# Patient Record
Sex: MALE | Race: BLACK OR AFRICAN AMERICAN | NOT HISPANIC OR LATINO
[De-identification: names, ages, dates, MRNs, and addresses within clinical notes are randomized per-mention and may not be internally consistent; named-entity substitution may affect disease eponyms.]

---

## 2019-08-20 ENCOUNTER — APPOINTMENT (OUTPATIENT)
Dept: UROLOGY | Facility: CLINIC | Age: 69
End: 2019-08-20
Payer: MEDICARE

## 2019-08-20 VITALS — BODY MASS INDEX: 26.52 KG/M2 | HEIGHT: 68 IN | WEIGHT: 175 LBS

## 2019-08-20 DIAGNOSIS — Z78.9 OTHER SPECIFIED HEALTH STATUS: ICD-10-CM

## 2019-08-20 DIAGNOSIS — R10.30 LOWER ABDOMINAL PAIN, UNSPECIFIED: ICD-10-CM

## 2019-08-20 PROCEDURE — 99204 OFFICE O/P NEW MOD 45 MIN: CPT

## 2019-08-20 NOTE — LETTER HEADER
[FreeTextEntry3] : Lee Montague MD\par Director of Robotic and Minimally Invasive Urologic Surgery\par \par [facility]\par Division of Urology\par [address]\par [address]\[place] [address]\par Tel [phone]\par Fax [phone]\par \par

## 2019-08-20 NOTE — LETTER BODY
[Dear  ___] : Dear  [unfilled], [Consult Letter:] : I had the pleasure of evaluating your patient, [unfilled]. [Please see my note below.] : Please see my note below. [Consult Closing:] : Thank you very much for allowing me to participate in the care of this patient.  If you have any questions, please do not hesitate to contact me. [Sincerely,] : Sincerely, [FreeTextEntry2] : Dr. Scar Peace\par [address]\[place] [address] [FreeTextEntry3] : Lee Montague MD\par Director of Robotic and Minimally Invasive Urologic Surgery\par [facility]\par

## 2019-08-20 NOTE — PHYSICAL EXAM
[General Appearance - Well Developed] : well developed [General Appearance - Well Nourished] : well nourished [Well Groomed] : well groomed [Normal Appearance] : normal appearance [General Appearance - In No Acute Distress] : no acute distress [Edema] : no peripheral edema [Respiration, Rhythm And Depth] : normal respiratory rhythm and effort [Exaggerated Use Of Accessory Muscles For Inspiration] : no accessory muscle use [Abdomen Soft] : soft [Abdomen Tenderness] : non-tender [Costovertebral Angle Tenderness] : no ~M costovertebral angle tenderness [Urethral Meatus] : meatus normal [Urinary Bladder Findings] : the bladder was normal on palpation [Penis Abnormality] : normal uncircumcised penis [Scrotum] : the scrotum was normal [Testes Tenderness] : no tenderness of the testes [Testes Mass (___cm)] : there were no testicular masses [No Prostate Nodules] : no prostate nodules [Prostate Tenderness] : the prostate was not tender [Prostate Size ___ gm] : prostate size [unfilled] gm [Normal Station and Gait] : the gait and station were normal for the patient's age [] : no rash [Oriented To Time, Place, And Person] : oriented to person, place, and time [No Focal Deficits] : no focal deficits [Affect] : the affect was normal [Mood] : the mood was normal [Not Anxious] : not anxious [FreeTextEntry1] : There is a firm, Indurated Non-mobile 2 cm mass (post to the perineal body) tracking to the rectum.

## 2019-08-20 NOTE — HISTORY OF PRESENT ILLNESS
[Nocturia] : nocturia [Weak Stream] : weak stream [FreeTextEntry1] : Yemi is a 69-year-old male who was sent for consultation regarding a perineal "lump" that he felt approximately 2 months ago. He is unsure if it has changed in size or quality.\par \par He denies any pain, drainage, dysuria, gross hematuria, abdominal/flank pain, suprapubic discomfort, testicular discomfort, drainage from the area, fever, chills, or further constitutional symptoms.\par Denies hx of perianal fistulas.\par \par At baseline he is reporting weeks drain and 2 episodes of Nocturia with occasional feelings of incomplete emptying.\par \par His IPSS is 9, indicating moderate symptoms, and he is mostly unsatisfied with the way he is voiding.\par His IIEF is 7 indicating severe ED. He has difficulty with both Obtaining/maintaining an erection. He has never tried PDE 5 inhibitors before.\par \par Old records reviewed and his PSA is 0.2 from 2018. His prior PSA titers have covered between 0.3-0.2 for the last 7 years.\par BMP from February 2018 shows a creatinine of 1.16 an eGFR of 75\par His hemoglobin A1c is 5.9 and borderline

## 2019-08-20 NOTE — ASSESSMENT
[FreeTextEntry1] : Yemi is a 69-year-old male who presents for evaluation of a perineal solid nodule in close proximity to the anus. Given it is indurated and somewhat fixed I would recommend consultation with colorectal surgery Dr Jaramillo with an MRI prior.\par ?neoplasm vs. fibrosed perianal fistulous tract?\par \par For mild LUTS patient is not bothered at this time and he prefers observation. UA\par \par Re Erectile dysfunction, recommending sildenafil.\par \par Based on the patient's history, he was prescribed a PDE5 inhibior. The patient understands that the risks of this medication include facial redness, flushing, nasal congestion, GERD, back pain, priapism, chest pain/MI, dizziness, drop in BP, changes in vision. ER precautions were given. The patient has been screened for potential medication interactions. He is not on any nitrates, po antifungals or HIV meds. He is not on alpha blockers.\par  I recommended that the patient take the first dose by himself. He knows that the medication requires approximately 45 minutes to have effect and works best on an empty stomach. He is aware sexual stimulation is required.\par \par \par

## 2019-08-21 ENCOUNTER — TRANSCRIPTION ENCOUNTER (OUTPATIENT)
Age: 69
End: 2019-08-21

## 2019-08-27 ENCOUNTER — OUTPATIENT (OUTPATIENT)
Dept: OUTPATIENT SERVICES | Facility: HOSPITAL | Age: 69
LOS: 1 days | Discharge: HOME | End: 2019-08-27
Payer: MEDICARE

## 2019-08-27 DIAGNOSIS — N50.89 OTHER SPECIFIED DISORDERS OF THE MALE GENITAL ORGANS: ICD-10-CM

## 2019-08-27 PROCEDURE — 72197 MRI PELVIS W/O & W/DYE: CPT | Mod: 26

## 2019-09-06 ENCOUNTER — OUTPATIENT (OUTPATIENT)
Dept: OUTPATIENT SERVICES | Facility: HOSPITAL | Age: 69
LOS: 1 days | Discharge: HOME | End: 2019-09-06

## 2019-09-06 ENCOUNTER — LABORATORY RESULT (OUTPATIENT)
Age: 69
End: 2019-09-06

## 2019-09-06 DIAGNOSIS — N50.89 OTHER SPECIFIED DISORDERS OF THE MALE GENITAL ORGANS: ICD-10-CM

## 2019-09-07 LAB
APPEARANCE: CLEAR
BILIRUBIN URINE: NEGATIVE
BLOOD URINE: NEGATIVE
COLOR: NORMAL
GLUCOSE QUALITATIVE U: NEGATIVE
KETONES URINE: NEGATIVE
LEUKOCYTE ESTERASE URINE: ABNORMAL
NITRITE URINE: POSITIVE
PH URINE: 6
PROTEIN URINE: NORMAL
SPECIFIC GRAVITY URINE: 1.01
UROBILINOGEN URINE: NORMAL

## 2019-09-17 ENCOUNTER — APPOINTMENT (OUTPATIENT)
Dept: SURGERY | Facility: CLINIC | Age: 69
End: 2019-09-17
Payer: MEDICARE

## 2019-09-17 VITALS
HEIGHT: 68 IN | DIASTOLIC BLOOD PRESSURE: 78 MMHG | WEIGHT: 182 LBS | BODY MASS INDEX: 27.58 KG/M2 | SYSTOLIC BLOOD PRESSURE: 130 MMHG

## 2019-09-17 DIAGNOSIS — I10 ESSENTIAL (PRIMARY) HYPERTENSION: ICD-10-CM

## 2019-09-17 DIAGNOSIS — E11.9 TYPE 2 DIABETES MELLITUS W/OUT COMPLICATIONS: ICD-10-CM

## 2019-09-17 DIAGNOSIS — E78.00 PURE HYPERCHOLESTEROLEMIA, UNSPECIFIED: ICD-10-CM

## 2019-09-17 PROCEDURE — 99203 OFFICE O/P NEW LOW 30 MIN: CPT | Mod: 25

## 2019-09-17 PROCEDURE — 46600 DIAGNOSTIC ANOSCOPY SPX: CPT

## 2019-09-18 PROBLEM — E11.9 DIABETES: Status: ACTIVE | Noted: 2019-09-17

## 2019-09-18 PROBLEM — E78.00 HIGH CHOLESTEROL: Status: ACTIVE | Noted: 2019-09-17

## 2019-09-18 PROBLEM — I10 HIGH BLOOD PRESSURE: Status: ACTIVE | Noted: 2019-09-17

## 2019-10-01 ENCOUNTER — APPOINTMENT (OUTPATIENT)
Dept: UROLOGY | Facility: CLINIC | Age: 69
End: 2019-10-01
Payer: MEDICARE

## 2019-10-01 ENCOUNTER — OUTPATIENT (OUTPATIENT)
Dept: OUTPATIENT SERVICES | Facility: HOSPITAL | Age: 69
LOS: 1 days | Discharge: HOME | End: 2019-10-01

## 2019-10-01 VITALS
HEIGHT: 68 IN | SYSTOLIC BLOOD PRESSURE: 135 MMHG | HEART RATE: 69 BPM | WEIGHT: 182 LBS | BODY MASS INDEX: 27.58 KG/M2 | DIASTOLIC BLOOD PRESSURE: 71 MMHG

## 2019-10-01 PROCEDURE — 99215 OFFICE O/P EST HI 40 MIN: CPT

## 2019-10-01 NOTE — LETTER BODY
[Dear  ___] : Dear  [unfilled], [Consult Letter:] : I had the pleasure of evaluating your patient, [unfilled]. [Please see my note below.] : Please see my note below. [Consult Closing:] : Thank you very much for allowing me to participate in the care of this patient.  If you have any questions, please do not hesitate to contact me. [Sincerely,] : Sincerely, [FreeTextEntry2] : Dr. Scar Peace\par [address]\[place] [address] [FreeTextEntry3] : Lee Montague MD\par Director of Robotic and Minimally Invasive Urologic Surgery\par [facility]\par

## 2019-10-01 NOTE — HISTORY OF PRESENT ILLNESS
[FreeTextEntry1] : Yemi is a 69-year-old male who presents for evaluation of a perineal solid nodule in close proximity to the anus. Patient saw Dr Jaramillo of colorectal surgery and anoscopy was neg, perianal fistula or colorectal pathology was ruled out.\par \par Patient underwent an MRI which showed --No evidence of a perianal fistulous tract. \par \par Abnormal signal in the corpora spongiosum along with two rounded structures \par coursing inferiorly along a tract of enhancement in the posterior \par peritoneum. These findings may be related to chronic fibrotic/granulomatous \par changes related to prior insult or injury. While enhancement can be seen in \par the setting of fibrotic changes, it can also be seen in the setting of \par neoplasia (though this is considered less likely). \par \par Urinalysis demonstrates microhematura (3 rbcs).\par Patient does report mild decreased force of stream. Denies gross hematuria.\par \par Old records reviewed and his PSA is 0.2 from 2018. His prior PSA titers have covered between 0.3-0.2 for the last 7 years.\par BMP from February 2018 shows a creatinine of 1.16 an eGFR of 75\par His hemoglobin A1c is 5.9 and borderline [Nocturia] : nocturia

## 2019-10-01 NOTE — LETTER HEADER
[FreeTextEntry3] : Lee Montague MD\par Director of Robotic and Minimally Invasive Urologic Surgery\par \par [facility]\par Division of Urology\par [address]\par [address]\[place] [address]\par Tel [phone]\par Fax [phone]\par \par

## 2019-10-01 NOTE — ASSESSMENT
[FreeTextEntry1] : Yemi is a 69-year-old male who presents for evaluation of a perineal solid nodule, stable on exam. No colorectal pathology. MRI found to have \par abnormal signal in the corpora spongiosum along with two rounded structures \par coursing inferiorly along a tract of enhancement in the posterior \par peritoneum. \par \par Patient also has microscopic hematuria and mild LUTS, we will perform standard microheme w/u w CTU, urine studies and cystoscopy.\par I am also recommending cystoscopy to evaluate for a urethral lesion or possible benign entity such as urethral stricture causing spongiofibrosis\par \par Re Erectile dysfunction, recommending sildenafil.\par \par

## 2019-10-01 NOTE — PHYSICAL EXAM
[General Appearance - Well Developed] : well developed [General Appearance - Well Nourished] : well nourished [Normal Appearance] : normal appearance [Well Groomed] : well groomed [General Appearance - In No Acute Distress] : no acute distress [Abdomen Soft] : soft [Abdomen Tenderness] : non-tender [Costovertebral Angle Tenderness] : no ~M costovertebral angle tenderness [Penis Abnormality] : normal uncircumcised penis [Urethral Meatus] : meatus normal [Urinary Bladder Findings] : the bladder was normal on palpation [Scrotum] : the scrotum was normal [Testes Tenderness] : no tenderness of the testes [Testes Mass (___cm)] : there were no testicular masses [Edema] : no peripheral edema [] : no respiratory distress [Exaggerated Use Of Accessory Muscles For Inspiration] : no accessory muscle use [Respiration, Rhythm And Depth] : normal respiratory rhythm and effort [Oriented To Time, Place, And Person] : oriented to person, place, and time [Affect] : the affect was normal [Mood] : the mood was normal [Not Anxious] : not anxious [Normal Station and Gait] : the gait and station were normal for the patient's age [No Focal Deficits] : no focal deficits [FreeTextEntry1] : There is a firm, Indurated Non-mobile 2 cm mass, previous prostate exam neg 45 g

## 2019-10-02 DIAGNOSIS — R31.29 OTHER MICROSCOPIC HEMATURIA: ICD-10-CM

## 2019-10-02 DIAGNOSIS — N50.89 OTHER SPECIFIED DISORDERS OF THE MALE GENITAL ORGANS: ICD-10-CM

## 2019-10-02 LAB
CREAT SERPL-MCNC: 1.1 MG/DL
URINE CYTOLOGY: NORMAL

## 2019-10-03 LAB — BACTERIA UR CULT: NORMAL

## 2019-10-08 ENCOUNTER — FORM ENCOUNTER (OUTPATIENT)
Age: 69
End: 2019-10-08

## 2019-10-09 ENCOUNTER — OUTPATIENT (OUTPATIENT)
Dept: OUTPATIENT SERVICES | Facility: HOSPITAL | Age: 69
LOS: 1 days | Discharge: HOME | End: 2019-10-09
Payer: MEDICARE

## 2019-10-09 DIAGNOSIS — R31.29 OTHER MICROSCOPIC HEMATURIA: ICD-10-CM

## 2019-10-09 PROCEDURE — 74178 CT ABD&PLV WO CNTR FLWD CNTR: CPT | Mod: 26

## 2019-10-11 NOTE — ASSESSMENT
[FreeTextEntry1] : Mr. SINGER clearly had a nodular lesion in the perineum. Currently it it appears to be resolved. Evaluation of his MRI does not identify a fistula. In fact the MRI states that there is currently no evidence of anal fistula. Fortunately for Mr. SINGER it appears that whatever process was going on has resolved. I have advised him that there certainly is a chance that that it can return. I've advised him to followup with Dr. GARCIA, andl I will see him back in one month for reevaluation. Certainly if the lesion returned as before that he should call and see me sooner.

## 2019-10-11 NOTE — HISTORY OF PRESENT ILLNESS
[FreeTextEntry1] : Is a new patient visit for Mr. SINGER who is referred with a possible anal fistula. Mr. LLANOS was seen and evaluated by Dr. GARCIA due to a perineal nodular lesion. On evaluation appeared to be possibly an anal fistula. He is referred now for colorectal evaluation. In the interim Mr. SINGER has undergone an MRI. Overall Mr. SINGER states that he feels well. He's had no further pain or drainage from the area. In fact he states that it feels like it went away.

## 2019-10-11 NOTE — PHYSICAL EXAM
[Multiple Sinus Tracts] : no perianal sinus tracts [Excoriation] : excoriations [Wart] : no warts [Fistula] : no fistulas [Pilonidal Sinus] : no pilonidal sinus [Ulcer ___ cm] : no ulcers [Pilonidal Cyst] : no pilonidal cysts [Pilonidal Sinus Draining] : no pilonidal sinus drainage [Nonprolapsing] : a nonprolapsing (grade I) [Skin Tags] : residual hemorrhoidal skin tags were noted [Normal] : was normal [Alert] : alert [None] : there was no rectal mass  [Oriented to Place] : oriented to place [Oriented to Person] : oriented to person [Calm] : calm [Oriented to Time] : oriented to time [de-identified] : No focal deficits. [de-identified] : Full range of motion [de-identified] : Healthy male, NAD

## 2019-10-11 NOTE — PROCEDURE
[FreeTextEntry1] : Anoscopy performed using a disposable anoscope.  The patient was place in the left lateral decubitus position. After JOSÉ was performed the anoscope was inserted and the distal rectum was evaluated along with the anal canal and anal margin.\par \par Findings:\par Extensive evaluation of the perineum reveals whatever nodule was clear appears to be resolved. Palpation throughout the perineum including the median raphae and scrotum does not reveal a nodule at this time. Mr. SE MENA did point to the area where the nodule was previously seen.\par \par JOSÉ, good tone no palpable mass.\par \par Anoscopy, grade 1 and 2 internal hemorrhoids, otherwise normal mucosa.

## 2019-10-22 ENCOUNTER — EMERGENCY (EMERGENCY)
Facility: HOSPITAL | Age: 69
LOS: 0 days | Discharge: HOME | End: 2019-10-23
Attending: EMERGENCY MEDICINE | Admitting: EMERGENCY MEDICINE
Payer: MEDICARE

## 2019-10-22 VITALS
TEMPERATURE: 96 F | SYSTOLIC BLOOD PRESSURE: 155 MMHG | HEIGHT: 68 IN | DIASTOLIC BLOOD PRESSURE: 72 MMHG | HEART RATE: 81 BPM | WEIGHT: 184.97 LBS | OXYGEN SATURATION: 98 % | RESPIRATION RATE: 18 BRPM

## 2019-10-22 DIAGNOSIS — R31.9 HEMATURIA, UNSPECIFIED: ICD-10-CM

## 2019-10-22 DIAGNOSIS — N39.0 URINARY TRACT INFECTION, SITE NOT SPECIFIED: ICD-10-CM

## 2019-10-22 PROCEDURE — 99284 EMERGENCY DEPT VISIT MOD MDM: CPT

## 2019-10-23 VITALS
HEART RATE: 78 BPM | RESPIRATION RATE: 18 BRPM | SYSTOLIC BLOOD PRESSURE: 145 MMHG | DIASTOLIC BLOOD PRESSURE: 68 MMHG | OXYGEN SATURATION: 99 % | TEMPERATURE: 97 F

## 2019-10-23 LAB
ALBUMIN SERPL ELPH-MCNC: 4.4 G/DL — SIGNIFICANT CHANGE UP (ref 3.5–5.2)
ALP SERPL-CCNC: 54 U/L — SIGNIFICANT CHANGE UP (ref 30–115)
ALT FLD-CCNC: 27 U/L — SIGNIFICANT CHANGE UP (ref 0–41)
ANION GAP SERPL CALC-SCNC: 12 MMOL/L — SIGNIFICANT CHANGE UP (ref 7–14)
APPEARANCE UR: CLEAR — SIGNIFICANT CHANGE UP
AST SERPL-CCNC: 36 U/L — SIGNIFICANT CHANGE UP (ref 0–41)
BACTERIA # UR AUTO: ABNORMAL
BASOPHILS # BLD AUTO: 0.04 K/UL — SIGNIFICANT CHANGE UP (ref 0–0.2)
BASOPHILS NFR BLD AUTO: 0.6 % — SIGNIFICANT CHANGE UP (ref 0–1)
BILIRUB SERPL-MCNC: 0.5 MG/DL — SIGNIFICANT CHANGE UP (ref 0.2–1.2)
BILIRUB UR-MCNC: NEGATIVE — SIGNIFICANT CHANGE UP
BUN SERPL-MCNC: 15 MG/DL — SIGNIFICANT CHANGE UP (ref 10–20)
CALCIUM SERPL-MCNC: 9.3 MG/DL — SIGNIFICANT CHANGE UP (ref 8.5–10.1)
CHLORIDE SERPL-SCNC: 102 MMOL/L — SIGNIFICANT CHANGE UP (ref 98–110)
CO2 SERPL-SCNC: 25 MMOL/L — SIGNIFICANT CHANGE UP (ref 17–32)
COLOR SPEC: YELLOW — SIGNIFICANT CHANGE UP
CREAT SERPL-MCNC: 1.1 MG/DL — SIGNIFICANT CHANGE UP (ref 0.7–1.5)
DIFF PNL FLD: ABNORMAL
EOSINOPHIL # BLD AUTO: 0.28 K/UL — SIGNIFICANT CHANGE UP (ref 0–0.7)
EOSINOPHIL NFR BLD AUTO: 4.1 % — SIGNIFICANT CHANGE UP (ref 0–8)
EPI CELLS # UR: 0 /HPF — SIGNIFICANT CHANGE UP (ref 0–5)
GLUCOSE SERPL-MCNC: 121 MG/DL — HIGH (ref 70–99)
GLUCOSE UR QL: NEGATIVE — SIGNIFICANT CHANGE UP
HCT VFR BLD CALC: 44.3 % — SIGNIFICANT CHANGE UP (ref 42–52)
HGB BLD-MCNC: 15 G/DL — SIGNIFICANT CHANGE UP (ref 14–18)
HYALINE CASTS # UR AUTO: 1 /LPF — SIGNIFICANT CHANGE UP (ref 0–7)
IMM GRANULOCYTES NFR BLD AUTO: 0.6 % — HIGH (ref 0.1–0.3)
KETONES UR-MCNC: NEGATIVE — SIGNIFICANT CHANGE UP
LEUKOCYTE ESTERASE UR-ACNC: ABNORMAL
LYMPHOCYTES # BLD AUTO: 1.23 K/UL — SIGNIFICANT CHANGE UP (ref 1.2–3.4)
LYMPHOCYTES # BLD AUTO: 18 % — LOW (ref 20.5–51.1)
MCHC RBC-ENTMCNC: 29 PG — SIGNIFICANT CHANGE UP (ref 27–31)
MCHC RBC-ENTMCNC: 33.9 G/DL — SIGNIFICANT CHANGE UP (ref 32–37)
MCV RBC AUTO: 85.7 FL — SIGNIFICANT CHANGE UP (ref 80–94)
MONOCYTES # BLD AUTO: 0.11 K/UL — SIGNIFICANT CHANGE UP (ref 0.1–0.6)
MONOCYTES NFR BLD AUTO: 1.6 % — LOW (ref 1.7–9.3)
NEUTROPHILS # BLD AUTO: 5.13 K/UL — SIGNIFICANT CHANGE UP (ref 1.4–6.5)
NEUTROPHILS NFR BLD AUTO: 75.1 % — SIGNIFICANT CHANGE UP (ref 42.2–75.2)
NITRITE UR-MCNC: POSITIVE
NRBC # BLD: 0 /100 WBCS — SIGNIFICANT CHANGE UP (ref 0–0)
PH UR: 5.5 — SIGNIFICANT CHANGE UP (ref 5–8)
PLATELET # BLD AUTO: 217 K/UL — SIGNIFICANT CHANGE UP (ref 130–400)
POTASSIUM SERPL-MCNC: 5.8 MMOL/L — HIGH (ref 3.5–5)
POTASSIUM SERPL-SCNC: 5.8 MMOL/L — HIGH (ref 3.5–5)
PROT SERPL-MCNC: 7.9 G/DL — SIGNIFICANT CHANGE UP (ref 6–8)
PROT UR-MCNC: NEGATIVE — SIGNIFICANT CHANGE UP
RBC # BLD: 5.17 M/UL — SIGNIFICANT CHANGE UP (ref 4.7–6.1)
RBC # FLD: 14.2 % — SIGNIFICANT CHANGE UP (ref 11.5–14.5)
RBC CASTS # UR COMP ASSIST: 171 /HPF — HIGH (ref 0–4)
SODIUM SERPL-SCNC: 139 MMOL/L — SIGNIFICANT CHANGE UP (ref 135–146)
SP GR SPEC: 1.02 — SIGNIFICANT CHANGE UP (ref 1.01–1.02)
UROBILINOGEN FLD QL: SIGNIFICANT CHANGE UP
WBC # BLD: 6.83 K/UL — SIGNIFICANT CHANGE UP (ref 4.8–10.8)
WBC # FLD AUTO: 6.83 K/UL — SIGNIFICANT CHANGE UP (ref 4.8–10.8)
WBC UR QL: 27 /HPF — HIGH (ref 0–5)

## 2019-10-23 PROCEDURE — 76770 US EXAM ABDO BACK WALL COMP: CPT | Mod: 26

## 2019-10-23 RX ORDER — CEFTRIAXONE 500 MG/1
1000 INJECTION, POWDER, FOR SOLUTION INTRAMUSCULAR; INTRAVENOUS ONCE
Refills: 0 | Status: COMPLETED | OUTPATIENT
Start: 2019-10-23 | End: 2019-10-23

## 2019-10-23 RX ORDER — SODIUM CHLORIDE 9 MG/ML
1000 INJECTION INTRAMUSCULAR; INTRAVENOUS; SUBCUTANEOUS ONCE
Refills: 0 | Status: COMPLETED | OUTPATIENT
Start: 2019-10-23 | End: 2019-10-23

## 2019-10-23 RX ORDER — CEFPODOXIME PROXETIL 100 MG
1 TABLET ORAL
Qty: 20 | Refills: 0
Start: 2019-10-23 | End: 2019-11-01

## 2019-10-23 RX ADMIN — SODIUM CHLORIDE 1000 MILLILITER(S): 9 INJECTION INTRAMUSCULAR; INTRAVENOUS; SUBCUTANEOUS at 00:50

## 2019-10-23 RX ADMIN — CEFTRIAXONE 1000 MILLIGRAM(S): 500 INJECTION, POWDER, FOR SOLUTION INTRAMUSCULAR; INTRAVENOUS at 01:58

## 2019-10-23 RX ADMIN — CEFTRIAXONE 100 MILLIGRAM(S): 500 INJECTION, POWDER, FOR SOLUTION INTRAMUSCULAR; INTRAVENOUS at 00:57

## 2019-10-23 RX ADMIN — SODIUM CHLORIDE 1000 MILLILITER(S): 9 INJECTION INTRAMUSCULAR; INTRAVENOUS; SUBCUTANEOUS at 01:58

## 2019-10-23 NOTE — ED PROVIDER NOTE - OBJECTIVE STATEMENT
69 year old male w hx of HTN, DM, DLD presents to the ED with sudden onset moderate hematuria around 9 PM tonight associated with chills and dysuria. Patient states he had minimal hematuria a few weeks ago, was evaluated by urologist Dr. Montague and had CT a/p at that time (negative report here in records). No prior hx of kidney stones, UTIs, or abdominal surgeries. Denies antiplatelet/anticoagulation use. Denies fevers, chest pain, shortness of breath, abd pain, back/flank pain, penile d/c, scrotal pain/swelling. No prior smoking hx or concern for STI.

## 2019-10-23 NOTE — ED PROVIDER NOTE - ATTENDING CONTRIBUTION TO CARE
70 yo male with pmh of high chol, HTN, gout, DM who presents today for hematuria tonight. He was in his USOH, when he felt shaky, rigors and noticed bloody urine and dysuria. Pt has +nausea, no vomiting/diarrhea. Pt has decreased appetite. No back pain, no CP/SOB/cough/abdominal pain. No rash.   On exam ncat, lungs ctab, heart regular s1s2, abdomen soft nd +mild tenderness to suprapubic, +BS, no flank tenderness, Ext no edema no calf tenderness, neuro intact. Check labs, urine, US and reassess.

## 2019-10-23 NOTE — ED PROVIDER NOTE - CLINICAL SUMMARY MEDICAL DECISION MAKING FREE TEXT BOX
70 yo male with pmh of high chol, htn, gout, DM who presents to ER for hematuria/chills tonight. +nausea. No pain to back but slight pain to suprapubic. Pt had labs,  urine, US done. +UTI. Started on abx. Copy of CT scan from Oct 9, 2019 given to patient as requested by pt and wife.

## 2019-10-23 NOTE — ED PROVIDER NOTE - PHYSICAL EXAMINATION
VITALS:  I have reviewed the initial vital signs.  GENERAL: Well-developed, well-nourished, in no acute distress. Nontoxic.  HEENT: Sclera clear. No conjunctival pallor. EOMI, PERRLA. Mucous membranes moist.  CARDIO: RRR, nl S1 and S2. No murmurs, rubs, or gallops.  PULM: Normal effort. CTA b/l without wheezes, rales, or rhonchi.  MSK: No paraspinal muscle ttp. No midline thoracic or lumbar spinous tenderness, step offs, or deformity.  GI: Normal bowel sounds. Abdomen soft and non-distended. Nontender in all four quadrants without rebound or guarding.  : No CVA tenderness b/l.  SKIN: Warm, dry. No pallor. Capillary refill <2 seconds.  NEURO: A&Ox3. Speech clear. No gross motor/sensory deficits.

## 2019-10-23 NOTE — ED PROVIDER NOTE - PROGRESS NOTE DETAILS
patient resting comfortably, no new complaints. discussed results, plan to tx for UTI, f/u with Clari. patient and family agreeable, verbalize understanding of return precautions.

## 2019-10-23 NOTE — ED PROVIDER NOTE - CARE PROVIDER_API CALL
Lee Montague)  [facility]  [address] [place] [address], [address]  [place] [address]  Phone: [phone]  Fax: [phone]  Follow Up Time: 1-3 Days

## 2019-10-23 NOTE — ED PROVIDER NOTE - NS ED ROS FT
CONSTITUTIONAL: (-) fevers, (+) chills, (-) decreased appetite  CARDIO: (-) chest pain, (-) palpitations, (-) edema  PULM: (-) cough, (-) sputum, (-) shortness of breath  GI: (-) nausea, (-) vomiting, (-) diarrhea, (-) constipation, (-) abdominal pain, (-) melena, (-) hematochezia  : see HPI, (+) dysuria, (+) hematuria, (-) frequency, (-) urgency, (-) incontinence, (-) difficulty urinating, (-) urinary retention, (-) flank pain, (-) penile discharge  HEME: (-) easy bruising, (-) easy bleeding  MSK: (-) back pain, (-) myalgias  SKIN: (-) rashes, (-) pallor  NEURO: (-) headache, (-) LOC, (-) dizziness, (-) lightheadedness, (-) syncope, (-) weakness    *all other systems negative except as documented above and in the HPI*

## 2019-10-23 NOTE — ED ADULT NURSE NOTE - NSIMPLEMENTINTERV_GEN_ALL_ED
Implemented All Universal Safety Interventions:  [place] to call system. Call bell, personal items and telephone within reach. Instruct patient to call for assistance. Room bathroom lighting operational. Non-slip footwear when patient is off stretcher. Physically safe environment: no spills, clutter or unnecessary equipment. Stretcher in lowest position, wheels locked, appropriate side rails in place.

## 2019-10-23 NOTE — ED PROVIDER NOTE - PATIENT PORTAL LINK FT
You can access the FollowMyHealth Patient Portal offered by [facility] by registering at the following website: [email or web address]. By joining [employer]’s FollowMyHealth portal, you will also be able to view your health information using other applications (apps) compatible with our system.

## 2019-10-24 LAB
CULTURE RESULTS: SIGNIFICANT CHANGE UP
SPECIMEN SOURCE: SIGNIFICANT CHANGE UP

## 2019-11-12 ENCOUNTER — APPOINTMENT (OUTPATIENT)
Dept: SURGERY | Facility: CLINIC | Age: 69
End: 2019-11-12

## 2019-11-19 ENCOUNTER — APPOINTMENT (OUTPATIENT)
Dept: UROLOGY | Facility: CLINIC | Age: 69
End: 2019-11-19
Payer: MEDICARE

## 2019-11-19 DIAGNOSIS — R35.1 NOCTURIA: ICD-10-CM

## 2019-11-19 PROCEDURE — 52000 CYSTOURETHROSCOPY: CPT

## 2019-11-19 PROCEDURE — 99214 OFFICE O/P EST MOD 30 MIN: CPT | Mod: 25

## 2019-11-19 NOTE — LETTER BODY
[Consult Letter:] : I had the pleasure of evaluating your patient, [unfilled]. [Dear  ___] : Dear  [unfilled], [Please see my note below.] : Please see my note below. [Consult Closing:] : Thank you very much for allowing me to participate in the care of this patient.  If you have any questions, please do not hesitate to contact me. [Sincerely,] : Sincerely, [FreeTextEntry2] : Dr. Scar Peace\par [address]\[place] [address] [FreeTextEntry3] : Lee Montague MD\par Director of Robotic and Minimally Invasive Urologic Surgery\par [facility]\par

## 2019-11-19 NOTE — PHYSICAL EXAM
[General Appearance - Well Developed] : well developed [General Appearance - Well Nourished] : well nourished [Normal Appearance] : normal appearance [Well Groomed] : well groomed [General Appearance - In No Acute Distress] : no acute distress [Abdomen Soft] : soft [Abdomen Tenderness] : non-tender [Costovertebral Angle Tenderness] : no ~M costovertebral angle tenderness [Urethral Meatus] : meatus normal [Penis Abnormality] : normal uncircumcised penis [Urinary Bladder Findings] : the bladder was normal on palpation [Scrotum] : the scrotum was normal [Testes Tenderness] : no tenderness of the testes [Testes Mass (___cm)] : there were no testicular masses [FreeTextEntry1] : There is a firm, Indurated Non-mobile 2 cm mass, previous prostate exam neg 45 g [Edema] : no peripheral edema [] : no respiratory distress [Respiration, Rhythm And Depth] : normal respiratory rhythm and effort [Exaggerated Use Of Accessory Muscles For Inspiration] : no accessory muscle use [Oriented To Time, Place, And Person] : oriented to person, place, and time [Affect] : the affect was normal [Mood] : the mood was normal [Not Anxious] : not anxious [Normal Station and Gait] : the gait and station were normal for the patient's age [No Focal Deficits] : no focal deficits

## 2019-11-19 NOTE — LETTER HEADER
[FreeTextEntry3] : Lee Montague MD\par Director of Robotic and Minimally Invasive Urologic Surgery\par \par [facility]\par Division of Urology\par [address]\par [address]\[place] [address]\par Tel [phone]\par Fax [phone]\par \par

## 2019-11-19 NOTE — ASSESSMENT
[FreeTextEntry1] : Yemi is a 69-year-old male who presents for evaluation of a perineal solid nodule, stable on exam. No colorectal pathology. MRI found to have \par abnormal signal in the corpora spongiosum along with two rounded structures \par coursing inferiorly along a tract of enhancement in the posterior \par peritoneum. \par \par Ucytol shows atypical squamous cells, benign urothelial cells,\par Cysto 11/19/19- urethroscopy shows bulbar urethral narrowing starting 3 cm distal to the fixed perineal nodule. not able to advance scope beyond narrowing\par \par Plan for cystoscopy, urethral dilation, urethral biopsy and fulguration. We discussed r/b/a including infection, bleeding ,injury to  tract, need for catheter, recurrence of stricture. Discussed my concern that this is urethral CA vs stricture w spongiofibrosis. \par \par \par \par

## 2019-11-19 NOTE — HISTORY OF PRESENT ILLNESS
[FreeTextEntry1] : Yemi is a 69-year-old male who presents for evaluation of a perineal solid nodule, stable on exam. No colorectal pathology. MRI found to have \par abnormal signal in the corpora spongiosum along with two rounded structures \par coursing inferiorly along a tract of enhancement in the posterior \par peritoneum. \par \par Ucytol shows atypical squamous cells, benign urothelial cells,\par cysto 11/19/19- urethroscopy shows bulbar urethral narrowing starting 3 cm distal to the fixed perineal nodule. not able to advance scope beyond narrowing\par CTU- no  pathology\par Had UTI since last time we saw pt.  cc at hospital bladder sono w debris in bladder. now asymptomatic.\par \par Patient underwent an MRI which showed --No evidence of a perianal fistulous tract. \par Abnormal signal in the corpora spongiosum along with two rounded structures \par coursing inferiorly along a tract of enhancement in the posterior \par peritoneum. These findings may be related to chronic fibrotic/granulomatous \par changes related to prior insult or injury. While enhancement can be seen in \par the setting of fibrotic changes, it can also be seen in the setting of \par neoplasia (though this is considered less likely). \par \par Urinalysis demonstrates microhematura (3 rbcs).\par Patient does report mild decreased force of stream. Denies gross hematuria.\par \par Old records reviewed and his PSA is 0.2 from 2018. His prior PSA titers have covered between 0.3-0.2 for the last 7 years.\par BMP from February 2018 shows a creatinine of 1.16 an eGFR of 75\par His hemoglobin A1c is 5.9 and borderline

## 2019-12-09 ENCOUNTER — OUTPATIENT (OUTPATIENT)
Dept: OUTPATIENT SERVICES | Facility: HOSPITAL | Age: 69
LOS: 1 days | Discharge: HOME | End: 2019-12-09
Payer: MEDICARE

## 2019-12-09 VITALS
SYSTOLIC BLOOD PRESSURE: 126 MMHG | OXYGEN SATURATION: 100 % | DIASTOLIC BLOOD PRESSURE: 75 MMHG | WEIGHT: 176.37 LBS | HEART RATE: 70 BPM | TEMPERATURE: 98 F | HEIGHT: 68 IN | RESPIRATION RATE: 16 BRPM

## 2019-12-09 DIAGNOSIS — Z98.890 OTHER SPECIFIED POSTPROCEDURAL STATES: Chronic | ICD-10-CM

## 2019-12-09 DIAGNOSIS — Z01.818 ENCOUNTER FOR OTHER PREPROCEDURAL EXAMINATION: ICD-10-CM

## 2019-12-09 DIAGNOSIS — N36.9 URETHRAL DISORDER, UNSPECIFIED: ICD-10-CM

## 2019-12-09 LAB
ALBUMIN SERPL ELPH-MCNC: 4.5 G/DL — SIGNIFICANT CHANGE UP (ref 3.5–5.2)
ALP SERPL-CCNC: 52 U/L — SIGNIFICANT CHANGE UP (ref 30–115)
ALT FLD-CCNC: 20 U/L — SIGNIFICANT CHANGE UP (ref 0–41)
ANION GAP SERPL CALC-SCNC: 18 MMOL/L — HIGH (ref 7–14)
APPEARANCE UR: CLEAR — SIGNIFICANT CHANGE UP
APTT BLD: 30.2 SEC — SIGNIFICANT CHANGE UP (ref 27–39.2)
AST SERPL-CCNC: 18 U/L — SIGNIFICANT CHANGE UP (ref 0–41)
BASOPHILS # BLD AUTO: 0.04 K/UL — SIGNIFICANT CHANGE UP (ref 0–0.2)
BASOPHILS NFR BLD AUTO: 0.6 % — SIGNIFICANT CHANGE UP (ref 0–1)
BILIRUB SERPL-MCNC: 0.4 MG/DL — SIGNIFICANT CHANGE UP (ref 0.2–1.2)
BILIRUB UR-MCNC: NEGATIVE — SIGNIFICANT CHANGE UP
BUN SERPL-MCNC: 16 MG/DL — SIGNIFICANT CHANGE UP (ref 10–20)
CALCIUM SERPL-MCNC: 9.5 MG/DL — SIGNIFICANT CHANGE UP (ref 8.5–10.1)
CHLORIDE SERPL-SCNC: 98 MMOL/L — SIGNIFICANT CHANGE UP (ref 98–110)
CO2 SERPL-SCNC: 22 MMOL/L — SIGNIFICANT CHANGE UP (ref 17–32)
COLOR SPEC: SIGNIFICANT CHANGE UP
CREAT SERPL-MCNC: 1.1 MG/DL — SIGNIFICANT CHANGE UP (ref 0.7–1.5)
DIFF PNL FLD: NEGATIVE — SIGNIFICANT CHANGE UP
EOSINOPHIL # BLD AUTO: 0.28 K/UL — SIGNIFICANT CHANGE UP (ref 0–0.7)
EOSINOPHIL NFR BLD AUTO: 4.5 % — SIGNIFICANT CHANGE UP (ref 0–8)
GLUCOSE SERPL-MCNC: 80 MG/DL — SIGNIFICANT CHANGE UP (ref 70–99)
GLUCOSE UR QL: NEGATIVE — SIGNIFICANT CHANGE UP
HCT VFR BLD CALC: 45.4 % — SIGNIFICANT CHANGE UP (ref 42–52)
HGB BLD-MCNC: 15.2 G/DL — SIGNIFICANT CHANGE UP (ref 14–18)
IMM GRANULOCYTES NFR BLD AUTO: 0.2 % — SIGNIFICANT CHANGE UP (ref 0.1–0.3)
INR BLD: 1.02 RATIO — SIGNIFICANT CHANGE UP (ref 0.65–1.3)
KETONES UR-MCNC: NEGATIVE — SIGNIFICANT CHANGE UP
LEUKOCYTE ESTERASE UR-ACNC: ABNORMAL
LYMPHOCYTES # BLD AUTO: 1.82 K/UL — SIGNIFICANT CHANGE UP (ref 1.2–3.4)
LYMPHOCYTES # BLD AUTO: 29.5 % — SIGNIFICANT CHANGE UP (ref 20.5–51.1)
MCHC RBC-ENTMCNC: 28.9 PG — SIGNIFICANT CHANGE UP (ref 27–31)
MCHC RBC-ENTMCNC: 33.5 G/DL — SIGNIFICANT CHANGE UP (ref 32–37)
MCV RBC AUTO: 86.3 FL — SIGNIFICANT CHANGE UP (ref 80–94)
MONOCYTES # BLD AUTO: 0.58 K/UL — SIGNIFICANT CHANGE UP (ref 0.1–0.6)
MONOCYTES NFR BLD AUTO: 9.4 % — HIGH (ref 1.7–9.3)
NEUTROPHILS # BLD AUTO: 3.43 K/UL — SIGNIFICANT CHANGE UP (ref 1.4–6.5)
NEUTROPHILS NFR BLD AUTO: 55.8 % — SIGNIFICANT CHANGE UP (ref 42.2–75.2)
NITRITE UR-MCNC: POSITIVE
NRBC # BLD: 0 /100 WBCS — SIGNIFICANT CHANGE UP (ref 0–0)
PH UR: 6 — SIGNIFICANT CHANGE UP (ref 5–8)
PLATELET # BLD AUTO: 229 K/UL — SIGNIFICANT CHANGE UP (ref 130–400)
POTASSIUM SERPL-MCNC: 4.7 MMOL/L — SIGNIFICANT CHANGE UP (ref 3.5–5)
POTASSIUM SERPL-SCNC: 4.7 MMOL/L — SIGNIFICANT CHANGE UP (ref 3.5–5)
PROT SERPL-MCNC: 7.8 G/DL — SIGNIFICANT CHANGE UP (ref 6–8)
PROT UR-MCNC: NEGATIVE — SIGNIFICANT CHANGE UP
PROTHROM AB SERPL-ACNC: 11.7 SEC — SIGNIFICANT CHANGE UP (ref 9.95–12.87)
RBC # BLD: 5.26 M/UL — SIGNIFICANT CHANGE UP (ref 4.7–6.1)
RBC # FLD: 14.7 % — HIGH (ref 11.5–14.5)
SODIUM SERPL-SCNC: 138 MMOL/L — SIGNIFICANT CHANGE UP (ref 135–146)
SP GR SPEC: 1.02 — SIGNIFICANT CHANGE UP (ref 1.01–1.02)
UROBILINOGEN FLD QL: SIGNIFICANT CHANGE UP
WBC # BLD: 6.16 K/UL — SIGNIFICANT CHANGE UP (ref 4.8–10.8)
WBC # FLD AUTO: 6.16 K/UL — SIGNIFICANT CHANGE UP (ref 4.8–10.8)

## 2019-12-09 PROCEDURE — 71046 X-RAY EXAM CHEST 2 VIEWS: CPT | Mod: 26

## 2019-12-09 PROCEDURE — 93010 ELECTROCARDIOGRAM REPORT: CPT

## 2019-12-09 NOTE — H&P PST ADULT - REASON FOR ADMISSION
70 yo male presents with c/o "difficulty urinating, I have a low stream"; pt is scheduled for urethral dilation  denies chest pain, palpitations, shortness of breath, dyspnea, or dysuria. exercise tolerance: 2 blocks/ flights of stairs w/o sob

## 2019-12-10 LAB
BACTERIA # UR AUTO: ABNORMAL
EPI CELLS # UR: 1 /HPF — SIGNIFICANT CHANGE UP (ref 0–5)
ESTIMATED AVERAGE GLUCOSE: 134 MG/DL — HIGH (ref 68–114)
HBA1C BLD-MCNC: 6.3 % — HIGH (ref 4–5.6)
HYALINE CASTS # UR AUTO: 0 /LPF — SIGNIFICANT CHANGE UP (ref 0–7)
RBC CASTS # UR COMP ASSIST: 2 /HPF — SIGNIFICANT CHANGE UP (ref 0–4)
WBC UR QL: 10 /HPF — HIGH (ref 0–5)

## 2019-12-11 LAB
CULTURE RESULTS: SIGNIFICANT CHANGE UP
SPECIMEN SOURCE: SIGNIFICANT CHANGE UP

## 2019-12-16 PROBLEM — E11.9 TYPE 2 DIABETES MELLITUS WITHOUT COMPLICATIONS: Chronic | Status: ACTIVE | Noted: 2019-12-09

## 2019-12-16 PROBLEM — I10 ESSENTIAL (PRIMARY) HYPERTENSION: Chronic | Status: ACTIVE | Noted: 2019-12-09

## 2019-12-16 PROBLEM — E78.00 PURE HYPERCHOLESTEROLEMIA, UNSPECIFIED: Chronic | Status: ACTIVE | Noted: 2019-12-09

## 2019-12-28 ENCOUNTER — OUTPATIENT (OUTPATIENT)
Dept: OUTPATIENT SERVICES | Facility: HOSPITAL | Age: 69
LOS: 1 days | Discharge: HOME | End: 2019-12-28

## 2019-12-28 DIAGNOSIS — R31.29 OTHER MICROSCOPIC HEMATURIA: ICD-10-CM

## 2019-12-28 DIAGNOSIS — Z98.890 OTHER SPECIFIED POSTPROCEDURAL STATES: Chronic | ICD-10-CM

## 2019-12-28 DIAGNOSIS — N50.89 OTHER SPECIFIED DISORDERS OF THE MALE GENITAL ORGANS: ICD-10-CM

## 2020-01-02 LAB
APPEARANCE: CLEAR
BACTERIA UR CULT: NORMAL
BILIRUBIN URINE: NEGATIVE
BLOOD URINE: NEGATIVE
COLOR: YELLOW
GLUCOSE QUALITATIVE U: NEGATIVE
KETONES URINE: NEGATIVE
LEUKOCYTE ESTERASE URINE: NEGATIVE
NITRITE URINE: NEGATIVE
PH URINE: 6
PROTEIN URINE: NEGATIVE
SPECIFIC GRAVITY URINE: 1.01
UROBILINOGEN URINE: NORMAL

## 2020-01-03 ENCOUNTER — APPOINTMENT (OUTPATIENT)
Dept: UROLOGY | Facility: HOSPITAL | Age: 70
End: 2020-01-03
Payer: MEDICARE

## 2020-01-03 ENCOUNTER — RESULT REVIEW (OUTPATIENT)
Age: 70
End: 2020-01-03

## 2020-01-03 ENCOUNTER — OUTPATIENT (OUTPATIENT)
Dept: OUTPATIENT SERVICES | Facility: HOSPITAL | Age: 70
LOS: 1 days | Discharge: HOME | End: 2020-01-03
Payer: MEDICARE

## 2020-01-03 VITALS — SYSTOLIC BLOOD PRESSURE: 172 MMHG | RESPIRATION RATE: 17 BRPM | DIASTOLIC BLOOD PRESSURE: 88 MMHG | HEART RATE: 64 BPM

## 2020-01-03 VITALS
TEMPERATURE: 96 F | OXYGEN SATURATION: 97 % | SYSTOLIC BLOOD PRESSURE: 141 MMHG | RESPIRATION RATE: 17 BRPM | HEART RATE: 74 BPM | HEIGHT: 68 IN | WEIGHT: 184.97 LBS | DIASTOLIC BLOOD PRESSURE: 67 MMHG

## 2020-01-03 DIAGNOSIS — Z98.890 OTHER SPECIFIED POSTPROCEDURAL STATES: Chronic | ICD-10-CM

## 2020-01-03 LAB — GLUCOSE BLDC GLUCOMTR-MCNC: 120 MG/DL — HIGH (ref 70–99)

## 2020-01-03 PROCEDURE — 88305 TISSUE EXAM BY PATHOLOGIST: CPT | Mod: 26

## 2020-01-03 PROCEDURE — 52224 CYSTOSCOPY AND TREATMENT: CPT

## 2020-01-03 PROCEDURE — 88313 SPECIAL STAINS GROUP 2: CPT | Mod: 26

## 2020-01-03 PROCEDURE — 52276 CYSTOSCOPY AND TREATMENT: CPT

## 2020-01-03 RX ORDER — HYDROMORPHONE HYDROCHLORIDE 2 MG/ML
0.5 INJECTION INTRAMUSCULAR; INTRAVENOUS; SUBCUTANEOUS
Refills: 0 | Status: DISCONTINUED | OUTPATIENT
Start: 2020-01-03 | End: 2020-01-03

## 2020-01-03 RX ORDER — ONDANSETRON 8 MG/1
4 TABLET, FILM COATED ORAL ONCE
Refills: 0 | Status: DISCONTINUED | OUTPATIENT
Start: 2020-01-03 | End: 2020-02-05

## 2020-01-03 RX ORDER — OXYCODONE AND ACETAMINOPHEN 5; 325 MG/1; MG/1
1 TABLET ORAL EVERY 4 HOURS
Refills: 0 | Status: DISCONTINUED | OUTPATIENT
Start: 2020-01-03 | End: 2020-01-03

## 2020-01-03 RX ORDER — ACETAMINOPHEN 500 MG
650 TABLET ORAL ONCE
Refills: 0 | Status: DISCONTINUED | OUTPATIENT
Start: 2020-01-03 | End: 2020-02-05

## 2020-01-03 RX ORDER — SODIUM CHLORIDE 9 MG/ML
1000 INJECTION, SOLUTION INTRAVENOUS
Refills: 0 | Status: DISCONTINUED | OUTPATIENT
Start: 2020-01-03 | End: 2020-02-05

## 2020-01-03 RX ADMIN — SODIUM CHLORIDE 100 MILLILITER(S): 9 INJECTION, SOLUTION INTRAVENOUS at 15:23

## 2020-01-03 NOTE — ASU DISCHARGE PLAN (ADULT/PEDIATRIC) - ASU DC SPECIAL INSTRUCTIONSFT
You have a catheter which will stay in until your next appointment with Dr Montague. Please take antibiotics which have been prescribed to your pharmacy. Save one antibiotic tablet for the morning of catheter removal. You may take tylenol for pain as needed.  Blood In urine or around the tip of the penis is expected. If the catheter stops draining or if you develop fevers or chills >100.4 not improving w tylenol you should visit emergency room and alert Dr Montague.  Dr Montague's office will call you for a follow up appointment.

## 2020-01-03 NOTE — PACU DISCHARGE NOTE - COMMENTS
moderately elevated BP,  Patient declined iv bp meds.  prefer to go home and take his missing medication since this AM.    May discharge patient from PACu

## 2020-01-03 NOTE — ASU PREOP CHECKLIST - WEIGHT IN KG
"Michael Ornelas is a 20 y.o. female.   Pulse 116   Temp 99.3 °F (37.4 °C)   Resp 12   Ht 170.2 cm (67\")   Wt 119 kg (262 lb 12.8 oz)   LMP  (LMP Unknown)   SpO2 96%   BMI 41.16 kg/m²   Past Medical History:   Diagnosis Date   • Allergic    • Anxiety    • Depression      Allergies   Allergen Reactions   • Ibuprofen Hives and Angioedema         Sore Throat    This is a new problem. The current episode started in the past 7 days. The problem has been gradually worsening. Maximum temperature: subjective. Associated symptoms include congestion, coughing and ear pain. Pertinent negatives include no abdominal pain, diarrhea, drooling, ear discharge, headaches, hoarse voice, plugged ear sensation or neck pain.   Cough   Associated symptoms include ear pain, a fever and a sore throat. Pertinent negatives include no headaches.   Earache    Associated symptoms include coughing and a sore throat. Pertinent negatives include no abdominal pain, diarrhea, ear discharge, headaches or neck pain.   Possible Pregnancy   Associated symptoms include congestion, coughing, fatigue, a fever and a sore throat. Pertinent negatives include no abdominal pain, headaches or neck pain.        The following portions of the patient's history were reviewed and updated as appropriate: allergies, current medications, past family history, past medical history, past social history, past surgical history and problem list.    Review of Systems   Constitutional: Positive for fatigue and fever.   HENT: Positive for congestion, ear pain and sore throat. Negative for drooling, ear discharge and hoarse voice.    Respiratory: Positive for cough.    Gastrointestinal: Negative for abdominal pain and diarrhea.   Musculoskeletal: Negative for neck pain.   Neurological: Negative for headaches.       Objective   Physical Exam   Constitutional: She appears well-developed and well-nourished.  Non-toxic appearance. She appears ill.   HENT:   Head: " Normocephalic and atraumatic.   Right Ear: Ear canal normal. Tympanic membrane is erythematous. Tympanic membrane is not perforated and not bulging.   Left Ear: Ear canal normal. Tympanic membrane is erythematous. Tympanic membrane is not perforated and not bulging.   Nose: Right sinus exhibits maxillary sinus tenderness. Right sinus exhibits no frontal sinus tenderness. Left sinus exhibits maxillary sinus tenderness. Left sinus exhibits no frontal sinus tenderness.   Cardiovascular: Regular rhythm and normal heart sounds.   Pulmonary/Chest: Effort normal. She has no wheezes. She has no rhonchi. She has no rales.   Lymphadenopathy:     She has no cervical adenopathy.   Skin: Skin is warm and dry.       Assessment/Plan   Luisana was seen today for sore throat, cough, earache and possible pregnancy.    Diagnoses and all orders for this visit:    Sorethroat  -     POC Rapid Strep A    Amenorrhea  -     POC Pregnancy, Urine    Acute suppurative otitis media of both ears without spontaneous rupture of tympanic membranes, recurrence not specified    Mild intermittent asthma with acute exacerbation    Other orders  -     predniSONE (DELTASONE) 10 MG tablet; 6/5/4/3/2/1 As directed PO  -     albuterol sulfate  (90 Base) MCG/ACT inhaler; Inhale 2 puffs Every 4 (Four) Hours As Needed for Wheezing or Shortness of Air.  -     cefdinir (OMNICEF) 300 MG capsule; Take 1 capsule by mouth 2 (Two) Times a Day for 10 days.          Results for orders placed or performed in visit on 08/21/19   POC Rapid Strep A   Result Value Ref Range    Rapid Strep A Screen Negative Negative, VALID, INVALID, Not Performed    Internal Control Passed Passed    Lot Number [id number]     Expiration Date 10/31/2020    POC Pregnancy, Urine   Result Value Ref Range    HCG, Urine, QL Negative Negative    Lot Number [id number]     Internal Positive Control Positive     Internal Negative Control Negative            83.9

## 2020-01-03 NOTE — CHART NOTE - NSCHARTNOTEFT_GEN_A_CORE
PACU ANESTHESIA ADMISSION NOTE      Procedure: cysto, urethra bx  Post op diagnosis:  urethra lesion    ____  Intubated  TV:______       Rate: ______      FiO2: ______    _x___  Patent Airway    _x___  Full return of protective reflexes    _x___  Full recovery from anesthesia / back to baseline status    Vitals:  see anesthesia record    Mental Status:  _x___ Awake   _____ Alert   _____ Drowsy   _____ Sedated    Nausea/Vomiting:  _x___  NO       ______Yes,   See Post - Op Orders         Pain Scale (0-10):  _____    Treatment: ____ None    __x__ See Post - Op/PCA Orders    Post - Operative Fluids:   ____ Oral   ___x_ See Post - Op Orders    Plan: Discharge:   ____Home       _____Floor     _____Critical Care    _____  Other:_________________    Comments:  No anesthesia issues or complications noted.  Discharge when criteria met.

## 2020-01-03 NOTE — BRIEF OPERATIVE NOTE - OPERATION/FINDINGS
sp urethral biopsy and bladder biopsy, DVIU, majority of bulbar urethra from proximal to distal urethra with shaggy appearing fibrosis ?spongiofibrosis vs tumor. bladder without tumors likely squamous metaplasia

## 2020-01-09 ENCOUNTER — APPOINTMENT (OUTPATIENT)
Dept: UROLOGY | Facility: CLINIC | Age: 70
End: 2020-01-09
Payer: MEDICARE

## 2020-01-09 DIAGNOSIS — R31.29 OTHER MICROSCOPIC HEMATURIA: ICD-10-CM

## 2020-01-09 DIAGNOSIS — N32.9 BLADDER DISORDER, UNSPECIFIED: ICD-10-CM

## 2020-01-09 PROCEDURE — 99214 OFFICE O/P EST MOD 30 MIN: CPT

## 2020-01-09 NOTE — ASSESSMENT
[FreeTextEntry1] : Yemi is a 69-year-old male who presents for evaluation of a perineal solid nodule, stable on exam. No colorectal pathology. MRI found to have \par abnormal signal in the corpora spongiosum along with two rounded structures \par coursing inferiorly along a tract of enhancement in the posterior \par peritoneum. \par \par sp urethral biopsy and bladder biopsy, DVIU, majority of bulbar urethra from proximal to distal urethra with shaggy appearing fibrosis ?spongiofibrosis vs tumor. 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 Recommend fu 6 mo w bladder sono for pvr.\par also discussed urethroplasty now vs CIC to keep stricture open however pt prefers to hold off.

## 2020-01-09 NOTE — LETTER BODY
[Consult Letter:] : I had the pleasure of evaluating your patient, [unfilled]. [Dear  ___] : Dear  [unfilled], [Sincerely,] : Sincerely, [Please see my note below.] : Please see my note below. [Consult Closing:] : Thank you very much for allowing me to participate in the care of this patient.  If you have any questions, please do not hesitate to contact me. [FreeTextEntry3] : Lee Montague MD\par Director of Robotic and Minimally Invasive Urologic Surgery\par [facility]\par  [FreeTextEntry2] : Dr. Scar Peace\par [address]\[place] [address]

## 2020-01-09 NOTE — PHYSICAL EXAM
[General Appearance - Well Developed] : well developed [General Appearance - Well Nourished] : well nourished [Normal Appearance] : normal appearance [General Appearance - In No Acute Distress] : no acute distress [Well Groomed] : well groomed [Abdomen Soft] : soft [Abdomen Tenderness] : non-tender [Costovertebral Angle Tenderness] : no ~M costovertebral angle tenderness [Penis Abnormality] : normal uncircumcised penis [Scrotum] : the scrotum was normal [Urinary Bladder Findings] : the bladder was normal on palpation [FreeTextEntry1] : ruth w clear yelow urine [] : no rash [Edema] : no peripheral edema [Respiration, Rhythm And Depth] : normal respiratory rhythm and effort [Exaggerated Use Of Accessory Muscles For Inspiration] : no accessory muscle use [Oriented To Time, Place, And Person] : oriented to person, place, and time [Affect] : the affect was normal [Mood] : the mood was normal [Not Anxious] : not anxious [Normal Station and Gait] : the gait and station were normal for the patient's age [No Focal Deficits] : no focal deficits

## 2020-01-09 NOTE — LETTER HEADER
[FreeTextEntry3] : Lee Montague MD\par Director of Robotic and Minimally Invasive Urologic Surgery\par \par [facility]\par Division of Urology\par [address]\par [address]\[place] [address]\par Tel [phone]\par Fax [phone]\par \par

## 2020-01-09 NOTE — HISTORY OF PRESENT ILLNESS
[FreeTextEntry1] : Yemi is a 69-year-old male who presents for evaluation of a perineal solid nodule, stable on exam. No colorectal pathology. MRI found to have \par abnormal signal in the corpora spongiosum along with two rounded structures \par coursing inferiorly along a tract of enhancement in the posterior \par peritoneum. \par \par sp urethral biopsy and bladder biopsy, DVIU, majority of bulbar urethra from proximal to distal urethra with shaggy appearing fibrosis ?spongiofibrosis vs tumor. bladder without tumors likely squamous metaplasia\par Pathology prelim = inflammation, bladder w cystitis cystica discussed w dr opitz\par \par Pt doing well w miranda no issues,. nof/c. no hematuria.\par \par Ucytol shows atypical squamous cells, benign urothelial cells,\par cysto 11/19/19- urethroscopy shows bulbar urethral narrowing starting 3 cm distal to the fixed perineal nodule. not able to advance scope beyond narrowing\par CTU- no  pathology\par Had UTI since last time we saw pt.  cc at hospital bladder sono w debris in bladder. now asymptomatic.\par \par Patient underwent an MRI which showed --No evidence of a perianal fistulous tract. \par Abnormal signal in the corpora spongiosum along with two rounded structures \par coursing inferiorly along a tract of enhancement in the posterior \par peritoneum. These findings may be related to chronic fibrotic/granulomatous \par changes related to prior insult or injury. While enhancement can be seen in \par the setting of fibrotic changes, it can also be seen in the setting of \par neoplasia (though this is considered less likely). \par \par Urinalysis demonstrates microhematura (3 rbcs).\par Patient does report mild decreased force of stream. Denies gross hematuria.\par \par Old records reviewed and his PSA is 0.2 from 2018. His prior PSA titers have covered between 0.3-0.2 for the last 7 years.\par BMP from February 2018 shows a creatinine of 1.16 an eGFR of 75\par His hemoglobin A1c is 5.9 and borderline

## 2020-01-10 LAB — SURGICAL PATHOLOGY STUDY: SIGNIFICANT CHANGE UP

## 2020-01-13 DIAGNOSIS — N30.81 OTHER CYSTITIS WITH HEMATURIA: ICD-10-CM

## 2020-01-13 DIAGNOSIS — N35.919 UNSPECIFIED URETHRAL STRICTURE, MALE, UNSPECIFIED SITE: ICD-10-CM

## 2020-01-13 DIAGNOSIS — I10 ESSENTIAL (PRIMARY) HYPERTENSION: ICD-10-CM

## 2020-01-13 DIAGNOSIS — N32.9 BLADDER DISORDER, UNSPECIFIED: ICD-10-CM

## 2020-04-02 PROBLEM — N32.9 LESION OF BLADDER: Status: ACTIVE | Noted: 2020-01-03

## 2020-07-13 ENCOUNTER — APPOINTMENT (OUTPATIENT)
Dept: UROLOGY | Facility: CLINIC | Age: 70
End: 2020-07-13
Payer: MEDICARE

## 2020-07-13 VITALS — TEMPERATURE: 96.7 F | WEIGHT: 182 LBS | BODY MASS INDEX: 27.58 KG/M2 | HEIGHT: 68 IN

## 2020-07-13 PROCEDURE — 99214 OFFICE O/P EST MOD 30 MIN: CPT

## 2020-07-13 NOTE — LETTER BODY
[Dear  ___] : Dear  [unfilled], [Consult Letter:] : I had the pleasure of evaluating your patient, [unfilled]. [Please see my note below.] : Please see my note below. [Sincerely,] : Sincerely, [Consult Closing:] : Thank you very much for allowing me to participate in the care of this patient.  If you have any questions, please do not hesitate to contact me. [FreeTextEntry2] : Dr. Scar Peace\par [address]\[place] [address] [FreeTextEntry3] : Lee Montague MD\par Director of Robotic and Minimally Invasive Urologic Surgery\par [facility]\par

## 2020-07-13 NOTE — PHYSICAL EXAM
[General Appearance - Well Developed] : well developed [General Appearance - Well Nourished] : well nourished [Normal Appearance] : normal appearance [Well Groomed] : well groomed [General Appearance - In No Acute Distress] : no acute distress [Abdomen Soft] : soft [Abdomen Tenderness] : non-tender [Costovertebral Angle Tenderness] : no ~M costovertebral angle tenderness [Penis Abnormality] : normal uncircumcised penis [Urinary Bladder Findings] : the bladder was normal on palpation [Scrotum] : the scrotum was normal [FreeTextEntry1] : no palpable perineal mass [Edema] : no peripheral edema [] : no respiratory distress [Respiration, Rhythm And Depth] : normal respiratory rhythm and effort [Exaggerated Use Of Accessory Muscles For Inspiration] : no accessory muscle use [Oriented To Time, Place, And Person] : oriented to person, place, and time [Affect] : the affect was normal [Mood] : the mood was normal [Not Anxious] : not anxious [Normal Station and Gait] : the gait and station were normal for the patient's age [No Focal Deficits] : no focal deficits

## 2020-07-13 NOTE — HISTORY OF PRESENT ILLNESS
[FreeTextEntry1] : Yemi is a 69-year-old male who presents for evaluation of a perineal solid nodule, stable on exam. No colorectal pathology. MRI found to have \par abnormal signal in the corpora spongiosum along with two rounded structures \par coursing inferiorly along a tract of enhancement in the posterior \par peritoneum. \par sp urethral biopsy and bladder biopsy, DVIU, majority of bulbar urethra from proximal to distal urethra with shaggy appearing fibrosis ?spongiofibrosis vs tumor. 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 States good force of stream, no luts, hematuria, dysuria. noctuira 0x. no daytime sx. c/o ED. no hx CAD, no cp/sob  w 2 flights of stairs. \par \par Ucytol shows atypical squamous cells, benign urothelial cells,\par cysto 11/19/19- urethroscopy shows bulbar urethral narrowing starting 3 cm distal to the fixed perineal nodule. not able to advance scope beyond narrowing\par CTU- no  pathology\par Had UTI since last time we saw pt.  cc at hospital bladder sono w debris in bladder. now asymptomatic.\par \par Patient underwent an MRI which showed --No evidence of a perianal fistulous tract. \par Abnormal signal in the corpora spongiosum along with two rounded structures \par coursing inferiorly along a tract of enhancement in the posterior \par peritoneum. These findings may be related to chronic fibrotic/granulomatous \par changes related to prior insult or injury. While enhancement can be seen in \par the setting of fibrotic changes, it can also be seen in the setting of \par neoplasia (though this is considered less likely). \par \par Urinalysis demonstrates microhematura (3 rbcs).\par Patient does report mild decreased force of stream. Denies gross hematuria.\par \par Old records reviewed and his PSA is 0.2 from 2018. His prior PSA titers have covered between 0.3-0.2 for the last 7 years.\par BMP from February 2018 shows a creatinine of 1.16 an eGFR of 75\par His hemoglobin A1c is 5.9 and borderline

## 2020-07-13 NOTE — LETTER HEADER
[FreeTextEntry3] : Lee Montague MD\par Director of Robotic and Minimally Invasive Urologic Surgery\par \par [facility]\par Division of Urology\par [address]\[place] [address]\par Tel [phone]\par Fax [phone]\par \par

## 2020-07-13 NOTE — ASSESSMENT
[FreeTextEntry1] : Yemi is a 69-year-old male who presents for evaluation of a perineal solid nodule, stable on exam. No colorectal pathology. MRI found to have \par abnormal signal in the corpora spongiosum along with two rounded structures \par coursing inferiorly along a tract of enhancement in the posterior \par peritoneum. \par sp urethral biopsy and bladder biopsy, DVIU, majority of bulbar urethra from proximal to distal urethra with shaggy appearing fibrosis ?spongiofibrosis vs tumor. 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 given the usual findings cystourethroscopy recommending repeat cystoscopy, ensure no recurrence or progression. ua ucx cytol today\par sildenafil\par \par \par Based on the patient's history, he was prescribed a PDE5 inhibior. The patient understands that the risks of this medication include facial redness, flushing, nasal congestion, GERD, back pain, priapism, chest pain/MI, dizziness, drop in BP, changes in vision. ER precautions were given. The patient haseen screened for potential medication interactions. He is not on any nitrates, po antifungals or HIV meds. He is not on alpha blockers.\par  I recommended that the patient take the first dose by himself. He knows that the medication requires approximately 45 minutes to have effect and works best on an empty stomach. He is aware sexual stimulation is required.\par \par \par

## 2020-07-20 LAB
BACTERIA UR CULT: NORMAL
URINE CYTOLOGY: NORMAL

## 2020-08-27 ENCOUNTER — APPOINTMENT (OUTPATIENT)
Dept: UROLOGY | Facility: CLINIC | Age: 70
End: 2020-08-27
Payer: MEDICARE

## 2020-08-27 DIAGNOSIS — R39.12 POOR URINARY STREAM: ICD-10-CM

## 2020-08-27 DIAGNOSIS — N35.819 OTHER URETHRAL STRICTURE, MALE, UNSPECIFIED SITE: ICD-10-CM

## 2020-08-27 DIAGNOSIS — N36.9 URETHRAL DISORDER, UNSPECIFIED: ICD-10-CM

## 2020-08-27 DIAGNOSIS — N52.9 MALE ERECTILE DYSFUNCTION, UNSPECIFIED: ICD-10-CM

## 2020-08-27 DIAGNOSIS — N50.89 OTHER SPECIFIED DISORDERS OF THE MALE GENITAL ORGANS: ICD-10-CM

## 2020-08-27 PROCEDURE — 99214 OFFICE O/P EST MOD 30 MIN: CPT | Mod: 25

## 2020-08-27 PROCEDURE — 52000 CYSTOURETHROSCOPY: CPT

## 2020-08-27 PROCEDURE — 81003 URINALYSIS AUTO W/O SCOPE: CPT | Mod: QW

## 2020-08-27 NOTE — PHYSICAL EXAM
[General Appearance - Well Developed] : well developed [General Appearance - Well Nourished] : well nourished [Normal Appearance] : normal appearance [Well Groomed] : well groomed [General Appearance - In No Acute Distress] : no acute distress [Abdomen Soft] : soft [Abdomen Tenderness] : non-tender [Costovertebral Angle Tenderness] : no ~M costovertebral angle tenderness [Penis Abnormality] : normal uncircumcised penis [Urinary Bladder Findings] : the bladder was normal on palpation [Scrotum] : the scrotum was normal [FreeTextEntry1] : n [Edema] : no peripheral edema [] : no respiratory distress [Respiration, Rhythm And Depth] : normal respiratory rhythm and effort [Exaggerated Use Of Accessory Muscles For Inspiration] : no accessory muscle use [Oriented To Time, Place, And Person] : oriented to person, place, and time [Affect] : the affect was normal [Mood] : the mood was normal [Not Anxious] : not anxious [Normal Station and Gait] : the gait and station were normal for the patient's age [No Focal Deficits] : no focal deficits

## 2020-08-27 NOTE — LETTER HEADER
[FreeTextEntry3] : Lee Montague MD\par Director of Robotic and Minimally Invasive Urologic Surgery\par \par [facility]\par Division of Urology\par [address]\par [address]\[place] [address]\par Tel [phone]\par Fax [phone]\par \par

## 2020-08-27 NOTE — HISTORY OF PRESENT ILLNESS
[FreeTextEntry1] : Yemi is a 69-year-old male who presents for evaluation of a perineal solid nodule, stable on exam. No colorectal pathology. MRI found to have \par abnormal signal in the corpora spongiosum along with two rounded structures \par coursing inferiorly along a tract of enhancement in the posterior \par peritoneum. \par sp urethral biopsy and bladder biopsy, DVIU, majority of bulbar urethra from proximal to distal urethra with shaggy appearing fibrosis ?spongiofibrosis vs tumor. 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 no e/o papillary tumor. perineal spongiofibrosis no change.\par Post void residual July 2020 10 cc\par Pt doing well.  States force of stream slightly slower than before but not bothersome.  No LUTS otherwise \par Ucytol neg 7/2020\par UA neg\par \par cysto 11/19/19- urethroscopy shows bulbar urethral narrowing starting 3 cm distal to the fixed perineal nodule. not able to advance scope beyond narrowing\par CTU- no  pathology\par Had UTI since last time we saw pt.  cc at hospital bladder sono w debris in bladder. now asymptomatic.\par \par Patient underwent an MRI which showed --No evidence of a perianal fistulous tract. \par Abnormal signal in the corpora spongiosum along with two rounded structures \par coursing inferiorly along a tract of enhancement in the posterior \par peritoneum. These findings may be related to chronic fibrotic/granulomatous \par changes related to prior insult or injury. While enhancement can be seen in \par the setting of fibrotic changes, it can also be seen in the setting of \par neoplasia (though this is considered less likely). \par \par Old records reviewed and his PSA is 0.2 from 2018. His prior PSA titers have covered between 0.3-0.2 for the last 7 years.\par BMP from February 2018 shows a creatinine of 1.16 an eGFR of 75\par His hemoglobin A1c is 5.9 and borderline

## 2020-08-27 NOTE — LETTER BODY
[Dear  ___] : Dear  [unfilled], [Consult Letter:] : I had the pleasure of evaluating your patient, [unfilled]. [Please see my note below.] : Please see my note below. [Consult Closing:] : Thank you very much for allowing me to participate in the care of this patient.  If you have any questions, please do not hesitate to contact me. [Sincerely,] : Sincerely, [FreeTextEntry2] : Dr. Scar Peace\par [address]\[place] [address] [FreeTextEntry3] : Lee Montague MD\par Director of Robotic and Minimally Invasive Urologic Surgery\par [facility]\par

## 2020-08-27 NOTE — ASSESSMENT
[FreeTextEntry1] : Yemi is a 69-year-old male who presents for evaluation of a perineal solid nodule, stable on exam. No colorectal pathology. MRI found to have \par abnormal signal in the corpora spongiosum along with two rounded structures \par coursing inferiorly along a tract of enhancement in the posterior \par peritoneum. \par sp urethral biopsy and bladder biopsy, DVIU, majority of bulbar urethra from proximal to distal urethra with shaggy appearing fibrosis ?spongiofibrosis vs tumor. 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  However patient is not retaining and has non-bothersome lower urinary tract symptoms.  He prefers observation at this time to follow-up in 6 months with a uroflow PVR.\par He will consider dilations in future

## 2020-08-28 LAB
BILIRUB UR QL STRIP: NORMAL
COLLECTION METHOD: NORMAL
GLUCOSE UR-MCNC: NORMAL
HCG UR QL: NORMAL EU/DL
HGB UR QL STRIP.AUTO: NORMAL
KETONES UR-MCNC: NORMAL
LEUKOCYTE ESTERASE UR QL STRIP: NORMAL
NITRITE UR QL STRIP: NORMAL
PH UR STRIP: 5
PROT UR STRIP-MCNC: NORMAL
SP GR UR STRIP: 1.01

## 2021-03-01 ENCOUNTER — APPOINTMENT (OUTPATIENT)
Dept: UROLOGY | Facility: CLINIC | Age: 71
End: 2021-03-01

## 2022-07-24 ENCOUNTER — INPATIENT (INPATIENT)
Facility: HOSPITAL | Age: 72
LOS: 3 days | Discharge: HOME | End: 2022-07-28
Attending: HOSPITALIST | Admitting: HOSPITALIST

## 2022-07-24 VITALS
TEMPERATURE: 99 F | WEIGHT: 164.91 LBS | HEIGHT: 68 IN | DIASTOLIC BLOOD PRESSURE: 95 MMHG | RESPIRATION RATE: 18 BRPM | SYSTOLIC BLOOD PRESSURE: 156 MMHG | HEART RATE: 57 BPM | OXYGEN SATURATION: 99 %

## 2022-07-24 DIAGNOSIS — Z80.0 FAMILY HISTORY OF MALIGNANT NEOPLASM OF DIGESTIVE ORGANS: ICD-10-CM

## 2022-07-24 DIAGNOSIS — K59.00 CONSTIPATION, UNSPECIFIED: ICD-10-CM

## 2022-07-24 DIAGNOSIS — C25.2 MALIGNANT NEOPLASM OF TAIL OF PANCREAS: ICD-10-CM

## 2022-07-24 DIAGNOSIS — R63.4 ABNORMAL WEIGHT LOSS: ICD-10-CM

## 2022-07-24 DIAGNOSIS — I10 ESSENTIAL (PRIMARY) HYPERTENSION: ICD-10-CM

## 2022-07-24 DIAGNOSIS — N40.0 BENIGN PROSTATIC HYPERPLASIA WITHOUT LOWER URINARY TRACT SYMPTOMS: ICD-10-CM

## 2022-07-24 DIAGNOSIS — R18.8 OTHER ASCITES: ICD-10-CM

## 2022-07-24 DIAGNOSIS — E78.00 PURE HYPERCHOLESTEROLEMIA, UNSPECIFIED: ICD-10-CM

## 2022-07-24 DIAGNOSIS — Z79.84 LONG TERM (CURRENT) USE OF ORAL HYPOGLYCEMIC DRUGS: ICD-10-CM

## 2022-07-24 DIAGNOSIS — K20.90 ESOPHAGITIS, UNSPECIFIED WITHOUT BLEEDING: ICD-10-CM

## 2022-07-24 DIAGNOSIS — Z91.013 ALLERGY TO SEAFOOD: ICD-10-CM

## 2022-07-24 DIAGNOSIS — R19.06 EPIGASTRIC SWELLING, MASS OR LUMP: ICD-10-CM

## 2022-07-24 DIAGNOSIS — R73.03 PREDIABETES: ICD-10-CM

## 2022-07-24 DIAGNOSIS — N30.90 CYSTITIS, UNSPECIFIED WITHOUT HEMATURIA: ICD-10-CM

## 2022-07-24 DIAGNOSIS — Z98.890 OTHER SPECIFIED POSTPROCEDURAL STATES: Chronic | ICD-10-CM

## 2022-07-24 DIAGNOSIS — C16.9 MALIGNANT NEOPLASM OF STOMACH, UNSPECIFIED: ICD-10-CM

## 2022-07-24 LAB
ALBUMIN SERPL ELPH-MCNC: 4.7 G/DL — SIGNIFICANT CHANGE UP (ref 3.5–5.2)
ALP SERPL-CCNC: 76 U/L — SIGNIFICANT CHANGE UP (ref 30–115)
ALT FLD-CCNC: 13 U/L — SIGNIFICANT CHANGE UP (ref 0–41)
ANION GAP SERPL CALC-SCNC: 17 MMOL/L — HIGH (ref 7–14)
AST SERPL-CCNC: 23 U/L — SIGNIFICANT CHANGE UP (ref 0–41)
BASOPHILS # BLD AUTO: 0.04 K/UL — SIGNIFICANT CHANGE UP (ref 0–0.2)
BASOPHILS NFR BLD AUTO: 0.5 % — SIGNIFICANT CHANGE UP (ref 0–1)
BILIRUB SERPL-MCNC: 0.7 MG/DL — SIGNIFICANT CHANGE UP (ref 0.2–1.2)
BUN SERPL-MCNC: 10 MG/DL — SIGNIFICANT CHANGE UP (ref 10–20)
CALCIUM SERPL-MCNC: 9.7 MG/DL — SIGNIFICANT CHANGE UP (ref 8.5–10.1)
CHLORIDE SERPL-SCNC: 95 MMOL/L — LOW (ref 98–110)
CO2 SERPL-SCNC: 22 MMOL/L — SIGNIFICANT CHANGE UP (ref 17–32)
CREAT SERPL-MCNC: 0.9 MG/DL — SIGNIFICANT CHANGE UP (ref 0.7–1.5)
EGFR: 91 ML/MIN/1.73M2 — SIGNIFICANT CHANGE UP
EOSINOPHIL # BLD AUTO: 0.39 K/UL — SIGNIFICANT CHANGE UP (ref 0–0.7)
EOSINOPHIL NFR BLD AUTO: 5.2 % — SIGNIFICANT CHANGE UP (ref 0–8)
GLUCOSE SERPL-MCNC: 103 MG/DL — HIGH (ref 70–99)
HCT VFR BLD CALC: 43.8 % — SIGNIFICANT CHANGE UP (ref 42–52)
HGB BLD-MCNC: 15.4 G/DL — SIGNIFICANT CHANGE UP (ref 14–18)
IMM GRANULOCYTES NFR BLD AUTO: 0.4 % — HIGH (ref 0.1–0.3)
LIDOCAIN IGE QN: 16 U/L — SIGNIFICANT CHANGE UP (ref 7–60)
LYMPHOCYTES # BLD AUTO: 2.13 K/UL — SIGNIFICANT CHANGE UP (ref 1.2–3.4)
LYMPHOCYTES # BLD AUTO: 28.4 % — SIGNIFICANT CHANGE UP (ref 20.5–51.1)
MCHC RBC-ENTMCNC: 28.8 PG — SIGNIFICANT CHANGE UP (ref 27–31)
MCHC RBC-ENTMCNC: 35.2 G/DL — SIGNIFICANT CHANGE UP (ref 32–37)
MCV RBC AUTO: 82 FL — SIGNIFICANT CHANGE UP (ref 80–94)
MONOCYTES # BLD AUTO: 0.53 K/UL — SIGNIFICANT CHANGE UP (ref 0.1–0.6)
MONOCYTES NFR BLD AUTO: 7.1 % — SIGNIFICANT CHANGE UP (ref 1.7–9.3)
NEUTROPHILS # BLD AUTO: 4.38 K/UL — SIGNIFICANT CHANGE UP (ref 1.4–6.5)
NEUTROPHILS NFR BLD AUTO: 58.4 % — SIGNIFICANT CHANGE UP (ref 42.2–75.2)
NRBC # BLD: 0 /100 WBCS — SIGNIFICANT CHANGE UP (ref 0–0)
PLATELET # BLD AUTO: 266 K/UL — SIGNIFICANT CHANGE UP (ref 130–400)
POTASSIUM SERPL-MCNC: 4.6 MMOL/L — SIGNIFICANT CHANGE UP (ref 3.5–5)
POTASSIUM SERPL-SCNC: 4.6 MMOL/L — SIGNIFICANT CHANGE UP (ref 3.5–5)
PROT SERPL-MCNC: 7.8 G/DL — SIGNIFICANT CHANGE UP (ref 6–8)
RBC # BLD: 5.34 M/UL — SIGNIFICANT CHANGE UP (ref 4.7–6.1)
RBC # FLD: 13.6 % — SIGNIFICANT CHANGE UP (ref 11.5–14.5)
SODIUM SERPL-SCNC: 134 MMOL/L — LOW (ref 135–146)
WBC # BLD: 7.5 K/UL — SIGNIFICANT CHANGE UP (ref 4.8–10.8)
WBC # FLD AUTO: 7.5 K/UL — SIGNIFICANT CHANGE UP (ref 4.8–10.8)

## 2022-07-24 PROCEDURE — 99285 EMERGENCY DEPT VISIT HI MDM: CPT | Mod: FS

## 2022-07-24 PROCEDURE — 74177 CT ABD & PELVIS W/CONTRAST: CPT | Mod: 26,MA

## 2022-07-24 RX ORDER — FAMOTIDINE 10 MG/ML
20 INJECTION INTRAVENOUS ONCE
Refills: 0 | Status: COMPLETED | OUTPATIENT
Start: 2022-07-24 | End: 2022-07-24

## 2022-07-24 RX ORDER — SODIUM CHLORIDE 9 MG/ML
1000 INJECTION INTRAMUSCULAR; INTRAVENOUS; SUBCUTANEOUS ONCE
Refills: 0 | Status: COMPLETED | OUTPATIENT
Start: 2022-07-24 | End: 2022-07-24

## 2022-07-24 RX ADMIN — SODIUM CHLORIDE 1000 MILLILITER(S): 9 INJECTION INTRAMUSCULAR; INTRAVENOUS; SUBCUTANEOUS at 16:40

## 2022-07-24 RX ADMIN — FAMOTIDINE 20 MILLIGRAM(S): 10 INJECTION INTRAVENOUS at 21:28

## 2022-07-24 NOTE — ED PROVIDER NOTE - CLINICAL SUMMARY MEDICAL DECISION MAKING FREE TEXT BOX
72-year-old male past medical history hypertension, hyperlipidemia, diabetes, presents to the ER for gradually worsening epigastric pain for the past month. 72-year-old male past medical history hypertension, hyperlipidemia, diabetes, presents to the ER for gradually worsening epigastric pain for the past month. Labs and imaging reviewed. IVF, pepcid given with significant improvement in sx. Recommend GI f/u

## 2022-07-24 NOTE — ED PROVIDER NOTE - ATTENDING APP SHARED VISIT CONTRIBUTION OF CARE
72-year-old male past medical history hypertension, hyperlipidemia, diabetes, presents to the ER for gradually worsening epigastric pain for the past month.  Associated with 15 pound unintentional weight loss over the past 4 weeks.  Symptoms triggered by food.  Initially he reports vomiting that has resolved completely.  Denies fevers, chills, diarrhea, bloody or melanotic stool, recent travel.  He reports his father had stomach cancer. He has never seen gastroenterology or had a EGD.    Vitals noted, triage note reviewed    Gen - NAD, Head - NCAT, Pharynx - clear, MMM, Heart - RRR, no m/g/r, Lungs - CTAB, no w/c/r, Abdomen - soft, minimally tender epigastric, ND, Skin - No rash, Extremities - FROM, no edema, erythema, ecchymosis, brisk cap refill, Neuro - A&O x3, equal strength and sensation, non-focal exam    a/p:  abd pain, unintentional weight loss  labs, ct a/p  ivf, reassess

## 2022-07-24 NOTE — ED ADULT TRIAGE NOTE - BMI (KG/M2)
Daily Note     Today's date: 2022  Patient name: Arnoldo Mcarthur  : 1952  MRN: [id number]  Referring provider: Gabriella Ng DO  Dx:   Encounter Diagnosis     ICD-10-CM    1  Tear of left rotator cuff, unspecified tear extent, unspecified whether traumatic  M75 [id number]    2  Aftercare following surgery  [id number]        Start Time: 1300  Stop Time: 1345  Total time in clinic (min): 45 minutes    Subjective: Patient reports now new complaints  Patient reports no pain, just sore  Objective: See treatment diary below      Assessment: Tolerated treatment well  Yeimy Tijerina participated in skilled PT session focused on PROM, Cervical ROM, and strengthening  Patient focused on PROM, shoulder retraction and cervical ROM  Patient demonstrates decreased PROM ER  Patient would continue to benefit from skilled PT interventions to address decreased ROM, decreased strength, and decrease pain   Patient demonstrated fatigue post treatment      Plan: Continue per plan of care           Precautions - No AROM for 6 weeks (22)       Manuals 22      PROM left shoulder CD CD      PROM left elbow with stretch into endranges CD CD                      Neuro Re-Ed         Active scap retraction/ elevation 110 X 10                                                      Ther Ex        pendulums  2 x 10 ea      Wrist flex/ext/UD/RD 1/10 2 x 10 ea      gripping  Grn squeeze ball x 30      C/s AROM 110 x10 ea                                      Ther Activity                        Gait Training                        Modalities        CP  X 10 min      IFC PRN
25.1

## 2022-07-24 NOTE — ED PROVIDER NOTE - OBJECTIVE STATEMENT
73 yo male with a pmh of HTN, HLD, and DM presents c/o abdominal pain for over a month. pt notes pain to his epigastric region with no radiation and described as pressure in nature. pt also states to have suffered from constipation with his last BM 1 weeks prior. pt states he was seen by his pcp last week and has a ct scheduled for Tuesday but he "just cannot wait". pt denies any other symptoms including fevers, chill, headache, recent illness/travel, cough, chest pain, or SOB. 73 yo male with a pmh of HTN, HLD, and DM presents c/o abdominal pain for over a month. pt notes pain to his epigastric region with no radiation and described as pressure in nature. pt also states to have suffered from constipation with his last BM 1 weeks prior. pt mentions a 15lb unintentional weight loss. pt states he was seen by his pcp last week and has a ct scheduled for Tuesday but he "just cannot wait". pt states that his father had a hx of stomach CA. pt denies any other symptoms including fevers, chill, headache, recent illness/travel, cough, chest pain, or SOB.

## 2022-07-24 NOTE — ED PROVIDER NOTE - PROGRESS NOTE DETAILS
JR: pt endorsed to Dr. Orosco- plan f/u CT a/p and reassess, f/u with GI JR: delayed entry d/t patient care. On multiple reassessments at the bedside pt reports improvement of sx. abd soft ntnd surgery consulted

## 2022-07-24 NOTE — ED PROVIDER NOTE - NS ED ATTENDING STATEMENT MOD
This was a shared visit with the THEODORA. I reviewed and verified the documentation and independently performed the documented:

## 2022-07-24 NOTE — ED PROVIDER NOTE - PHYSICAL EXAMINATION
Gen: NAD, AOx3  Head: NCAT  HEENT: PERRL, oral mucosa moist, normal conjunctiva, oropharynx clear without exudate or erythema  Lung: CTAB, no respiratory distress, no wheezing, rales, rhonchi  CV: normal s1/s2, rrr, Normal perfusion, pulses 2+ throughout  Abd: soft, epigastric/LUQ tenderness, ND, no CVA tenderness  Genitourinary: no pelvic tenderness  MSK: No edema, no visible deformities, full range of motion in all 4 extremities  Neuro: CN II-XII grossly intact, No focal neurologic deficits  Skin: No rash   Psych: normal affect

## 2022-07-25 LAB
A1C WITH ESTIMATED AVERAGE GLUCOSE RESULT: 6.6 % — HIGH (ref 4–5.6)
ALBUMIN SERPL ELPH-MCNC: 4.1 G/DL — SIGNIFICANT CHANGE UP (ref 3.5–5.2)
ALP SERPL-CCNC: 67 U/L — SIGNIFICANT CHANGE UP (ref 30–115)
ALT FLD-CCNC: 11 U/L — SIGNIFICANT CHANGE UP (ref 0–41)
AMYLASE P1 CFR SERPL: 78 U/L — SIGNIFICANT CHANGE UP (ref 25–115)
ANION GAP SERPL CALC-SCNC: 12 MMOL/L — SIGNIFICANT CHANGE UP (ref 7–14)
APTT BLD: 28.7 SEC — SIGNIFICANT CHANGE UP (ref 27–39.2)
AST SERPL-CCNC: 13 U/L — SIGNIFICANT CHANGE UP (ref 0–41)
BASOPHILS # BLD AUTO: 0.05 K/UL — SIGNIFICANT CHANGE UP (ref 0–0.2)
BASOPHILS NFR BLD AUTO: 0.7 % — SIGNIFICANT CHANGE UP (ref 0–1)
BILIRUB SERPL-MCNC: 0.6 MG/DL — SIGNIFICANT CHANGE UP (ref 0.2–1.2)
BLD GP AB SCN SERPL QL: SIGNIFICANT CHANGE UP
BUN SERPL-MCNC: 9 MG/DL — LOW (ref 10–20)
CALCIUM SERPL-MCNC: 9.3 MG/DL — SIGNIFICANT CHANGE UP (ref 8.5–10.1)
CANCER AG19-9 SERPL-ACNC: 6481 U/ML — HIGH
CEA SERPL-MCNC: 6.8 NG/ML — HIGH (ref 0–3.8)
CHLORIDE SERPL-SCNC: 101 MMOL/L — SIGNIFICANT CHANGE UP (ref 98–110)
CHOLEST SERPL-MCNC: 135 MG/DL — SIGNIFICANT CHANGE UP
CO2 SERPL-SCNC: 27 MMOL/L — SIGNIFICANT CHANGE UP (ref 17–32)
CREAT SERPL-MCNC: 0.8 MG/DL — SIGNIFICANT CHANGE UP (ref 0.7–1.5)
EGFR: 95 ML/MIN/1.73M2 — SIGNIFICANT CHANGE UP
EOSINOPHIL # BLD AUTO: 0.45 K/UL — SIGNIFICANT CHANGE UP (ref 0–0.7)
EOSINOPHIL NFR BLD AUTO: 6.3 % — SIGNIFICANT CHANGE UP (ref 0–8)
ESTIMATED AVERAGE GLUCOSE: 143 MG/DL — HIGH (ref 68–114)
GLUCOSE SERPL-MCNC: 101 MG/DL — HIGH (ref 70–99)
HCT VFR BLD CALC: 41.1 % — LOW (ref 42–52)
HCV AB S/CO SERPL IA: 0.03 COI — SIGNIFICANT CHANGE UP
HCV AB SERPL-IMP: SIGNIFICANT CHANGE UP
HDLC SERPL-MCNC: 46 MG/DL — SIGNIFICANT CHANGE UP
HGB BLD-MCNC: 14.5 G/DL — SIGNIFICANT CHANGE UP (ref 14–18)
IMM GRANULOCYTES NFR BLD AUTO: 0.3 % — SIGNIFICANT CHANGE UP (ref 0.1–0.3)
INR BLD: 1.18 RATIO — SIGNIFICANT CHANGE UP (ref 0.65–1.3)
LIPID PNL WITH DIRECT LDL SERPL: 75 MG/DL — SIGNIFICANT CHANGE UP
LYMPHOCYTES # BLD AUTO: 1.88 K/UL — SIGNIFICANT CHANGE UP (ref 1.2–3.4)
LYMPHOCYTES # BLD AUTO: 26.2 % — SIGNIFICANT CHANGE UP (ref 20.5–51.1)
MAGNESIUM SERPL-MCNC: 2.3 MG/DL — SIGNIFICANT CHANGE UP (ref 1.8–2.4)
MCHC RBC-ENTMCNC: 29 PG — SIGNIFICANT CHANGE UP (ref 27–31)
MCHC RBC-ENTMCNC: 35.3 G/DL — SIGNIFICANT CHANGE UP (ref 32–37)
MCV RBC AUTO: 82.2 FL — SIGNIFICANT CHANGE UP (ref 80–94)
MONOCYTES # BLD AUTO: 0.58 K/UL — SIGNIFICANT CHANGE UP (ref 0.1–0.6)
MONOCYTES NFR BLD AUTO: 8.1 % — SIGNIFICANT CHANGE UP (ref 1.7–9.3)
NEUTROPHILS # BLD AUTO: 4.2 K/UL — SIGNIFICANT CHANGE UP (ref 1.4–6.5)
NEUTROPHILS NFR BLD AUTO: 58.4 % — SIGNIFICANT CHANGE UP (ref 42.2–75.2)
NON HDL CHOLESTEROL: 89 MG/DL — SIGNIFICANT CHANGE UP
NRBC # BLD: 0 /100 WBCS — SIGNIFICANT CHANGE UP (ref 0–0)
PLATELET # BLD AUTO: 241 K/UL — SIGNIFICANT CHANGE UP (ref 130–400)
POTASSIUM SERPL-MCNC: 4.9 MMOL/L — SIGNIFICANT CHANGE UP (ref 3.5–5)
POTASSIUM SERPL-SCNC: 4.9 MMOL/L — SIGNIFICANT CHANGE UP (ref 3.5–5)
PROT SERPL-MCNC: 6.8 G/DL — SIGNIFICANT CHANGE UP (ref 6–8)
PROTHROM AB SERPL-ACNC: 13.5 SEC — HIGH (ref 9.95–12.87)
RBC # BLD: 5 M/UL — SIGNIFICANT CHANGE UP (ref 4.7–6.1)
RBC # FLD: 13.8 % — SIGNIFICANT CHANGE UP (ref 11.5–14.5)
SARS-COV-2 RNA SPEC QL NAA+PROBE: SIGNIFICANT CHANGE UP
SODIUM SERPL-SCNC: 140 MMOL/L — SIGNIFICANT CHANGE UP (ref 135–146)
TRIGL SERPL-MCNC: 73 MG/DL — SIGNIFICANT CHANGE UP
TROPONIN T SERPL-MCNC: <0.01 NG/ML — SIGNIFICANT CHANGE UP
TSH SERPL-MCNC: 2.62 UIU/ML — SIGNIFICANT CHANGE UP (ref 0.27–4.2)
WBC # BLD: 7.18 K/UL — SIGNIFICANT CHANGE UP (ref 4.8–10.8)
WBC # FLD AUTO: 7.18 K/UL — SIGNIFICANT CHANGE UP (ref 4.8–10.8)

## 2022-07-25 PROCEDURE — 71046 X-RAY EXAM CHEST 2 VIEWS: CPT | Mod: 26

## 2022-07-25 PROCEDURE — 74018 RADEX ABDOMEN 1 VIEW: CPT | Mod: 26

## 2022-07-25 PROCEDURE — 71250 CT THORAX DX C-: CPT | Mod: 26

## 2022-07-25 PROCEDURE — 99223 1ST HOSP IP/OBS HIGH 75: CPT

## 2022-07-25 RX ORDER — LISINOPRIL 2.5 MG/1
5 TABLET ORAL DAILY
Refills: 0 | Status: DISCONTINUED | OUTPATIENT
Start: 2022-07-25 | End: 2022-07-28

## 2022-07-25 RX ORDER — ENOXAPARIN SODIUM 100 MG/ML
40 INJECTION SUBCUTANEOUS EVERY 24 HOURS
Refills: 0 | Status: DISCONTINUED | OUTPATIENT
Start: 2022-07-25 | End: 2022-07-25

## 2022-07-25 RX ORDER — PANTOPRAZOLE SODIUM 20 MG/1
40 TABLET, DELAYED RELEASE ORAL
Refills: 0 | Status: DISCONTINUED | OUTPATIENT
Start: 2022-07-25 | End: 2022-07-28

## 2022-07-25 RX ORDER — PANTOPRAZOLE SODIUM 20 MG/1
40 TABLET, DELAYED RELEASE ORAL DAILY
Refills: 0 | Status: DISCONTINUED | OUTPATIENT
Start: 2022-07-25 | End: 2022-07-25

## 2022-07-25 RX ORDER — ATORVASTATIN CALCIUM 80 MG/1
20 TABLET, FILM COATED ORAL AT BEDTIME
Refills: 0 | Status: DISCONTINUED | OUTPATIENT
Start: 2022-07-25 | End: 2022-07-28

## 2022-07-25 RX ORDER — METFORMIN HYDROCHLORIDE 850 MG/1
1 TABLET ORAL
Qty: 0 | Refills: 0 | DISCHARGE

## 2022-07-25 RX ORDER — ENOXAPARIN SODIUM 100 MG/ML
40 INJECTION SUBCUTANEOUS EVERY 24 HOURS
Refills: 0 | Status: DISCONTINUED | OUTPATIENT
Start: 2022-07-26 | End: 2022-07-28

## 2022-07-25 RX ORDER — POLYETHYLENE GLYCOL 3350 17 G/17G
17 POWDER, FOR SOLUTION ORAL
Refills: 0 | Status: DISCONTINUED | OUTPATIENT
Start: 2022-07-25 | End: 2022-07-28

## 2022-07-25 RX ORDER — SENNA PLUS 8.6 MG/1
2 TABLET ORAL AT BEDTIME
Refills: 0 | Status: DISCONTINUED | OUTPATIENT
Start: 2022-07-25 | End: 2022-07-28

## 2022-07-25 RX ORDER — CHLORHEXIDINE GLUCONATE 213 G/1000ML
1 SOLUTION TOPICAL
Refills: 0 | Status: DISCONTINUED | OUTPATIENT
Start: 2022-07-25 | End: 2022-07-28

## 2022-07-25 RX ORDER — SODIUM CHLORIDE 9 MG/ML
1000 INJECTION, SOLUTION INTRAVENOUS
Refills: 0 | Status: DISCONTINUED | OUTPATIENT
Start: 2022-07-25 | End: 2022-07-28

## 2022-07-25 RX ORDER — ALLOPURINOL 300 MG
1 TABLET ORAL
Qty: 0 | Refills: 0 | DISCHARGE

## 2022-07-25 RX ADMIN — SENNA PLUS 2 TABLET(S): 8.6 TABLET ORAL at 22:13

## 2022-07-25 RX ADMIN — PANTOPRAZOLE SODIUM 40 MILLIGRAM(S): 20 TABLET, DELAYED RELEASE ORAL at 17:40

## 2022-07-25 RX ADMIN — SODIUM CHLORIDE 75 MILLILITER(S): 9 INJECTION, SOLUTION INTRAVENOUS at 06:18

## 2022-07-25 RX ADMIN — PANTOPRAZOLE SODIUM 40 MILLIGRAM(S): 20 TABLET, DELAYED RELEASE ORAL at 11:19

## 2022-07-25 RX ADMIN — SODIUM CHLORIDE 75 MILLILITER(S): 9 INJECTION, SOLUTION INTRAVENOUS at 22:13

## 2022-07-25 RX ADMIN — ATORVASTATIN CALCIUM 20 MILLIGRAM(S): 80 TABLET, FILM COATED ORAL at 22:13

## 2022-07-25 RX ADMIN — LISINOPRIL 5 MILLIGRAM(S): 2.5 TABLET ORAL at 06:18

## 2022-07-25 RX ADMIN — POLYETHYLENE GLYCOL 3350 17 GRAM(S): 17 POWDER, FOR SOLUTION ORAL at 17:40

## 2022-07-25 RX ADMIN — POLYETHYLENE GLYCOL 3350 17 GRAM(S): 17 POWDER, FOR SOLUTION ORAL at 06:18

## 2022-07-25 NOTE — ED ADULT NURSE REASSESSMENT NOTE - NS ED NURSE REASSESS COMMENT FT1
Assumed care of pt; A&O4, Neg SOB at this time. Pt resting on stretcher, no s/s of distress. Pt denies any needs at this time. Pt safety and comfort measures in place. Will continue to monitor at this time.

## 2022-07-25 NOTE — H&P ADULT - HISTORY OF PRESENT ILLNESS
This is a 73 yo male with PMH of HTN, HLD, DM, who presents for gradually worsening epigastric pain for the past month. Associated with 15lb weight loss.       In ED, vitals T 98.9, HR 57, /95, 99% RA. CBC wnl, CMP with wnl. Lipase 16. Ct A/P with perigastric infiltrative soft tissue changes which are inseparable from the tail of pancreas and extend around the proximal duodenum. Small volume abdominopelvic ascites. Differential includes underlying pancreatic or gastrointestinal neoplasm, sequelae of partially contained ruptured gastric or duodenal ulcer, and infectious process or perhaps pancreatitis. Clinical correlation and/or further evaluation with EGD should be considered. Findings also compatible with BPH. Superimposed cystitis is difficult to exclude on imaging.     This is a 71 yo male with PMH of HTN, HLD, DM, who presents for gradually worsening epigastric pain for the past month. Pain does not radiate anywhere else, worse after eating. Chronic discomfort, (2/10 on pain scale). Associated with 15lb weight loss in the last month. Also associated with constipation. Last BM 1 week ago, brown, normal. Denies fevers, chills, sob, chest pain, N/V/D, le edema. Denies melena/ BRBPR. Denies jaundice/ pruritis. Denies urinary frequency and urgency. Never smoker. Social alcohol use 1 glass of wine/ beer/ week. Father  of cancer at the age of 60. Thinks it may have been stomach cancer. Has had colonoscopies in the past and reports they were normal. Never had an EGD.     In ED, vitals T 98.9, HR 57, /95, 99% RA. CBC wnl, CMP with wnl. Lipase 16. Ct A/P with perigastric infiltrative soft tissue changes which are inseparable from the tail of pancreas and extend around the proximal duodenum. Small volume abdominopelvic ascites. Differential includes underlying pancreatic or gastrointestinal neoplasm, sequelae of partially contained ruptured gastric or duodenal ulcer, and infectious process or perhaps pancreatitis. Clinical correlation and/or further evaluation with EGD should be considered. Findings also compatible with BPH. Superimposed cystitis is difficult to exclude on imaging.     Per surgery team, no ruptured ulcer. Likely all mass.     Admitted to medicine for GI eval, possible EGD/ biopsy while inpatient.          This is a 71 yo male with PMH of HTN, HLD, DM, who presents for gradually worsening epigastric pain for the past month. Pain does not radiate anywhere else, worse after eating. Chronic discomfort, (2/10 on pain scale). Associated with 15lb weight loss in the last month. Also associated with constipation. Last BM 1 week ago, brown, normal. Denies fevers, chills, sob, chest pain, N/V/D, le edema. Denies melena/ BRBPR. Denies jaundice/ pruritis. Denies urinary frequency and urgency. Never smoker. Social alcohol use 1 glass of wine/ beer/ week. Father  of cancer at the age of 60. Thinks it may have been stomach cancer. Has had colonoscopies in the past and reports they were normal. Never had an EGD.     In ED, vitals T 98.9, HR 57, /95, 99% RA. CBC wnl, CMP with wnl. Lipase 16. Ct A/P with perigastric infiltrative soft tissue changes which are inseparable from the tail of pancreas and extend around the proximal duodenum. Small volume abdominopelvic ascites. Differential includes underlying pancreatic or gastrointestinal neoplasm, sequelae of partially contained ruptured gastric or duodenal ulcer, and infectious process or perhaps pancreatitis. Clinical correlation and/or further evaluation with EGD should be considered. Findings also compatible with BPH. Superimposed cystitis is difficult to exclude on imaging.     Per surgery team, no ruptured ulcer. Likely all soft tissue.     Admitted to medicine for GI eval, possible EGD/ biopsy while inpatient.

## 2022-07-25 NOTE — CONSULT NOTE ADULT - NS PANP COMMENT GEN_ALL_CORE FT
Patient seen and examined with the GI–advanced endoscopy team during rounds, case reviewed, plan discussed with the team, assessment and plan as above

## 2022-07-25 NOTE — CONSULT NOTE ADULT - ASSESSMENT
Patient is a 73 y/o male with PMHx of HTN, HLD, DM, who presented to [facility] for abdominal pain. Patient notes pain located in the Epigastric area,  gradually worsening, for the past month. Pain does not radiate anywhere else, worse after eating. Chronic discomfort like in nature. Pain associated with 15lb weight loss in the last month. Also associated with constipation. Last BM 1 week ago, brown, normal. Denies fevers, chills, sob, chest pain, N/V/D, le edema. Denies melena/ BRBPR. Denies jaundice/ pruritis. Denies urinary frequency and urgency. Patient notes when he eats food feels to become stuck or pass slow ( Points at mid-abdomen). Last Colonoscopy was 4 years prior but never had an EGD. Wife present during interview. CT scan notable for perigastric infiltrative soft tissue changes which are inseparable from the tail of the pancreas that extends around the duodenum patient also has some small volume ascites.  Abdominal exam is reassuring and patient is without pain on exam.  Discussed with patient and family present the need for endoscopic evaluation with utilization of endoscopic ultrasound.  While differential includes ulcer or partial contained rupture clinically it does not appear that way when factoring in weight loss along with family history.  Patient to remain n.p.o. for now.  Advise pantoprazole 40 mg twice daily.    Abnormal CT Imaging / Perigastric soft tissue changes/ 15 lb weight loss over one month  - NPO, Pantoprazole 40mg Q12 hour  - Discussed EGD with EUS with patient for direct evaluation and sampling if need be  - While differential includes ulcer- clinical exam is reassuring  - Timing for EUS to be decided latter on today after rounds  - Will follow

## 2022-07-25 NOTE — CONSULT NOTE ADULT - SUBJECTIVE AND OBJECTIVE BOX
Patient is a 71 y/o male with PMHx of HTN, HLD, DM, who presented to [facility] for abdominal pain. Patient notes pain located in the Epigastric area,  gradually worsening, for the past month. Pain does not radiate anywhere else, worse after eating. Chronic discomfort like in nature. Pain associated with 15lb weight loss in the last month. Also associated with constipation. Last BM 1 week ago, brown, normal. Denies fevers, chills, sob, chest pain, N/V/D, le edema. Denies melena/ BRBPR. Denies jaundice/ pruritis. Denies urinary frequency and urgency. Patient notes when he eats food feels to become stuck or pass slow ( Points at mid-abdomen). Last Colonoscopy was 4 years prior but never had an EGD. Wife present during interview.      PAST MEDICAL & SURGICAL HISTORY:  HTN (hypertension)  Diabetes  Hypercholesteremia      Social History  Denies Current Tobacco use  Denies Current ETOH use  Denies Current Illicit Drug use     FAMILY HISTORY:  Family Hx of Gastric Ca    MEDICATIONS  (STANDING):  atorvastatin 20 milliGRAM(s) Oral at bedtime  chlorhexidine 2% Cloths 1 Application(s) Topical <User Schedule>  enoxaparin Injectable 40 milliGRAM(s) SubCutaneous every 24 hours  lactated ringers. 1000 milliLiter(s) (75 mL/Hr) IV Continuous <Continuous>  lisinopril 5 milliGRAM(s) Oral daily  pantoprazole   Suspension 40 milliGRAM(s) Oral daily  polyethylene glycol 3350 17 Gram(s) Oral two times a day  senna 2 Tablet(s) Oral at bedtime    MEDICATIONS  (PRN):      Allergies  No Known Drug Allergies  shellfish (Unknown)      Review of Systems  General:  See HPI  HEENT: Denies Trouble Swallowing ,Denies  Sore Throat , Denies Change in hearing/vision/speech ,Denies Dizziness    Cardio: Denies  Chest Pain , Palpitations    Respiratory: Denies worsening of SOB, Denies Cough  Abdomen: See detailed HPI  Neuro: Denies Headache Denies Dizziness, Denies Paresthesias  MSK: Denies pain in Bones/Joints/Muscles   Psych: Patient denies depression, denies suicidal or homicidal ideations  Integ: Patient Denies rash, or new skin lesions     Vital Signs Last 24 Hrs  T(C): 36.7 (24 Jul 2022 21:52), Max: 37.2 (24 Jul 2022 14:11)  T(F): 98.1 (24 Jul 2022 21:52), Max: 98.9 (24 Jul 2022 14:11)  HR: 62 (24 Jul 2022 21:52) (57 - 62)  BP: 135/73 (24 Jul 2022 21:52) (135/73 - 156/95)  RR: 20 (24 Jul 2022 21:52) (18 - 20)  SpO2: 98% (24 Jul 2022 21:52) (98% - 99%)    Parameters below as of 24 Jul 2022 21:52  Patient On (Oxygen Delivery Method): room air    Physical Exam  Gen: NAD  HEENT: NC/AT, Mucosal Membranes  Neck: supple  Cardio: S1/S2 No S3/S4, Regular  Resp: CTA B/L  Abdomen: Soft, ND/NT  Neuro: AAOx3, Cranial Nerve II-XII intact   Extremities: FROM x 4  Skin: No jaundice         Labs:                          14.5   7.18  )-----------( 241      ( 25 Jul 2022 06:03 )             41.1       07-25    140  |  101  |  9<L>  ----------------------------<  101<H>  4.9   |  27  |  0.8      Calcium, Total Serum: 9.3 mg/dL (07-25-22 @ 06:03)      LFTs:             6.8  | 0.6  | 13       ------------------[67      ( 25 Jul 2022 06:03 )  4.1  | x    | 11             Amylase:78         Coags:     13.50  ----< 1.18    ( 25 Jul 2022 06:03 )     28.7        CARDIAC MARKERS ( 25 Jul 2022 06:03 )  x     / <0.01 ng/mL / x     / x     / x            RADIOLOGY & ADDITIONAL STUDIES:  CT Abdomen and Pelvis w/ IV Cont (07.24.22 @ 20:07) >  IMPRESSION:    Predominantly perigastric infiltrative soft tissue changes which are   inseparable from the tail of pancreas and extend around the proximal   duodenum. Small volume abdominopelvic ascites. Differential includes   underlying pancreatic or gastrointestinal neoplasm, sequelae of partially   contained ruptured gastric or duodenal  ulcer, and infectious process or   perhaps pancreatitis. Clinical correlation and/or further evaluation with   EGD should be considered.    Findings compatible with BPH. Superimposed cystitis is difficult to   exclude on imaging.

## 2022-07-25 NOTE — H&P ADULT - ASSESSMENT
This is a 73 yo male with PMH of HTN, HLD, DM, who presents for gradually worsening epigastric pain for the past month. Pain does not radiate anywhere else, worse after eating. Chronic discomfort, (2/10 on pain scale). Associated with 15lb weight loss in the last month. Found to have perigastric soft tissue changes, admitted to medicine.     #Epigastric Pain associated with weight loss   #Perigastric infiltrative sift tissue changes likely Neoplasm   - No sepsis on admission, no clinical signs of acute pancreatitis   - Benign abdominal exam   - Lipase 16, f/u amylase   - Ct A/P with perigastric infiltrative soft tissue changes which are inseparable from the tail of pancreas and extend around the proximal duodenum. Small volume abdominopelvic ascites. Differential includes underlying pancreatic or gastrointestinal neoplasm, sequelae of partially contained ruptured gastric or duodenal ulcer, and infectious process or perhaps pancreatitis.   - F/u Surgery consult   - F/u GI consult   -F/U Chest Xray, KUB  - F/U CEA, CA 19-9  - NPO until cleared by surgery and GI    #Possible Cystitis on CT  - Denies urinary symptoms  - F/U UA  - monitor off abx    #HTN  - c/w     #HLD  - c/w atorvastatin     #Diabetes Mellitus  - Home:   - F/U a1c  - Monitor FS. Start insuiln regimen if persistently elevated     #Misc  - GI prophylaxis: PPI  - DVT prophylaxis: lovenox   - Diet: NPO until surgery and GI eval  - Activity as tolerated  - Code Status: Full  - Dispo: admit to medicine          This is a 73 yo male with PMH of HTN, HLD, DM, who presents for gradually worsening epigastric pain for the past month. Pain does not radiate anywhere else, worse after eating. Chronic discomfort, (2/10 on pain scale). Associated with 15lb weight loss in the last month. Found to have perigastric soft tissue changes, admitted to medicine.     #Epigastric Pain associated with weight loss   #Perigastric infiltrative sift tissue changes likely Neoplasm   - No sepsis on admission, no clinical signs of acute pancreatitis   -No free air in CT   - Benign abdominal exam   - Lipase 16, f/u amylase   - Ct A/P with perigastric infiltrative soft tissue changes which are inseparable from the tail of pancreas and extend around the proximal duodenum. Small volume abdominopelvic ascites. Differential includes underlying pancreatic or gastrointestinal neoplasm, sequelae of partially contained ruptured gastric or duodenal ulcer, and infectious process or perhaps pancreatitis.   - F/u Surgery consult   - F/u GI consult   -F/U Chest Xray, KUB  - F/U CEA, CA 19-9  -Protonix qd   - NPO until cleared by surgery and GI    #Possible Cystitis on CT  - Denies urinary symptoms  - F/U UA  - monitor off abx    #HTN  - c/w lisinopril 5mg qd    #HLD  - c/w atorvastatin 20mg qd     #Diabetes Mellitus  - Not on any home meds currently   - F/U a1c  - Monitor FS. Start insulin regimen if persistently elevated     #Misc  - GI prophylaxis: PPI  - DVT prophylaxis: lovenox   - Diet: NPO until surgery and GI eval  - Activity as tolerated  - Code Status: Full  - Dispo: admit to medicine

## 2022-07-25 NOTE — H&P ADULT - NSHPPHYSICALEXAM_GEN_ALL_CORE
GENERAL:   HEENT:  Atraumatic, Normocephalic. EOMI, PERRLA, conjunctiva and sclera clear, No JVD  PULMONARY: Clear to auscultation bilaterally; No wheeze  CARDIOVASCULAR: Regular rate and rhythm; No murmurs, rubs, or gallops  GASTROINTESTINAL: Soft, Nontender, Nondistended; Bowel sounds present  MUSCULOSKELETAL:  2+ Peripheral Pulses, No clubbing, cyanosis, or edema  NEUROLOGY: non-focal  SKIN: No rashes or lesions GENERAL: NAD, well-developed, AAOx3  HEENT:  Atraumatic, Normocephalic. EOMI, PERRLA, conjunctiva and sclera clear, No JVD  PULMONARY: Clear to auscultation bilaterally; No wheeze  CARDIOVASCULAR: Regular rate and rhythm; No murmurs, rubs, or gallops  GASTROINTESTINAL: Soft, Nontender, Nondistended; Bowel sounds present  MUSCULOSKELETAL:  2+ Peripheral Pulses, No clubbing, cyanosis, or edema  NEUROLOGY: non-focal  SKIN: No rashes or lesions

## 2022-07-25 NOTE — CONSULT NOTE ADULT - SUBJECTIVE AND OBJECTIVE BOX
GENERAL SURGERY CONSULT NOTE    Patient: JINA SINGER , 72y ([birth year])Male   MRN: [id number]  Location: [facility] ER Hold 005 A  Visit: 07-24-22 Inpatient  Date: 07-25-22 @ 01:03    HPI:  This is a 73 yo male with PMH of HTN, HLD, DM, who presents for gradually worsening epigastric pain for the past month. Associated with 15lb weight loss.       In ED, vitals T 98.9, HR 57, /95, 99% RA. CBC wnl, CMP with wnl. Lipase 16. Ct A/P with perigastric infiltrative soft tissue changes which are inseparable from the tail of pancreas and extend around the proximal duodenum. Small volume abdominopelvic ascites. Differential includes underlying pancreatic or gastrointestinal neoplasm, sequelae of partially contained ruptured gastric or duodenal ulcer, and infectious process or perhaps pancreatitis. Clinical correlation and/or further evaluation with EGD should be considered. Findings also compatible with BPH. Superimposed cystitis is difficult to exclude on imaging.     (25 Jul 2022 00:11)    History as above. The patient presents to ED for constipation for 1 week, reports a 1 month history of epigastric discomfort after any PO intake. The patient states it feels like the food gets "stuck" at the epigastric level with associated abdominal distension. The patient endorses a 15 pound unintentional weight loss over the last month that he has been experiencing these symptoms. The patient was evaluated by his PCP who ordered a routine CT scan planned for this week. The patient came to the ED because he has not had a bowel movement in 5 days. The patient admits to a family history of cancer (possibly gastric?), last colonoscopy was 4 years ago and was normal, has never had an endoscopy in the past. Surgery consulted to evaluate the patient based on CT scan findings that could not rule out gastric perforation, but could be considered a gastrointestinal neoplasm.       PAST MEDICAL & SURGICAL HISTORY:  HTN (hypertension)  Diabetes  Hypercholesteremia  H/O colonoscopy    Home Medications:  allopurinol 100 mg oral tablet: 1 tab(s) orally once a day (09 Dec 2019 13:09)  atorvastatin 20 mg oral tablet: 1 tab(s) orally once a day (at bedtime) (09 Dec 2019 13:09)  lisinopril 5 mg oral tablet: 1 tab(s) orally once a day (09 Dec 2019 13:09)  metFORMIN 500 mg oral tablet, extended release: 1 tab(s) orally once a day (09 Dec 2019 13:09)      VITALS:  T(F): 98.1 (07-24-22 @ 21:52), Max: 98.9 (07-24-22 @ 14:11)  HR: 62 (07-24-22 @ 21:52) (57 - 62)  BP: 135/73 (07-24-22 @ 21:52) (135/73 - 156/95)  RR: 20 (07-24-22 @ 21:52) (18 - 20)  SpO2: 98% (07-24-22 @ 21:52) (98% - 99%)    PHYSICAL EXAM:  GENERAL: NAD, well-appearing  CHEST/LUNG: Clear to auscultation bilaterally  HEART: Regular rate and rhythm  ABDOMEN: Soft, Nontender, Nondistended;  EXTREMITIES:  No clubbing, cyanosis, or edema      MEDICATIONS  (STANDING):  chlorhexidine 2% Cloths 1 Application(s) Topical <User Schedule>  enoxaparin Injectable 40 milliGRAM(s) SubCutaneous every 24 hours  polyethylene glycol 3350 17 Gram(s) Oral two times a day  senna 2 Tablet(s) Oral at bedtime    MEDICATIONS  (PRN):      LAB/STUDIES:                        15.4   7.50  )-----------( 266      ( 24 Jul 2022 16:46 )             43.8     07-24    134<L>  |  95<L>  |  10  ----------------------------<  103<H>  4.6   |  22  |  0.9    Ca    9.7      24 Jul 2022 16:46    TPro  7.8  /  Alb  4.7  /  TBili  0.7  /  DBili  x   /  AST  23  /  ALT  13  /  AlkPhos  76  07-24      LIVER FUNCTIONS - ( 24 Jul 2022 16:46 )  Alb: 4.7 g/dL / Pro: 7.8 g/dL / ALK PHOS: 76 U/L / ALT: 13 U/L / AST: 23 U/L / GGT: x           IMAGING:  < from: CT Abdomen and Pelvis w/ IV Cont (07.24.22 @ 20:07) >  IMPRESSION:    Predominantly perigastric infiltrative soft tissue changes which are   inseparable from the tail of pancreas and extend around the proximal   duodenum. Small volume abdominopelvic ascites. Differential includes   underlying pancreatic or gastrointestinal neoplasm, sequelae of partially   contained ruptured gastric or duodenal  ulcer, and infectious process or   perhaps pancreatitis. Clinical correlation and/or further evaluation with   EGD should be considered.    Findings compatible with BPH. Superimposed cystitis is difficult to   exclude on imaging.    --- End of Report ---    < end of copied text >      ACCESS DEVICES:  [x ] Peripheral IV               GENERAL SURGERY CONSULT NOTE    Patient: JINA SINGER , 72y ([birth year])Male   MRN: [id number]  Location: [facility] ER Hold 005 A  Visit: 07-24-22 Inpatient  Date: 07-25-22 @ 01:03    HPI:  This is a 71 yo male with PMH of HTN, HLD, DM, who presents for gradually worsening epigastric pain for the past month. Associated with 15lb weight loss.     In ED, vitals T 98.9, HR 57, /95, 99% RA. CBC wnl, CMP with wnl. Lipase 16. Ct A/P with perigastric infiltrative soft tissue changes which are inseparable from the tail of pancreas and extend around the proximal duodenum. Small volume abdominopelvic ascites. Differential includes underlying pancreatic or gastrointestinal neoplasm, sequelae of partially contained ruptured gastric or duodenal ulcer, and infectious process or perhaps pancreatitis. Clinical correlation and/or further evaluation with EGD should be considered. Findings also compatible with BPH. Superimposed cystitis is difficult to exclude on imaging.  (25 Jul 2022 00:11)    History as above. The patient presents to ED for constipation for 1 week, reports a 1 month history of epigastric discomfort after any PO intake. The patient states it feels like the food gets "stuck" at the epigastric level with associated abdominal distension. The patient endorses a 15 pound unintentional weight loss over the last month that he has been experiencing these symptoms. The patient was evaluated by his PCP who ordered a routine CT scan planned for this week. The patient came to the ED because he has not had a bowel movement in 5 days. The patient admits to a family history of cancer (possibly gastric?), last colonoscopy was 4 years ago and was normal, has never had an endoscopy in the past. Surgery consulted to evaluate the patient based on CT scan findings that could not rule out gastric perforation, but could be considered a gastrointestinal neoplasm.     PAST MEDICAL & SURGICAL HISTORY:  HTN (hypertension)  Diabetes  Hypercholesteremia  H/O colonoscopy    Home Medications:  allopurinol 100 mg oral tablet: 1 tab(s) orally once a day (09 Dec 2019 13:09)  atorvastatin 20 mg oral tablet: 1 tab(s) orally once a day (at bedtime) (09 Dec 2019 13:09)  lisinopril 5 mg oral tablet: 1 tab(s) orally once a day (09 Dec 2019 13:09)  metFORMIN 500 mg oral tablet, extended release: 1 tab(s) orally once a day (09 Dec 2019 13:09)      VITALS:  T(F): 98.1 (07-24-22 @ 21:52), Max: 98.9 (07-24-22 @ 14:11)  HR: 62 (07-24-22 @ 21:52) (57 - 62)  BP: 135/73 (07-24-22 @ 21:52) (135/73 - 156/95)  RR: 20 (07-24-22 @ 21:52) (18 - 20)  SpO2: 98% (07-24-22 @ 21:52) (98% - 99%)    PHYSICAL EXAM:  GENERAL: NAD, well-appearing  HEENT: NCAT, EOMI   CHEST/LUNG: normal respiratory effort, bilateral breath sounds   HEART: S1, S2   ABDOMEN: Soft, Nontender, Nondistended, no guarding, no rebound   EXTREMITIES:  No clubbing, cyanosis, or edema  SKIN: No juandice    MEDICATIONS  (STANDING):  chlorhexidine 2% Cloths 1 Application(s) Topical <User Schedule>  enoxaparin Injectable 40 milliGRAM(s) SubCutaneous every 24 hours  polyethylene glycol 3350 17 Gram(s) Oral two times a day  senna 2 Tablet(s) Oral at bedtime    MEDICATIONS  (PRN):      LAB/STUDIES:                        15.4   7.50  )-----------( 266      ( 24 Jul 2022 16:46 )             43.8     07-24    134<L>  |  95<L>  |  10  ----------------------------<  103<H>  4.6   |  22  |  0.9    Ca    9.7      24 Jul 2022 16:46    TPro  7.8  /  Alb  4.7  /  TBili  0.7  /  DBili  x   /  AST  23  /  ALT  13  /  AlkPhos  76  07-24    LIVER FUNCTIONS - ( 24 Jul 2022 16:46 )  Alb: 4.7 g/dL / Pro: 7.8 g/dL / ALK PHOS: 76 U/L / ALT: 13 U/L / AST: 23 U/L / GGT: x           IMAGING:  < from: CT Abdomen and Pelvis w/ IV Cont (07.24.22 @ 20:07) >  IMPRESSION:    Predominantly perigastric infiltrative soft tissue changes which are   inseparable from the tail of pancreas and extend around the proximal   duodenum. Small volume abdominopelvic ascites. Differential includes   underlying pancreatic or gastrointestinal neoplasm, sequelae of partially   contained ruptured gastric or duodenal  ulcer, and infectious process or   perhaps pancreatitis. Clinical correlation and/or further evaluation with   EGD should be considered.    Findings compatible with BPH. Superimposed cystitis is difficult to   exclude on imaging.  --- End of Report ---    ACCESS DEVICES:  [x ] Peripheral IV         GENERAL SURGERY CONSULT NOTE    Patient: JINA SINGER , 72y ([birth year])Male   MRN: [id number]  Location: [facility] ER Hold 005 A  Visit: 07-24-22 Inpatient  Date: 07-25-22 @ 01:03    HPI:  This is a 71 yo male with PMH of HTN, HLD, DM, who presents for gradually worsening epigastric pain for the past month. Associated with 15lb weight loss.     In ED, vitals T 98.9, HR 57, /95, 99% RA. CBC wnl, CMP with wnl. Lipase 16. Ct A/P with perigastric infiltrative soft tissue changes which are inseparable from the tail of pancreas and extend around the proximal duodenum. Small volume abdominopelvic ascites. Differential includes underlying pancreatic or gastrointestinal neoplasm, sequelae of partially contained ruptured gastric or duodenal ulcer, and infectious process or perhaps pancreatitis. Clinical correlation and/or further evaluation with EGD should be considered. Findings also compatible with BPH. Superimposed cystitis is difficult to exclude on imaging.  (25 Jul 2022 00:11)    History as above. The patient presents to ED for constipation for 1 week, reports a 1 month history of epigastric discomfort after any PO intake. The patient states it feels like the food gets "stuck" at the epigastric level with associated abdominal distension. The patient endorses a 15 pound unintentional weight loss over the last month that he has been experiencing these symptoms. The patient was evaluated by his PCP who ordered a routine CT scan planned for this week. The patient came to the ED because he has not had a bowel movement in 5 days. The patient admits to a family history of cancer (possibly gastric?), last colonoscopy was 4 years ago and was normal, has never had an endoscopy in the past. Surgery consulted to evaluate the patient based on CT scan findings that could not rule out gastric perforation, but could be considered a gastrointestinal neoplasm.     PAST MEDICAL & SURGICAL HISTORY:  HTN (hypertension)  Diabetes  Hypercholesteremia  H/O colonoscopy    Home Medications:  allopurinol 100 mg oral tablet: 1 tab(s) orally once a day (09 Dec 2019 13:09)  atorvastatin 20 mg oral tablet: 1 tab(s) orally once a day (at bedtime) (09 Dec 2019 13:09)  lisinopril 5 mg oral tablet: 1 tab(s) orally once a day (09 Dec 2019 13:09)  metFORMIN 500 mg oral tablet, extended release: 1 tab(s) orally once a day (09 Dec 2019 13:09)      VITALS:  T(F): 98.1 (07-24-22 @ 21:52), Max: 98.9 (07-24-22 @ 14:11)  HR: 62 (07-24-22 @ 21:52) (57 - 62)  BP: 135/73 (07-24-22 @ 21:52) (135/73 - 156/95)  RR: 20 (07-24-22 @ 21:52) (18 - 20)  SpO2: 98% (07-24-22 @ 21:52) (98% - 99%)    PHYSICAL EXAM:  GENERAL: NAD, well-appearing  HEENT: NCAT, EOMI   CHEST/LUNG: normal respiratory effort, bilateral breath sounds   HEART: S1, S2   ABDOMEN: Soft, Nontender, Nondistended, no guarding, no rebound   EXTREMITIES:  No clubbing, cyanosis, or edema  SKIN: No juandice    MEDICATIONS  (STANDING):  chlorhexidine 2% Cloths 1 Application(s) Topical <User Schedule>  enoxaparin Injectable 40 milliGRAM(s) SubCutaneous every 24 hours  polyethylene glycol 3350 17 Gram(s) Oral two times a day  senna 2 Tablet(s) Oral at bedtime    MEDICATIONS  (PRN):      LAB/STUDIES:                        15.4   7.50  )-----------( 266      ( 24 Jul 2022 16:46 )             43.8     07-24    134<L>  |  95<L>  |  10  ----------------------------<  103<H>  4.6   |  22  |  0.9    Ca    9.7      24 Jul 2022 16:46    TPro  7.8  /  Alb  4.7  /  TBili  0.7  /  DBili  x   /  AST  23  /  ALT  13  /  AlkPhos  76  07-24    LIVER FUNCTIONS - ( 24 Jul 2022 16:46 )  Alb: 4.7 g/dL / Pro: 7.8 g/dL / ALK PHOS: 76 U/L / ALT: 13 U/L / AST: 23 U/L / GGT: x           IMAGING:  < from: CT Abdomen and Pelvis w/ IV Cont (07.24.22 @ 20:07) >  IMPRESSION:  Predominantly perigastric infiltrative soft tissue changes which are   inseparable from the tail of pancreas and extend around the proximal   duodenum. Small volume abdominopelvic ascites. Differential includes   underlying pancreatic or gastrointestinal neoplasm, sequelae of partially   contained ruptured gastric or duodenal  ulcer, and infectious process or   perhaps pancreatitis. Clinical correlation and/or further evaluation with   EGD should be considered.    Findings compatible with BPH. Superimposed cystitis is difficult to   exclude on imaging.  --- End of Report ---    ACCESS DEVICES:  [x ] Peripheral IV

## 2022-07-25 NOTE — CONSULT NOTE ADULT - ASSESSMENT
ASSESSMENT:  72yM w/ PMHx of  HTN, DM, HLD, who presented with constipation for 5 days, epigastric pain x 1 month and unintentional weight loss of 15pounds (over 1 month), CT scan demonstrates perigastric infiltrative soft tissue changes with differential of possible gastric perforation vs neoplasm. The patient is non-tender on examination, vital signs are stable, WBC 7.50 very low clinical suspicion for gastric perforation, no acute surgical intervention at this time, would consider GI consult and surgical oncology consult.     Physical exam findings, imaging, and labs as documented above.     PLAN:  -GI consult for endoscopy for further evaluation of perigastric infiltrative soft tissue changes  -NPO, IVF (until seen by GI)  -Protonix  -Tumor markers  -Will follow     ASSESSMENT:  72yM w/ PMHx of  HTN, DM, HLD, who presented with constipation for 5 days, epigastric pain x 1 month and unintentional weight loss of 15pounds (over 1 month), CT scan demonstrates perigastric infiltrative soft tissue changes with differential of possible gastric perforation vs neoplasm. The patient is non-tender on examination, vital signs are stable, WBC 7.50 very low clinical suspicion for gastric perforation, no acute surgical intervention at this time, would consider GI consult and surgical oncology consult. Physical exam findings, imaging, and labs as documented above.     PLAN:  -GI consult for EGD/EUS  -NPO, IVF (until seen by GI)  -Protonix  -Tumor markers  -Will follow     ASSESSMENT:  72yM w/ PMHx of  HTN, DM, HLD, who presented with constipation for 5 days, epigastric pain x 1 month with PO intake and unintentional weight loss of 15pounds (over 1 month). History of gastric cancer in father. Reports never having a prior to EGD. Physical exam findings, imaging, and labs as documented above.     PLAN:  -GI consult for EGD/EUS  -Tumor markers  -CT Chest for staging   -GI/DVT ppx   -Surgery to continue to follow     [employer] #[id number]

## 2022-07-25 NOTE — ED ADULT NURSE REASSESSMENT NOTE - NS ED NURSE REASSESS COMMENT FT1
Patient refused bed alarm. educated on risks of not having bed alarm, and importance of having it. Active rounding in place

## 2022-07-25 NOTE — H&P ADULT - ATTENDING COMMENTS
71 yo male with PMH of HTN, HLD, DM, who presents for gradually worsening epigastric pain for the past month. Possibly findings of neoplasm on imaging.    #Abdominal Pain/weight loss  r/o GI malignancy  CTAP w IV (07/24): Predominantly perigastric infiltrative soft tissue changes which are inseparable from the tail of pancreas and extend around the proximal duodenum  - cont NPO  - PPI BID  - GI plans to the EGD + EUS for further eval, will f/u    #HTN/HLD  - c/w lisinopril 5mg qd  - Cont lipitor 20mg qD    #Diabetes Mellitus  - Not on any home meds currently   - F/U a1c  - Monitor FS. Start insulin regimen if persistently elevated     DVT PPX, lovenox    #Progress Note Handoff  Pending (specify): EGD   Family discussion: shira pt regarding further eval with EGD  Disposition: Home

## 2022-07-25 NOTE — CONSULT NOTE ADULT - SUBJECTIVE AND OBJECTIVE BOX
GENERAL SURGERY CONSULT NOTE    Patient: JINA SINGER , 72y ([birth year])Male   MRN: [id number]  Location: [facility] ER Hold 005 A  Visit: 07-24-22 Inpatient  Date: 07-25-22 @ 01:03    HPI:  This is a 73 yo male with PMH of HTN, HLD, DM, who presents for gradually worsening epigastric pain for the past month. Associated with 15lb weight loss.       In ED, vitals T 98.9, HR 57, /95, 99% RA. CBC wnl, CMP with wnl. Lipase 16. Ct A/P with perigastric infiltrative soft tissue changes which are inseparable from the tail of pancreas and extend around the proximal duodenum. Small volume abdominopelvic ascites. Differential includes underlying pancreatic or gastrointestinal neoplasm, sequelae of partially contained ruptured gastric or duodenal ulcer, and infectious process or perhaps pancreatitis. Clinical correlation and/or further evaluation with EGD should be considered. Findings also compatible with BPH. Superimposed cystitis is difficult to exclude on imaging.     (25 Jul 2022 00:11)    History as above. The patient presents to ED for constipation for 1 week, reports a 1 month history of epigastric discomfort after any PO intake. The patient states it feels like the food gets "stuck" at the epigastric level with associated abdominal distension. The patient endorses a 15 pound unintentional weight loss over the last month that he has been experiencing these symptoms. The patient was evaluated by his PCP who ordered a routine CT scan planned for this week. The patient came to the ED because he has not had a bowel movement in 5 days. The patient admits to a family history of cancer (possibly gastric?), last colonoscopy was 4 years ago and was normal, has never had an endoscopy in the past. Surgery consulted to evaluate the patient based on CT scan findings that could not rule out gastric perforation, but could be considered a gastrointestinal neoplasm.       PAST MEDICAL & SURGICAL HISTORY:  HTN (hypertension)  Diabetes  Hypercholesteremia  H/O colonoscopy    Home Medications:  allopurinol 100 mg oral tablet: 1 tab(s) orally once a day (09 Dec 2019 13:09)  atorvastatin 20 mg oral tablet: 1 tab(s) orally once a day (at bedtime) (09 Dec 2019 13:09)  lisinopril 5 mg oral tablet: 1 tab(s) orally once a day (09 Dec 2019 13:09)  metFORMIN 500 mg oral tablet, extended release: 1 tab(s) orally once a day (09 Dec 2019 13:09)      VITALS:  T(F): 98.1 (07-24-22 @ 21:52), Max: 98.9 (07-24-22 @ 14:11)  HR: 62 (07-24-22 @ 21:52) (57 - 62)  BP: 135/73 (07-24-22 @ 21:52) (135/73 - 156/95)  RR: 20 (07-24-22 @ 21:52) (18 - 20)  SpO2: 98% (07-24-22 @ 21:52) (98% - 99%)    PHYSICAL EXAM:  GENERAL: NAD, well-appearing  CHEST/LUNG: Clear to auscultation bilaterally  HEART: Regular rate and rhythm  ABDOMEN: Soft, Nontender, Nondistended;  EXTREMITIES:  No clubbing, cyanosis, or edema      MEDICATIONS  (STANDING):  chlorhexidine 2% Cloths 1 Application(s) Topical <User Schedule>  enoxaparin Injectable 40 milliGRAM(s) SubCutaneous every 24 hours  polyethylene glycol 3350 17 Gram(s) Oral two times a day  senna 2 Tablet(s) Oral at bedtime    MEDICATIONS  (PRN):      LAB/STUDIES:                        15.4   7.50  )-----------( 266      ( 24 Jul 2022 16:46 )             43.8     07-24    134<L>  |  95<L>  |  10  ----------------------------<  103<H>  4.6   |  22  |  0.9    Ca    9.7      24 Jul 2022 16:46    TPro  7.8  /  Alb  4.7  /  TBili  0.7  /  DBili  x   /  AST  23  /  ALT  13  /  AlkPhos  76  07-24      LIVER FUNCTIONS - ( 24 Jul 2022 16:46 )  Alb: 4.7 g/dL / Pro: 7.8 g/dL / ALK PHOS: 76 U/L / ALT: 13 U/L / AST: 23 U/L / GGT: x           IMAGING:  < from: CT Abdomen and Pelvis w/ IV Cont (07.24.22 @ 20:07) >  IMPRESSION:    Predominantly perigastric infiltrative soft tissue changes which are   inseparable from the tail of pancreas and extend around the proximal   duodenum. Small volume abdominopelvic ascites. Differential includes   underlying pancreatic or gastrointestinal neoplasm, sequelae of partially   contained ruptured gastric or duodenal  ulcer, and infectious process or   perhaps pancreatitis. Clinical correlation and/or further evaluation with   EGD should be considered.    Findings compatible with BPH. Superimposed cystitis is difficult to   exclude on imaging.    --- End of Report ---    < end of copied text >      ACCESS DEVICES:  [x ] Peripheral IV           GENERAL SURGERY CONSULT NOTE    Patient: JINA SINGER , 72y ([birth year])Male   MRN: [id number]  Location: [facility] ER Hold 005 A  Visit: 07-24-22 Inpatient  Date: 07-25-22 @ 01:03    HPI:  This is a 71 yo male with PMH of HTN, HLD, DM, who presents for gradually worsening epigastric pain for the past month. Associated with 15lb weight loss.       In ED, vitals T 98.9, HR 57, /95, 99% RA. CBC wnl, CMP with wnl. Lipase 16. Ct A/P with perigastric infiltrative soft tissue changes which are inseparable from the tail of pancreas and extend around the proximal duodenum. Small volume abdominopelvic ascites. Differential includes underlying pancreatic or gastrointestinal neoplasm, sequelae of partially contained ruptured gastric or duodenal ulcer, and infectious process or perhaps pancreatitis. Clinical correlation and/or further evaluation with EGD should be considered. Findings also compatible with BPH. Superimposed cystitis is difficult to exclude on imaging.     (25 Jul 2022 00:11)    History as above. The patient presents to ED for constipation for 1 week, reports a 1 month history of epigastric discomfort after any PO intake. The patient states it feels like the food gets "stuck" at the epigastric level with associated abdominal distension. The patient endorses a 15 pound unintentional weight loss over the last month that he has been experiencing these symptoms. The patient was evaluated by his PCP who ordered a routine CT scan planned for this week. The patient came to the ED because he has not had a bowel movement in 5 days. The patient admits to a family history of cancer (possibly gastric?), last colonoscopy was 4 years ago and was normal, has never had an endoscopy in the past. Surgery consulted to evaluate the patient based on CT scan findings that could not rule out gastric perforation, but could be considered a gastrointestinal neoplasm.       PAST MEDICAL & SURGICAL HISTORY:  HTN (hypertension)  Diabetes  Hypercholesteremia  H/O colonoscopy    Home Medications:  allopurinol 100 mg oral tablet: 1 tab(s) orally once a day (09 Dec 2019 13:09)  atorvastatin 20 mg oral tablet: 1 tab(s) orally once a day (at bedtime) (09 Dec 2019 13:09)  lisinopril 5 mg oral tablet: 1 tab(s) orally once a day (09 Dec 2019 13:09)  metFORMIN 500 mg oral tablet, extended release: 1 tab(s) orally once a day (09 Dec 2019 13:09)      VITALS:  T(F): 98.1 (07-24-22 @ 21:52), Max: 98.9 (07-24-22 @ 14:11)  HR: 62 (07-24-22 @ 21:52) (57 - 62)  BP: 135/73 (07-24-22 @ 21:52) (135/73 - 156/95)  RR: 20 (07-24-22 @ 21:52) (18 - 20)  SpO2: 98% (07-24-22 @ 21:52) (98% - 99%)    PHYSICAL EXAM:  GENERAL: NAD, well-appearing  CHEST/LUNG: Clear to auscultation bilaterally  HEART: Regular rate and rhythm  ABDOMEN: Soft, Nontender, Nondistended;  EXTREMITIES:  No clubbing, cyanosis, or edema      MEDICATIONS  (STANDING):  chlorhexidine 2% Cloths 1 Application(s) Topical <User Schedule>  enoxaparin Injectable 40 milliGRAM(s) SubCutaneous every 24 hours  polyethylene glycol 3350 17 Gram(s) Oral two times a day  senna 2 Tablet(s) Oral at bedtime    MEDICATIONS  (PRN):      LAB/STUDIES:                        15.4   7.50  )-----------( 266      ( 24 Jul 2022 16:46 )             43.8     07-24    134<L>  |  95<L>  |  10  ----------------------------<  103<H>  4.6   |  22  |  0.9    Ca    9.7      24 Jul 2022 16:46    TPro  7.8  /  Alb  4.7  /  TBili  0.7  /  DBili  x   /  AST  23  /  ALT  13  /  AlkPhos  76  07-24      LIVER FUNCTIONS - ( 24 Jul 2022 16:46 )  Alb: 4.7 g/dL / Pro: 7.8 g/dL / ALK PHOS: 76 U/L / ALT: 13 U/L / AST: 23 U/L / GGT: x           IMAGING:  < from: CT Abdomen and Pelvis w/ IV Cont (07.24.22 @ 20:07) >  IMPRESSION:  Predominantly perigastric infiltrative soft tissue changes which are   inseparable from the tail of pancreas and extend around the proximal   duodenum. Small volume abdominopelvic ascites. Differential includes   underlying pancreatic or gastrointestinal neoplasm, sequelae of partially   contained ruptured gastric or duodenal  ulcer, and infectious process or   perhaps pancreatitis. Clinical correlation and/or further evaluation with   EGD should be considered.    Findings compatible with BPH. Superimposed cystitis is difficult to   exclude on imaging.  --- End of Report ---      ACCESS DEVICES:  [x ] Peripheral IV

## 2022-07-25 NOTE — CONSULT NOTE ADULT - ATTENDING COMMENTS
ACS Attending Note Attestation    Patient is examined and evaluated at the bedside with the residents/PAs. Treatment plan discussed with the team, nurses, and consulting physicians and consulting teams. Medications, radiological studies and all other relevant studies reviewed. I reviewed the resident/PA note and agreed with above assessment and plan with following additions and corrections.    JINA SINGER Patient is a 72y old  Male who presents with a chief complaint of Epigastric pain CT abdomen/pelvis reviewed and demonstrated thickened stomach    Allergies  No Known Drug Allergies  shellfish (Unknown)  Intolerances    PAST MEDICAL & SURGICAL HISTORY:  HTN (hypertension)  Diabetes  Hypercholesteremia  H/O colonoscopy    Vital Signs Last 24 Hrs  T(C): 36.7 (24 Jul 2022 21:52), Max: 37.2 (24 Jul 2022 14:11)  T(F): 98.1 (24 Jul 2022 21:52), Max: 98.9 (24 Jul 2022 14:11)  HR: 62 (24 Jul 2022 21:52) (57 - 62)  BP: 135/73 (24 Jul 2022 21:52) (135/73 - 156/95)  BP(mean): --  RR: 20 (24 Jul 2022 21:52) (18 - 20)  SpO2: 98% (24 Jul 2022 21:52) (98% - 99%)    Parameters below as of 24 Jul 2022 21:52  Patient On (Oxygen Delivery Method): room air               15.4   7.50  )-----------( 266      ( 24 Jul 2022 16:46 )             43.8     07-24    134<L>  |  95<L>  |  10  ----------------------------<  103<H>  4.6   |  22  |  0.9    Ca    9.7      24 Jul 2022 16:46    TPro  7.8  /  Alb  4.7  /  TBili  0.7  /  DBili  x   /  AST  23  /  ALT  13  /  AlkPhos  76  07-24      Diagnosis: Abdominal pain    Plan:	  - no surgical intervention  - GI consult   - supportive care  - GI/DVT prophylaxis  - pain management  - follow up consults  - repeat studies as needed    Shyanne Nur MD, FACS  Trauma/ACS/Surcical Critical care Attending
pt examined in the ED  has a stomach mass congruous with pancreatic mass  discussed with patient and family   will need egd/eus  discussed with GI   discussed with family   will need metastatic work up

## 2022-07-25 NOTE — CONSULT NOTE ADULT - ASSESSMENT
ASSESSMENT:  72yM w/ PMHx of  HTN, DM, HLD, who presented with constipation for 5 days, epigastric pain x 1 month and unintentional weight loss of 15pounds (over 1 month), CT scan demonstrates perigastric infiltrative soft tissue changes with differential of possible gastric perforation vs neoplasm. The patient is non-tender on examination, vital signs are stable, WBC 7.50 very low clinical suspicion for gastric perforation, no acute surgical intervention at this time, would consider GI consult and surgical oncology consult.     Physical exam findings, imaging, and labs as documented above.     PLAN:  -GI consult for endoscopy for further evaluation of perigastric infiltrative soft tissue changes  -NPO, IVF (until seen by GI)  -Protonix  -Tumor markers  -Surgical oncology consult    Lines/Tubes: PIV    Above plan discussed with Attending Surgeon Dr. Nur , patient, patient family, and Primary team  07-25-22 @ 01:03   ASSESSMENT:  72yM w/ PMHx of  HTN, DM, HLD, who presented with constipation for 5 days, epigastric pain x 1 month and unintentional weight loss of 15pounds (over 1 month), CT scan demonstrates perigastric infiltrative soft tissue changes with differential of possible gastric perforation vs neoplasm. The patient is non-tender on examination, vital signs are stable, WBC 7.50 very low clinical suspicion for gastric perforation, no acute surgical intervention at this time, would consider GI consult and surgical oncology consult. Physical exam findings, imaging, and labs as documented above.     PLAN:  -GI consult for endoscopy for further evaluation of perigastric infiltrative soft tissue changes  -NPO, IVF (until seen by GI)  -Protonix  -Tumor markers  -Surgical oncology consult    Lines/Tubes: PIV    Above plan discussed with Attending Surgeon Dr. Nur, patient, patient family, and Primary team  07-25-22 @ 01:03

## 2022-07-26 LAB
ALBUMIN SERPL ELPH-MCNC: 3.9 G/DL — SIGNIFICANT CHANGE UP (ref 3.5–5.2)
ALP SERPL-CCNC: 63 U/L — SIGNIFICANT CHANGE UP (ref 30–115)
ALT FLD-CCNC: 10 U/L — SIGNIFICANT CHANGE UP (ref 0–41)
ANION GAP SERPL CALC-SCNC: 10 MMOL/L — SIGNIFICANT CHANGE UP (ref 7–14)
APPEARANCE UR: CLEAR — SIGNIFICANT CHANGE UP
AST SERPL-CCNC: 11 U/L — SIGNIFICANT CHANGE UP (ref 0–41)
BACTERIA # UR AUTO: NEGATIVE — SIGNIFICANT CHANGE UP
BASOPHILS # BLD AUTO: 0.04 K/UL — SIGNIFICANT CHANGE UP (ref 0–0.2)
BASOPHILS NFR BLD AUTO: 0.6 % — SIGNIFICANT CHANGE UP (ref 0–1)
BILIRUB SERPL-MCNC: 0.8 MG/DL — SIGNIFICANT CHANGE UP (ref 0.2–1.2)
BILIRUB UR-MCNC: NEGATIVE — SIGNIFICANT CHANGE UP
BUN SERPL-MCNC: 7 MG/DL — LOW (ref 10–20)
CALCIUM SERPL-MCNC: 9.4 MG/DL — SIGNIFICANT CHANGE UP (ref 8.5–10.1)
CHLORIDE SERPL-SCNC: 101 MMOL/L — SIGNIFICANT CHANGE UP (ref 98–110)
CO2 SERPL-SCNC: 28 MMOL/L — SIGNIFICANT CHANGE UP (ref 17–32)
COLOR SPEC: SIGNIFICANT CHANGE UP
CREAT SERPL-MCNC: 0.9 MG/DL — SIGNIFICANT CHANGE UP (ref 0.7–1.5)
DIFF PNL FLD: NEGATIVE — SIGNIFICANT CHANGE UP
EGFR: 91 ML/MIN/1.73M2 — SIGNIFICANT CHANGE UP
EOSINOPHIL # BLD AUTO: 0.4 K/UL — SIGNIFICANT CHANGE UP (ref 0–0.7)
EOSINOPHIL NFR BLD AUTO: 5.6 % — SIGNIFICANT CHANGE UP (ref 0–8)
EPI CELLS # UR: 1 /HPF — SIGNIFICANT CHANGE UP (ref 0–5)
GLUCOSE BLDC GLUCOMTR-MCNC: 115 MG/DL — HIGH (ref 70–99)
GLUCOSE BLDC GLUCOMTR-MCNC: 117 MG/DL — HIGH (ref 70–99)
GLUCOSE BLDC GLUCOMTR-MCNC: 120 MG/DL — HIGH (ref 70–99)
GLUCOSE BLDC GLUCOMTR-MCNC: 122 MG/DL — HIGH (ref 70–99)
GLUCOSE BLDC GLUCOMTR-MCNC: 123 MG/DL — HIGH (ref 70–99)
GLUCOSE SERPL-MCNC: 106 MG/DL — HIGH (ref 70–99)
GLUCOSE UR QL: NEGATIVE — SIGNIFICANT CHANGE UP
HCT VFR BLD CALC: 38.9 % — LOW (ref 42–52)
HGB BLD-MCNC: 13.7 G/DL — LOW (ref 14–18)
HYALINE CASTS # UR AUTO: 0 /LPF — SIGNIFICANT CHANGE UP (ref 0–7)
IMM GRANULOCYTES NFR BLD AUTO: 0.3 % — SIGNIFICANT CHANGE UP (ref 0.1–0.3)
KETONES UR-MCNC: NEGATIVE — SIGNIFICANT CHANGE UP
LEUKOCYTE ESTERASE UR-ACNC: ABNORMAL
LYMPHOCYTES # BLD AUTO: 1.62 K/UL — SIGNIFICANT CHANGE UP (ref 1.2–3.4)
LYMPHOCYTES # BLD AUTO: 22.8 % — SIGNIFICANT CHANGE UP (ref 20.5–51.1)
MAGNESIUM SERPL-MCNC: 2.3 MG/DL — SIGNIFICANT CHANGE UP (ref 1.8–2.4)
MCHC RBC-ENTMCNC: 29.2 PG — SIGNIFICANT CHANGE UP (ref 27–31)
MCHC RBC-ENTMCNC: 35.2 G/DL — SIGNIFICANT CHANGE UP (ref 32–37)
MCV RBC AUTO: 82.9 FL — SIGNIFICANT CHANGE UP (ref 80–94)
MONOCYTES # BLD AUTO: 0.57 K/UL — SIGNIFICANT CHANGE UP (ref 0.1–0.6)
MONOCYTES NFR BLD AUTO: 8 % — SIGNIFICANT CHANGE UP (ref 1.7–9.3)
NEUTROPHILS # BLD AUTO: 4.47 K/UL — SIGNIFICANT CHANGE UP (ref 1.4–6.5)
NEUTROPHILS NFR BLD AUTO: 62.7 % — SIGNIFICANT CHANGE UP (ref 42.2–75.2)
NITRITE UR-MCNC: NEGATIVE — SIGNIFICANT CHANGE UP
NRBC # BLD: 0 /100 WBCS — SIGNIFICANT CHANGE UP (ref 0–0)
PH UR: 6.5 — SIGNIFICANT CHANGE UP (ref 5–8)
PLATELET # BLD AUTO: 240 K/UL — SIGNIFICANT CHANGE UP (ref 130–400)
POTASSIUM SERPL-MCNC: 4.7 MMOL/L — SIGNIFICANT CHANGE UP (ref 3.5–5)
POTASSIUM SERPL-SCNC: 4.7 MMOL/L — SIGNIFICANT CHANGE UP (ref 3.5–5)
PROT SERPL-MCNC: 6.7 G/DL — SIGNIFICANT CHANGE UP (ref 6–8)
PROT UR-MCNC: NEGATIVE — SIGNIFICANT CHANGE UP
RBC # BLD: 4.69 M/UL — LOW (ref 4.7–6.1)
RBC # FLD: 14 % — SIGNIFICANT CHANGE UP (ref 11.5–14.5)
RBC CASTS # UR COMP ASSIST: 1 /HPF — SIGNIFICANT CHANGE UP (ref 0–4)
SODIUM SERPL-SCNC: 139 MMOL/L — SIGNIFICANT CHANGE UP (ref 135–146)
SP GR SPEC: 1.01 — LOW (ref 1.01–1.03)
UROBILINOGEN FLD QL: SIGNIFICANT CHANGE UP
WBC # BLD: 7.12 K/UL — SIGNIFICANT CHANGE UP (ref 4.8–10.8)
WBC # FLD AUTO: 7.12 K/UL — SIGNIFICANT CHANGE UP (ref 4.8–10.8)
WBC UR QL: 23 /HPF — HIGH (ref 0–5)

## 2022-07-26 PROCEDURE — 99233 SBSQ HOSP IP/OBS HIGH 50: CPT

## 2022-07-26 PROCEDURE — 99232 SBSQ HOSP IP/OBS MODERATE 35: CPT

## 2022-07-26 RX ADMIN — LISINOPRIL 5 MILLIGRAM(S): 2.5 TABLET ORAL at 05:09

## 2022-07-26 RX ADMIN — PANTOPRAZOLE SODIUM 40 MILLIGRAM(S): 20 TABLET, DELAYED RELEASE ORAL at 17:20

## 2022-07-26 RX ADMIN — PANTOPRAZOLE SODIUM 40 MILLIGRAM(S): 20 TABLET, DELAYED RELEASE ORAL at 05:09

## 2022-07-26 RX ADMIN — CHLORHEXIDINE GLUCONATE 1 APPLICATION(S): 213 SOLUTION TOPICAL at 05:10

## 2022-07-26 RX ADMIN — SENNA PLUS 2 TABLET(S): 8.6 TABLET ORAL at 21:18

## 2022-07-26 RX ADMIN — ENOXAPARIN SODIUM 40 MILLIGRAM(S): 100 INJECTION SUBCUTANEOUS at 05:10

## 2022-07-26 RX ADMIN — ATORVASTATIN CALCIUM 20 MILLIGRAM(S): 80 TABLET, FILM COATED ORAL at 21:18

## 2022-07-26 RX ADMIN — SODIUM CHLORIDE 75 MILLILITER(S): 9 INJECTION, SOLUTION INTRAVENOUS at 12:24

## 2022-07-26 RX ADMIN — POLYETHYLENE GLYCOL 3350 17 GRAM(S): 17 POWDER, FOR SOLUTION ORAL at 17:24

## 2022-07-26 NOTE — PROGRESS NOTE ADULT - ASSESSMENT
72yM w/ PMHx of  HTN, DM, HLD, who presented with constipation for 5 days, epigastric pain x 1 month with PO intake and unintentional weight loss of 15pounds (over 1 month). History of gastric cancer in father. Reports never having a prior to EGD. Physical exam findings, imaging, and labs as documented above.     PLAN:  -care by primary team  -no acute surgical at this moment  -GI consult for EGD/EUS, possibly Thurday/Friday  -Heme/ONC after tissue diagnosis  -Tumor markers  -GI/DVT ppx       Blue Team #[id number]

## 2022-07-26 NOTE — PROGRESS NOTE ADULT - SUBJECTIVE AND OBJECTIVE BOX
GENERAL SURGERY PROGRESS NOTE    Patient: JINA SINGER , 72y ([birth year])Male   MRN: [id number]  Location: [facility] [id number] B  Visit: 07-24-22 Inpatient  Date: 07-26-22 @ 01:22      Procedure/Dx/Injuries: suspicious for pancreatic or gastrointestinal neoplasm.    Events of past 24 hours: ct chest done    PAST MEDICAL & SURGICAL HISTORY:  HTN (hypertension)      Diabetes      Hypercholesteremia      H/O colonoscopy          Vitals:   T(F): 97.5 (07-25-22 @ 23:30), Max: 97.7 (07-25-22 @ 16:44)  HR: 67 (07-25-22 @ 23:30)  BP: 118/67 (07-25-22 @ 23:30)  RR: 18 (07-25-22 @ 23:30)  SpO2: 96% (07-25-22 @ 23:30)      Diet, NPO after Midnight:      NPO Start Date: 25-Jul-2022,   NPO Start Time: 23:59  Diet, Full Liquid:   Supplement Feeding Modality:  Oral  Ensure Enlive Cans or Servings Per Day:  1       Frequency:  Two Times a day      Fluids: lactated ringers.: Solution, 1000 milliLiter(s) infuse at 75 mL/Hr      I & O's:      PHYSICAL EXAM:  General Appearance: AAOx3, NAD  Heart: RRR  Lungs: CTAX2  Abdomen:  soft, no tender, non distended  MSK/Extremities:  mobile    MEDICATIONS  (STANDING):  atorvastatin 20 milliGRAM(s) Oral at bedtime  chlorhexidine 2% Cloths 1 Application(s) Topical <User Schedule>  enoxaparin Injectable 40 milliGRAM(s) SubCutaneous every 24 hours  lactated ringers. 1000 milliLiter(s) (75 mL/Hr) IV Continuous <Continuous>  lisinopril 5 milliGRAM(s) Oral daily  pantoprazole  Injectable 40 milliGRAM(s) IV Push two times a day  polyethylene glycol 3350 17 Gram(s) Oral two times a day  senna 2 Tablet(s) Oral at bedtime    MEDICATIONS  (PRN):      DVT PROPHYLAXIS: enoxaparin Injectable 40 milliGRAM(s) SubCutaneous every 24 hours    GI PROPHYLAXIS: pantoprazole  Injectable 40 milliGRAM(s) IV Push two times a day    ANTICOAGULATION:   ANTIBIOTICS:            LAB/STUDIES:  Labs:  CAPILLARY BLOOD GLUCOSE      POCT Blood Glucose.: 122 mg/dL (26 Jul 2022 00:18)                          14.5   7.18  )-----------( 241      ( 25 Jul 2022 06:03 )             41.1       Auto Neutrophil %: 58.4 % (07-25-22 @ 06:03)  Auto Immature Granulocyte %: 0.3 % (07-25-22 @ 06:03)    07-25    140  |  101  |  9<L>  ----------------------------<  101<H>  4.9   |  27  |  0.8      Calcium, Total Serum: 9.3 mg/dL (07-25-22 @ 06:03)      LFTs:             6.8  | 0.6  | 13       ------------------[67      ( 25 Jul 2022 06:03 )  4.1  | x    | 11          Lipase:x      Amylase:78            Coags:     13.50  ----< 1.18    ( 25 Jul 2022 06:03 )     28.7        CARDIAC MARKERS ( 25 Jul 2022 06:03 )  x     / <0.01 ng/mL / x     / x     / x                          IMAGING:  < from: CT Chest No Cont (07.25.22 @ 14:23) >    IMPRESSION:    Perigastric infiltrative process inseparable from the tail the pancreas   extending to the proximal duodenum. Associatedascites. Findings again   suspicious for pancreatic or gastrointestinal neoplasm. Differential   diagnosis includes partially contained ruptured gastric or duodenal ulcer   as well as infectious process/pancreatitis.    Hyperaerated lungs. Scattered granulomata. Vascular calcifications.   Nonspecific areas of subpleural thickening bilaterally.    --- End of Report ---      < end of copied text >      ACCESS/ DEVICES:  [ ] Peripheral IV  [ ] Central Venous Line	[ ] R	[ ] L	[ ] IJ	[ ] Fem	[ ] SC	Placed:   [ ] Arterial Line		[ ] R	[ ] L	[ ] Fem	[ ] Rad	[ ] Ax	Placed:   [ ] PICC:					[ ] Mediport  [ ] Urinary Catheter,  Date Placed:   [ ] Chest tube: [ ] Right, [ ] Left  [ ] MAYCOL/Nicolás Drains

## 2022-07-26 NOTE — PROGRESS NOTE ADULT - ASSESSMENT
Patient is a 71 y/o male with PMHx of HTN, HLD, DM, who presented to [facility] for abdominal pain. Patient notes pain located in the Epigastric area,  gradually worsening, for the past month. Pain does not radiate anywhere else, worse after eating. Chronic discomfort like in nature. Pain associated with 15lb weight loss in the last month. Also associated with constipation. Last BM 1 week ago, brown, normal. Denies fevers, chills, sob, chest pain, N/V/D, le edema. Denies melena/ BRBPR. Denies jaundice/ pruritis. Denies urinary frequency and urgency. Patient notes when he eats food feels to become stuck or pass slow ( Points at mid-abdomen). Last Colonoscopy was 4 years prior but never had an EGD. Wife present during interview. CT scan notable for perigastric infiltrative soft tissue changes which are inseparable from the tail of the pancreas that extends around the duodenum patient also has some small volume ascites.  Abdominal exam is reassuring and patient is without pain on exam.  Discussed with patient and family present the need for endoscopic evaluation with utilization of endoscopic ultrasound.  While differential includes ulcer or partial contained rupture clinically it does not appear that way when factoring in weight loss along with family history. Full liquid diet for now, will attempt Endoscopic evaluation tomorrow if endoscopy can accommodate us.     Abnormal CT Imaging / Perigastric soft tissue changes/ 15 lb weight loss over one month  - Pantoprazole 40mg Q12 hour  - Discussed EGD with EUS with patient for direct evaluation and sampling if need be Wednesday if endoscopy can accommodate   - Full liquids, NPO after midnight , hold AC tomorrow   - Will follow

## 2022-07-26 NOTE — PROGRESS NOTE ADULT - ASSESSMENT
This is a 73 yo male with PMH of HTN, HLD, DM, who presents for gradually worsening epigastric pain for the past month. Pain does not radiate anywhere else, worse after eating. Chronic discomfort, (2/10 on pain scale). Associated with 15lb weight loss in the last month. Found to have perigastric soft tissue changes, admitted to medicine.     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 Small volume abdominopelvic ascites. Differential includes underlying pancreatic or gastrointestinal neoplasm, sequelae of partially contained ruptured gastric or duodenal ulcer, and infectious process or perhaps pancreatitis.   - Surgery consult - no intervention  - CEA 6.8, CA 19-9 [id number]  -Protonix qd   - GI consult - NPO midnight with EGD/EUS tomorrow (7/27)    #Possible Cystitis on CT  - Denies urinary symptoms  - UA positive for leukocyte esterase and WBC 23  - monitor off abx  - KUB - excreted contrast noted within the bladder    #HTN  - c/w lisinopril 5mg qd    #HLD  - c/w atorvastatin 20mg qd     #Diabetes Mellitus  - Not on any home meds currently   - A1c - 6.6  - Monitor FS. Start insulin regimen if persistently elevated     #Misc  - GI prophylaxis: PPI  - DVT prophylaxis: lovenox   - Diet: NPO until surgery and GI eval  - Activity as tolerated  - Code Status: Full  - Dispo: admit to medicine      This is a 71 yo male with PMH of HTN, HLD, DM, who presents for gradually worsening epigastric pain for the past month. Pain does not radiate anywhere else, worse after eating. Chronic discomfort, (2/10 on pain scale). Associated with 15lb weight loss in the last month. Found to have perigastric soft tissue changes, admitted to medicine.     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 Small volume abdominopelvic ascites. Differential includes underlying pancreatic or gastrointestinal neoplasm, sequelae of partially contained ruptured gastric or duodenal ulcer, and infectious process or perhaps pancreatitis.   - Surgery consult - no intervention  - CEA 6.8, CA 19-9 [id number]  -Protonix qd   - GI consult - NPO midnight with EGD/EUS tomorrow (7/27)  -full liquid for now only as per GI    #Possible Cystitis on CT  - Denies urinary symptoms  - UA positive for leukocyte esterase and WBC 23  - monitor off abx  - KUB - excreted contrast noted within the bladder    #HTN  - c/w lisinopril 5mg qd    #HLD  - c/w atorvastatin 20mg qd     #Diabetes Mellitus  - Not on any home meds currently   - A1c - 6.6  - Monitor FS. Start insulin regimen if persistently elevated     #Misc  - GI prophylaxis: PPI  - DVT prophylaxis: lovenox   - Diet: full liquid, npo afer midnnight   - Activity as tolerated  - Code Status: Full  - Dispo: acute

## 2022-07-26 NOTE — PROGRESS NOTE ADULT - SUBJECTIVE AND OBJECTIVE BOX
Patient is a 71 y/o male with PMHx of HTN, HLD, DM, who presented to [facility] for abdominal pain. Patient notes pain located in the Epigastric area,  gradually worsening, for the past month. Patient stable. Seen by surgery team as well. No overnight events.       PAST MEDICAL & SURGICAL HISTORY:  HTN (hypertension)  Diabetes  Hypercholesteremia      MEDICATIONS  (STANDING):  atorvastatin 20 milliGRAM(s) Oral at bedtime  chlorhexidine 2% Cloths 1 Application(s) Topical <User Schedule>  enoxaparin Injectable 40 milliGRAM(s) SubCutaneous every 24 hours  lactated ringers. 1000 milliLiter(s) (75 mL/Hr) IV Continuous <Continuous>  lisinopril 5 milliGRAM(s) Oral daily  pantoprazole   Suspension 40 milliGRAM(s) Oral daily  polyethylene glycol 3350 17 Gram(s) Oral two times a day  senna 2 Tablet(s) Oral at bedtime    MEDICATIONS  (PRN):      Allergies  No Known Drug Allergies  shellfish (Unknown)      Review of Systems  General:  See HPI  HEENT: Denies Trouble Swallowing ,Denies  Sore Throat , Denies Change in hearing/vision/speech ,Denies Dizziness    Cardio: Denies  Chest Pain , Palpitations    Respiratory: Denies worsening of SOB, Denies Cough  Abdomen: See detailed HPI  Neuro: Denies Headache Denies Dizziness, Denies Paresthesias  MSK: Denies pain in Bones/Joints/Muscles   Psych: Patient denies depression, denies suicidal or homicidal ideations  Integ: Patient Denies rash, or new skin lesions     Vital Signs Last 24 Hrs  T(C): 36.8 (26 Jul 2022 05:31), Max: 36.8 (26 Jul 2022 05:31)  T(F): 98.3 (26 Jul 2022 05:31), Max: 98.3 (26 Jul 2022 05:31)  HR: 68 (26 Jul 2022 07:45) (60 - 70)  BP: 105/56 (26 Jul 2022 05:31) (105/56 - 125/74)  BP(mean): 60 (26 Jul 2022 05:31) (60 - 82)  RR: 18 (26 Jul 2022 05:31) (18 - 18)  SpO2: 95% (26 Jul 2022 07:45) (95% - 100%)    Parameters below as of 26 Jul 2022 07:45  Patient On (Oxygen Delivery Method): room air      Physical Exam  Gen: NAD  HEENT: NC/AT, Mucosal Membranes  Neck: supple  Cardio: S1/S2 No S3/S4, Regular  Resp: CTA B/L  Abdomen: Soft, ND/NT  Neuro: AAOx3, Cranial Nerve II-XII intact   Extremities: FROM x 4  Skin: No jaundice         Labs:                        13.7   7.12  )-----------( 240      ( 26 Jul 2022 07:14 )             38.9     07-26    139  |  101  |  7<L>  ----------------------------<  106<H>  4.7   |  28  |  0.9    Ca    9.4      26 Jul 2022 07:14  Mg     2.3     07-26    TPro  6.7  /  Alb  3.9  /  TBili  0.8  /  DBili  x   /  AST  11  /  ALT  10  /  AlkPhos  63  07-26      RADIOLOGY & ADDITIONAL STUDIES:  CT Abdomen and Pelvis w/ IV Cont (07.24.22 @ 20:07) >  IMPRESSION:    Predominantly perigastric infiltrative soft tissue changes which are   inseparable from the tail of pancreas and extend around the proximal   duodenum. Small volume abdominopelvic ascites. Differential includes   underlying pancreatic or gastrointestinal neoplasm, sequelae of partially   contained ruptured gastric or duodenal  ulcer, and infectious process or   perhaps pancreatitis. Clinical correlation and/or further evaluation with   EGD should be considered.    Findings compatible with BPH. Superimposed cystitis is difficult to   exclude on imaging.

## 2022-07-26 NOTE — PROGRESS NOTE ADULT - SUBJECTIVE AND OBJECTIVE BOX
24H events:    Patient is a 72y old Male who presents with a chief complaint of Epigastric pain (2022 01:21)    Primary diagnosis of Abdominal pain       Today is hospital day 2d. This morning patient was seen and examined at bedside, resting comfortably in bed.    No acute or major events overnight.    PAST MEDICAL & SURGICAL HISTORY  HTN (hypertension)    Diabetes    Hypercholesteremia    H/O colonoscopy      SOCIAL HISTORY:  Social History:  Denies smoking  Social alcohol drinker: (2022 00:11)      ALLERGIES:  No Known Drug Allergies  shellfish (Unknown)    MEDICATIONS:  STANDING MEDICATIONS  atorvastatin 20 milliGRAM(s) Oral at bedtime  chlorhexidine 2% Cloths 1 Application(s) Topical <User Schedule>  enoxaparin Injectable 40 milliGRAM(s) SubCutaneous every 24 hours  lactated ringers. 1000 milliLiter(s) IV Continuous <Continuous>  lisinopril 5 milliGRAM(s) Oral daily  pantoprazole  Injectable 40 milliGRAM(s) IV Push two times a day  polyethylene glycol 3350 17 Gram(s) Oral two times a day  senna 2 Tablet(s) Oral at bedtime    PRN MEDICATIONS    VITALS:   T(F): 98.3  HR: 68  BP: 105/56  RR: 18  SpO2: 95%    PHYSICAL EXAM:  GENERAL: No acute distress, well-groomed, well-developed  NERVOUS SYSTEM:  Alert & Oriented X3  CHEST/LUNG: Clear to auscultation bilaterally; No rales, rhonchi, wheezing, or rubs  HEART: Normal S1/S2; Regular rate and rhythm; No murmurs, rubs, or gallops  ABDOMEN: Soft, Nontender, Nondistended; Bowel sounds present  EXTREMITIES:  2+ Peripheral Pulses, No clubbing, cyanosis, or edema    LABS:                        13.7   7.12  )-----------( 240      ( 2022 07:14 )             38.9         139  |  101  |  7<L>  ----------------------------<  106<H>  4.7   |  28  |  0.9    Ca    9.4      2022 07:14  Mg     2.3         TPro  6.7  /  Alb  3.9  /  TBili  0.8  /  DBili  x   /  AST  11  /  ALT  10  /  AlkPhos  63      PT/INR - ( 2022 06:03 )   PT: 13.50 sec;   INR: 1.18 ratio         PTT - ( 2022 06:03 )  PTT:28.7 sec  Urinalysis Basic - ( 2022 05:50 )    Color: Light Yellow / Appearance: Clear / S.007 / pH: x  Gluc: x / Ketone: Negative  / Bili: Negative / Urobili: <2 mg/dL   Blood: x / Protein: Negative / Nitrite: Negative   Leuk Esterase: Large / RBC: 1 /HPF / WBC 23 /HPF   Sq Epi: x / Non Sq Epi: 1 /HPF / Bacteria: Negative            CARDIAC MARKERS ( 2022 06:03 )  x     / <0.01 ng/mL / x     / x     / x          RADIOLOGY:           24H events:    Patient is a 72y old Male who presents with a chief complaint of Epigastric pain (2022 01:21)    Primary diagnosis of Abdominal pain       Today is hospital day 2d. This morning patient was seen and examined at bedside, resting comfortably in bed.    No acute or major events overnight.    PAST MEDICAL & SURGICAL HISTORY  HTN (hypertension)    Diabetes    Hypercholesteremia    H/O colonoscopy      SOCIAL HISTORY:  Social History:  Denies smoking  Social alcohol drinker: (2022 00:11)      ALLERGIES:  No Known Drug Allergies  shellfish (Unknown)    MEDICATIONS:  STANDING MEDICATIONS  atorvastatin 20 milliGRAM(s) Oral at bedtime  chlorhexidine 2% Cloths 1 Application(s) Topical <User Schedule>  enoxaparin Injectable 40 milliGRAM(s) SubCutaneous every 24 hours  lactated ringers. 1000 milliLiter(s) IV Continuous <Continuous>  lisinopril 5 milliGRAM(s) Oral daily  pantoprazole  Injectable 40 milliGRAM(s) IV Push two times a day  polyethylene glycol 3350 17 Gram(s) Oral two times a day  senna 2 Tablet(s) Oral at bedtime    PRN MEDICATIONS    VITALS:   T(F): 98.3  HR: 68  BP: 105/56  RR: 18  SpO2: 95%    PHYSICAL EXAM:  GENERAL: No acute distress, well-groomed, well-developed  NERVOUS SYSTEM:  Alert & Oriented X3  CHEST/LUNG: Clear to auscultation bilaterally; No rales, rhonchi, wheezing, or rubs  HEART: Normal S1/S2; Regular rate and rhythm; No murmurs, rubs, or gallops  ABDOMEN: Soft, Nontender, Nondistended; Bowel sounds present  EXTREMITIES:  2+ Peripheral Pulses, No clubbing, cyanosis, or edema    LABS:                        13.7   7.12  )-----------( 240      ( 2022 07:14 )             38.9         139  |  101  |  7<L>  ----------------------------<  106<H>  4.7   |  28  |  0.9    Ca    9.4      2022 07:14  Mg     2.3         TPro  6.7  /  Alb  3.9  /  TBili  0.8  /  DBili  x   /  AST  11  /  ALT  10  /  AlkPhos  63      PT/INR - ( 2022 06:03 )   PT: 13.50 sec;   INR: 1.18 ratio         PTT - ( 2022 06:03 )  PTT:28.7 sec  Urinalysis Basic - ( 2022 05:50 )    Color: Light Yellow / Appearance: Clear / S.007 / pH: x  Gluc: x / Ketone: Negative  / Bili: Negative / Urobili: <2 mg/dL   Blood: x / Protein: Negative / Nitrite: Negative   Leuk Esterase: Large / RBC: 1 /HPF / WBC 23 /HPF   Sq Epi: x / Non Sq Epi: 1 /HPF / Bacteria: Negative            CARDIAC MARKERS ( 2022 06:03 )  x     / <0.01 ng/mL / x     / x     / x          RADIOLOGY:    < from: CT Abdomen and Pelvis w/ IV Cont (22 @ 20:07) >    IMPRESSION:    Predominantly perigastric infiltrative soft tissue changes which are   inseparable from the tail of pancreas and extend around the proximal   duodenum. Small volume abdominopelvic ascites. Differential includes   underlying pancreatic or gastrointestinal neoplasm, sequelae of partially   contained ruptured gastric or duodenal  ulcer, and infectious process or   perhaps pancreatitis. Clinical correlation and/or further evaluation with   EGD should be considered.    Findings compatible with BPH. Superimposed cystitis is difficult to   exclude on imaging.    < end of copied text >  < from: CT Chest No Cont (22 @ 14:23) >  IMPRESSION:    Perigastric infiltrative process inseparable from the tail the pancreas   extending to the proximal duodenum. Associatedascites. Findings again   suspicious for pancreatic or gastrointestinal neoplasm. Differential   diagnosis includes partially contained ruptured gastric or duodenal ulcer   as well as infectious process/pancreatitis.    Hyperaerated lungs. Scattered granulomata. Vascular calcifications.   Nonspecific areas of subpleural thickening bilaterally.    < end of copied text >

## 2022-07-26 NOTE — PROGRESS NOTE ADULT - ATTENDING COMMENTS
pt examined in the ED  has a stomach mass congruous with pancreatic mass  discussed with patient and family   will need egd/eus  discussed with GI   discussed with family   will need metastatic work up
patient seen and examined at bedside independently of medical resident and medical student; agree with the note unless otherwise stated     -no acute events overnight   -awaiting for EUS - NPO cancelled (procedure most likely tomorrow as per GI)    Vital Signs Last 24 Hrs  T(C): 36.3 (26 Jul 2022 13:56), Max: 36.8 (26 Jul 2022 05:31)  T(F): 97.3 (26 Jul 2022 13:56), Max: 98.3 (26 Jul 2022 05:31)  HR: 98 (26 Jul 2022 13:56) (60 - 98)  BP: 123/67 (26 Jul 2022 13:56) (105/56 - 125/74)  BP(mean): 60 (26 Jul 2022 05:31) (60 - 82)  RR: 18 (26 Jul 2022 13:56) (18 - 18)  SpO2: 95% (26 Jul 2022 07:45) (95% - 100%)    Parameters below as of 26 Jul 2022 07:45  Patient On (Oxygen Delivery Method): room air                          13.7   7.12  )-----------( 240      ( 26 Jul 2022 07:14 )             38.9   07-26    139  |  101  |  7<L>  ----------------------------<  106<H>  4.7   |  28  |  0.9    Ca    9.4      26 Jul 2022 07:14  Mg     2.3     07-26    TPro  6.7  /  Alb  3.9  /  TBili  0.8  /  DBili  x   /  AST  11  /  ALT  10  /  AlkPhos  63  07-26    # Epigastric discomfort   # Recent weight loss  # r/o underlying pancreatic malignancy   # HTN/HL/DM II     -EUS tomorrow with [facility]   -patient is aware of the abnormal  findings   -continue with home maintenance meds     DISPO: from home - not discharge ready   Attending Physician Dr. Esther Mckenna # [id number]

## 2022-07-27 ENCOUNTER — TRANSCRIPTION ENCOUNTER (OUTPATIENT)
Age: 72
End: 2022-07-27

## 2022-07-27 ENCOUNTER — RESULT REVIEW (OUTPATIENT)
Age: 72
End: 2022-07-27

## 2022-07-27 LAB
ALBUMIN SERPL ELPH-MCNC: 3.7 G/DL — SIGNIFICANT CHANGE UP (ref 3.5–5.2)
ALP SERPL-CCNC: 68 U/L — SIGNIFICANT CHANGE UP (ref 30–115)
ALT FLD-CCNC: 10 U/L — SIGNIFICANT CHANGE UP (ref 0–41)
ANION GAP SERPL CALC-SCNC: 11 MMOL/L — SIGNIFICANT CHANGE UP (ref 7–14)
AST SERPL-CCNC: 13 U/L — SIGNIFICANT CHANGE UP (ref 0–41)
BASOPHILS # BLD AUTO: 0.02 K/UL — SIGNIFICANT CHANGE UP (ref 0–0.2)
BASOPHILS NFR BLD AUTO: 0.3 % — SIGNIFICANT CHANGE UP (ref 0–1)
BILIRUB SERPL-MCNC: 0.6 MG/DL — SIGNIFICANT CHANGE UP (ref 0.2–1.2)
BUN SERPL-MCNC: 6 MG/DL — LOW (ref 10–20)
CALCIUM SERPL-MCNC: 9.2 MG/DL — SIGNIFICANT CHANGE UP (ref 8.5–10.1)
CHLORIDE SERPL-SCNC: 100 MMOL/L — SIGNIFICANT CHANGE UP (ref 98–110)
CO2 SERPL-SCNC: 27 MMOL/L — SIGNIFICANT CHANGE UP (ref 17–32)
CREAT SERPL-MCNC: 0.9 MG/DL — SIGNIFICANT CHANGE UP (ref 0.7–1.5)
EGFR: 91 ML/MIN/1.73M2 — SIGNIFICANT CHANGE UP
EOSINOPHIL # BLD AUTO: 0.31 K/UL — SIGNIFICANT CHANGE UP (ref 0–0.7)
EOSINOPHIL NFR BLD AUTO: 4.6 % — SIGNIFICANT CHANGE UP (ref 0–8)
GLUCOSE BLDC GLUCOMTR-MCNC: 107 MG/DL — HIGH (ref 70–99)
GLUCOSE BLDC GLUCOMTR-MCNC: 118 MG/DL — HIGH (ref 70–99)
GLUCOSE BLDC GLUCOMTR-MCNC: 97 MG/DL — SIGNIFICANT CHANGE UP (ref 70–99)
GLUCOSE SERPL-MCNC: 109 MG/DL — HIGH (ref 70–99)
HCT VFR BLD CALC: 38.6 % — LOW (ref 42–52)
HGB BLD-MCNC: 13.5 G/DL — LOW (ref 14–18)
IMM GRANULOCYTES NFR BLD AUTO: 0.3 % — SIGNIFICANT CHANGE UP (ref 0.1–0.3)
LYMPHOCYTES # BLD AUTO: 1.78 K/UL — SIGNIFICANT CHANGE UP (ref 1.2–3.4)
LYMPHOCYTES # BLD AUTO: 26.2 % — SIGNIFICANT CHANGE UP (ref 20.5–51.1)
MAGNESIUM SERPL-MCNC: 2.1 MG/DL — SIGNIFICANT CHANGE UP (ref 1.8–2.4)
MCHC RBC-ENTMCNC: 28.7 PG — SIGNIFICANT CHANGE UP (ref 27–31)
MCHC RBC-ENTMCNC: 35 G/DL — SIGNIFICANT CHANGE UP (ref 32–37)
MCV RBC AUTO: 82.1 FL — SIGNIFICANT CHANGE UP (ref 80–94)
MONOCYTES # BLD AUTO: 0.5 K/UL — SIGNIFICANT CHANGE UP (ref 0.1–0.6)
MONOCYTES NFR BLD AUTO: 7.4 % — SIGNIFICANT CHANGE UP (ref 1.7–9.3)
NEUTROPHILS # BLD AUTO: 4.16 K/UL — SIGNIFICANT CHANGE UP (ref 1.4–6.5)
NEUTROPHILS NFR BLD AUTO: 61.2 % — SIGNIFICANT CHANGE UP (ref 42.2–75.2)
NRBC # BLD: 0 /100 WBCS — SIGNIFICANT CHANGE UP (ref 0–0)
PLATELET # BLD AUTO: 245 K/UL — SIGNIFICANT CHANGE UP (ref 130–400)
POTASSIUM SERPL-MCNC: 4.6 MMOL/L — SIGNIFICANT CHANGE UP (ref 3.5–5)
POTASSIUM SERPL-SCNC: 4.6 MMOL/L — SIGNIFICANT CHANGE UP (ref 3.5–5)
PROT SERPL-MCNC: 6.4 G/DL — SIGNIFICANT CHANGE UP (ref 6–8)
RBC # BLD: 4.7 M/UL — SIGNIFICANT CHANGE UP (ref 4.7–6.1)
RBC # FLD: 13.9 % — SIGNIFICANT CHANGE UP (ref 11.5–14.5)
SODIUM SERPL-SCNC: 138 MMOL/L — SIGNIFICANT CHANGE UP (ref 135–146)
WBC # BLD: 6.79 K/UL — SIGNIFICANT CHANGE UP (ref 4.8–10.8)
WBC # FLD AUTO: 6.79 K/UL — SIGNIFICANT CHANGE UP (ref 4.8–10.8)

## 2022-07-27 PROCEDURE — 88173 CYTOPATH EVAL FNA REPORT: CPT | Mod: 26

## 2022-07-27 PROCEDURE — 88305 TISSUE EXAM BY PATHOLOGIST: CPT | Mod: 26

## 2022-07-27 PROCEDURE — 99232 SBSQ HOSP IP/OBS MODERATE 35: CPT

## 2022-07-27 PROCEDURE — 43239 EGD BIOPSY SINGLE/MULTIPLE: CPT | Mod: XU

## 2022-07-27 RX ADMIN — POLYETHYLENE GLYCOL 3350 17 GRAM(S): 17 POWDER, FOR SOLUTION ORAL at 05:21

## 2022-07-27 RX ADMIN — PANTOPRAZOLE SODIUM 40 MILLIGRAM(S): 20 TABLET, DELAYED RELEASE ORAL at 05:21

## 2022-07-27 RX ADMIN — ENOXAPARIN SODIUM 40 MILLIGRAM(S): 100 INJECTION SUBCUTANEOUS at 05:21

## 2022-07-27 RX ADMIN — SODIUM CHLORIDE 75 MILLILITER(S): 9 INJECTION, SOLUTION INTRAVENOUS at 17:12

## 2022-07-27 RX ADMIN — ATORVASTATIN CALCIUM 20 MILLIGRAM(S): 80 TABLET, FILM COATED ORAL at 21:52

## 2022-07-27 RX ADMIN — LISINOPRIL 5 MILLIGRAM(S): 2.5 TABLET ORAL at 05:21

## 2022-07-27 RX ADMIN — SODIUM CHLORIDE 75 MILLILITER(S): 9 INJECTION, SOLUTION INTRAVENOUS at 01:02

## 2022-07-27 RX ADMIN — PANTOPRAZOLE SODIUM 40 MILLIGRAM(S): 20 TABLET, DELAYED RELEASE ORAL at 17:10

## 2022-07-27 RX ADMIN — CHLORHEXIDINE GLUCONATE 1 APPLICATION(S): 213 SOLUTION TOPICAL at 05:26

## 2022-07-27 NOTE — PROGRESS NOTE ADULT - SUBJECTIVE AND OBJECTIVE BOX
24H events:    Patient is a 72y old Male who presents with a chief complaint of Epigastric pain (2022 12:46)    Primary diagnosis of Abdominal pain       Today is hospital day 3d. This morning patient was seen and examined at bedside, resting comfortably in bed.    No acute or major events overnight.    PAST MEDICAL & SURGICAL HISTORY  HTN (hypertension)    Diabetes    Hypercholesteremia    H/O colonoscopy      SOCIAL HISTORY:  Social History:  Denies smoking  Social alcohol drinker: (2022 00:11)      ALLERGIES:  No Known Drug Allergies  shellfish (Unknown)    MEDICATIONS:  STANDING MEDICATIONS  atorvastatin 20 milliGRAM(s) Oral at bedtime  chlorhexidine 2% Cloths 1 Application(s) Topical <User Schedule>  enoxaparin Injectable 40 milliGRAM(s) SubCutaneous every 24 hours  lactated ringers. 1000 milliLiter(s) IV Continuous <Continuous>  lisinopril 5 milliGRAM(s) Oral daily  pantoprazole  Injectable 40 milliGRAM(s) IV Push two times a day  polyethylene glycol 3350 17 Gram(s) Oral two times a day  senna 2 Tablet(s) Oral at bedtime    PRN MEDICATIONS    VITALS:   T(F): 97.3  HR: 57  BP: 142/74  RR: 18  SpO2: 96%    PHYSICAL EXAM:  GENERAL: No acute distress  NERVOUS SYSTEM:  Alert & Oriented X3  CHEST/LUNG: Clear to auscultation bilaterally; No rales, rhonchi, wheezing, or rubs  HEART: Normal S1/S2; Regular rate and rhythm; No murmurs, rubs, or gallops  ABDOMEN: Soft, Nontender, Nondistended; Bowel sounds present  EXTREMITIES:  2+ Peripheral Pulses, No clubbing, cyanosis, or edema    LABS:                        13.5   6.79  )-----------( 245      ( 2022 09:29 )             38.6         139  |  101  |  7<L>  ----------------------------<  106<H>  4.7   |  28  |  0.9    Ca    9.4      2022 07:14  Mg     2.3         TPro  6.7  /  Alb  3.9  /  TBili  0.8  /  DBili  x   /  AST  11  /  ALT  10  /  AlkPhos  63        Urinalysis Basic - ( 2022 05:50 )    Color: Light Yellow / Appearance: Clear / S.007 / pH: x  Gluc: x / Ketone: Negative  / Bili: Negative / Urobili: <2 mg/dL   Blood: x / Protein: Negative / Nitrite: Negative   Leuk Esterase: Large / RBC: 1 /HPF / WBC 23 /HPF   Sq Epi: x / Non Sq Epi: 1 /HPF / Bacteria: Negative                RADIOLOGY:    IMPRESSION:    Predominantly perigastric infiltrative soft tissue changes which are   inseparable from the tail of pancreas and extend around the proximal   duodenum. Small volume abdominopelvic ascites. Differential includes   underlying pancreatic or gastrointestinal neoplasm, sequelae of partially   contained ruptured gastric or duodenal  ulcer, and infectious process or   perhaps pancreatitis. Clinical correlation and/or further evaluation with   EGD should be considered.    Findings compatible with BPH. Superimposed cystitis is difficult to   exclude on imaging.    < end of copied text >  < from: CT Chest No Cont (22 @ 14:23) >  IMPRESSION:    Perigastric infiltrative process inseparable from the tail the pancreas   extending to the proximal duodenum. Associatedascites. Findings again   suspicious for pancreatic or gastrointestinal neoplasm. Differential   diagnosis includes partially contained ruptured gastric or duodenal ulcer   as well as infectious process/pancreatitis.    Hyperaerated lungs. Scattered granulomata. Vascular calcifications.   Nonspecific areas of subpleural thickening bilaterally.

## 2022-07-27 NOTE — PROGRESS NOTE ADULT - ASSESSMENT
73 yo male with PMH of HTN, HLD, DM, who presents for gradually worsening epigastric pain for the past month. Pain does not radiate anywhere else, worse after eating. Chronic discomfort, (2/10 on pain scale). Associated with 15lb weight loss in the last month. Found to have perigastric soft tissue changes, admitted to medicine.       #Epigastric Pain associated with weight loss   #Perigastric infiltrative sift tissue changes likely Neoplasm   - Ct A/P with perigastric infiltrative soft tissue changes which are inseparable from the tail of pancreas and extend around the proximal duodenum. Small volume abdominopelvic ascites. Differential includes underlying pancreatic or gastrointestinal neoplasm, sequelae of partially contained ruptured gastric or duodenal ulcer, and infectious process or perhaps pancreatitis.   - Surgery consult - no intervention  - CEA 6.8, CA 19-9 [id number]  -Protonix qd   - s/p  EGD/EUS today - pending results from the test  - full liwuid diet; advance as tolerated    # Cystitis on CT- asymptomatic  - Denies urinary symptoms  - UA positive for leukocyte esterase and WBC 23  - monitor off abx    #HTN- c/w lisinopril 5mg qd    #HLD- c/w atorvastatin 20mg qd     #Diabetes Mellitus  - Not on any home meds currently   - A1c - 6.6  - Monitor FS      - GI prophylaxis: PPI  - DVT prophylaxis: lovenox     Pending: EGD and EUS report; tolerance of diet  Plan of care d/w patient and also with wife at bedside

## 2022-07-27 NOTE — PROGRESS NOTE ADULT - SUBJECTIVE AND OBJECTIVE BOX
GENERAL SURGERY PROGRESS NOTE    Patient: JINA SINGER , 72y (52)Male   MRN: [id number]  Location: [address]  Visit: 22 Inpatient  Date: 22 @ 11:42    Events of past 24 hours: No acute issues post-operatively. Pending GI procedure for EGD w/ EUS. Denies nausea, vomiting, fever, or chills. Hemodynamically stable, afebrile, voiding spontaneously (+) flatus, (-) BM. Pain well controlled.     PAST MEDICAL & SURGICAL HISTORY:  HTN (hypertension  Diabetes  Hypercholesteremia  H/O colonoscopy    Vitals:   T(F): 97.3 (22 @ 10:06), Max: 98.4 (22 @ 20:38)  HR: 57 (22 @ 10:59)  BP: 142/74 (22 @ 10:59)  RR: 18 (22 @ 10:59)  SpO2: 96% (22 @ 10:59)      Diet, NPO after Midnight:      NPO Start Date: 2022,   NPO Start Time: 23:59  Diet, Full Liquid  Diet, NPO after Midnight:      NPO Start Date: 2022,   NPO Start Time: 23:59    Fluids:     I & O's:    22 @ 07:01  -  22 @ 07:00  --------------------------------------------------------  IN:    Lactated Ringers: 825 mL  Total IN: 825 mL    OUT:  Total OUT: 0 mL    Total NET: 825 mL    Bowel Movement: : [] YES [x] NO  Flatus: : [x] YES [] NO    PHYSICAL EXAM:  General: NAD, AAOx3, calm and cooperative  HEENT: NCAT  Cardiac: No peripheral cyanosis or pallor, extremities well perfused   Respiratory: Non-labored breathing, equal chest rise bilaterally  Abdomen: Soft, non-distended, non-tender, no rebound, no guarding  Musculoskeletal: Strength 5/5 BL UE/LE, ROM intact, compartments soft  Neuro: Sensation grossly intact and equal throughout, no focal deficits  Vascular: Pulses 2+ throughout, extremities well perfused  Skin: Warm/dry, normal color, no jaundice  Incision/wound: healing well, dressings in place, clean, dry and intact    MEDICATIONS  (STANDING):  atorvastatin 20 milliGRAM(s) Oral at bedtime  chlorhexidine 2% Cloths 1 Application(s) Topical <User Schedule>  enoxaparin Injectable 40 milliGRAM(s) SubCutaneous every 24 hours  lactated ringers. 1000 milliLiter(s) (75 mL/Hr) IV Continuous <Continuous>  lisinopril 5 milliGRAM(s) Oral daily  pantoprazole  Injectable 40 milliGRAM(s) IV Push two times a day  polyethylene glycol 3350 17 Gram(s) Oral two times a day  senna 2 Tablet(s) Oral at bedtime    MEDICATIONS  (PRN):    DVT PROPHYLAXIS: enoxaparin Injectable 40 milliGRAM(s) SubCutaneous every 24 hours  GI PROPHYLAXIS: pantoprazole  Injectable 40 milliGRAM(s) IV Push two times a day  ANTICOAGULATION:   ANTIBIOTICS:      LAB/STUDIES:  Labs:  CAPILLARY BLOOD GLUCOSE      POCT Blood Glucose.: 107 mg/dL (2022 08:02)  POCT Blood Glucose.: 115 mg/dL (2022 21:33)  POCT Blood Glucose.: 120 mg/dL (2022 16:42)  POCT Blood Glucose.: 123 mg/dL (2022 11:47)                          13.5   6.79  )-----------( 245      ( 2022 09:29 )             38.6       Auto Neutrophil %: 61.2 % (22 @ 09:29)  Auto Immature Granulocyte %: 0.3 % (22 @ 09:29)        138  |  100  |  6<L>  ----------------------------<  109<H>  4.6   |  27  |  0.9      Calcium, Total Serum: 9.2 mg/dL (22 @ 09:29)      LFTs:             6.4  | 0.6  | 13       ------------------[68      ( 2022 09:29 )  3.7  | x    | 10          Lipase:x      Amylase:x        Coags:    Urinalysis Basic - ( 2022 05:50 )    Color: Light Yellow / Appearance: Clear / S.007 / pH: x  Gluc: x / Ketone: Negative  / Bili: Negative / Urobili: <2 mg/dL   Blood: x / Protein: Negative / Nitrite: Negative   Leuk Esterase: Large / RBC: 1 /HPF / WBC 23 /HPF   Sq Epi: x / Non Sq Epi: 1 /HPF / Bacteria: Negative

## 2022-07-27 NOTE — PROGRESS NOTE ADULT - ASSESSMENT
72yM w/ PMHx of  HTN, DM, HLD, who presented with constipation for 5 days, epigastric pain x 1 month with PO intake and unintentional weight loss of 15pounds (over 1 month). History of gastric cancer in father. Reports never having a prior to EGD. Physical exam findings, imaging, and labs as documented above. Pending procedure with GI with EGD/EUS.     PLAN:  - Care per primary team   - No acute surgical intervention at this time   - Pending GI evaluation with EGD/EUS  - Heme/onc consulted, recs appreciated   - Following tumor markers: : 6,481, CEA: 6.8   - GI/DVT ppx   - Spectra [id number]

## 2022-07-27 NOTE — CHART NOTE - NSCHARTNOTEFT_GEN_A_CORE
PACU ANESTHESIA ADMISSION NOTE      Procedure: EGD/EUS  Post op diagnosis:  abdominal mass    __x__  Patent Airway    __x__  Full return of protective reflexes    __x__  Full recovery from anesthesia / back to baseline status    Vitals:  T(C): 97.3 F  HR: 98  BP: 129/65  RR: 18  SpO2: 95%    Mental Status:  _x___ Awake   _x____ Alert   _____ Drowsy   _____ Sedated    Nausea/Vomiting:  __x__ NO  ______Yes,   See Post - Op Orders          Pain Scale (0-10):  _____    Treatment: ____ None    __x__ See Post - Op/PCA Orders    Post - Operative Fluids:   ____ Oral   _x___ See Post - Op Orders    Plan: Discharge:   ____Home       __x___Floor     _____Critical Care    _____  Other:_________________    Comments: uneventful anesthesia course no complications. Vitals stable. Pt transferred to PACU. Transfer to floor when criteria is met

## 2022-07-27 NOTE — PROGRESS NOTE ADULT - ASSESSMENT
This is a 71 yo male with PMH of HTN, HLD, DM, who presents for gradually worsening epigastric pain for the past month. Pain does not radiate anywhere else, worse after eating. Chronic discomfort, (2/10 on pain scale). Associated with 15lb weight loss in the last month. Found to have perigastric soft tissue changes, admitted to medicine.     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 Small volume abdominopelvic ascites. Differential includes underlying pancreatic or gastrointestinal neoplasm, sequelae of partially contained ruptured gastric or duodenal ulcer, and infectious process or perhaps pancreatitis.   - Surgery consult - no intervention  - CEA 6.8, CA 19-9 6481  -Protonix qd   - NPO with EGD/EUS today (7/27)  - f/u pathology results       #Possible Cystitis on CT  - Denies urinary symptoms  - UA positive for leukocyte esterase and WBC 23  - monitor off abx  - KUB - excreted contrast noted within the bladder - normal findings as per radiology    #HTN  - c/w lisinopril 5mg qd    #HLD  - c/w atorvastatin 20mg qd     #Diabetes Mellitus  - Not on any home meds currently   - A1c - 6.6  - Monitor FS. Start insulin regimen if persistently elevated     #Misc  - GI prophylaxis: PPI  - DVT prophylaxis: lovenox   - Diet: npo   - Activity as tolerated  - Code Status: Full  - Dispo: acute      This is a 73 yo male with PMH of HTN, HLD, DM, who presents for gradually worsening epigastric pain for the past month. Pain does not radiate anywhere else, worse after eating. Chronic discomfort, (2/10 on pain scale). Associated with 15lb weight loss in the last month. Found to have perigastric soft tissue changes, admitted to medicine.     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 Small volume abdominopelvic ascites. Differential includes underlying pancreatic or gastrointestinal neoplasm, sequelae of partially contained ruptured gastric or duodenal ulcer, and infectious process or perhaps pancreatitis.   - Surgery consult - no intervention  - CEA 6.8, CA 19-9 6481  -Protonix qd   - NPO with EGD/EUS today (7/27)  - f/u pathology results, then onc      #Possible Cystitis on CT  - Denies urinary symptoms  - UA positive for leukocyte esterase and WBC 23  - monitor off abx  - KUB - excreted contrast noted within the bladder - normal findings as per radiology    #HTN  - c/w lisinopril 5mg qd    #HLD  - c/w atorvastatin 20mg qd     #Diabetes Mellitus  - Not on any home meds currently   - A1c - 6.6  - Monitor FS. Start insulin regimen if persistently elevated     #Misc  - GI prophylaxis: PPI  - DVT prophylaxis: lovenox   - Diet: npo   - Activity as tolerated  - Code Status: Full  - Dispo: acute

## 2022-07-27 NOTE — PROGRESS NOTE ADULT - SUBJECTIVE AND OBJECTIVE BOX
HPI  Patient is a 72y old Male who presents with a chief complaint of Epigastric pain (2022 11:41)    Currently admitted to medicine with the primary diagnosis of Abdominal pain       Today is hospital day 3d.     INTERVAL HPI / OVERNIGHT EVENTS:  Patient was seen and examined at bedside  seen after EGD  wife present at bedside  No new complaints  Denies any complains of chest pain or shortness of breath  Denies any abdominal pain/nausea/vomiting        PAST MEDICAL & SURGICAL HISTORY  HTN (hypertension)    Diabetes    Hypercholesteremia    H/O colonoscopy      ALLERGIES  No Known Drug Allergies  shellfish (Unknown)    MEDICATIONS  STANDING MEDICATIONS  atorvastatin 20 milliGRAM(s) Oral at bedtime  chlorhexidine 2% Cloths 1 Application(s) Topical <User Schedule>  enoxaparin Injectable 40 milliGRAM(s) SubCutaneous every 24 hours  lactated ringers. 1000 milliLiter(s) IV Continuous <Continuous>  lisinopril 5 milliGRAM(s) Oral daily  pantoprazole  Injectable 40 milliGRAM(s) IV Push two times a day  polyethylene glycol 3350 17 Gram(s) Oral two times a day  senna 2 Tablet(s) Oral at bedtime    PRN MEDICATIONS    VITALS:  T(F): 97.5  HR: 75  BP: 143/70  RR: 18  SpO2: 95%    PHYSICAL EXAM  GEN: no distress, comfortable  PULM: BS heard b/l equal, No wheezing  CVS: S1S2 present, no rubs or gallops  ABD: Soft, non-distended, no guarding; non-tender  EXT: No lower extremity edema  NEURO: A&Ox3, moving all extremities    LABS                        13.5   6.79  )-----------( 245      ( 2022 09:29 )             38.6     07    138  |  100  |  6<L>  ----------------------------<  109<H>  4.6   |  27  |  0.9    Ca    9.2      2022 09:29  Mg     2.1         TPro  6.4  /  Alb  3.7  /  TBili  0.6  /  DBili  x   /  AST  13  /  ALT  10  /  AlkPhos  68        Urinalysis Basic - ( 2022 05:50 )    Color: Light Yellow / Appearance: Clear / S.007 / pH: x  Gluc: x / Ketone: Negative  / Bili: Negative / Urobili: <2 mg/dL   Blood: x / Protein: Negative / Nitrite: Negative   Leuk Esterase: Large / RBC: 1 /HPF / WBC 23 /HPF   Sq Epi: x / Non Sq Epi: 1 /HPF / Bacteria: Negative                RADIOLOGY

## 2022-07-28 ENCOUNTER — TRANSCRIPTION ENCOUNTER (OUTPATIENT)
Age: 72
End: 2022-07-28

## 2022-07-28 VITALS
RESPIRATION RATE: 18 BRPM | SYSTOLIC BLOOD PRESSURE: 136 MMHG | DIASTOLIC BLOOD PRESSURE: 79 MMHG | HEART RATE: 66 BPM | TEMPERATURE: 98 F

## 2022-07-28 LAB
ALBUMIN SERPL ELPH-MCNC: 3.5 G/DL — SIGNIFICANT CHANGE UP (ref 3.5–5.2)
ALP SERPL-CCNC: 58 U/L — SIGNIFICANT CHANGE UP (ref 30–115)
ALT FLD-CCNC: 9 U/L — SIGNIFICANT CHANGE UP (ref 0–41)
ANION GAP SERPL CALC-SCNC: 10 MMOL/L — SIGNIFICANT CHANGE UP (ref 7–14)
AST SERPL-CCNC: 11 U/L — SIGNIFICANT CHANGE UP (ref 0–41)
BASOPHILS # BLD AUTO: 0.05 K/UL — SIGNIFICANT CHANGE UP (ref 0–0.2)
BASOPHILS NFR BLD AUTO: 0.5 % — SIGNIFICANT CHANGE UP (ref 0–1)
BILIRUB SERPL-MCNC: 0.7 MG/DL — SIGNIFICANT CHANGE UP (ref 0.2–1.2)
BUN SERPL-MCNC: 6 MG/DL — LOW (ref 10–20)
CALCIUM SERPL-MCNC: 8.7 MG/DL — SIGNIFICANT CHANGE UP (ref 8.5–10.1)
CHLORIDE SERPL-SCNC: 100 MMOL/L — SIGNIFICANT CHANGE UP (ref 98–110)
CO2 SERPL-SCNC: 27 MMOL/L — SIGNIFICANT CHANGE UP (ref 17–32)
CREAT SERPL-MCNC: 0.9 MG/DL — SIGNIFICANT CHANGE UP (ref 0.7–1.5)
EGFR: 91 ML/MIN/1.73M2 — SIGNIFICANT CHANGE UP
EOSINOPHIL # BLD AUTO: 0.24 K/UL — SIGNIFICANT CHANGE UP (ref 0–0.7)
EOSINOPHIL NFR BLD AUTO: 2.2 % — SIGNIFICANT CHANGE UP (ref 0–8)
GLUCOSE BLDC GLUCOMTR-MCNC: 110 MG/DL — HIGH (ref 70–99)
GLUCOSE BLDC GLUCOMTR-MCNC: 139 MG/DL — HIGH (ref 70–99)
GLUCOSE SERPL-MCNC: 153 MG/DL — HIGH (ref 70–99)
HCT VFR BLD CALC: 35.6 % — LOW (ref 42–52)
HGB BLD-MCNC: 12.9 G/DL — LOW (ref 14–18)
IMM GRANULOCYTES NFR BLD AUTO: 0.3 % — SIGNIFICANT CHANGE UP (ref 0.1–0.3)
LYMPHOCYTES # BLD AUTO: 1.66 K/UL — SIGNIFICANT CHANGE UP (ref 1.2–3.4)
LYMPHOCYTES # BLD AUTO: 15.3 % — LOW (ref 20.5–51.1)
MAGNESIUM SERPL-MCNC: 1.9 MG/DL — SIGNIFICANT CHANGE UP (ref 1.8–2.4)
MCHC RBC-ENTMCNC: 30.1 PG — SIGNIFICANT CHANGE UP (ref 27–31)
MCHC RBC-ENTMCNC: 36.2 G/DL — SIGNIFICANT CHANGE UP (ref 32–37)
MCV RBC AUTO: 83 FL — SIGNIFICANT CHANGE UP (ref 80–94)
MONOCYTES # BLD AUTO: 0.57 K/UL — SIGNIFICANT CHANGE UP (ref 0.1–0.6)
MONOCYTES NFR BLD AUTO: 5.2 % — SIGNIFICANT CHANGE UP (ref 1.7–9.3)
NEUTROPHILS # BLD AUTO: 8.31 K/UL — HIGH (ref 1.4–6.5)
NEUTROPHILS NFR BLD AUTO: 76.5 % — HIGH (ref 42.2–75.2)
NRBC # BLD: 0 /100 WBCS — SIGNIFICANT CHANGE UP (ref 0–0)
PLATELET # BLD AUTO: 232 K/UL — SIGNIFICANT CHANGE UP (ref 130–400)
POTASSIUM SERPL-MCNC: 3.9 MMOL/L — SIGNIFICANT CHANGE UP (ref 3.5–5)
POTASSIUM SERPL-SCNC: 3.9 MMOL/L — SIGNIFICANT CHANGE UP (ref 3.5–5)
PROT SERPL-MCNC: 5.9 G/DL — LOW (ref 6–8)
RBC # BLD: 4.29 M/UL — LOW (ref 4.7–6.1)
RBC # FLD: 14.1 % — SIGNIFICANT CHANGE UP (ref 11.5–14.5)
SODIUM SERPL-SCNC: 137 MMOL/L — SIGNIFICANT CHANGE UP (ref 135–146)
WBC # BLD: 10.86 K/UL — HIGH (ref 4.8–10.8)
WBC # FLD AUTO: 10.86 K/UL — HIGH (ref 4.8–10.8)

## 2022-07-28 PROCEDURE — 99238 HOSP IP/OBS DSCHRG MGMT 30/<: CPT

## 2022-07-28 PROCEDURE — 99232 SBSQ HOSP IP/OBS MODERATE 35: CPT

## 2022-07-28 RX ORDER — PANTOPRAZOLE SODIUM 20 MG/1
1 TABLET, DELAYED RELEASE ORAL
Qty: 30 | Refills: 0
Start: 2022-07-28 | End: 2022-08-26

## 2022-07-28 RX ADMIN — ENOXAPARIN SODIUM 40 MILLIGRAM(S): 100 INJECTION SUBCUTANEOUS at 05:14

## 2022-07-28 RX ADMIN — CHLORHEXIDINE GLUCONATE 1 APPLICATION(S): 213 SOLUTION TOPICAL at 05:14

## 2022-07-28 RX ADMIN — SODIUM CHLORIDE 75 MILLILITER(S): 9 INJECTION, SOLUTION INTRAVENOUS at 06:16

## 2022-07-28 RX ADMIN — PANTOPRAZOLE SODIUM 40 MILLIGRAM(S): 20 TABLET, DELAYED RELEASE ORAL at 05:14

## 2022-07-28 RX ADMIN — LISINOPRIL 5 MILLIGRAM(S): 2.5 TABLET ORAL at 05:14

## 2022-07-28 NOTE — PROGRESS NOTE ADULT - ASSESSMENT
· Assessment	  72yM w/ PMHx of  HTN, DM, HLD, who presented with constipation for 5 days, epigastric pain x 1 month with PO intake and unintentional weight loss of 15pounds (over 1 month). History of gastric cancer in father. Reports never having a prior to EGD. Physical exam findings, imaging, and labs as documented above. Pending procedure with GI with EGD/EUS.     PLAN:  - Care per primary team   - No acute surgical intervention at this time   - Pending GI evaluation with EGD/EUS  - Heme/onc consulted, recs appreciated   - Following tumor markers: : 6,481, CEA: 6.8   - GI/DVT ppx     - Spectra [id number]

## 2022-07-28 NOTE — PROGRESS NOTE ADULT - SUBJECTIVE AND OBJECTIVE BOX
GENERAL SURGERY PROGRESS NOTE    Patient: JINA SINGER , 72y (52)Male   MRN: [id number]  Location: [facility] T4-3B 016 B  Visit: 22 Inpatient  Date: 22 @ 01:25    Hospital Day #: 3    Procedure/Dx/Injuries: Constipation    Events of past 24 hours: Patient denies pain, nausea, and vomiting. He has been ambulating some, voiding, and having both flatus and bowel movements. No acute events overnight.    PAST MEDICAL & SURGICAL HISTORY:  HTN (hypertension)      Diabetes      Hypercholesteremia      H/O colonoscopy          Vitals:   T(F): 97.5 (22 @ 17:17), Max: 98.1 (22 @ 05:06)  HR: 88 (22 @ 20:39)  BP: 141/78 (22 @ 20:39)  RR: 18 (22 @ 20:39)  SpO2: 95% (22 @ 17:17)      Diet, Full Liquid      Fluids:     I & O's:    22 @ 07:01  -  22 @ 07:00  --------------------------------------------------------  IN:    Lactated Ringers: 825 mL  Total IN: 825 mL    OUT:  Total OUT: 0 mL    Total NET: 825 mL        Bowel Movement: : [x] YES [] NO  Flatus: : [x] YES [] NO    PHYSICAL EXAM:  General: NAD, calm and cooperative  Respiratory: Normal respiratory effort  Abdomen: Soft, non-distended, non-tender, no rebound, no guarding.  Skin: Warm/dry, normal color, no jaundice  Incision/wound: Neck wound healing well    MEDICATIONS  (STANDING):  atorvastatin 20 milliGRAM(s) Oral at bedtime  chlorhexidine 2% Cloths 1 Application(s) Topical <User Schedule>  enoxaparin Injectable 40 milliGRAM(s) SubCutaneous every 24 hours  lactated ringers. 1000 milliLiter(s) (75 mL/Hr) IV Continuous <Continuous>  lisinopril 5 milliGRAM(s) Oral daily  pantoprazole  Injectable 40 milliGRAM(s) IV Push two times a day  polyethylene glycol 3350 17 Gram(s) Oral two times a day  senna 2 Tablet(s) Oral at bedtime    MEDICATIONS  (PRN):      DVT PROPHYLAXIS: enoxaparin Injectable 40 milliGRAM(s) SubCutaneous every 24 hours    GI PROPHYLAXIS: pantoprazole  Injectable 40 milliGRAM(s) IV Push two times a day    ANTICOAGULATION:   ANTIBIOTICS:            LAB/STUDIES:  Labs:  CAPILLARY BLOOD GLUCOSE      POCT Blood Glucose.: 118 mg/dL (2022 21:32)  POCT Blood Glucose.: 97 mg/dL (2022 16:53)  POCT Blood Glucose.: 107 mg/dL (2022 08:02)                          13.5   6.79  )-----------( 245      ( 2022 09:29 )             38.6       Auto Neutrophil %: 61.2 % (22 @ 09:29)  Auto Immature Granulocyte %: 0.3 % (22 @ 09:29)        138  |  100  |  6<L>  ----------------------------<  109<H>  4.6   |  27  |  0.9      Calcium, Total Serum: 9.2 mg/dL (22 @ 09:29)      LFTs:             6.4  | 0.6  | 13       ------------------[68      ( 2022 09:29 )  3.7  | x    | 10          Lipase:x      Amylase:x             Coags:            Urinalysis Basic - ( 2022 05:50 )    Color: Light Yellow / Appearance: Clear / S.007 / pH: x  Gluc: x / Ketone: Negative  / Bili: Negative / Urobili: <2 mg/dL   Blood: x / Protein: Negative / Nitrite: Negative   Leuk Esterase: Large / RBC: 1 /HPF / WBC 23 /HPF   Sq Epi: x / Non Sq Epi: 1 /HPF / Bacteria: Negative        IMAGING:  Nothing past 24 hours.    GENERAL SURGERY PROGRESS NOTE    Patient: JINA SINGER , 72y (52)Male   MRN: [id number]  Location: [facility] T4-3B 016 B  Visit: 22 Inpatient  Date: 22 @ 01:25    Hospital Day #: 3    Procedure/Dx/Injuries: Constipation    Events of past 24 hours: Patient denies pain, nausea, and vomiting. He has been ambulating some, voiding, and having both flatus and bowel movements. No acute events overnight.    PAST MEDICAL & SURGICAL HISTORY:  HTN (hypertension)      Diabetes      Hypercholesteremia      H/O colonoscopy          Vitals:   T(F): 97.5 (22 @ 17:17), Max: 98.1 (22 @ 05:06)  HR: 88 (22 @ 20:39)  BP: 141/78 (22 @ 20:39)  RR: 18 (22 @ 20:39)  SpO2: 95% (22 @ 17:17)      Diet, Full Liquid      Fluids:     I & O's:    22 @ 07:01  -  22 @ 07:00  --------------------------------------------------------  IN:    Lactated Ringers: 825 mL  Total IN: 825 mL    OUT:  Total OUT: 0 mL    Total NET: 825 mL        Bowel Movement: : [x] YES [] NO  Flatus: : [x] YES [] NO    PHYSICAL EXAM:  General: NAD, calm and cooperative  Respiratory: Normal respiratory effort  Abdomen: Soft, non-distended, non-tender, no rebound, no guarding.  Skin: Warm/dry, normal color, no jaundice    MEDICATIONS  (STANDING):  atorvastatin 20 milliGRAM(s) Oral at bedtime  chlorhexidine 2% Cloths 1 Application(s) Topical <User Schedule>  enoxaparin Injectable 40 milliGRAM(s) SubCutaneous every 24 hours  lactated ringers. 1000 milliLiter(s) (75 mL/Hr) IV Continuous <Continuous>  lisinopril 5 milliGRAM(s) Oral daily  pantoprazole  Injectable 40 milliGRAM(s) IV Push two times a day  polyethylene glycol 3350 17 Gram(s) Oral two times a day  senna 2 Tablet(s) Oral at bedtime    MEDICATIONS  (PRN):      DVT PROPHYLAXIS: enoxaparin Injectable 40 milliGRAM(s) SubCutaneous every 24 hours    GI PROPHYLAXIS: pantoprazole  Injectable 40 milliGRAM(s) IV Push two times a day    ANTICOAGULATION:   ANTIBIOTICS:            LAB/STUDIES:  Labs:  CAPILLARY BLOOD GLUCOSE      POCT Blood Glucose.: 118 mg/dL (2022 21:32)  POCT Blood Glucose.: 97 mg/dL (2022 16:53)  POCT Blood Glucose.: 107 mg/dL (2022 08:02)                          13.5   6.79  )-----------( 245      ( 2022 09:29 )             38.6       Auto Neutrophil %: 61.2 % (22 @ 09:29)  Auto Immature Granulocyte %: 0.3 % (22 @ 09:29)        138  |  100  |  6<L>  ----------------------------<  109<H>  4.6   |  27  |  0.9      Calcium, Total Serum: 9.2 mg/dL (22 @ 09:29)      LFTs:             6.4  | 0.6  | 13       ------------------[68      ( 2022 09:29 )  3.7  | x    | 10          Lipase:x      Amylase:x             Coags:            Urinalysis Basic - ( 2022 05:50 )    Color: Light Yellow / Appearance: Clear / S.007 / pH: x  Gluc: x / Ketone: Negative  / Bili: Negative / Urobili: <2 mg/dL   Blood: x / Protein: Negative / Nitrite: Negative   Leuk Esterase: Large / RBC: 1 /HPF / WBC 23 /HPF   Sq Epi: x / Non Sq Epi: 1 /HPF / Bacteria: Negative        IMAGING:  Nothing past 24 hours.

## 2022-07-28 NOTE — DISCHARGE NOTE PROVIDER - PROVIDER TOKENS
PROVIDER:[TOKEN:[[id number]],FOLLOWUP:[1 week]],PROVIDER:[TOKEN:[[id number]],FOLLOWUP:[1 week]] PROVIDER:[TOKEN:[[id number]],FOLLOWUP:[1 week]],PROVIDER:[TOKEN:[[id number]],FOLLOWUP:[1 week]],PROVIDER:[TOKEN:[[id number]],FOLLOWUP:[2 weeks]]

## 2022-07-28 NOTE — PROGRESS NOTE ADULT - PROVIDER SPECIALTY LIST ADULT
Gastroenterology
Internal Medicine
Surgery
Gastroenterology
Internal Medicine
Surgery
Surgery

## 2022-07-28 NOTE — DISCHARGE NOTE PROVIDER - CARE PROVIDER_API CALL
Olga Aguirre)  Gastroenterology; Internal Medicine  [address] [place] [address]  Phone: [phone]  Fax: [phone]  Follow Up Time: 1 week    Joan Morgan)  Surgery  [address] [place] [address], [address] [place] [address]  Phone: [phone]  Fax: [phone]  Follow Up Time: 1 week   Olga Aguirre)  Gastroenterology; Internal Medicine  [address] [place] [address]  Phone: [phone]  Fax: [phone]  Follow Up Time: 1 week    Joan Morgan)  Surgery  [address] [place] [address], [address] [place] [address]  Phone: [phone]  Fax: [phone]  Follow Up Time: 1 week    DONTAE JAIMES  Internal Medicine  [address] [place] [address]  Phone: [phone]  Fax: [phone]  Follow Up Time: 2 weeks

## 2022-07-28 NOTE — PROGRESS NOTE ADULT - SUBJECTIVE AND OBJECTIVE BOX
Patient is a 73 y/o male with PMHx of HTN, HLD, DM, who presented to [facility] for abdominal pain. CT scan notable for perigastric infiltrative soft tissue changes which are inseparable from the tail of the pancreas that extends around the duodenum patient also has some small volume ascites.  Abdominal exam is reassuring and patient is without pain on exam. Patient had EGD with EUS and biopsy 7/27. Feels well, tolerating diet and surgery on board.       PAST MEDICAL & SURGICAL HISTORY:  HTN (hypertension)  Diabetes  Hypercholesteremia      MEDICATIONS  (STANDING):  atorvastatin 20 milliGRAM(s) Oral at bedtime  chlorhexidine 2% Cloths 1 Application(s) Topical <User Schedule>  enoxaparin Injectable 40 milliGRAM(s) SubCutaneous every 24 hours  lactated ringers. 1000 milliLiter(s) (75 mL/Hr) IV Continuous <Continuous>  lisinopril 5 milliGRAM(s) Oral daily  pantoprazole   Suspension 40 milliGRAM(s) Oral daily  polyethylene glycol 3350 17 Gram(s) Oral two times a day  senna 2 Tablet(s) Oral at bedtime    MEDICATIONS  (PRN):      Allergies  No Known Drug Allergies  shellfish (Unknown)      Review of Systems  General:  See HPI  HEENT: Denies Trouble Swallowing ,Denies  Sore Throat , Denies Change in hearing/vision/speech ,Denies Dizziness    Cardio: Denies  Chest Pain , Palpitations    Respiratory: Denies worsening of SOB, Denies Cough  Abdomen: See detailed HPI  Neuro: Denies Headache Denies Dizziness, Denies Paresthesias  MSK: Denies pain in Bones/Joints/Muscles   Psych: Patient denies depression, denies suicidal or homicidal ideations  Integ: Patient Denies rash, or new skin lesions         Vital Signs Last 24 Hrs  T(C): 36.8 (28 Jul 2022 07:56), Max: 37.2 (28 Jul 2022 04:48)  T(F): 98.2 (28 Jul 2022 07:56), Max: 99 (28 Jul 2022 04:48)  HR: 60 (28 Jul 2022 07:56) (60 - 104)  BP: 123/66 (28 Jul 2022 04:48) (123/66 - 166/90)  RR: 18 (28 Jul 2022 07:56) (16 - 20)  SpO2: 94% (28 Jul 2022 07:56) (94% - 98%)    Parameters below as of 28 Jul 2022 07:56  Patient On (Oxygen Delivery Method): room air    Physical Exam  Gen: NAD  HEENT: NC/AT, Mucosal Membranes  Cardio: S1/S2 No S3/S4, Regular  Resp: CTA B/L  Abdomen: Soft, ND/NT  Neuro: AAOx3, Cranial Nerve II-XII intact   Extremities: FROM x 4        Labs:                        12.9   10.86 )-----------( 232      ( 28 Jul 2022 09:58 )             35.6     07-28    137  |  100  |  6<L>  ----------------------------<  153<H>  3.9   |  27  |  0.9    Ca    8.7      28 Jul 2022 09:58  Mg     1.9     07-28    TPro  5.9<L>  /  Alb  3.5  /  TBili  0.7  /  DBili  x   /  AST  11  /  ALT  9   /  AlkPhos  58  07-28        RADIOLOGY & ADDITIONAL STUDIES:  CT Abdomen and Pelvis w/ IV Cont (07.24.22 @ 20:07) >  IMPRESSION:    Predominantly perigastric infiltrative soft tissue changes which are   inseparable from the tail of pancreas and extend around the proximal   duodenum. Small volume abdominopelvic ascites. Differential includes   underlying pancreatic or gastrointestinal neoplasm, sequelae of partially   contained ruptured gastric or duodenal  ulcer, and infectious process or   perhaps pancreatitis. Clinical correlation and/or further evaluation with   EGD should be considered.    Findings compatible with BPH. Superimposed cystitis is difficult to   exclude on imaging.

## 2022-07-28 NOTE — DISCHARGE NOTE NURSING/CASE MANAGEMENT/SOCIAL WORK - NSDCPEFALRISK_GEN_ALL_CORE
For information on Fall & Injury Prevention, visit: [email or web address] OR  [email or web address] OR  https://www.cdc.gov/steadi/patient.html

## 2022-07-28 NOTE — PROGRESS NOTE ADULT - ASSESSMENT
This is a 73 yo male with PMH of HTN, HLD, DM, who presents for gradually worsening epigastric pain for the past month. Pain does not radiate anywhere else, worse after eating. Chronic discomfort, (2/10 on pain scale). Associated with 15lb weight loss in the last month. Found to have perigastric soft tissue changes, admitted to medicine.     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 Small volume abdominopelvic ascites. Differential includes underlying pancreatic or gastrointestinal neoplasm, sequelae of partially contained ruptured gastric or duodenal ulcer, and infectious process or perhaps pancreatitis.   - Surgery consult - no intervention  - CEA 6.8, CA 19-9 6481  -Protonix qd   - EGD/EUS yesterday (7/27)  - f/u pathology results  - advance diet as per GI      #Possible Cystitis on CT  - Denies urinary symptoms  - UA positive for leukocyte esterase and WBC 23  - monitor off abx  - KUB - excreted contrast noted within the bladder - normal findings as per radiology    #HTN  - c/w lisinopril 5mg qd    #HLD  - c/w atorvastatin 20mg qd     #Diabetes Mellitus  - Not on any home meds currently   - A1c - 6.6  - Monitor FS. Start insulin regimen if persistently elevated     #Misc  - GI prophylaxis: PPI  - DVT prophylaxis: lovenox   - Diet: full liquid diet, advance as tolerated  - Activity as tolerated  - Code Status: Full  - Dispo: acute      This is a 71 yo male with PMH of HTN, HLD, DM, who presents for gradually worsening epigastric pain for the past month. Pain does not radiate anywhere else, worse after eating. Chronic discomfort, (2/10 on pain scale). Associated with 15lb weight loss in the last month. Found to have perigastric soft tissue changes, admitted to medicine.     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 Small volume abdominopelvic ascites. Differential includes underlying pancreatic or gastrointestinal neoplasm, sequelae of partially contained ruptured gastric or duodenal ulcer, and infectious process or perhaps pancreatitis.   - Surgery consult - no intervention  - CEA 6.8, CA 19-9 6481  -Protonix qd   - EGD/EUS yesterday (7/27)  - f/u pathology results  - advance diet as per GI      #Possible Cystitis on CT  - Denies urinary symptoms  - UA positive for leukocyte esterase and WBC 23  - monitor off abx  - KUB - excreted contrast noted within the bladder - normal findings as per radiology    #HTN  - c/w lisinopril 5mg qd    #HLD  - c/w atorvastatin 20mg qd     #Diabetes Mellitus  - Not on any home meds currently   - A1c - 6.6  - Monitor FS. Start insulin regimen if persistently elevated     #Misc  - GI prophylaxis: PPI  - DVT prophylaxis: lovenox   - Diet: full liquid diet, advance as tolerated  - Activity as tolerated  - Code Status: Full  - Dispo: acute .

## 2022-07-28 NOTE — PROGRESS NOTE ADULT - SUBJECTIVE AND OBJECTIVE BOX
24H events:    Patient is a 72y old Male who presents with a chief complaint of Epigastric pain (28 Jul 2022 01:24)    Primary diagnosis of Abdominal pain       Today is hospital day 4d. This morning patient was seen and examined at bedside, resting comfortably in bed.    No acute or major events overnight.    PAST MEDICAL & SURGICAL HISTORY  HTN (hypertension)    Diabetes    Hypercholesteremia    H/O colonoscopy      SOCIAL HISTORY:  Social History:  Denies smoking  Social alcohol drinker: (25 Jul 2022 00:11)      ALLERGIES:  No Known Drug Allergies  shellfish (Unknown)    MEDICATIONS:  STANDING MEDICATIONS  atorvastatin 20 milliGRAM(s) Oral at bedtime  chlorhexidine 2% Cloths 1 Application(s) Topical <User Schedule>  enoxaparin Injectable 40 milliGRAM(s) SubCutaneous every 24 hours  lactated ringers. 1000 milliLiter(s) IV Continuous <Continuous>  lisinopril 5 milliGRAM(s) Oral daily  pantoprazole  Injectable 40 milliGRAM(s) IV Push two times a day  polyethylene glycol 3350 17 Gram(s) Oral two times a day  senna 2 Tablet(s) Oral at bedtime    PRN MEDICATIONS    VITALS:   T(F): 98.2  HR: 60  BP: 123/66  RR: 18  SpO2: 94%    PHYSICAL EXAM:  GENERAL: No acute distress  NERVOUS SYSTEM:  Alert & Oriented X3  CHEST/LUNG: Clear to auscultation bilaterally; No rales, rhonchi, wheezing, or rubs  HEART: Normal S1/S2; Regular rate and rhythm; No murmurs, rubs, or gallops  ABDOMEN: Soft, Nontender, Nondistended; Bowel sounds present  EXTREMITIES:  2+ Peripheral Pulses, No clubbing, cyanosis, or edema    LABS:                        13.5   6.79  )-----------( 245      ( 27 Jul 2022 09:29 )             38.6     07-27    138  |  100  |  6<L>  ----------------------------<  109<H>  4.6   |  27  |  0.9    Ca    9.2      27 Jul 2022 09:29  Mg     2.1     07-27    TPro  6.4  /  Alb  3.7  /  TBili  0.6  /  DBili  x   /  AST  13  /  ALT  10  /  AlkPhos  68  07-27    RADIOLOGY:    IMPRESSION:    Predominantly perigastric infiltrative soft tissue changes which are   inseparable from the tail of pancreas and extend around the proximal   duodenum. Small volume abdominopelvic ascites. Differential includes   underlying pancreatic or gastrointestinal neoplasm, sequelae of partially   contained ruptured gastric or duodenal  ulcer, and infectious process or   perhaps pancreatitis. Clinical correlation and/or further evaluation with   EGD should be considered.    Findings compatible with BPH. Superimposed cystitis is difficult to   exclude on imaging.    < end of copied text >  < from: CT Chest No Cont (07.25.22 @ 14:23) >  IMPRESSION:    Perigastric infiltrative process inseparable from the tail the pancreas   extending to the proximal duodenum. Associatedascites. Findings again   suspicious for pancreatic or gastrointestinal neoplasm. Differential   diagnosis includes partially contained ruptured gastric or duodenal ulcer   as well as infectious process/pancreatitis.    Hyperaerated lungs. Scattered granulomata. Vascular calcifications.   Nonspecific areas of subpleural thickening bilaterally.

## 2022-07-28 NOTE — DISCHARGE NOTE PROVIDER - CARE PROVIDERS DIRECT ADDRESSES
,DirectAddress_Unknown,[email or web address] ,DirectAddress_Unknown,[email or web address],DirectAddress_Unknown

## 2022-07-28 NOTE — DISCHARGE NOTE NURSING/CASE MANAGEMENT/SOCIAL WORK - PATIENT PORTAL LINK FT
You can access the FollowMyHealth Patient Portal offered by [facility] by registering at the following website: [email or web address]. By joining [employer]’s FollowMyHealth portal, you will also be able to view your health information using other applications (apps) compatible with our system.

## 2022-07-28 NOTE — DISCHARGE NOTE PROVIDER - HOSPITAL COURSE
This is a 73 yo male with PMH of HTN, HLD, DM, who presents for gradually worsening epigastric pain for the past month. Pain does not radiate anywhere else, worse after eating. Chronic discomfort, (2/10 on pain scale). Associated with 15lb weight loss in the last month. Found to have perigastric soft tissue changes, admitted to medicine.     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 Small volume abdominopelvic ascites. Differential includes underlying pancreatic or gastrointestinal neoplasm, sequelae of partially contained ruptured gastric or duodenal ulcer, and infectious process or perhaps pancreatitis.   - Surgery consult - no intervention  - CEA 6.8, CA 19-9 6481  -Protonix qd   - EGD/EUS yesterday (7/27): 5x5 hypoechoic infiltrative mass (irregular borders) extending between the  muscularis propria (abuting it) and the territory of the pancreatic tail without  involving the superficial (mucosa/submucosa) gastric layers. Findings suggestive  of a possible pancreatic tail mass or gastric mass rising from the deep layers  of the stomach extending to the pancreas. The mass extended between 41-45cm from  theincisors (fundus/cardia/cardia on the left lateral to posterior aspect of  the stomach. The mass was also noted from the antrum - FNB with ESTEFANY. .    Multiple superior pancreatic lymph nodes were noted the largest of which  measuring up to 11mm. .    The PD measured 1.4mm in the head. The common bile duct was also traced from  the hilum to the ampulla, and measured 4.3mm.    - f/u pathology results  - advance diet as per GI      #Possible Cystitis on CT  - Denies urinary symptoms  - UA positive for leukocyte esterase and WBC 23  - monitor off abx  - KUB - excreted contrast noted within the bladder - normal findings as per radiology    #HTN  - c/w lisinopril 5mg qd    #HLD  - c/w atorvastatin 20mg qd     #Diabetes Mellitus  - Not on any home meds currently   - A1c - 6.6  - Monitor FS. Start insulin regimen if persistently elevated    This is a 73 yo male with PMH of HTN, HLD, DM, who presents for gradually worsening epigastric pain for the past month. Pain does not radiate anywhere else, worse after eating. Chronic discomfort, (2/10 on pain scale). Associated with 15lb weight loss in the last month. Found to have perigastric soft tissue changes, admitted to medicine.     #Epigastric Pain associated with weight loss   #Perigastric infiltrative soft tissue changes inseparable from tail of pancreas>> likely locally advanced pancreatic neoplasm  - Ct A/P with perigastric infiltrative soft tissue changes which are inseparable from the tail of pancreas and extend around the proximal duodenum. Small volume abdominopelvic ascites. Differential includes underlying pancreatic or gastrointestinal neoplasm, sequelae of partially contained ruptured gastric or duodenal ulcer, and infectious process or perhaps pancreatitis.     - EGD/EUS yesterday (7/27): 5x5 hypoechoic infiltrative mass (irregular borders) extending between the  muscularis propria (abuting it) and the territory of the pancreatic tail without  involving the superficial (mucosa/submucosa) gastric layers. Findings suggestive  of a possible pancreatic tail mass or gastric mass rising from the deep layers  of the stomach extending to the pancreas. The mass extended between 41-45cm from  theincisors (fundus/cardia/cardia on the left lateral to posterior aspect of  the stomach. The mass was also noted from the antrum - FNB with ESTEFANY. .    Multiple superior pancreatic lymph nodes were noted the largest of which  measuring up to 11mm. .    The PD measured 1.4mm in the head. The common bile duct was also traced from  the hilum to the ampulla, and measured 4.3mm.    - f/u pathology results  - discussed with surgeon on day of discharge. patient to follow up with advanced GI team and surgeon in 1 week for further management plan and to follow up with oncologist based on pathology  result  - CEA 6.8, CA 19-9 6481  -Protonix qd     #Possible Cystitis on CT- asymptomatic  - Denies urinary symptoms  - UA positive for leukocyte esterase and WBC 23  - monitored off antibiotics- stable    #HTN  - c/w lisinopril 5mg qd    #HLD  - c/w atorvastatin 20mg qd     #Diabetes Mellitus  - Not on any home meds currently   - A1c - 6.6  - follow up as OP

## 2022-07-28 NOTE — DISCHARGE NOTE PROVIDER - NSDCMRMEDTOKEN_GEN_ALL_CORE_FT
atorvastatin 20 mg oral tablet: 1 tab(s) orally once a day (at bedtime)  lisinopril 5 mg oral tablet: 1 tab(s) orally once a day   atorvastatin 20 mg oral tablet: 1 tab(s) orally once a day (at bedtime)  lisinopril 5 mg oral tablet: 1 tab(s) orally once a day  pantoprazole 40 mg oral delayed release tablet: 1 tab(s) orally once a day

## 2022-07-28 NOTE — DISCHARGE NOTE PROVIDER - NSDCCPCAREPLAN_GEN_ALL_CORE_FT
PRINCIPAL DISCHARGE DIAGNOSIS  Diagnosis: Gastrointestinal neoplasm  Assessment and Plan of Treatment: You have a mass which involves your stomach and pancreas. We took a biopsy during the endoscopy. It will take a few days for the biopsy to return and give the results of what exactly the mass is. Follow up with both advanced GI and surgery next week.

## 2022-07-28 NOTE — PROGRESS NOTE ADULT - ASSESSMENT
Patient is a 73 y/o male with PMHx of HTN, HLD, DM, who presented to [facility] for abdominal pain. CT scan notable for perigastric infiltrative soft tissue changes which are inseparable from the tail of the pancreas that extends around the duodenum patient also has some small volume ascites.  Abdominal exam is reassuring and patient is without pain on exam. Patient had EGD with EUS and biopsy 7/27. Feels well, tolerating diet and surgery on board.     Abnormal CT Imaging / Perigastric soft tissue changes/ 15 lb weight loss over one month/ S/P EGD EUS FNA of Panc Tail/Gastric Mass  - Clinically well appearing  - Tolerating diet  - Has Surgical Oncology follow up  - Endoscopic procedure and findings discussed with patient- awaiting pathology - can be discharged   - Follow up with Dr Olga Amos advanced GI in 1-2 weeks [phone]

## 2022-07-30 LAB — NON-GYNECOLOGICAL CYTOLOGY STUDY: SIGNIFICANT CHANGE UP

## 2022-08-01 LAB — SURGICAL PATHOLOGY STUDY: SIGNIFICANT CHANGE UP

## 2022-08-02 ENCOUNTER — NON-APPOINTMENT (OUTPATIENT)
Age: 72
End: 2022-08-02

## 2022-08-05 ENCOUNTER — APPOINTMENT (OUTPATIENT)
Dept: SURGERY | Facility: CLINIC | Age: 72
End: 2022-08-05

## 2022-08-05 VITALS
HEART RATE: 88 BPM | WEIGHT: 152 LBS | SYSTOLIC BLOOD PRESSURE: 130 MMHG | OXYGEN SATURATION: 98 % | TEMPERATURE: 97 F | BODY MASS INDEX: 23.04 KG/M2 | HEIGHT: 68 IN | DIASTOLIC BLOOD PRESSURE: 71 MMHG

## 2022-08-05 PROCEDURE — 99214 OFFICE O/P EST MOD 30 MIN: CPT

## 2022-08-05 NOTE — PHYSICAL EXAM
[JVD] : no jugular venous distention  [Purpura] : no purpura  [Alert] : alert [Calm] : calm [de-identified] : Normal [de-identified] : Normal [de-identified] : Normal [de-identified] : Normal

## 2022-08-05 NOTE — HISTORY OF PRESENT ILLNESS
[de-identified] : Mr. JINA SINGER is a 72 year  year old man. He presents to the office on 08/05/2022 , his primary is Unable to Collect PCP .\par He has presented to the ER with abdominal discomfort. \par He had a CT that was shown to have gastric / pancreatic mass.\par EGD showed normal mucosa , EUS showed a retrogastric mass - FNA shows AdenoCA gastric v Pancreas\par Ca199 elevated. \par

## 2022-08-05 NOTE — ASSESSMENT
[FreeTextEntry1] : Mr. JINA SINGER is a 72 year  year old man. He presents to the office on 08/05/2022 , his primary is Unable to Collect PCP .\par He has presented to the ER with abdominal discomfort. \par He had a CT that was shown to have gastric / pancreatic mass.\par EGD showed normal mucosa , EUS showed a retrogastric mass - FNA shows AdenoCA gastric v Pancreas\par Ca199 elevated. \par \par \par impression:  advanced cancer gastric v Adeno\par \par We explained in great detail the pathophysiology of the disease process. We breann diagrams and discussed the workup for diagnosis and management.\par The various options were explained to the patient. The Risk , benefit and alternatives were discussed. We discussed recovery and possible complications.\par The Post operative care was explained to the patient. He was counselled on diet , exercise and wound care.\par We discussed the pathology and surgery with him.\par \par Discussed in tumor board\par \par Will do diagnostic laparoscopy \par peritoneal washings\par EGD/EUS\par and  Mediport. \par \par Discussed in detail with patient and wife.\par Discussed with ONcology cordinator and Oncologist. \par

## 2022-08-09 NOTE — CDI QUERY NOTE - NSCDIOTHERTXTBX_GEN_ALL_CORE_HH
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, in cores of desmoplastic fibrous stroma, Note:  There are no specific immunohistochemical stains to differentiate pancreatic versus gastric adenocarcinoma …    Based on your professional judgement and the clinical indicators, please clarify if the cytopathology report can be further specified as:    •   Invasive adenocarcinoma of the stomach and/or pancreas could not be differentiated      per cytopathology report  •   Other (please specify):  •   Clinically unable to further specify findings on the cytopathology report      Thank you,  Sandy Membreno, RN, BSN, CCDS, [facility]  [phone]

## 2022-08-15 ENCOUNTER — APPOINTMENT (OUTPATIENT)
Dept: GASTROENTEROLOGY | Facility: CLINIC | Age: 72
End: 2022-08-15

## 2022-08-15 ENCOUNTER — NON-APPOINTMENT (OUTPATIENT)
Age: 72
End: 2022-08-15

## 2022-08-15 VITALS — HEIGHT: 68 IN

## 2022-08-15 VITALS — BODY MASS INDEX: 23.57 KG/M2 | WEIGHT: 155 LBS

## 2022-08-15 PROCEDURE — 99214 OFFICE O/P EST MOD 30 MIN: CPT

## 2022-08-15 NOTE — ASSESSMENT
[FreeTextEntry1] : 72-year-old following up after hospitalization for abdominal pain.  EUS and EGD were performed EGD showed erythema in the stomach and irregularity biopsies were suggestive of chronic gastritis.  EUS revealed a mass that arises between the muscularis propria of the stomach to the tail of the pancreas.  Biopsies with FNA revealed adenocarcinoma with signet ring cells.  Case was discussed on tumor board and decision was to perform laparoscopy for further staging and sampling.  Discussed with the patient the next step and suggested performing simultaneous endoscopy for reevaluation of the gastric mucosa.  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  This will be further followed after establishment of the finding gastric/pancreatic etiology and further treatment.  Discussed with medical oncology and will discuss with her surgical oncology to plan for laparoscopy timing (expedited) to be followed by initiation of chemotherapy most likely

## 2022-08-15 NOTE — REVIEW OF SYSTEMS
[Abdominal Pain] : abdominal pain [Negative] : Heme/Lymph [FreeTextEntry2] : 25 lbs weight loss over the last few months [FreeTextEntry7] : pain when eats excessively

## 2022-08-15 NOTE — PHYSICAL EXAM
[General Appearance - Alert] : alert [General Appearance - In No Acute Distress] : in no acute distress [Sclera] : the sclera and conjunctiva were normal [Outer Ear] : the ears and nose were normal in appearance [] : no respiratory distress [Neck Appearance] : the appearance of the neck was normal [Abdomen Soft] : soft [Abdomen Tenderness] : non-tender [No CVA Tenderness] : no ~M costovertebral angle tenderness [Abnormal Walk] : normal gait [Skin Color & Pigmentation] : normal skin color and pigmentation [No Focal Deficits] : no focal deficits [Oriented To Time, Place, And Person] : oriented to person, place, and time

## 2022-08-17 ENCOUNTER — OUTPATIENT (OUTPATIENT)
Dept: OUTPATIENT SERVICES | Facility: HOSPITAL | Age: 72
LOS: 1 days | Discharge: HOME | End: 2022-08-17

## 2022-08-17 VITALS
HEIGHT: 68 IN | WEIGHT: 147.93 LBS | TEMPERATURE: 97 F | HEART RATE: 70 BPM | SYSTOLIC BLOOD PRESSURE: 125 MMHG | OXYGEN SATURATION: 97 % | RESPIRATION RATE: 14 BRPM | DIASTOLIC BLOOD PRESSURE: 68 MMHG

## 2022-08-17 DIAGNOSIS — Z98.890 OTHER SPECIFIED POSTPROCEDURAL STATES: Chronic | ICD-10-CM

## 2022-08-17 DIAGNOSIS — Z01.818 ENCOUNTER FOR OTHER PREPROCEDURAL EXAMINATION: ICD-10-CM

## 2022-08-17 DIAGNOSIS — C16.9 MALIGNANT NEOPLASM OF STOMACH, UNSPECIFIED: ICD-10-CM

## 2022-08-17 LAB
A1C WITH ESTIMATED AVERAGE GLUCOSE RESULT: 6.9 % — HIGH (ref 4–5.6)
ALBUMIN SERPL ELPH-MCNC: 4.5 G/DL — SIGNIFICANT CHANGE UP (ref 3.5–5.2)
ALP SERPL-CCNC: 74 U/L — SIGNIFICANT CHANGE UP (ref 30–115)
ALT FLD-CCNC: 13 U/L — SIGNIFICANT CHANGE UP (ref 0–41)
ANION GAP SERPL CALC-SCNC: 12 MMOL/L — SIGNIFICANT CHANGE UP (ref 7–14)
APPEARANCE UR: ABNORMAL
APTT BLD: 30.2 SEC — SIGNIFICANT CHANGE UP (ref 27–39.2)
AST SERPL-CCNC: 14 U/L — SIGNIFICANT CHANGE UP (ref 0–41)
BASOPHILS # BLD AUTO: 0.07 K/UL — SIGNIFICANT CHANGE UP (ref 0–0.2)
BASOPHILS NFR BLD AUTO: 0.8 % — SIGNIFICANT CHANGE UP (ref 0–1)
BILIRUB SERPL-MCNC: 0.7 MG/DL — SIGNIFICANT CHANGE UP (ref 0.2–1.2)
BILIRUB UR-MCNC: NEGATIVE — SIGNIFICANT CHANGE UP
BLD GP AB SCN SERPL QL: SIGNIFICANT CHANGE UP
BUN SERPL-MCNC: 10 MG/DL — SIGNIFICANT CHANGE UP (ref 10–20)
CALCIUM SERPL-MCNC: 9.8 MG/DL — SIGNIFICANT CHANGE UP (ref 8.5–10.1)
CHLORIDE SERPL-SCNC: 99 MMOL/L — SIGNIFICANT CHANGE UP (ref 98–110)
CO2 SERPL-SCNC: 28 MMOL/L — SIGNIFICANT CHANGE UP (ref 17–32)
COLOR SPEC: YELLOW — SIGNIFICANT CHANGE UP
CREAT SERPL-MCNC: 0.9 MG/DL — SIGNIFICANT CHANGE UP (ref 0.7–1.5)
DIFF PNL FLD: NEGATIVE — SIGNIFICANT CHANGE UP
EGFR: 91 ML/MIN/1.73M2 — SIGNIFICANT CHANGE UP
EOSINOPHIL # BLD AUTO: 0.47 K/UL — SIGNIFICANT CHANGE UP (ref 0–0.7)
EOSINOPHIL NFR BLD AUTO: 5.6 % — SIGNIFICANT CHANGE UP (ref 0–8)
ESTIMATED AVERAGE GLUCOSE: 151 MG/DL — HIGH (ref 68–114)
GLUCOSE SERPL-MCNC: 98 MG/DL — SIGNIFICANT CHANGE UP (ref 70–99)
GLUCOSE UR QL: NEGATIVE — SIGNIFICANT CHANGE UP
HCT VFR BLD CALC: 41.4 % — LOW (ref 42–52)
HGB BLD-MCNC: 14.2 G/DL — SIGNIFICANT CHANGE UP (ref 14–18)
IMM GRANULOCYTES NFR BLD AUTO: 0.4 % — HIGH (ref 0.1–0.3)
INR BLD: 1.15 RATIO — SIGNIFICANT CHANGE UP (ref 0.65–1.3)
KETONES UR-MCNC: SIGNIFICANT CHANGE UP
LEUKOCYTE ESTERASE UR-ACNC: ABNORMAL
LYMPHOCYTES # BLD AUTO: 1.84 K/UL — SIGNIFICANT CHANGE UP (ref 1.2–3.4)
LYMPHOCYTES # BLD AUTO: 22 % — SIGNIFICANT CHANGE UP (ref 20.5–51.1)
MCHC RBC-ENTMCNC: 28.8 PG — SIGNIFICANT CHANGE UP (ref 27–31)
MCHC RBC-ENTMCNC: 34.3 G/DL — SIGNIFICANT CHANGE UP (ref 32–37)
MCV RBC AUTO: 84 FL — SIGNIFICANT CHANGE UP (ref 80–94)
MONOCYTES # BLD AUTO: 0.55 K/UL — SIGNIFICANT CHANGE UP (ref 0.1–0.6)
MONOCYTES NFR BLD AUTO: 6.6 % — SIGNIFICANT CHANGE UP (ref 1.7–9.3)
NEUTROPHILS # BLD AUTO: 5.4 K/UL — SIGNIFICANT CHANGE UP (ref 1.4–6.5)
NEUTROPHILS NFR BLD AUTO: 64.6 % — SIGNIFICANT CHANGE UP (ref 42.2–75.2)
NITRITE UR-MCNC: NEGATIVE — SIGNIFICANT CHANGE UP
NRBC # BLD: 0 /100 WBCS — SIGNIFICANT CHANGE UP (ref 0–0)
PH UR: 6 — SIGNIFICANT CHANGE UP (ref 5–8)
PLATELET # BLD AUTO: 257 K/UL — SIGNIFICANT CHANGE UP (ref 130–400)
POTASSIUM SERPL-MCNC: 4.1 MMOL/L — SIGNIFICANT CHANGE UP (ref 3.5–5)
POTASSIUM SERPL-SCNC: 4.1 MMOL/L — SIGNIFICANT CHANGE UP (ref 3.5–5)
PROT SERPL-MCNC: 7.9 G/DL — SIGNIFICANT CHANGE UP (ref 6–8)
PROT UR-MCNC: SIGNIFICANT CHANGE UP
PROTHROM AB SERPL-ACNC: 13.2 SEC — HIGH (ref 9.95–12.87)
RBC # BLD: 4.93 M/UL — SIGNIFICANT CHANGE UP (ref 4.7–6.1)
RBC # FLD: 13.8 % — SIGNIFICANT CHANGE UP (ref 11.5–14.5)
SODIUM SERPL-SCNC: 139 MMOL/L — SIGNIFICANT CHANGE UP (ref 135–146)
SP GR SPEC: 1.02 — SIGNIFICANT CHANGE UP (ref 1.01–1.03)
UROBILINOGEN FLD QL: ABNORMAL
WBC # BLD: 8.36 K/UL — SIGNIFICANT CHANGE UP (ref 4.8–10.8)
WBC # FLD AUTO: 8.36 K/UL — SIGNIFICANT CHANGE UP (ref 4.8–10.8)

## 2022-08-17 PROCEDURE — 93010 ELECTROCARDIOGRAM REPORT: CPT

## 2022-08-17 NOTE — H&P PST ADULT - HISTORY OF PRESENT ILLNESS
72 male presents for PAST in preparation for  EXPLOATORY LAPAROTOMY; PERITONEAL WASHING; INSERTION OF MEDI PORT ESOPHAGOASTRODUODENOSCOPY  Pt complains of weight loss of 30 pounds in two months. Per pt biopsy of abdomen was done in July (7/27/22). Reports appetite loss, accessional fatigue  Denies N/V/D   CT abdomen results done on 7/24/22  Predominantly perigastric infiltrative soft tissue changes which are  nseparable from the tail of pancreas and extend around the proximal   duodenum. Small volume abdominopelvic ascites. Differential includes underlying pancreatic or gastrointestinal neoplasm, sequelae of partially   contained ruptured gastric or duodenal  ulcer, and infectious process or perhaps pancreatitis. Clinical correlation and/or further evaluation with   EGD should be considered.  Denieschest pain, difficulty breathing, SOB, palpitations, dysuria, URI, or any other infections in the last 2 weeks/1 month. Denies any recent travel, contact, or exposure to any persons with known or suspected COVID-19. Pt also denies COVID testing within the last 2 weeks. Pt advised to self quarantine until day of procedure. Exercise tolerance of 2-3 flights of stairs without dyspnea. TASHI reviewed with patient.  Anesthesia Alert  NO--Difficult Airway  NO--History of neck surgery or radiation  NO--Limited ROM of neck  NO--History of Malignant hyperthermia  NO--Personal or family history of Pseudocholinesterase deficiency.  NO--Prior Anesthesia Complication  NO--Latex Allergy  NO--Loose teeth  NO--History of Rheumatoid Arthritis  NO--TASHI  NO--Bleeding risk  NO--Other_____   written and verbal instructions with teach back on chlorhexidine shampoo provided,  pt verbalized understanding with returned demonstration  Patient verbalized understanding of instructions and was given the opportunity to ask questions and have them answered.

## 2022-08-17 NOTE — H&P PST ADULT - REASON FOR ADMISSION
Case Type: OP Block TimeSuite: OR NorthProceduralist: Joan Morgan  Confirmed Surgery DateTime: 08- - 0:00PAST DateTime: 08- - 17:00Procedure: EXPLOATORY LAPAROTOMY; PERITONEAL WASHING; INSERTION OF MEDI PORT ESOPHAGOASTRODUODENOSCOPY  Laterality: N/ALength of Procedure: 180 Minutes  Anesthesia Type: General

## 2022-08-17 NOTE — H&P PST ADULT - NSANTHOSAYNRD_GEN_A_CORE
No. TASHI screening performed.  STOP BANG Legend: 0-2 = LOW Risk; 3-4 = INTERMEDIATE Risk; 5-8 = HIGH Risk

## 2022-08-18 LAB
BACTERIA # UR AUTO: NEGATIVE — SIGNIFICANT CHANGE UP
EPI CELLS # UR: 3 /HPF — SIGNIFICANT CHANGE UP (ref 0–5)
HYALINE CASTS # UR AUTO: 11 /LPF — HIGH (ref 0–7)
RBC CASTS # UR COMP ASSIST: 5 /HPF — HIGH (ref 0–4)
WBC UR QL: 58 /HPF — HIGH (ref 0–5)

## 2022-08-19 ENCOUNTER — LABORATORY RESULT (OUTPATIENT)
Age: 72
End: 2022-08-19

## 2022-08-19 PROBLEM — M10.9 GOUT, UNSPECIFIED: Chronic | Status: ACTIVE | Noted: 2022-08-17

## 2022-08-19 PROBLEM — Z87.19 PERSONAL HISTORY OF OTHER DISEASES OF THE DIGESTIVE SYSTEM: Chronic | Status: ACTIVE | Noted: 2022-08-17

## 2022-08-19 NOTE — ASU PATIENT PROFILE, ADULT - FALL HARM RISK - UNIVERSAL INTERVENTIONS
Bed in lowest position, wheels locked, appropriate side rails in place/Call bell, personal items and telephone in reach/Instruct patient to call for assistance before getting out of bed or chair/Non-slip footwear when patient is out of bed/[place] to call system/Physically safe environment - no spills, clutter or unnecessary equipment/Purposeful Proactive Rounding/Room/bathroom lighting operational, light cord in reach

## 2022-08-19 NOTE — ASU PATIENT PROFILE, ADULT - NSICDXPASTMEDICALHX_GEN_ALL_CORE_FT
PAST MEDICAL HISTORY:  Cancer of abdominal organ between stomach and opancreas    Diabetes     Gout     H/O constipation     HTN (hypertension)     Hypercholesteremia

## 2022-08-21 ENCOUNTER — RESULT REVIEW (OUTPATIENT)
Age: 72
End: 2022-08-21

## 2022-08-21 VITALS
RESPIRATION RATE: 17 BRPM | WEIGHT: 147.93 LBS | HEIGHT: 68 IN | TEMPERATURE: 98 F | OXYGEN SATURATION: 98 % | SYSTOLIC BLOOD PRESSURE: 134 MMHG | HEART RATE: 82 BPM | DIASTOLIC BLOOD PRESSURE: 73 MMHG

## 2022-08-21 NOTE — PRE-ANESTHESIA EVALUATION ADULT - NSRADCARDRESULTSFT_GEN_ALL_CORE
EKG:    Ventricular Rate 70 BPM    Atrial Rate 70 BPM    P-R Interval 142 ms    QRS Duration 78 ms    Q-T Interval 394 ms    QTC Calculation(Bazett) 425 ms    P Axis 35 degrees    R Axis -50 degrees    T Axis 9 degrees    Diagnosis Line Normal sinus rhythm  Left anterior fascicular block  Moderate voltage criteria for LVH, may be normal variant  Nonspecific T wave abnormality  Abnormal ECG

## 2022-08-21 NOTE — PRE-ANESTHESIA EVALUATION ADULT - NSPROPOSEDPROCEDFT_GEN_ALL_CORE
Please continue to take your vitamin and mineral supplements as instructed. If you received a blood work prescription today for laboratory monitoring due prior to your next routine follow-up visit, please have this blood work obtained 10 to 14 days prior to your next visit. It is important to fast for 12 hours prior to routine weight loss surgery blood work, EXCEPT for drinking water, to ensure accuracy of results. Please report nausea, vomiting, abdominal pain, or any other problems you experience to your surgeon. For problems related to weight loss surgery, it is best to go to [address] [place] [address] Emergency Department and have your surgeon paged. ex-lap, peritoneal washing, insertion of mediport, EGD dx-lap, peritoneal washing, insertion of mediport, EGD

## 2022-08-22 ENCOUNTER — TRANSCRIPTION ENCOUNTER (OUTPATIENT)
Age: 72
End: 2022-08-22

## 2022-08-22 ENCOUNTER — OUTPATIENT (OUTPATIENT)
Dept: OUTPATIENT SERVICES | Facility: HOSPITAL | Age: 72
LOS: 1 days | Discharge: HOME | End: 2022-08-22
Payer: MEDICARE

## 2022-08-22 ENCOUNTER — APPOINTMENT (OUTPATIENT)
Dept: SURGERY | Facility: HOSPITAL | Age: 72
End: 2022-08-22

## 2022-08-22 ENCOUNTER — RESULT REVIEW (OUTPATIENT)
Age: 72
End: 2022-08-22

## 2022-08-22 VITALS
SYSTOLIC BLOOD PRESSURE: 124 MMHG | OXYGEN SATURATION: 100 % | HEART RATE: 70 BPM | DIASTOLIC BLOOD PRESSURE: 64 MMHG | TEMPERATURE: 98 F | RESPIRATION RATE: 16 BRPM

## 2022-08-22 DIAGNOSIS — Z98.890 OTHER SPECIFIED POSTPROCEDURAL STATES: Chronic | ICD-10-CM

## 2022-08-22 LAB — GLUCOSE BLDC GLUCOMTR-MCNC: 96 MG/DL — SIGNIFICANT CHANGE UP (ref 70–99)

## 2022-08-22 PROCEDURE — 88305 TISSUE EXAM BY PATHOLOGIST: CPT | Mod: 26,59

## 2022-08-22 PROCEDURE — 88313 SPECIAL STAINS GROUP 2: CPT | Mod: 26

## 2022-08-22 PROCEDURE — 88112 CYTOPATH CELL ENHANCE TECH: CPT | Mod: 26

## 2022-08-22 PROCEDURE — 88304 TISSUE EXAM BY PATHOLOGIST: CPT | Mod: 26

## 2022-08-22 PROCEDURE — 88305 TISSUE EXAM BY PATHOLOGIST: CPT | Mod: 26

## 2022-08-22 PROCEDURE — 36561 INSERT TUNNELED CV CATH: CPT

## 2022-08-22 PROCEDURE — 49329 UNLSTD LAPS PX ABD PERTM&OMN: CPT

## 2022-08-22 PROCEDURE — 71045 X-RAY EXAM CHEST 1 VIEW: CPT | Mod: 26

## 2022-08-22 PROCEDURE — 88341 IMHCHEM/IMCYTCHM EA ADD ANTB: CPT | Mod: 26

## 2022-08-22 PROCEDURE — 76998 US GUIDE INTRAOP: CPT | Mod: 26,59

## 2022-08-22 PROCEDURE — 49320 DIAG LAPARO SEPARATE PROC: CPT

## 2022-08-22 PROCEDURE — 88342 IMHCHEM/IMCYTCHM 1ST ANTB: CPT | Mod: 26

## 2022-08-22 PROCEDURE — 88307 TISSUE EXAM BY PATHOLOGIST: CPT | Mod: 26

## 2022-08-22 RX ORDER — SODIUM CHLORIDE 9 MG/ML
1000 INJECTION, SOLUTION INTRAVENOUS
Refills: 0 | Status: DISCONTINUED | OUTPATIENT
Start: 2022-08-22 | End: 2022-08-22

## 2022-08-22 RX ORDER — METOCLOPRAMIDE HCL 10 MG
10 TABLET ORAL ONCE
Refills: 0 | Status: DISCONTINUED | OUTPATIENT
Start: 2022-08-22 | End: 2022-08-22

## 2022-08-22 RX ORDER — OXYCODONE HYDROCHLORIDE 5 MG/1
5 TABLET ORAL ONCE
Refills: 0 | Status: DISCONTINUED | OUTPATIENT
Start: 2022-08-22 | End: 2022-08-22

## 2022-08-22 RX ORDER — BUPIVACAINE 13.3 MG/ML
20 INJECTION, SUSPENSION, LIPOSOMAL INFILTRATION ONCE
Refills: 0 | Status: DISCONTINUED | OUTPATIENT
Start: 2022-08-22 | End: 2022-08-22

## 2022-08-22 RX ORDER — HYDROMORPHONE HYDROCHLORIDE 2 MG/ML
0.5 INJECTION INTRAMUSCULAR; INTRAVENOUS; SUBCUTANEOUS
Refills: 0 | Status: DISCONTINUED | OUTPATIENT
Start: 2022-08-22 | End: 2022-08-22

## 2022-08-22 RX ORDER — ONDANSETRON 8 MG/1
4 TABLET, FILM COATED ORAL ONCE
Refills: 0 | Status: DISCONTINUED | OUTPATIENT
Start: 2022-08-22 | End: 2022-08-22

## 2022-08-22 RX ADMIN — SODIUM CHLORIDE 75 MILLILITER(S): 9 INJECTION, SOLUTION INTRAVENOUS at 10:20

## 2022-08-22 NOTE — ASU DISCHARGE PLAN (ADULT/PEDIATRIC) - CARE PROVIDER_API CALL
Joan Morgan)  Surgery  [address] [place] [address], [address] [place] [address]  Phone: [phone]  Fax: [phone]  Follow Up Time: 2 weeks

## 2022-08-22 NOTE — ASU DISCHARGE PLAN (ADULT/PEDIATRIC) - NS MD DC FALL RISK RISK
For information on Fall & Injury Prevention, visit: [email or web address] OR  [email or web address] OR  https://www.cdc.gov/steadi/patient.html

## 2022-08-22 NOTE — BRIEF OPERATIVE NOTE - OPERATION/FINDINGS
Diagnostic laparoscopy with findings of ~1L malignant ascites and peritoneal implants. Peritoneal washings and biopsies taken. Placement of left subclavian chemoport.

## 2022-08-22 NOTE — ASU DISCHARGE PLAN (ADULT/PEDIATRIC) - ASU DC SPECIAL INSTRUCTIONSFT
Diet: Continue your usual diet.  Dressings: Keep dressings dry for 1 days. You may then remove the band aids and shower normally. Leave steri-strips in place, they will fall off on their own. No soaking (tub, pool, ocean, etc.) for 4 weeks.  Pain: Take tylenol 500mg and/or ibuprofen 400mg every 4-6 hours as needed for mild to moderate pain. You may take both at the same time. You have percocet ordered to your pharmacy; only fill it and take it if needed for severe pain. Take percocet every 6-8 hours as needed for breakthrough, severe pain.   Activity: Avoid heavy lifting (anything over 10 pounds) for at least 4 weeks. If you do not urinate within 12 hours of surgery, call Dr. Morgan's office and go to the nearest urgent care or emergency department to get a urinary catheter placed.  Follow up: Call to schedule a follow up appointment in 2 weeks. Diet: Continue your usual diet.  Dressings: Keep dressings dry for 1 days. You may then remove the band aids and shower normally. Leave steri-strips in place, they will fall off on their own. No soaking (tub, pool, ocean, etc.) for 4 weeks.  Pain: Take tylenol 500mg and/or ibuprofen 400mg every 4-6 hours as needed for pain. You may take both at the same time.   Activity: Avoid heavy lifting (anything over 10 pounds) for at least 4 weeks. If you do not urinate within 12 hours of surgery, call Dr. Morgan's office and go to the nearest urgent care or emergency department to get a urinary catheter placed.  Follow up: Call to schedule a follow up appointment in 2 weeks.

## 2022-08-22 NOTE — ASU PREOP CHECKLIST - PATIENT'S PERSONAL PROPERTY GIVEN TO
Suzanne MORILLO wife has wallet/ phone/ keys/security/safe Suzanne MORILLO,  wife has wallet/ phone/ keys/security/safe

## 2022-08-22 NOTE — BRIEF OPERATIVE NOTE - NSICDXBRIEFPROCEDURE_GEN_ALL_CORE_FT
PROCEDURES:  EGD 22-Aug-2022 10:01:37  Mary Odonnell  Diagnostic laparoscopy with peritoneal biopsy 22-Aug-2022 10:01:50  Mary Odonnell  Collection of peritoneal washing using laparoscope 22-Aug-2022 10:01:59  Mary Odonnell  Insertion, central venous catheter, tunneled, with port, age 5 years or older 22-Aug-2022 10:06:21  Mary Odonnell

## 2022-08-24 LAB — SURGICAL PATHOLOGY STUDY: SIGNIFICANT CHANGE UP

## 2022-08-25 PROBLEM — C76.2 MALIGNANT NEOPLASM OF ABDOMEN: Chronic | Status: ACTIVE | Noted: 2022-08-17

## 2022-08-26 ENCOUNTER — APPOINTMENT (OUTPATIENT)
Dept: HEMATOLOGY ONCOLOGY | Facility: CLINIC | Age: 72
End: 2022-08-26

## 2022-08-26 VITALS
WEIGHT: 148 LBS | RESPIRATION RATE: 18 BRPM | HEIGHT: 68 IN | TEMPERATURE: 98.4 F | HEART RATE: 90 BPM | OXYGEN SATURATION: 94 % | SYSTOLIC BLOOD PRESSURE: 123 MMHG | BODY MASS INDEX: 22.43 KG/M2 | DIASTOLIC BLOOD PRESSURE: 69 MMHG

## 2022-08-26 PROCEDURE — 99205 OFFICE O/P NEW HI 60 MIN: CPT

## 2022-08-26 NOTE — H&P PST ADULT - ABILITY TO HEAR (WITH HEARING AID OR HEARING APPLIANCE IF NORMALLY USED):
Attempted to reach Natalie No answer. Unable to leave message. \"The person you are calling is not accepting messages at this time\".    Adequate: hears normal conversation without difficulty

## 2022-08-26 NOTE — REASON FOR VISIT
[Initial Consultation] : an initial consultation [FreeTextEntry2] : Metastatic moderate to poorly differentiated adenocarcinoma

## 2022-08-26 NOTE — CONSULT LETTER
[Dear  ___] : Dear  [unfilled], [Consult Letter:] : I had the pleasure of evaluating your patient, [unfilled]. [Please see my note below.] : Please see my note below. [Consult Closing:] : Thank you very much for allowing me to participate in the care of this patient.  If you have any questions, please do not hesitate to contact me. [Sincerely,] : Sincerely, [FreeTextEntry3] : Faustino Che

## 2022-08-26 NOTE — HISTORY OF PRESENT ILLNESS
[Disease: _____________________] : Disease: [unfilled] [de-identified] : Mr. JINA SINGER is a 72 year old male here today for evaluation and management of Pancreatic Cancer.  \par \par He was referred by SURG, Dr. Morgan.  JINA is a 72 year old M with PMHx including HTN, HLD, T2DM, who presents to clinic to establish care.  Patient presented to ER back in 07/2022 with complaint of gradually worsening abdominal pain for the prior month, worse after eating and associated with 40lb weight loss in the last 2 months.  He underwent CT imaging which shows evidence suggestive of metastatic disease.  He also endorses constipation.  He underwent laparoscopy with findings of ~1L malignant ascites and peritoneal implants on 8.22.2022.  He is presently feeling well with no new complaints.  Patient denies fever, chills, nausea, vomiting, dysphagia, dyspnea, neuropathy or bleeding.  Patient reports family history of malignancy in his father (possibly stomach cancer) and mother (possibly colon).  Never a smoker.  NKDA.  \par \par RADIOLOGIC WORKUP\par CT Chest (7.25.2022) IMPRESSION:Perigastric infiltrative process inseparable from the tail the pancreas extending to the proximal duodenum. Associated ascites. Findings again  suspicious for pancreatic or gastrointestinal neoplasm. Differential diagnosis includes partially contained ruptured gastric or duodenal ulcer as well as infectious process/pancreatitis.Hyperaerated lungs. Scattered granulomata. Vascular calcifications. Nonspecific areas of subpleural thickening bilaterally.\par CT A/P (7.24.2022) IMPRESSION:Predominantly perigastric infiltrative soft tissue changes which are inseparable from the tail of pancreas and extend around the proximal  duodenum. Small volume abdominopelvic ascites. Differential includes underlying pancreatic or gastrointestinal neoplasm, sequelae of partially contained ruptured gastric or duodenal  ulcer, and infectious process or perhaps pancreatitis. Clinical correlation and/or further evaluation with EGD should be considered.Findings compatible with BPH. Superimposed cystitis is difficult to exclude on imaging.\par \par LAB WORKUP\par (8.17.2022) WBC 8.36, Hgb 14.2, MCV 84, , normal diff, Cr 0.9, eGFR 91, normal LFTs\par (7.25.2022) Ca19-9 is 6481, CEA 6.8, TSH 2.62\par (10.23.2019) WBC 6.83, Hgb 15, MCV 85.7, \par \par PATHOLOGY\par (see results section)\par \par HCM\par Colonoscopy done about 5 years , polyps removed \par Upper Endoscopy done 07/2022 showed 5x5 hypoechoic infiltrative mass (irregular borders) extending between the  muscularis propria (abuting it) and the territory of the pancreatic tail \par COVID Vaccinated?

## 2022-08-26 NOTE — PHYSICAL EXAM
[Restricted in physically strenuous activity but ambulatory and able to carry out work of a light or sedentary nature] : Status 1- Restricted in physically strenuous activity but ambulatory and able to carry out work of a light or sedentary nature, e.g., light house work, office work [Normal] : normal appearance, no rash, nodules, vesicles, ulcers, erythema [de-identified] : wearing glasses  [de-identified] : L chest port present

## 2022-08-26 NOTE — ASSESSMENT
[Palliative] : Goals of care discussed with patient: Palliative [FreeTextEntry1] : # Metastatic moderate to poorly differentiated adenocarcinoma , dx 07/2022 \par - s/p laparoscopy on 8.22.2022 with findings of ~1L malignant ascites and peritoneal implants \par - Ca19-9 is 6481, CEA 6.8 from 07/2022 \par - reviewed radiology, pathology and lab workup and had a discussion regarding implications of diagnosis, prognosis and options for management including but not limited to chemotherapy for palliative intent\par - s/p L Chest port placement with SURG, Dr. Morgan \par - Discussed risks of chemotherapy using FOLFIRINOX including but not limited to fatigue, nausea, vomiting, diarrhea, elevated liver enzymes, loss of appetite, mucositis, hair loss, neuropathy, allergic reactions, cytopenias, and infection to name a few.  Written information provided.  Anti-emetics prescribed to pharmacy on file.  Consent obtained. \par \par # H. Pylori infection , dx 07/2022 \par - s/p upper EGD in 07/2022 \par - followup with GI, Dr. Maximilian Larson, as scheduled for management \par \par RTC in 2-3 weeks (C#2) with CBC, BMP, LFTs \par \par SEEn/examined w/ NP C.Smart; note reviewed ;case discussed\par 73 yo with ECOG 1 has pancreas adenocarcinoma, metastatic to omentum, stage IV. Newly diagnosed. \par explained that the goal of therapy is palliation and not cure\par explained that the available modality is systemic therapy. we would need to confirm NGS/MMR/MSI/ BRCA testing\par in view of a good ECOG, recommend to proceed with the standard of care chemo; modified FOLFIRINOX\par explained side effects and what to expect\par agreed to start therapy after the LABOR's Day\par will get restaging CT CAP afer 2-3 months of chemo\par

## 2022-08-29 LAB — NON-GYNECOLOGICAL CYTOLOGY STUDY: SIGNIFICANT CHANGE UP

## 2022-08-31 ENCOUNTER — NON-APPOINTMENT (OUTPATIENT)
Age: 72
End: 2022-08-31

## 2022-08-31 DIAGNOSIS — E78.00 PURE HYPERCHOLESTEROLEMIA, UNSPECIFIED: ICD-10-CM

## 2022-08-31 DIAGNOSIS — C16.9 MALIGNANT NEOPLASM OF STOMACH, UNSPECIFIED: ICD-10-CM

## 2022-08-31 DIAGNOSIS — C79.89 SECONDARY MALIGNANT NEOPLASM OF OTHER SPECIFIED SITES: ICD-10-CM

## 2022-08-31 DIAGNOSIS — C78.6 SECONDARY MALIGNANT NEOPLASM OF RETROPERITONEUM AND PERITONEUM: ICD-10-CM

## 2022-08-31 DIAGNOSIS — Z80.0 FAMILY HISTORY OF MALIGNANT NEOPLASM OF DIGESTIVE ORGANS: ICD-10-CM

## 2022-08-31 DIAGNOSIS — R18.8 OTHER ASCITES: ICD-10-CM

## 2022-08-31 DIAGNOSIS — C80.1 MALIGNANT (PRIMARY) NEOPLASM, UNSPECIFIED: ICD-10-CM

## 2022-08-31 DIAGNOSIS — I10 ESSENTIAL (PRIMARY) HYPERTENSION: ICD-10-CM

## 2022-08-31 DIAGNOSIS — C77.9 SECONDARY AND UNSPECIFIED MALIGNANT NEOPLASM OF LYMPH NODE, UNSPECIFIED: ICD-10-CM

## 2022-08-31 DIAGNOSIS — E11.9 TYPE 2 DIABETES MELLITUS WITHOUT COMPLICATIONS: ICD-10-CM

## 2022-09-06 ENCOUNTER — LABORATORY RESULT (OUTPATIENT)
Age: 72
End: 2022-09-06

## 2022-09-06 ENCOUNTER — APPOINTMENT (OUTPATIENT)
Dept: INFUSION THERAPY | Facility: CLINIC | Age: 72
End: 2022-09-06

## 2022-09-06 VITALS
HEART RATE: 84 BPM | BODY MASS INDEX: 22.23 KG/M2 | DIASTOLIC BLOOD PRESSURE: 70 MMHG | TEMPERATURE: 97.6 F | SYSTOLIC BLOOD PRESSURE: 126 MMHG | HEIGHT: 66.5 IN | WEIGHT: 140 LBS

## 2022-09-06 RX ORDER — IRINOTECAN HYDROCHLORIDE 100 MG/5ML
270 INJECTION, SOLUTION INTRAVENOUS ONCE
Refills: 0 | Status: COMPLETED | OUTPATIENT
Start: 2022-09-06 | End: 2022-09-06

## 2022-09-06 RX ORDER — LEUCOVORIN CALCIUM 5 MG
720 TABLET ORAL ONCE
Refills: 0 | Status: COMPLETED | OUTPATIENT
Start: 2022-09-06 | End: 2022-09-06

## 2022-09-06 RX ORDER — FAMOTIDINE 10 MG/ML
20 INJECTION INTRAVENOUS ONCE
Refills: 0 | Status: COMPLETED | OUTPATIENT
Start: 2022-09-06 | End: 2022-09-06

## 2022-09-06 RX ORDER — ATROPINE SULFATE 0.1 MG/ML
0.6 SYRINGE (ML) INJECTION ONCE
Refills: 0 | Status: COMPLETED | OUTPATIENT
Start: 2022-09-06 | End: 2022-09-06

## 2022-09-06 RX ORDER — DIPHENHYDRAMINE HCL 50 MG
50 CAPSULE ORAL ONCE
Refills: 0 | Status: COMPLETED | OUTPATIENT
Start: 2022-09-06 | End: 2022-09-06

## 2022-09-06 RX ORDER — FLUOROURACIL 50 MG/ML
4150 INJECTION, SOLUTION INTRAVENOUS ONCE
Refills: 0 | Status: COMPLETED | OUTPATIENT
Start: 2022-09-06 | End: 2022-09-06

## 2022-09-06 RX ORDER — OXALIPLATIN 5 MG/ML
150 INJECTION, SOLUTION INTRAVENOUS ONCE
Refills: 0 | Status: COMPLETED | OUTPATIENT
Start: 2022-09-06 | End: 2022-09-06

## 2022-09-06 RX ORDER — DEXAMETHASONE 0.5 MG/5ML
12 ELIXIR ORAL ONCE
Refills: 0 | Status: COMPLETED | OUTPATIENT
Start: 2022-09-06 | End: 2022-09-06

## 2022-09-06 RX ORDER — FOSAPREPITANT DIMEGLUMINE 150 MG/5ML
150 INJECTION, POWDER, LYOPHILIZED, FOR SOLUTION INTRAVENOUS ONCE
Refills: 0 | Status: COMPLETED | OUTPATIENT
Start: 2022-09-06 | End: 2022-09-06

## 2022-09-06 RX ADMIN — Medication 720 MILLIGRAM(S): at 10:55

## 2022-09-06 RX ADMIN — Medication 102 MILLIGRAM(S): at 09:58

## 2022-09-06 RX ADMIN — OXALIPLATIN 150 MILLIGRAM(S): 5 INJECTION, SOLUTION INTRAVENOUS at 10:56

## 2022-09-06 RX ADMIN — FAMOTIDINE 104 MILLIGRAM(S): 10 INJECTION INTRAVENOUS at 09:58

## 2022-09-06 RX ADMIN — Medication 0.6 MILLIGRAM(S): at 09:59

## 2022-09-06 RX ADMIN — FLUOROURACIL 4150 MILLIGRAM(S): 50 INJECTION, SOLUTION INTRAVENOUS at 15:25

## 2022-09-06 RX ADMIN — Medication 122 MILLIGRAM(S): at 09:58

## 2022-09-06 RX ADMIN — IRINOTECAN HYDROCHLORIDE 270 MILLIGRAM(S): 100 INJECTION, SOLUTION INTRAVENOUS at 10:55

## 2022-09-06 RX ADMIN — FOSAPREPITANT DIMEGLUMINE 300 MILLIGRAM(S): 150 INJECTION, POWDER, LYOPHILIZED, FOR SOLUTION INTRAVENOUS at 09:58

## 2022-09-08 ENCOUNTER — NON-APPOINTMENT (OUTPATIENT)
Age: 72
End: 2022-09-08

## 2022-09-08 ENCOUNTER — APPOINTMENT (OUTPATIENT)
Dept: INFUSION THERAPY | Facility: CLINIC | Age: 72
End: 2022-09-08

## 2022-09-08 RX ORDER — PEGFILGRASTIM-CBQV 6 MG/.6ML
6 INJECTION, SOLUTION SUBCUTANEOUS ONCE
Refills: 0 | Status: COMPLETED | OUTPATIENT
Start: 2022-09-08 | End: 2022-09-08

## 2022-09-08 RX ADMIN — PEGFILGRASTIM-CBQV 6 MILLIGRAM(S): 6 INJECTION, SOLUTION SUBCUTANEOUS at 13:45

## 2022-09-12 NOTE — CHART NOTE - NSCHARTNOTEFT_GEN_A_CORE
CDI query     cytopathology report can be further specified as:  •   Invasive adenocarcinoma of the stomach and/or pancreas could not be differentiated      per cytopathology report

## 2022-09-16 ENCOUNTER — APPOINTMENT (OUTPATIENT)
Dept: HEMATOLOGY ONCOLOGY | Facility: CLINIC | Age: 72
End: 2022-09-16

## 2022-09-16 VITALS
SYSTOLIC BLOOD PRESSURE: 138 MMHG | HEART RATE: 88 BPM | TEMPERATURE: 98.2 F | RESPIRATION RATE: 18 BRPM | DIASTOLIC BLOOD PRESSURE: 65 MMHG | HEIGHT: 66 IN | BODY MASS INDEX: 22.5 KG/M2 | WEIGHT: 140 LBS

## 2022-09-16 DIAGNOSIS — Z00.00 ENCOUNTER FOR GENERAL ADULT MEDICAL EXAMINATION W/OUT ABNORMAL FINDINGS: ICD-10-CM

## 2022-09-16 LAB
ALBUMIN SERPL ELPH-MCNC: 4.3 G/DL
ALP BLD-CCNC: 168 U/L
ALT SERPL-CCNC: 15 U/L
ANION GAP SERPL CALC-SCNC: 13 MMOL/L
AST SERPL-CCNC: 21 U/L
BASOPHILS # BLD AUTO: 0.03 K/UL
BASOPHILS NFR BLD AUTO: 0.1 %
BILIRUB DIRECT SERPL-MCNC: <0.2 MG/DL
BILIRUB INDIRECT SERPL-MCNC: >0.1 MG/DL
BILIRUB SERPL-MCNC: 0.3 MG/DL
BUN SERPL-MCNC: 10 MG/DL
CALCIUM SERPL-MCNC: 9.2 MG/DL
CHLORIDE SERPL-SCNC: 96 MMOL/L
CO2 SERPL-SCNC: 25 MMOL/L
CREAT SERPL-MCNC: 0.8 MG/DL
EGFR: 95 ML/MIN/1.73M2
EOSINOPHIL # BLD AUTO: 0.19 K/UL
EOSINOPHIL NFR BLD AUTO: 0.6 %
GLUCOSE SERPL-MCNC: 116 MG/DL
HCT VFR BLD CALC: 36 %
HGB BLD-MCNC: 12.6 G/DL
IMM GRANULOCYTES NFR BLD AUTO: 6.6 %
LYMPHOCYTES # BLD AUTO: 2.24 K/UL
LYMPHOCYTES NFR BLD AUTO: 7 %
MAN DIFF?: NORMAL
MCHC RBC-ENTMCNC: 28.6 PG
MCHC RBC-ENTMCNC: 35 G/DL
MCV RBC AUTO: 81.8 FL
MONOCYTES # BLD AUTO: 1.57 K/UL
MONOCYTES NFR BLD AUTO: 4.9 %
NEUTROPHILS # BLD AUTO: 26.03 K/UL
NEUTROPHILS NFR BLD AUTO: 80.8 %
PLATELET # BLD AUTO: 203 K/UL
POTASSIUM SERPL-SCNC: 3.7 MMOL/L
PROT SERPL-MCNC: 7.2 G/DL
RBC # BLD: 4.4 M/UL
RBC # FLD: 14.6 %
SODIUM SERPL-SCNC: 134 MMOL/L
WBC # FLD AUTO: 32.18 K/UL

## 2022-09-16 PROCEDURE — 99214 OFFICE O/P EST MOD 30 MIN: CPT

## 2022-09-16 NOTE — REASON FOR VISIT
[Follow-Up Visit] : a follow-up [FreeTextEntry2] : Metastatic moderate to poorly differentiated adenocarcinoma

## 2022-09-16 NOTE — REVIEW OF SYSTEMS
[Negative] : Allergic/Immunologic [Fever] : no fever [Night Sweats] : no night sweats [Dysphagia] : no dysphagia [Chest Pain] : no chest pain [Shortness Of Breath] : no shortness of breath [Abdominal Pain] : no abdominal pain [Vomiting] : no vomiting [Constipation] : no constipation [Easy Bleeding] : no tendency for easy bleeding [FreeTextEntry2] : appetite slightly improved but still poor  [FreeTextEntry7] : reports mid abdominal discomfort  [de-identified] : L chest port

## 2022-09-16 NOTE — PHYSICAL EXAM
[Restricted in physically strenuous activity but ambulatory and able to carry out work of a light or sedentary nature] : Status 1- Restricted in physically strenuous activity but ambulatory and able to carry out work of a light or sedentary nature, e.g., light house work, office work [Normal] : affect appropriate [de-identified] : wearing glasses  [de-identified] : L chest port present

## 2022-09-16 NOTE — HISTORY OF PRESENT ILLNESS
[Disease: _____________________] : Disease: [unfilled] [Therapy: ___] : Therapy: [unfilled] [Cycle: ___] : Cycle: [unfilled] [de-identified] : Mr. JINA SINGER is a 72 year old male here today for evaluation and management of Pancreatic Cancer.  \par \par He was referred by SURG, Dr. Morgan.  JINA is a 72 year old M with PMHx including HTN, HLD, T2DM, who presents to clinic to establish care.  Patient presented to ER back in 07/2022 with complaint of gradually worsening abdominal pain for the prior month, worse after eating and associated with 40lb weight loss in the last 2 months.  He underwent CT imaging which shows evidence suggestive of metastatic disease.  He also endorses constipation.  He underwent laparoscopy with findings of ~1L malignant ascites and peritoneal implants on 8.22.2022.  He is presently feeling well with no new complaints.  Patient denies fever, chills, nausea, vomiting, dysphagia, dyspnea, neuropathy or bleeding.  Patient reports family history of malignancy in his father (possibly stomach cancer) and mother (possibly colon).  Never a smoker.  NKDA.  \par \par RADIOLOGIC WORKUP\par CT Chest (7.25.2022) IMPRESSION:Perigastric infiltrative process inseparable from the tail the pancreas extending to the proximal duodenum. Associated ascites. Findings again  suspicious for pancreatic or gastrointestinal neoplasm. Differential diagnosis includes partially contained ruptured gastric or duodenal ulcer as well as infectious process/pancreatitis.Hyperaerated lungs. Scattered granulomata. Vascular calcifications. Nonspecific areas of subpleural thickening bilaterally.\par CT A/P (7.24.2022) IMPRESSION:Predominantly perigastric infiltrative soft tissue changes which are inseparable from the tail of pancreas and extend around the proximal  duodenum. Small volume abdominopelvic ascites. Differential includes underlying pancreatic or gastrointestinal neoplasm, sequelae of partially contained ruptured gastric or duodenal  ulcer, and infectious process or perhaps pancreatitis. Clinical correlation and/or further evaluation with EGD should be considered.Findings compatible with BPH. Superimposed cystitis is difficult to exclude on imaging.\par \par LAB WORKUP\par (8.17.2022) WBC 8.36, Hgb 14.2, MCV 84, , normal diff, Cr 0.9, eGFR 91, normal LFTs\par (7.25.2022) Ca19-9 is 6481, CEA 6.8, TSH 2.62\par (10.23.2019) WBC 6.83, Hgb 15, MCV 85.7, \par \par PATHOLOGY\par (see results section)\par \par HCM\par Colonoscopy done about 5 years , polyps removed \par Upper Endoscopy done 07/2022 showed 5x5 hypoechoic infiltrative mass (irregular borders) extending between the  muscularis propria (abuting it) and the territory of the pancreatic tail \par COVID Vaccinated? [FreeTextEntry1] : Started mFOLFIRINOX on 9.6.2022 [de-identified] : 9/14/22\par Patient is here for a follow-up visit for pancreatic cancer.  He is s/p initial cycle of mFOLFIRINOX on 9.6.2022.  He is feeling well with no new complaints.  Reviewed most recent CBC, which is stable.  Patient denies fever, chills, nausea, vomiting, dyspnea, neuropathy, persistent diarrhea or bleeding.  He states his appetite is still poor but slightly improving.  He is due for cycle 2 of chemotherapy next week.

## 2022-09-16 NOTE — ASSESSMENT
[Palliative] : Goals of care discussed with patient: Palliative [FreeTextEntry1] : # Metastatic moderate to poorly differentiated adenocarcinoma , dx 07/2022 \par - s/p laparoscopy on 8.22.2022 with findings of ~1L malignant ascites and peritoneal implants \par - Ca19-9 is 6481, CEA 6.8 from 07/2022 \par - reviewed radiology, pathology and lab workup and had a discussion regarding implications of diagnosis, prognosis and options for management including but not limited to chemotherapy for palliative intent\par - s/p L Chest port placement with SURG, Dr. Morgan \par - c/w cycle 2 of FOLFIRINOX next week , initiated on 9.6.2022\par - plan to get restaging CT CAP after 2-3 months of chemo (~11/2022)\par - Labwork today: CBC, BMP, LFTs, Ca19-9\par \par # H. Pylori infection , dx 07/2022 \par - s/p upper EGD in 07/2022 \par - followup with GI, Dr. Maximilian Larson, as scheduled for management \par \par RTC in 2 weeks with Dr. Che with CBC, BMP, LFTs Ca19-9 prior \par \par 73 yo with ECOG 1 has pancreas adenocarcinoma, metastatic to omentum, stage IV. Newly diagnosed. \par explained that the goal of therapy is palliation and not cure\par explained that the available modality is systemic therapy. we need to confirm NGS/MMR/MSI/ BRCA testing\par in view of a good ECOG, recommend to proceed with the standard of care chemo; modified FOLFIRINOX\par explained side effects and what to expect

## 2022-09-19 ENCOUNTER — APPOINTMENT (OUTPATIENT)
Dept: HEMATOLOGY ONCOLOGY | Facility: CLINIC | Age: 72
End: 2022-09-19

## 2022-09-20 ENCOUNTER — APPOINTMENT (OUTPATIENT)
Dept: INFUSION THERAPY | Facility: CLINIC | Age: 72
End: 2022-09-20

## 2022-09-20 LAB
ALBUMIN SERPL ELPH-MCNC: 4.3 G/DL
ALP BLD-CCNC: 81 U/L
ALT SERPL-CCNC: 8 U/L
ANION GAP SERPL CALC-SCNC: 13 MMOL/L
AST SERPL-CCNC: 14 U/L
BASOPHILS # BLD AUTO: 0.11 K/UL
BASOPHILS NFR BLD AUTO: 0.5 %
BILIRUB DIRECT SERPL-MCNC: <0.2 MG/DL
BILIRUB INDIRECT SERPL-MCNC: NORMAL MG/DL
BILIRUB SERPL-MCNC: 0.6 MG/DL
BUN SERPL-MCNC: 9 MG/DL
CALCIUM SERPL-MCNC: 9.5 MG/DL
CANCER AG19-9 SERPL-ACNC: ABNORMAL U/ML
CANCER AG19-9 SERPL-ACNC: ABNORMAL U/ML
CHLORIDE SERPL-SCNC: 101 MMOL/L
CO2 SERPL-SCNC: 25 MMOL/L
CREAT SERPL-MCNC: 0.9 MG/DL
EGFR: 91 ML/MIN/1.73M2
EOSINOPHIL # BLD AUTO: 0.19 K/UL
EOSINOPHIL NFR BLD AUTO: 0.8 %
GLUCOSE SERPL-MCNC: 122 MG/DL
HCT VFR BLD CALC: 36 %
HCT VFR BLD CALC: 38.6 %
HGB BLD-MCNC: 12.7 G/DL
HGB BLD-MCNC: 13.4 G/DL
IMM GRANULOCYTES NFR BLD AUTO: 3.8 %
LYMPHOCYTES # BLD AUTO: 2.62 K/UL
LYMPHOCYTES NFR BLD AUTO: 10.8 %
MAN DIFF?: NORMAL
MCHC RBC-ENTMCNC: 28.7 PG
MCHC RBC-ENTMCNC: 29.1 PG
MCHC RBC-ENTMCNC: 34.7 G/DL
MCHC RBC-ENTMCNC: 35.3 G/DL
MCV RBC AUTO: 82.6 FL
MCV RBC AUTO: 82.7 FL
MONOCYTES # BLD AUTO: 0.98 K/UL
MONOCYTES NFR BLD AUTO: 4.1 %
NEUTROPHILS # BLD AUTO: 19.35 K/UL
NEUTROPHILS NFR BLD AUTO: 80 %
PLATELET # BLD AUTO: 189 K/UL
PLATELET # BLD AUTO: 252 K/UL
PMV BLD: 9.1 FL
POTASSIUM SERPL-SCNC: 4.9 MMOL/L
PROT SERPL-MCNC: 7.3 G/DL
RBC # BLD: 4.36 M/UL
RBC # BLD: 4.67 M/UL
RBC # FLD: 14.3 %
RBC # FLD: 15 %
SODIUM SERPL-SCNC: 139 MMOL/L
WBC # FLD AUTO: 24.17 K/UL
WBC # FLD AUTO: 9.1 K/UL

## 2022-09-20 RX ORDER — DEXAMETHASONE 0.5 MG/5ML
12 ELIXIR ORAL ONCE
Refills: 0 | Status: COMPLETED | OUTPATIENT
Start: 2022-09-20 | End: 2022-09-20

## 2022-09-20 RX ORDER — IRINOTECAN HYDROCHLORIDE 100 MG/5ML
270 INJECTION, SOLUTION INTRAVENOUS ONCE
Refills: 0 | Status: COMPLETED | OUTPATIENT
Start: 2022-09-20 | End: 2022-09-20

## 2022-09-20 RX ORDER — FLUOROURACIL 50 MG/ML
4150 INJECTION, SOLUTION INTRAVENOUS ONCE
Refills: 0 | Status: COMPLETED | OUTPATIENT
Start: 2022-09-20 | End: 2022-09-20

## 2022-09-20 RX ORDER — FAMOTIDINE 10 MG/ML
20 INJECTION INTRAVENOUS ONCE
Refills: 0 | Status: COMPLETED | OUTPATIENT
Start: 2022-09-20 | End: 2022-09-20

## 2022-09-20 RX ORDER — DIPHENHYDRAMINE HCL 50 MG
50 CAPSULE ORAL ONCE
Refills: 0 | Status: COMPLETED | OUTPATIENT
Start: 2022-09-20 | End: 2022-09-20

## 2022-09-20 RX ORDER — LEUCOVORIN CALCIUM 5 MG
720 TABLET ORAL ONCE
Refills: 0 | Status: COMPLETED | OUTPATIENT
Start: 2022-09-20 | End: 2022-09-20

## 2022-09-20 RX ORDER — FOSAPREPITANT DIMEGLUMINE 150 MG/5ML
150 INJECTION, POWDER, LYOPHILIZED, FOR SOLUTION INTRAVENOUS ONCE
Refills: 0 | Status: COMPLETED | OUTPATIENT
Start: 2022-09-20 | End: 2022-09-20

## 2022-09-20 RX ORDER — OXALIPLATIN 5 MG/ML
150 INJECTION, SOLUTION INTRAVENOUS ONCE
Refills: 0 | Status: COMPLETED | OUTPATIENT
Start: 2022-09-20 | End: 2022-09-20

## 2022-09-20 RX ORDER — ATROPINE SULFATE 0.1 MG/ML
0.6 SYRINGE (ML) INJECTION ONCE
Refills: 0 | Status: COMPLETED | OUTPATIENT
Start: 2022-09-20 | End: 2022-09-20

## 2022-09-20 RX ADMIN — FOSAPREPITANT DIMEGLUMINE 300 MILLIGRAM(S): 150 INJECTION, POWDER, LYOPHILIZED, FOR SOLUTION INTRAVENOUS at 09:34

## 2022-09-20 RX ADMIN — IRINOTECAN HYDROCHLORIDE 270 MILLIGRAM(S): 100 INJECTION, SOLUTION INTRAVENOUS at 10:30

## 2022-09-20 RX ADMIN — OXALIPLATIN 150 MILLIGRAM(S): 5 INJECTION, SOLUTION INTRAVENOUS at 10:29

## 2022-09-20 RX ADMIN — Medication 122 MILLIGRAM(S): at 09:36

## 2022-09-20 RX ADMIN — Medication 720 MILLIGRAM(S): at 10:29

## 2022-09-20 RX ADMIN — Medication 102 MILLIGRAM(S): at 09:36

## 2022-09-20 RX ADMIN — FLUOROURACIL 4150 MILLIGRAM(S): 50 INJECTION, SOLUTION INTRAVENOUS at 15:58

## 2022-09-20 RX ADMIN — FAMOTIDINE 104 MILLIGRAM(S): 10 INJECTION INTRAVENOUS at 09:36

## 2022-09-20 RX ADMIN — Medication 0.6 MILLIGRAM(S): at 09:35

## 2022-09-22 ENCOUNTER — APPOINTMENT (OUTPATIENT)
Dept: INFUSION THERAPY | Facility: CLINIC | Age: 72
End: 2022-09-22

## 2022-09-22 RX ORDER — PEGFILGRASTIM-CBQV 6 MG/.6ML
6 INJECTION, SOLUTION SUBCUTANEOUS ONCE
Refills: 0 | Status: COMPLETED | OUTPATIENT
Start: 2022-09-22 | End: 2022-09-22

## 2022-09-22 RX ADMIN — PEGFILGRASTIM-CBQV 6 MILLIGRAM(S): 6 INJECTION, SOLUTION SUBCUTANEOUS at 13:28

## 2022-10-03 ENCOUNTER — APPOINTMENT (OUTPATIENT)
Dept: HEMATOLOGY ONCOLOGY | Facility: CLINIC | Age: 72
End: 2022-10-03

## 2022-10-04 ENCOUNTER — APPOINTMENT (OUTPATIENT)
Dept: INFUSION THERAPY | Facility: CLINIC | Age: 72
End: 2022-10-04
Payer: MEDICARE

## 2022-10-04 ENCOUNTER — APPOINTMENT (OUTPATIENT)
Dept: HEMATOLOGY ONCOLOGY | Facility: CLINIC | Age: 72
End: 2022-10-04
Payer: MEDICARE

## 2022-10-04 VITALS
HEART RATE: 86 BPM | DIASTOLIC BLOOD PRESSURE: 77 MMHG | SYSTOLIC BLOOD PRESSURE: 139 MMHG | TEMPERATURE: 98.2 F | HEIGHT: 66 IN | BODY MASS INDEX: 22.34 KG/M2 | WEIGHT: 139 LBS

## 2022-10-04 LAB
ALBUMIN SERPL ELPH-MCNC: 4.2 G/DL
ALP BLD-CCNC: 192 U/L
ALT SERPL-CCNC: 15 U/L
ANION GAP SERPL CALC-SCNC: 14 MMOL/L
AST SERPL-CCNC: 21 U/L
BASOPHILS # BLD AUTO: 0.15 K/UL
BASOPHILS NFR BLD AUTO: 0.4 %
BILIRUB DIRECT SERPL-MCNC: <0.2 MG/DL
BILIRUB INDIRECT SERPL-MCNC: NORMAL MG/DL
BILIRUB SERPL-MCNC: <0.2 MG/DL
BUN SERPL-MCNC: 10 MG/DL
CALCIUM SERPL-MCNC: 9.1 MG/DL
CANCER AG19-9 SERPL-ACNC: ABNORMAL U/ML
CHLORIDE SERPL-SCNC: 100 MMOL/L
CO2 SERPL-SCNC: 28 MMOL/L
CREAT SERPL-MCNC: 0.7 MG/DL
EGFR: 99 ML/MIN/1.73M2
EOSINOPHIL # BLD AUTO: 0.14 K/UL
EOSINOPHIL NFR BLD AUTO: 0.4 %
GLUCOSE SERPL-MCNC: 91 MG/DL
HCT VFR BLD CALC: 33.3 %
HGB BLD-MCNC: 11.6 G/DL
IMM GRANULOCYTES NFR BLD AUTO: 4.3 %
LYMPHOCYTES # BLD AUTO: 3.08 K/UL
LYMPHOCYTES NFR BLD AUTO: 8.7 %
MAN DIFF?: NORMAL
MCHC RBC-ENTMCNC: 28.9 PG
MCHC RBC-ENTMCNC: 34.8 G/DL
MCV RBC AUTO: 82.8 FL
MONOCYTES # BLD AUTO: 1.13 K/UL
MONOCYTES NFR BLD AUTO: 3.2 %
NEUTROPHILS # BLD AUTO: 29.31 K/UL
NEUTROPHILS NFR BLD AUTO: 83 %
PLATELET # BLD AUTO: 232 K/UL
POTASSIUM SERPL-SCNC: 3.9 MMOL/L
PROT SERPL-MCNC: 6.8 G/DL
RBC # BLD: 4.02 M/UL
RBC # FLD: 15.5 %
SODIUM SERPL-SCNC: 142 MMOL/L
WBC # FLD AUTO: 35.32 K/UL

## 2022-10-04 PROCEDURE — 99214 OFFICE O/P EST MOD 30 MIN: CPT

## 2022-10-04 RX ORDER — ATROPINE SULFATE 0.1 MG/ML
0.6 SYRINGE (ML) INJECTION ONCE
Refills: 0 | Status: COMPLETED | OUTPATIENT
Start: 2022-10-04 | End: 2022-10-04

## 2022-10-04 RX ORDER — IRINOTECAN HYDROCHLORIDE 100 MG/5ML
270 INJECTION, SOLUTION INTRAVENOUS ONCE
Refills: 0 | Status: COMPLETED | OUTPATIENT
Start: 2022-10-04 | End: 2022-10-04

## 2022-10-04 RX ORDER — DIPHENHYDRAMINE HCL 50 MG
50 CAPSULE ORAL ONCE
Refills: 0 | Status: COMPLETED | OUTPATIENT
Start: 2022-10-04 | End: 2022-10-04

## 2022-10-04 RX ORDER — FAMOTIDINE 10 MG/ML
20 INJECTION INTRAVENOUS ONCE
Refills: 0 | Status: COMPLETED | OUTPATIENT
Start: 2022-10-04 | End: 2022-10-04

## 2022-10-04 RX ORDER — LEUCOVORIN CALCIUM 5 MG
720 TABLET ORAL ONCE
Refills: 0 | Status: COMPLETED | OUTPATIENT
Start: 2022-10-04 | End: 2022-10-04

## 2022-10-04 RX ORDER — FOSAPREPITANT DIMEGLUMINE 150 MG/5ML
150 INJECTION, POWDER, LYOPHILIZED, FOR SOLUTION INTRAVENOUS ONCE
Refills: 0 | Status: COMPLETED | OUTPATIENT
Start: 2022-10-04 | End: 2022-10-04

## 2022-10-04 RX ORDER — FLUOROURACIL 50 MG/ML
4150 INJECTION, SOLUTION INTRAVENOUS ONCE
Refills: 0 | Status: COMPLETED | OUTPATIENT
Start: 2022-10-04 | End: 2022-10-04

## 2022-10-04 RX ORDER — DEXAMETHASONE 0.5 MG/5ML
12 ELIXIR ORAL ONCE
Refills: 0 | Status: COMPLETED | OUTPATIENT
Start: 2022-10-04 | End: 2022-10-04

## 2022-10-04 RX ORDER — OXALIPLATIN 5 MG/ML
150 INJECTION, SOLUTION INTRAVENOUS ONCE
Refills: 0 | Status: COMPLETED | OUTPATIENT
Start: 2022-10-04 | End: 2022-10-04

## 2022-10-04 RX ADMIN — Medication 720 MILLIGRAM(S): at 14:40

## 2022-10-04 RX ADMIN — FOSAPREPITANT DIMEGLUMINE 500 MILLIGRAM(S): 150 INJECTION, POWDER, LYOPHILIZED, FOR SOLUTION INTRAVENOUS at 14:01

## 2022-10-04 RX ADMIN — OXALIPLATIN 150 MILLIGRAM(S): 5 INJECTION, SOLUTION INTRAVENOUS at 14:39

## 2022-10-04 RX ADMIN — Medication 102 MILLIGRAM(S): at 14:00

## 2022-10-04 RX ADMIN — FAMOTIDINE 104 MILLIGRAM(S): 10 INJECTION INTRAVENOUS at 14:01

## 2022-10-04 RX ADMIN — IRINOTECAN HYDROCHLORIDE 270 MILLIGRAM(S): 100 INJECTION, SOLUTION INTRAVENOUS at 14:39

## 2022-10-04 RX ADMIN — Medication 0.6 MILLIGRAM(S): at 14:01

## 2022-10-04 RX ADMIN — FLUOROURACIL 4150 MILLIGRAM(S): 50 INJECTION, SOLUTION INTRAVENOUS at 17:52

## 2022-10-04 RX ADMIN — Medication 122 MILLIGRAM(S): at 14:01

## 2022-10-04 NOTE — HISTORY OF PRESENT ILLNESS
[Disease: _____________________] : Disease: [unfilled] [Therapy: ___] : Therapy: [unfilled] [Cycle: ___] : Cycle: [unfilled] [de-identified] : Mr. JINA SINGER is a 72 year old male here today for evaluation and management of Pancreatic Cancer.  \par \par He was referred by SURG, Dr. Morgan.  JINA is a 72 year old M with PMHx including HTN, HLD, T2DM, who presents to clinic to establish care.  Patient presented to ER back in 07/2022 with complaint of gradually worsening abdominal pain for the prior month, worse after eating and associated with 40lb weight loss in the last 2 months.  He underwent CT imaging which shows evidence suggestive of metastatic disease.  He also endorses constipation.  He underwent laparoscopy with findings of ~1L malignant ascites and peritoneal implants on 8.22.2022.  He is presently feeling well with no new complaints.  Patient denies fever, chills, nausea, vomiting, dysphagia, dyspnea, neuropathy or bleeding.  Patient reports family history of malignancy in his father (possibly stomach cancer) and mother (possibly colon).  Never a smoker.  NKDA.  \par \par RADIOLOGIC WORKUP\par CT Chest (7.25.2022) IMPRESSION:Perigastric infiltrative process inseparable from the tail the pancreas extending to the proximal duodenum. Associated ascites. Findings again  suspicious for pancreatic or gastrointestinal neoplasm. Differential diagnosis includes partially contained ruptured gastric or duodenal ulcer as well as infectious process/pancreatitis.Hyperaerated lungs. Scattered granulomata. Vascular calcifications. Nonspecific areas of subpleural thickening bilaterally.\par CT A/P (7.24.2022) IMPRESSION:Predominantly perigastric infiltrative soft tissue changes which are inseparable from the tail of pancreas and extend around the proximal  duodenum. Small volume abdominopelvic ascites. Differential includes underlying pancreatic or gastrointestinal neoplasm, sequelae of partially contained ruptured gastric or duodenal  ulcer, and infectious process or perhaps pancreatitis. Clinical correlation and/or further evaluation with EGD should be considered.Findings compatible with BPH. Superimposed cystitis is difficult to exclude on imaging.\par \par LAB WORKUP\par (8.17.2022) WBC 8.36, Hgb 14.2, MCV 84, , normal diff, Cr 0.9, eGFR 91, normal LFTs\par (7.25.2022) Ca19-9 is 6481, CEA 6.8, TSH 2.62\par (10.23.2019) WBC 6.83, Hgb 15, MCV 85.7, \par \par PATHOLOGY\par (see results section)\par \par HCM\par Colonoscopy done about 5 years , polyps removed \par Upper Endoscopy done 07/2022 showed 5x5 hypoechoic infiltrative mass (irregular borders) extending between the  muscularis propria (abuting it) and the territory of the pancreatic tail \par COVID Vaccinated? [FreeTextEntry1] : Started mFOLFIRINOX on 9.6.2022 [de-identified] : 9/14/22\par Patient is here for a follow-up visit for pancreatic cancer.  He is s/p initial cycle of mFOLFIRINOX on 9.6.2022.  He is feeling well with no new complaints.  Reviewed most recent CBC, which is stable.  Patient denies fever, chills, nausea, vomiting, dyspnea, neuropathy, persistent diarrhea or bleeding.  He states his appetite is still poor but slightly improving.  He is due for cycle 2 of chemotherapy next week.  \par \par 10/4/22\par Patient is here for a follow-up visit for pancreatic cancer.  He is due for cycle 3 of mFOLFIRINOX today, initiated on 9.6.2022.  He is feeling well with no new complaints.  Reviewed most recent CBC, which shows mild anemia, hgb 11.6g/dL and persistent leukocytosis, likely due to GCSF administration with chemotherapy.  ALK Phos is slightly higher at 198 but Ca19-9 is decreasing down to 32294X/mL.  Patient denies fever, chills, vomiting, dyspnea, neuropathy, CP, palpitations, abdominal pain, persistent diarrhea or bleeding.  He is endorses transient nausea which was not bothersome and he took anti-emetics.

## 2022-10-04 NOTE — ASSESSMENT
[Palliative] : Goals of care discussed with patient: Palliative [FreeTextEntry1] : # Metastatic moderate to poorly differentiated adenocarcinoma , dx 07/2022 \par - s/p laparoscopy on 8.22.2022 with findings of ~1L malignant ascites and peritoneal implants \par - Ca19-9 is 6481, CEA 6.8 from 07/2022 \par - reviewed radiology, pathology and lab workup and had a discussion regarding implications of diagnosis, prognosis and options for management including but not limited to chemotherapy for palliative intent\par - s/p L Chest port placement with SURG, Dr. Morgan \par - c/w cycle 3 of FOLFIRINOX on 10/4 , initiated on 9.6.2022\par - plan to get restaging CT CAP after 2-3 months of chemo (~11/2022) ; will order at next visit \par - Labwork today: CBC, BMP, LFTs, Ca19-9\par \par # H. Pylori infection , dx 07/2022 \par - s/p upper EGD in 07/2022 \par - followup with GI, Dr. Maximilian Larson, as scheduled for management \par \par RTC in 2 weeks with Dr. Che with CBC, BMP, LFTs Ca19-9 prior \par \par 73 yo with ECOG 1 has pancreas adenocarcinoma, metastatic to omentum, stage IV. Newly diagnosed. \par explained that the goal of therapy is palliation and not cure\par explained that the available modality is systemic therapy. we need to confirm NGS/MMR/MSI/ BRCA testing\par in view of a good ECOG, recommend to proceed with the standard of care chemo; modified FOLFIRINOX\par explained side effects and what to expect

## 2022-10-04 NOTE — REVIEW OF SYSTEMS
[Negative] : Allergic/Immunologic [Fever] : no fever [Night Sweats] : no night sweats [Dysphagia] : no dysphagia [Chest Pain] : no chest pain [Shortness Of Breath] : no shortness of breath [Abdominal Pain] : no abdominal pain [Vomiting] : no vomiting [Constipation] : no constipation [Easy Bleeding] : no tendency for easy bleeding [FreeTextEntry2] : appetite slightly improved but still poor  [FreeTextEntry7] : reports transient nausea  [de-identified] : L chest port present

## 2022-10-04 NOTE — PHYSICAL EXAM
[Restricted in physically strenuous activity but ambulatory and able to carry out work of a light or sedentary nature] : Status 1- Restricted in physically strenuous activity but ambulatory and able to carry out work of a light or sedentary nature, e.g., light house work, office work [Normal] : affect appropriate [de-identified] : wearing glasses  [de-identified] : L chest port present

## 2022-10-05 ENCOUNTER — NON-APPOINTMENT (OUTPATIENT)
Age: 72
End: 2022-10-05

## 2022-10-06 ENCOUNTER — NON-APPOINTMENT (OUTPATIENT)
Age: 72
End: 2022-10-06

## 2022-10-06 ENCOUNTER — APPOINTMENT (OUTPATIENT)
Dept: INFUSION THERAPY | Facility: CLINIC | Age: 72
End: 2022-10-06

## 2022-10-06 RX ORDER — PEGFILGRASTIM-CBQV 6 MG/.6ML
6 INJECTION, SOLUTION SUBCUTANEOUS ONCE
Refills: 0 | Status: COMPLETED | OUTPATIENT
Start: 2022-10-06 | End: 2022-10-06

## 2022-10-06 RX ADMIN — PEGFILGRASTIM-CBQV 6 MILLIGRAM(S): 6 INJECTION, SOLUTION SUBCUTANEOUS at 14:30

## 2022-10-14 ENCOUNTER — APPOINTMENT (OUTPATIENT)
Dept: HEMATOLOGY ONCOLOGY | Facility: CLINIC | Age: 72
End: 2022-10-14

## 2022-10-17 ENCOUNTER — APPOINTMENT (OUTPATIENT)
Dept: HEMATOLOGY ONCOLOGY | Facility: CLINIC | Age: 72
End: 2022-10-17

## 2022-10-18 ENCOUNTER — APPOINTMENT (OUTPATIENT)
Dept: INFUSION THERAPY | Facility: CLINIC | Age: 72
End: 2022-10-18

## 2022-10-18 ENCOUNTER — APPOINTMENT (OUTPATIENT)
Dept: HEMATOLOGY ONCOLOGY | Facility: CLINIC | Age: 72
End: 2022-10-18

## 2022-10-18 ENCOUNTER — APPOINTMENT (OUTPATIENT)
Dept: HEMATOLOGY ONCOLOGY | Facility: CLINIC | Age: 72
End: 2022-10-18
Payer: MEDICARE

## 2022-10-18 ENCOUNTER — APPOINTMENT (OUTPATIENT)
Dept: INFUSION THERAPY | Facility: CLINIC | Age: 72
End: 2022-10-18
Payer: MEDICARE

## 2022-10-18 LAB
ALBUMIN SERPL ELPH-MCNC: 3.8 G/DL
ALP BLD-CCNC: 148 U/L
ALT SERPL-CCNC: 19 U/L
ANION GAP SERPL CALC-SCNC: 15 MMOL/L
AST SERPL-CCNC: 20 U/L
BASOPHILS # BLD AUTO: 0.16 K/UL
BASOPHILS NFR BLD AUTO: 0.6 %
BILIRUB DIRECT SERPL-MCNC: <0.2 MG/DL
BILIRUB INDIRECT SERPL-MCNC: >0 MG/DL
BILIRUB SERPL-MCNC: 0.2 MG/DL
BUN SERPL-MCNC: 10 MG/DL
CALCIUM SERPL-MCNC: 9.1 MG/DL
CANCER AG19-9 SERPL-ACNC: 7079 U/ML
CHLORIDE SERPL-SCNC: 100 MMOL/L
CO2 SERPL-SCNC: 23 MMOL/L
CREAT SERPL-MCNC: 0.8 MG/DL
EGFR: 95 ML/MIN/1.73M2
EOSINOPHIL # BLD AUTO: 0.09 K/UL
EOSINOPHIL NFR BLD AUTO: 0.3 %
GLUCOSE SERPL-MCNC: 110 MG/DL
HCT VFR BLD CALC: 31.8 %
HGB BLD-MCNC: 11.1 G/DL
IMM GRANULOCYTES NFR BLD AUTO: 7.9 %
LYMPHOCYTES # BLD AUTO: 2.48 K/UL
LYMPHOCYTES NFR BLD AUTO: 9 %
MAN DIFF?: NORMAL
MCHC RBC-ENTMCNC: 29.3 PG
MCHC RBC-ENTMCNC: 34.9 G/DL
MCV RBC AUTO: 83.9 FL
MONOCYTES # BLD AUTO: 1.29 K/UL
MONOCYTES NFR BLD AUTO: 4.7 %
NEUTROPHILS # BLD AUTO: 21.44 K/UL
NEUTROPHILS NFR BLD AUTO: 77.5 %
PLATELET # BLD AUTO: 203 K/UL
POTASSIUM SERPL-SCNC: 4.2 MMOL/L
PROT SERPL-MCNC: 6.6 G/DL
RBC # BLD: 3.79 M/UL
RBC # FLD: 16.6 %
SODIUM SERPL-SCNC: 138 MMOL/L
WBC # FLD AUTO: 27.64 K/UL

## 2022-10-18 PROCEDURE — 99214 OFFICE O/P EST MOD 30 MIN: CPT

## 2022-10-18 RX ORDER — FLUOROURACIL 50 MG/ML
4150 INJECTION, SOLUTION INTRAVENOUS ONCE
Refills: 0 | Status: COMPLETED | OUTPATIENT
Start: 2022-10-18 | End: 2022-10-18

## 2022-10-18 RX ORDER — LEUCOVORIN CALCIUM 5 MG
720 TABLET ORAL ONCE
Refills: 0 | Status: COMPLETED | OUTPATIENT
Start: 2022-10-18 | End: 2022-10-18

## 2022-10-18 RX ORDER — DIPHENHYDRAMINE HCL 50 MG
50 CAPSULE ORAL ONCE
Refills: 0 | Status: COMPLETED | OUTPATIENT
Start: 2022-10-18 | End: 2022-10-18

## 2022-10-18 RX ORDER — FOSAPREPITANT DIMEGLUMINE 150 MG/5ML
150 INJECTION, POWDER, LYOPHILIZED, FOR SOLUTION INTRAVENOUS ONCE
Refills: 0 | Status: COMPLETED | OUTPATIENT
Start: 2022-10-18 | End: 2022-10-18

## 2022-10-18 RX ORDER — ATROPINE SULFATE 0.1 MG/ML
0.6 SYRINGE (ML) INJECTION ONCE
Refills: 0 | Status: COMPLETED | OUTPATIENT
Start: 2022-10-18 | End: 2022-10-18

## 2022-10-18 RX ORDER — OXALIPLATIN 5 MG/ML
150 INJECTION, SOLUTION INTRAVENOUS ONCE
Refills: 0 | Status: COMPLETED | OUTPATIENT
Start: 2022-10-18 | End: 2022-10-18

## 2022-10-18 RX ORDER — DEXAMETHASONE 0.5 MG/5ML
12 ELIXIR ORAL ONCE
Refills: 0 | Status: COMPLETED | OUTPATIENT
Start: 2022-10-18 | End: 2022-10-18

## 2022-10-18 RX ORDER — IRINOTECAN HYDROCHLORIDE 100 MG/5ML
270 INJECTION, SOLUTION INTRAVENOUS ONCE
Refills: 0 | Status: COMPLETED | OUTPATIENT
Start: 2022-10-18 | End: 2022-10-18

## 2022-10-18 RX ORDER — FAMOTIDINE 10 MG/ML
20 INJECTION INTRAVENOUS ONCE
Refills: 0 | Status: COMPLETED | OUTPATIENT
Start: 2022-10-18 | End: 2022-10-18

## 2022-10-18 RX ADMIN — FOSAPREPITANT DIMEGLUMINE 500 MILLIGRAM(S): 150 INJECTION, POWDER, LYOPHILIZED, FOR SOLUTION INTRAVENOUS at 09:55

## 2022-10-18 RX ADMIN — Medication 102 MILLIGRAM(S): at 10:55

## 2022-10-18 RX ADMIN — FLUOROURACIL 4150 MILLIGRAM(S): 50 INJECTION, SOLUTION INTRAVENOUS at 00:00

## 2022-10-18 RX ADMIN — Medication 720 MILLIGRAM(S): at 13:35

## 2022-10-18 RX ADMIN — FLUOROURACIL 4150 MILLIGRAM(S): 50 INJECTION, SOLUTION INTRAVENOUS at 16:00

## 2022-10-18 RX ADMIN — Medication 0.6 MILLIGRAM(S): at 14:10

## 2022-10-18 RX ADMIN — FOSAPREPITANT DIMEGLUMINE 150 MILLIGRAM(S): 150 INJECTION, POWDER, LYOPHILIZED, FOR SOLUTION INTRAVENOUS at 10:25

## 2022-10-18 RX ADMIN — Medication 720 MILLIGRAM(S): at 15:45

## 2022-10-18 RX ADMIN — IRINOTECAN HYDROCHLORIDE 270 MILLIGRAM(S): 100 INJECTION, SOLUTION INTRAVENOUS at 14:10

## 2022-10-18 RX ADMIN — FAMOTIDINE 20 MILLIGRAM(S): 10 INJECTION INTRAVENOUS at 10:55

## 2022-10-18 RX ADMIN — Medication 50 MILLIGRAM(S): at 11:05

## 2022-10-18 RX ADMIN — FAMOTIDINE 104 MILLIGRAM(S): 10 INJECTION INTRAVENOUS at 10:45

## 2022-10-18 RX ADMIN — Medication 122 MILLIGRAM(S): at 10:35

## 2022-10-18 RX ADMIN — Medication 12 MILLIGRAM(S): at 10:45

## 2022-10-18 RX ADMIN — IRINOTECAN HYDROCHLORIDE 270 MILLIGRAM(S): 100 INJECTION, SOLUTION INTRAVENOUS at 15:50

## 2022-10-18 RX ADMIN — OXALIPLATIN 150 MILLIGRAM(S): 5 INJECTION, SOLUTION INTRAVENOUS at 10:26

## 2022-10-20 ENCOUNTER — APPOINTMENT (OUTPATIENT)
Dept: INFUSION THERAPY | Facility: CLINIC | Age: 72
End: 2022-10-20

## 2022-10-20 RX ORDER — PEGFILGRASTIM-CBQV 6 MG/.6ML
6 INJECTION, SOLUTION SUBCUTANEOUS ONCE
Refills: 0 | Status: COMPLETED | OUTPATIENT
Start: 2022-10-20 | End: 2022-10-20

## 2022-10-20 RX ADMIN — PEGFILGRASTIM-CBQV 6 MILLIGRAM(S): 6 INJECTION, SOLUTION SUBCUTANEOUS at 14:46

## 2022-11-01 ENCOUNTER — APPOINTMENT (OUTPATIENT)
Dept: HEMATOLOGY ONCOLOGY | Facility: CLINIC | Age: 72
End: 2022-11-01

## 2022-11-01 LAB
ALBUMIN SERPL ELPH-MCNC: 3.7 G/DL
ALP BLD-CCNC: 138 U/L
ALT SERPL-CCNC: 16 U/L
ANION GAP SERPL CALC-SCNC: 11 MMOL/L
AST SERPL-CCNC: 18 U/L
BASOPHILS # BLD AUTO: 0.12 K/UL
BASOPHILS NFR BLD AUTO: 0.5 %
BILIRUB DIRECT SERPL-MCNC: <0.2 MG/DL
BILIRUB INDIRECT SERPL-MCNC: NORMAL MG/DL
BILIRUB SERPL-MCNC: <0.2 MG/DL
BUN SERPL-MCNC: 13 MG/DL
CALCIUM SERPL-MCNC: 8.9 MG/DL
CHLORIDE SERPL-SCNC: 106 MMOL/L
CO2 SERPL-SCNC: 26 MMOL/L
CREAT SERPL-MCNC: 0.8 MG/DL
EGFR: 95 ML/MIN/1.73M2
EOSINOPHIL # BLD AUTO: 0.19 K/UL
EOSINOPHIL NFR BLD AUTO: 0.8 %
GLUCOSE SERPL-MCNC: 108 MG/DL
HCT VFR BLD CALC: 33.5 %
HGB BLD-MCNC: 11.1 G/DL
IMM GRANULOCYTES NFR BLD AUTO: 4.6 %
LYMPHOCYTES # BLD AUTO: 2.44 K/UL
LYMPHOCYTES NFR BLD AUTO: 9.9 %
MAN DIFF?: NORMAL
MCHC RBC-ENTMCNC: 28.8 PG
MCHC RBC-ENTMCNC: 33.1 G/DL
MCV RBC AUTO: 86.8 FL
MONOCYTES # BLD AUTO: 0.92 K/UL
MONOCYTES NFR BLD AUTO: 3.7 %
NEUTROPHILS # BLD AUTO: 19.78 K/UL
NEUTROPHILS NFR BLD AUTO: 80.5 %
PLATELET # BLD AUTO: 211 K/UL
POTASSIUM SERPL-SCNC: 3.6 MMOL/L
PROT SERPL-MCNC: 6.6 G/DL
RBC # BLD: 3.86 M/UL
RBC # FLD: 18.2 %
SODIUM SERPL-SCNC: 143 MMOL/L
WBC # FLD AUTO: 24.58 K/UL

## 2022-11-01 NOTE — HISTORY OF PRESENT ILLNESS
[Disease: _____________________] : Disease: [unfilled] [Therapy: ___] : Therapy: [unfilled] [Cycle: ___] : Cycle: [unfilled] [de-identified] : Mr. JINA SINGER is a 72 year old male here today for evaluation and management of Pancreatic Cancer.  \par \par He was referred by SURG, Dr. Morgan.  JINA is a 72 year old M with PMHx including HTN, HLD, T2DM, who presents to clinic to establish care.  Patient presented to ER back in 07/2022 with complaint of gradually worsening abdominal pain for the prior month, worse after eating and associated with 40lb weight loss in the last 2 months.  He underwent CT imaging which shows evidence suggestive of metastatic disease.  He also endorses constipation.  He underwent laparoscopy with findings of ~1L malignant ascites and peritoneal implants on 8.22.2022.  He is presently feeling well with no new complaints.  Patient denies fever, chills, nausea, vomiting, dysphagia, dyspnea, neuropathy or bleeding.  Patient reports family history of malignancy in his father (possibly stomach cancer) and mother (possibly colon).  Never a smoker.  NKDA.  \par \par RADIOLOGIC WORKUP\par CT Chest (7.25.2022) IMPRESSION:Perigastric infiltrative process inseparable from the tail the pancreas extending to the proximal duodenum. Associated ascites. Findings again  suspicious for pancreatic or gastrointestinal neoplasm. Differential diagnosis includes partially contained ruptured gastric or duodenal ulcer as well as infectious process/pancreatitis.Hyperaerated lungs. Scattered granulomata. Vascular calcifications. Nonspecific areas of subpleural thickening bilaterally.\par CT A/P (7.24.2022) IMPRESSION:Predominantly perigastric infiltrative soft tissue changes which are inseparable from the tail of pancreas and extend around the proximal  duodenum. Small volume abdominopelvic ascites. Differential includes underlying pancreatic or gastrointestinal neoplasm, sequelae of partially contained ruptured gastric or duodenal  ulcer, and infectious process or perhaps pancreatitis. Clinical correlation and/or further evaluation with EGD should be considered.Findings compatible with BPH. Superimposed cystitis is difficult to exclude on imaging.\par \par LAB WORKUP\par (8.17.2022) WBC 8.36, Hgb 14.2, MCV 84, , normal diff, Cr 0.9, eGFR 91, normal LFTs\par (7.25.2022) Ca19-9 is 6481, CEA 6.8, TSH 2.62\par (10.23.2019) WBC 6.83, Hgb 15, MCV 85.7, \par \par PATHOLOGY\par (see results section)\par \par HCM\par Colonoscopy done about 5 years , polyps removed \par Upper Endoscopy done 07/2022 showed 5x5 hypoechoic infiltrative mass (irregular borders) extending between the  muscularis propria (abuting it) and the territory of the pancreatic tail \par COVID Vaccinated? [FreeTextEntry1] : Started mFOLFIRINOX on 9.6.2022 [de-identified] : 9/14/22\par Patient is here for a follow-up visit for pancreatic cancer.  He is s/p initial cycle of mFOLFIRINOX on 9.6.2022.  He is feeling well with no new complaints.  Reviewed most recent CBC, which is stable.  Patient denies fever, chills, nausea, vomiting, dyspnea, neuropathy, persistent diarrhea or bleeding.  He states his appetite is still poor but slightly improving.  He is due for cycle 2 of chemotherapy next week.  \par \par 10/4/22\par Patient is here for a follow-up visit for pancreatic cancer.  He is due for cycle 3 of mFOLFIRINOX today, initiated on 9.6.2022.  He is feeling well with no new complaints.  Reviewed most recent CBC, which shows mild anemia, hgb 11.6g/dL and persistent leukocytosis, likely due to GCSF administration with chemotherapy.  ALK Phos is slightly higher at 198 but Ca19-9 is decreasing down to 05172K/mL.  Patient denies fever, chills, vomiting, dyspnea, neuropathy, CP, palpitations, abdominal pain, persistent diarrhea or bleeding.  He is endorses transient nausea which was not bothersome and he took anti-emetics.

## 2022-11-01 NOTE — REVIEW OF SYSTEMS
[Negative] : Allergic/Immunologic [Fever] : no fever [Night Sweats] : no night sweats [Dysphagia] : no dysphagia [Chest Pain] : no chest pain [Shortness Of Breath] : no shortness of breath [Abdominal Pain] : no abdominal pain [Vomiting] : no vomiting [Constipation] : no constipation [Easy Bleeding] : no tendency for easy bleeding [FreeTextEntry2] : appetite slightly improved but still poor  [FreeTextEntry7] : reports transient nausea  [de-identified] : L chest port present

## 2022-11-01 NOTE — PHYSICAL EXAM
[Restricted in physically strenuous activity but ambulatory and able to carry out work of a light or sedentary nature] : Status 1- Restricted in physically strenuous activity but ambulatory and able to carry out work of a light or sedentary nature, e.g., light house work, office work [Normal] : affect appropriate [de-identified] : wearing glasses  [de-identified] : L chest port present

## 2022-11-01 NOTE — ASSESSMENT
[Palliative] : Goals of care discussed with patient: Palliative [FreeTextEntry1] : # Metastatic moderate to poorly differentiated adenocarcinoma , dx 07/2022 \par - s/p laparoscopy on 8.22.2022 with findings of ~1L malignant ascites and peritoneal implants \par - Ca19-9 is 6481, CEA 6.8 from 07/2022 \par - reviewed radiology, pathology and lab workup and had a discussion regarding implications of diagnosis, prognosis and options for management including but not limited to chemotherapy for palliative intent\par - s/p L Chest port placement with SURG, Dr. Morgan \par - c/w cycle 3 of FOLFIRINOX on 10/4 , initiated on 9.6.2022\par - plan to get restaging CT CAP after 2-3 months of chemo (~11/2022) ; will order at next visit \par - Labwork today: CBC, BMP, LFTs, Ca19-9\par \par # H. Pylori infection , dx 07/2022 \par - s/p upper EGD in 07/2022 \par - followup with GI, Dr. Maximilian Larson, as scheduled for management \par \par RTC in 2 weeks with Dr. Che with CBC, BMP, LFTs Ca19-9 prior \par \par 71 yo with ECOG 1 has pancreas adenocarcinoma, metastatic to omentum, stage IV. Newly diagnosed. \par explained that the goal of therapy is palliation and not cure\par explained that the available modality is systemic therapy. we need to confirm NGS/MMR/MSI/ BRCA testing\par in view of a good ECOG, recommend to proceed with the standard of care chemo; modified FOLFIRINOX\par explained side effects and what to expect\par c4 10/18/22\par plan CT CAP after c5=6

## 2022-11-02 ENCOUNTER — APPOINTMENT (OUTPATIENT)
Dept: HEMATOLOGY ONCOLOGY | Facility: CLINIC | Age: 72
End: 2022-11-02
Payer: MEDICARE

## 2022-11-02 ENCOUNTER — APPOINTMENT (OUTPATIENT)
Dept: INFUSION THERAPY | Facility: CLINIC | Age: 72
End: 2022-11-02
Payer: MEDICARE

## 2022-11-02 VITALS
HEART RATE: 78 BPM | TEMPERATURE: 98.4 F | OXYGEN SATURATION: 97 % | SYSTOLIC BLOOD PRESSURE: 143 MMHG | WEIGHT: 142 LBS | HEIGHT: 66 IN | DIASTOLIC BLOOD PRESSURE: 80 MMHG | BODY MASS INDEX: 22.82 KG/M2

## 2022-11-02 LAB — CANCER AG19-9 SERPL-ACNC: 4619 U/ML

## 2022-11-02 PROCEDURE — 99214 OFFICE O/P EST MOD 30 MIN: CPT

## 2022-11-02 RX ORDER — FLUOROURACIL 50 MG/ML
4150 INJECTION, SOLUTION INTRAVENOUS ONCE
Refills: 0 | Status: COMPLETED | OUTPATIENT
Start: 2022-11-02 | End: 2022-11-02

## 2022-11-02 RX ORDER — ATROPINE SULFATE 0.1 MG/ML
0.6 SYRINGE (ML) INJECTION ONCE
Refills: 0 | Status: COMPLETED | OUTPATIENT
Start: 2022-11-02 | End: 2022-11-02

## 2022-11-02 RX ORDER — DIPHENHYDRAMINE HCL 50 MG
50 CAPSULE ORAL ONCE
Refills: 0 | Status: COMPLETED | OUTPATIENT
Start: 2022-11-02 | End: 2022-11-02

## 2022-11-02 RX ORDER — IRINOTECAN HYDROCHLORIDE 100 MG/5ML
270 INJECTION, SOLUTION INTRAVENOUS ONCE
Refills: 0 | Status: COMPLETED | OUTPATIENT
Start: 2022-11-02 | End: 2022-11-02

## 2022-11-02 RX ORDER — LEUCOVORIN CALCIUM 5 MG
720 TABLET ORAL ONCE
Refills: 0 | Status: COMPLETED | OUTPATIENT
Start: 2022-11-02 | End: 2022-11-02

## 2022-11-02 RX ORDER — FOSAPREPITANT DIMEGLUMINE 150 MG/5ML
150 INJECTION, POWDER, LYOPHILIZED, FOR SOLUTION INTRAVENOUS ONCE
Refills: 0 | Status: COMPLETED | OUTPATIENT
Start: 2022-11-02 | End: 2022-11-02

## 2022-11-02 RX ORDER — DEXAMETHASONE 0.5 MG/5ML
12 ELIXIR ORAL ONCE
Refills: 0 | Status: COMPLETED | OUTPATIENT
Start: 2022-11-02 | End: 2022-11-02

## 2022-11-02 RX ORDER — OXALIPLATIN 5 MG/ML
150 INJECTION, SOLUTION INTRAVENOUS ONCE
Refills: 0 | Status: COMPLETED | OUTPATIENT
Start: 2022-11-02 | End: 2022-11-02

## 2022-11-02 RX ORDER — FAMOTIDINE 10 MG/ML
20 INJECTION INTRAVENOUS ONCE
Refills: 0 | Status: COMPLETED | OUTPATIENT
Start: 2022-11-02 | End: 2022-11-02

## 2022-11-02 RX ADMIN — Medication 102 MILLIGRAM(S): at 10:50

## 2022-11-02 RX ADMIN — FLUOROURACIL 4150 MILLIGRAM(S): 50 INJECTION, SOLUTION INTRAVENOUS at 15:58

## 2022-11-02 RX ADMIN — Medication 720 MILLIGRAM(S): at 15:55

## 2022-11-02 RX ADMIN — Medication 122 MILLIGRAM(S): at 10:10

## 2022-11-02 RX ADMIN — Medication 720 MILLIGRAM(S): at 13:46

## 2022-11-02 RX ADMIN — OXALIPLATIN 150 MILLIGRAM(S): 5 INJECTION, SOLUTION INTRAVENOUS at 11:43

## 2022-11-02 RX ADMIN — Medication 0.6 MILLIGRAM(S): at 10:15

## 2022-11-02 RX ADMIN — FOSAPREPITANT DIMEGLUMINE 150 MILLIGRAM(S): 150 INJECTION, POWDER, LYOPHILIZED, FOR SOLUTION INTRAVENOUS at 11:40

## 2022-11-02 RX ADMIN — FAMOTIDINE 104 MILLIGRAM(S): 10 INJECTION INTRAVENOUS at 10:30

## 2022-11-02 RX ADMIN — FAMOTIDINE 20 MILLIGRAM(S): 10 INJECTION INTRAVENOUS at 10:50

## 2022-11-02 RX ADMIN — FOSAPREPITANT DIMEGLUMINE 500 MILLIGRAM(S): 150 INJECTION, POWDER, LYOPHILIZED, FOR SOLUTION INTRAVENOUS at 11:10

## 2022-11-02 RX ADMIN — Medication 12 MILLIGRAM(S): at 10:30

## 2022-11-02 RX ADMIN — IRINOTECAN HYDROCHLORIDE 270 MILLIGRAM(S): 100 INJECTION, SOLUTION INTRAVENOUS at 15:55

## 2022-11-02 RX ADMIN — IRINOTECAN HYDROCHLORIDE 270 MILLIGRAM(S): 100 INJECTION, SOLUTION INTRAVENOUS at 14:25

## 2022-11-02 RX ADMIN — Medication 50 MILLIGRAM(S): at 11:10

## 2022-11-02 RX ADMIN — OXALIPLATIN 150 MILLIGRAM(S): 5 INJECTION, SOLUTION INTRAVENOUS at 13:43

## 2022-11-02 NOTE — HISTORY OF PRESENT ILLNESS
[Disease: _____________________] : Disease: [unfilled] [Therapy: ___] : Therapy: [unfilled] [Cycle: ___] : Cycle: [unfilled] [de-identified] : Mr. JINA SINGER is a 72 year old male here today for evaluation and management of Pancreatic Cancer.  \par \par He was referred by SURG, Dr. Morgan.  JINA is a 72 year old M with PMHx including HTN, HLD, T2DM, who presents to clinic to establish care.  Patient presented to ER back in 07/2022 with complaint of gradually worsening abdominal pain for the prior month, worse after eating and associated with 40lb weight loss in the last 2 months.  He underwent CT imaging which shows evidence suggestive of metastatic disease.  He also endorses constipation.  He underwent laparoscopy with findings of ~1L malignant ascites and peritoneal implants on 8.22.2022.  He is presently feeling well with no new complaints.  Patient denies fever, chills, nausea, vomiting, dysphagia, dyspnea, neuropathy or bleeding.  Patient reports family history of malignancy in his father (possibly stomach cancer) and mother (possibly colon).  Never a smoker.  NKDA.  \par \par RADIOLOGIC WORKUP\par CT Chest (7.25.2022) IMPRESSION:Perigastric infiltrative process inseparable from the tail the pancreas extending to the proximal duodenum. Associated ascites. Findings again  suspicious for pancreatic or gastrointestinal neoplasm. Differential diagnosis includes partially contained ruptured gastric or duodenal ulcer as well as infectious process/pancreatitis.Hyperaerated lungs. Scattered granulomata. Vascular calcifications. Nonspecific areas of subpleural thickening bilaterally.\par CT A/P (7.24.2022) IMPRESSION:Predominantly perigastric infiltrative soft tissue changes which are inseparable from the tail of pancreas and extend around the proximal  duodenum. Small volume abdominopelvic ascites. Differential includes underlying pancreatic or gastrointestinal neoplasm, sequelae of partially contained ruptured gastric or duodenal  ulcer, and infectious process or perhaps pancreatitis. Clinical correlation and/or further evaluation with EGD should be considered.Findings compatible with BPH. Superimposed cystitis is difficult to exclude on imaging.\par \par LAB WORKUP\par (8.17.2022) WBC 8.36, Hgb 14.2, MCV 84, , normal diff, Cr 0.9, eGFR 91, normal LFTs\par (7.25.2022) Ca19-9 is 6481, CEA 6.8, TSH 2.62\par (10.23.2019) WBC 6.83, Hgb 15, MCV 85.7, \par \par PATHOLOGY\par (see results section)\par \par HCM\par Colonoscopy done about 5 years , polyps removed \par Upper Endoscopy done 07/2022 showed 5x5 hypoechoic infiltrative mass (irregular borders) extending between the  muscularis propria (abuting it) and the territory of the pancreatic tail \par COVID Vaccinated? [FreeTextEntry1] : Started mFOLFIRINOX on 9.6.2022 [de-identified] : 9/14/22\par Patient is here for a follow-up visit for pancreatic cancer.  He is s/p initial cycle of mFOLFIRINOX on 9.6.2022.  He is feeling well with no new complaints.  Reviewed most recent CBC, which is stable.  Patient denies fever, chills, nausea, vomiting, dyspnea, neuropathy, persistent diarrhea or bleeding.  He states his appetite is still poor but slightly improving.  He is due for cycle 2 of chemotherapy next week.  \par \par 10/4/22\par Patient is here for a follow-up visit for pancreatic cancer.  He is due for cycle 3 of mFOLFIRINOX today, initiated on 9.6.2022.  He is feeling well with no new complaints.  Reviewed most recent CBC, which shows mild anemia, hgb 11.6g/dL and persistent leukocytosis, likely due to GCSF administration with chemotherapy.  ALK Phos is slightly higher at 198 but Ca19-9 is decreasing down to 01127J/mL.  Patient denies fever, chills, vomiting, dyspnea, neuropathy, CP, palpitations, abdominal pain, persistent diarrhea or bleeding.  He is endorses transient nausea which was not bothersome and he took anti-emetics.  \par \par 11/2/22\par Patient is here for a follow-up visit for pancreatic cancer.  He is due for cycle 4 of mFOLFIRINOX today, initiated on 9.6.2022.  He is feeling well with no new complaints.  Reviewed most recent CBC, which shows mild anemia, hgb 11.1g/dL and persistent leukocytosis, likely due to GCSF administration with chemotherapy.  ALK Phos is slightly lower at 138 but Ca19-9 is decreasing down to 4619U/mL.  Patient denies fever, chills, vomiting, dyspnea, CP, palpitations, abdominal pain, persistent diarrhea or bleeding.  He endorses occasional neuropathy with long exposure to cold items.

## 2022-11-02 NOTE — PHYSICAL EXAM
[Restricted in physically strenuous activity but ambulatory and able to carry out work of a light or sedentary nature] : Status 1- Restricted in physically strenuous activity but ambulatory and able to carry out work of a light or sedentary nature, e.g., light house work, office work [Normal] : affect appropriate [de-identified] : wearing glasses  [de-identified] : L chest port present

## 2022-11-02 NOTE — REVIEW OF SYSTEMS
[Negative] : Allergic/Immunologic [Fever] : no fever [Night Sweats] : no night sweats [Dysphagia] : no dysphagia [Chest Pain] : no chest pain [Shortness Of Breath] : no shortness of breath [Abdominal Pain] : no abdominal pain [Vomiting] : no vomiting [Constipation] : no constipation [Easy Bleeding] : no tendency for easy bleeding [FreeTextEntry2] : appetite slightly improved but still poor  [FreeTextEntry7] : reports transient nausea  [de-identified] : L chest port present  [de-identified] : endorses occasional neuropathy with long exposure to cold items

## 2022-11-02 NOTE — ASSESSMENT
[Palliative] : Goals of care discussed with patient: Palliative [FreeTextEntry1] : # Metastatic moderate to poorly differentiated adenocarcinoma , dx 07/2022 \par - BRCA (-) , \par - s/p laparoscopy on 8.22.2022 with findings of ~1L malignant ascites and peritoneal implants \par - Ca19-9 is 6481, CEA 6.8 from 07/2022 \par - reviewed radiology, pathology and lab workup and had a discussion regarding implications of diagnosis, prognosis and options for management including but not limited to chemotherapy for palliative intent\par - s/p L Chest port placement with SURG, Dr. Morgan \par - c/w cycle 5 of FOLFIRINOX on 11/2 , initiated on 9.6.2022\par - CT CAP after 2-3 months of chemo (~11/2022) , Rx given\par \par # H. Pylori infection , dx 07/2022 \par - s/p upper EGD in 07/2022 ; completed antibiotic treatment \par - followup with GI, Dr. Maximilian Larson, as scheduled for management \par \par RTC in 2 weeks with Dr. Che with CBC, BMP, LFTs Ca19-9 prior \par \par 71 yo with ECOG 0 has pancreas adenocarcinoma, metastatic to omentum, stage IV. Newly diagnosed. \par explained that the goal of therapy is palliation and not cure\par c5 mFOLFIRINOX 11.2.22\par  has a dramatic response\par plan CT CAP to restage now

## 2022-11-04 ENCOUNTER — APPOINTMENT (OUTPATIENT)
Dept: INFUSION THERAPY | Facility: CLINIC | Age: 72
End: 2022-11-04

## 2022-11-04 RX ORDER — PEGFILGRASTIM-CBQV 6 MG/.6ML
6 INJECTION, SOLUTION SUBCUTANEOUS ONCE
Refills: 0 | Status: COMPLETED | OUTPATIENT
Start: 2022-11-04 | End: 2022-11-04

## 2022-11-04 RX ADMIN — PEGFILGRASTIM-CBQV 6 MILLIGRAM(S): 6 INJECTION, SOLUTION SUBCUTANEOUS at 13:55

## 2022-11-08 ENCOUNTER — OUTPATIENT (OUTPATIENT)
Dept: OUTPATIENT SERVICES | Facility: HOSPITAL | Age: 72
LOS: 1 days | Discharge: HOME | End: 2022-11-08
Payer: MEDICARE

## 2022-11-08 DIAGNOSIS — R10.2 PELVIC AND PERINEAL PAIN: ICD-10-CM

## 2022-11-08 DIAGNOSIS — Z98.890 OTHER SPECIFIED POSTPROCEDURAL STATES: Chronic | ICD-10-CM

## 2022-11-08 DIAGNOSIS — C25.9 MALIGNANT NEOPLASM OF PANCREAS, UNSPECIFIED: ICD-10-CM

## 2022-11-08 DIAGNOSIS — R10.9 UNSPECIFIED ABDOMINAL PAIN: ICD-10-CM

## 2022-11-08 PROCEDURE — 74177 CT ABD & PELVIS W/CONTRAST: CPT | Mod: 26,MH

## 2022-11-08 PROCEDURE — 71260 CT THORAX DX C+: CPT | Mod: 26,MH

## 2022-11-15 ENCOUNTER — APPOINTMENT (OUTPATIENT)
Dept: HEMATOLOGY ONCOLOGY | Facility: CLINIC | Age: 72
End: 2022-11-15

## 2022-11-15 LAB
ALBUMIN SERPL ELPH-MCNC: 3.9 G/DL
ALP BLD-CCNC: 146 U/L
ALT SERPL-CCNC: 19 U/L
ANION GAP SERPL CALC-SCNC: 11 MMOL/L
AST SERPL-CCNC: 20 U/L
BASOPHILS # BLD AUTO: 0.17 K/UL
BASOPHILS NFR BLD AUTO: 0.7 %
BILIRUB DIRECT SERPL-MCNC: <0.2 MG/DL
BILIRUB INDIRECT SERPL-MCNC: >0 MG/DL
BILIRUB SERPL-MCNC: 0.2 MG/DL
BUN SERPL-MCNC: 10 MG/DL
CALCIUM SERPL-MCNC: 9.1 MG/DL
CHLORIDE SERPL-SCNC: 100 MMOL/L
CO2 SERPL-SCNC: 28 MMOL/L
CREAT SERPL-MCNC: 0.8 MG/DL
EGFR: 95 ML/MIN/1.73M2
EOSINOPHIL # BLD AUTO: 0.24 K/UL
EOSINOPHIL NFR BLD AUTO: 1 %
GLUCOSE SERPL-MCNC: 104 MG/DL
HCT VFR BLD CALC: 32.4 %
HGB BLD-MCNC: 11.1 G/DL
IMM GRANULOCYTES NFR BLD AUTO: 3.7 %
LYMPHOCYTES # BLD AUTO: 2.26 K/UL
LYMPHOCYTES NFR BLD AUTO: 9.3 %
MAN DIFF?: NORMAL
MCHC RBC-ENTMCNC: 29.3 PG
MCHC RBC-ENTMCNC: 34.3 G/DL
MCV RBC AUTO: 85.5 FL
MONOCYTES # BLD AUTO: 1.14 K/UL
MONOCYTES NFR BLD AUTO: 4.7 %
NEUTROPHILS # BLD AUTO: 19.49 K/UL
NEUTROPHILS NFR BLD AUTO: 80.6 %
PLATELET # BLD AUTO: 145 K/UL
POTASSIUM SERPL-SCNC: 3.7 MMOL/L
PROT SERPL-MCNC: 6.8 G/DL
RBC # BLD: 3.79 M/UL
RBC # FLD: 18.8 %
SODIUM SERPL-SCNC: 139 MMOL/L
WBC # FLD AUTO: 24.2 K/UL

## 2022-11-16 ENCOUNTER — APPOINTMENT (OUTPATIENT)
Dept: INFUSION THERAPY | Facility: CLINIC | Age: 72
End: 2022-11-16
Payer: MEDICARE

## 2022-11-16 ENCOUNTER — APPOINTMENT (OUTPATIENT)
Dept: HEMATOLOGY ONCOLOGY | Facility: CLINIC | Age: 72
End: 2022-11-16
Payer: MEDICARE

## 2022-11-16 VITALS
TEMPERATURE: 98.4 F | WEIGHT: 138 LBS | BODY MASS INDEX: 22.18 KG/M2 | HEIGHT: 66 IN | OXYGEN SATURATION: 96 % | HEART RATE: 64 BPM | SYSTOLIC BLOOD PRESSURE: 145 MMHG | DIASTOLIC BLOOD PRESSURE: 72 MMHG

## 2022-11-16 LAB — CANCER AG19-9 SERPL-ACNC: 2708 U/ML

## 2022-11-16 PROCEDURE — 99214 OFFICE O/P EST MOD 30 MIN: CPT

## 2022-11-16 RX ORDER — IRINOTECAN HYDROCHLORIDE 100 MG/5ML
270 INJECTION, SOLUTION INTRAVENOUS ONCE
Refills: 0 | Status: COMPLETED | OUTPATIENT
Start: 2022-11-16 | End: 2022-11-16

## 2022-11-16 RX ORDER — DIPHENHYDRAMINE HCL 50 MG
50 CAPSULE ORAL ONCE
Refills: 0 | Status: COMPLETED | OUTPATIENT
Start: 2022-11-16 | End: 2022-11-16

## 2022-11-16 RX ORDER — ATROPINE SULFATE 0.1 MG/ML
0.6 SYRINGE (ML) INJECTION ONCE
Refills: 0 | Status: COMPLETED | OUTPATIENT
Start: 2022-11-16 | End: 2022-11-16

## 2022-11-16 RX ORDER — LEUCOVORIN CALCIUM 5 MG
720 TABLET ORAL ONCE
Refills: 0 | Status: COMPLETED | OUTPATIENT
Start: 2022-11-16 | End: 2022-11-16

## 2022-11-16 RX ORDER — DEXAMETHASONE 0.5 MG/5ML
12 ELIXIR ORAL ONCE
Refills: 0 | Status: COMPLETED | OUTPATIENT
Start: 2022-11-16 | End: 2022-11-16

## 2022-11-16 RX ORDER — FAMOTIDINE 10 MG/ML
20 INJECTION INTRAVENOUS ONCE
Refills: 0 | Status: COMPLETED | OUTPATIENT
Start: 2022-11-16 | End: 2022-11-16

## 2022-11-16 RX ORDER — FLUOROURACIL 50 MG/ML
4150 INJECTION, SOLUTION INTRAVENOUS ONCE
Refills: 0 | Status: COMPLETED | OUTPATIENT
Start: 2022-11-16 | End: 2022-11-16

## 2022-11-16 RX ORDER — OXALIPLATIN 5 MG/ML
150 INJECTION, SOLUTION INTRAVENOUS ONCE
Refills: 0 | Status: COMPLETED | OUTPATIENT
Start: 2022-11-16 | End: 2022-11-16

## 2022-11-16 RX ORDER — FOSAPREPITANT DIMEGLUMINE 150 MG/5ML
150 INJECTION, POWDER, LYOPHILIZED, FOR SOLUTION INTRAVENOUS ONCE
Refills: 0 | Status: COMPLETED | OUTPATIENT
Start: 2022-11-16 | End: 2022-11-16

## 2022-11-16 RX ADMIN — FLUOROURACIL 4150 MILLIGRAM(S): 50 INJECTION, SOLUTION INTRAVENOUS at 16:41

## 2022-11-16 RX ADMIN — OXALIPLATIN 150 MILLIGRAM(S): 5 INJECTION, SOLUTION INTRAVENOUS at 11:50

## 2022-11-16 RX ADMIN — FAMOTIDINE 104 MILLIGRAM(S): 10 INJECTION INTRAVENOUS at 10:29

## 2022-11-16 RX ADMIN — Medication 720 MILLIGRAM(S): at 11:50

## 2022-11-16 RX ADMIN — FOSAPREPITANT DIMEGLUMINE 500 MILLIGRAM(S): 150 INJECTION, POWDER, LYOPHILIZED, FOR SOLUTION INTRAVENOUS at 10:28

## 2022-11-16 RX ADMIN — Medication 122 MILLIGRAM(S): at 10:29

## 2022-11-16 RX ADMIN — Medication 0.6 MILLIGRAM(S): at 10:29

## 2022-11-16 RX ADMIN — IRINOTECAN HYDROCHLORIDE 270 MILLIGRAM(S): 100 INJECTION, SOLUTION INTRAVENOUS at 11:50

## 2022-11-16 RX ADMIN — Medication 102 MILLIGRAM(S): at 10:28

## 2022-11-16 NOTE — REVIEW OF SYSTEMS
[Negative] : Allergic/Immunologic [Fever] : no fever [Night Sweats] : no night sweats [Dysphagia] : no dysphagia [Chest Pain] : no chest pain [Shortness Of Breath] : no shortness of breath [Abdominal Pain] : no abdominal pain [Vomiting] : no vomiting [Constipation] : no constipation [Easy Bleeding] : no tendency for easy bleeding [FreeTextEntry2] : appetite slightly improved but still poor  [FreeTextEntry7] : reports transient nausea  [de-identified] : L chest port present  [de-identified] : endorses occasional neuropathy with long exposure to cold items

## 2022-11-16 NOTE — HISTORY OF PRESENT ILLNESS
[Disease: _____________________] : Disease: [unfilled] [Therapy: ___] : Therapy: [unfilled] [Cycle: ___] : Cycle: [unfilled] [de-identified] : Mr. JINA SINGER is a 72 year old male here today for evaluation and management of Pancreatic Cancer.  \par \par He was referred by SURG, Dr. Morgan.  JINA is a 72 year old M with PMHx including HTN, HLD, T2DM, who presents to clinic to establish care.  Patient presented to ER back in 07/2022 with complaint of gradually worsening abdominal pain for the prior month, worse after eating and associated with 40lb weight loss in the last 2 months.  He underwent CT imaging which shows evidence suggestive of metastatic disease.  He also endorses constipation.  He underwent laparoscopy with findings of ~1L malignant ascites and peritoneal implants on 8.22.2022.  He is presently feeling well with no new complaints.  Patient denies fever, chills, nausea, vomiting, dysphagia, dyspnea, neuropathy or bleeding.  Patient reports family history of malignancy in his father (possibly stomach cancer) and mother (possibly colon).  Never a smoker.  NKDA.  \par \par RADIOLOGIC WORKUP\par CT Chest (7.25.2022) IMPRESSION:Perigastric infiltrative process inseparable from the tail the pancreas extending to the proximal duodenum. Associated ascites. Findings again  suspicious for pancreatic or gastrointestinal neoplasm. Differential diagnosis includes partially contained ruptured gastric or duodenal ulcer as well as infectious process/pancreatitis.Hyperaerated lungs. Scattered granulomata. Vascular calcifications. Nonspecific areas of subpleural thickening bilaterally.\par CT A/P (7.24.2022) IMPRESSION:Predominantly perigastric infiltrative soft tissue changes which are inseparable from the tail of pancreas and extend around the proximal  duodenum. Small volume abdominopelvic ascites. Differential includes underlying pancreatic or gastrointestinal neoplasm, sequelae of partially contained ruptured gastric or duodenal  ulcer, and infectious process or perhaps pancreatitis. Clinical correlation and/or further evaluation with EGD should be considered.Findings compatible with BPH. Superimposed cystitis is difficult to exclude on imaging.\par \par LAB WORKUP\par (8.17.2022) WBC 8.36, Hgb 14.2, MCV 84, , normal diff, Cr 0.9, eGFR 91, normal LFTs\par (7.25.2022) Ca19-9 is 6481, CEA 6.8, TSH 2.62\par (10.23.2019) WBC 6.83, Hgb 15, MCV 85.7, \par \par PATHOLOGY\par (see results section)\par \par HCM\par Colonoscopy done about 5 years , polyps removed \par Upper Endoscopy done 07/2022 showed 5x5 hypoechoic infiltrative mass (irregular borders) extending between the  muscularis propria (abuting it) and the territory of the pancreatic tail \par COVID Vaccinated? [FreeTextEntry1] : Started mFOLFIRINOX on 9.6.2022 [de-identified] : 9/14/22\[facility] Patient is here for a follow-up visit for pancreatic cancer.  He is s/p initial cycle of mFOLFIRINOX on 9.6.2022.  He is feeling well with no new complaints.  Reviewed most recent CBC, which is stable.  Patient denies fever, chills, nausea, vomiting, dyspnea, neuropathy, persistent diarrhea or bleeding.  He states his appetite is still poor but slightly improving.  He is due for cycle 2 of chemotherapy next week.  \par \par 10/4/22\[facility] Patient is here for a follow-up visit for pancreatic cancer.  He is due for cycle 3 of mFOLFIRINOX today, initiated on 9.6.2022.  He is feeling well with no new complaints.  Reviewed most recent CBC, which shows mild anemia, hgb 11.6g/dL and persistent leukocytosis, likely due to GCSF administration with chemotherapy.  ALK Phos is slightly higher at 198 but Ca19-9 is decreasing down to 58715C/mL.  Patient denies fever, chills, vomiting, dyspnea, neuropathy, CP, palpitations, abdominal pain, persistent diarrhea or bleeding.  He is endorses transient nausea which was not bothersome and he took anti-emetics.  \par \par 11/2/22\[facility] Patient is here for a follow-up visit for pancreatic cancer.  He is due for cycle 4 of mFOLFIRINOX today, initiated on 9.6.2022.  He is feeling well with no new complaints.  Reviewed most recent CBC, which shows mild anemia, hgb 11.1g/dL and persistent leukocytosis, likely due to GCSF administration with chemotherapy.  ALK Phos is slightly lower at 138 but Ca19-9 is decreasing down to 4619U/mL.  Patient denies fever, chills, vomiting, dyspnea, CP, palpitations, abdominal pain, persistent diarrhea or bleeding.  He endorses occasional neuropathy with long exposure to cold items.  \par \par 11/16/22\[facility] Patient is here for a follow-up visit for pancreatic cancer, accompanied by spouse.  He is due for cycle 6 of mFOLFIRINOX today, initiated on 9.6.2022.  He is feeling well with no new complaints.  Reviewed most recent CBC, which shows mild anemia, hgb 11.1g/dL and persistent leukocytosis, likely due to GCSF administration with chemotherapy.  ALK Phos is essentially stable at 146 but Ca19-9 is decreasing down to 2708U/mL.  Patient denies fever, chills, vomiting, dyspnea, CP, palpitations, abdominal pain, persistent diarrhea, mouth sores or bleeding.  He still notes occasional neuropathy with long exposure to cold items.  Reviewed most recent CT imaging which is essentially stable.  \par CT C/A/P (11.8.2022) IMPRESSION:1. No definite evidence of intrathoracic metastasis.Since 7/24/2022, no significant interval change.Near diffuse peritoneal carcinomatosis is not significantly changed.Diffuse tumor infiltration in the pancreatic tail (4/226), unchanged.Completely attenuated splenic vein is unchanged. Prominent venous collaterals in the left upper quadrant is unchanged.

## 2022-11-16 NOTE — PHYSICAL EXAM
[Restricted in physically strenuous activity but ambulatory and able to carry out work of a light or sedentary nature] : Status 1- Restricted in physically strenuous activity but ambulatory and able to carry out work of a light or sedentary nature, e.g., light house work, office work [Normal] : affect appropriate [de-identified] : wearing glasses  [de-identified] : L chest port present

## 2022-11-16 NOTE — REASON FOR VISIT
[Follow-Up Visit] : a follow-up [Spouse] : spouse [FreeTextEntry2] : Metastatic moderate to poorly differentiated adenocarcinoma

## 2022-11-16 NOTE — ASSESSMENT
[Palliative] : Goals of care discussed with patient: Palliative [FreeTextEntry1] : # Metastatic moderate to poorly differentiated adenocarcinoma , dx 07/2022 \par - BRCA (-) \par - s/p laparoscopy on 8.22.2022 with findings of ~1L malignant ascites and peritoneal implants \par - Ca19-9 is 6481, CEA 6.8 from 07/2022 \par - reviewed radiology, pathology and lab workup and had a discussion regarding implications of diagnosis, prognosis and options for management including but not limited to chemotherapy for palliative intent\par - s/p L Chest port placement with SURG, Dr. Morgan \par - CT CAP from 11.8.2022 is essentially stable, tumor marker is decreasing \par - c/w cycle 6 of FOLFIRINOX on 11/16 , initiated on 9.6.2022 \par - plan to reimage after another 2-3 months of treatment (late ~01/2023) \par \par # H. Pylori infection , dx 07/2022 \par - s/p upper EGD in 07/2022 ; completed antibiotic treatment \par - followup with GI, Dr. Maximilian Larson, as scheduled for management \par \par RTC in 2 weeks with CBC, BMP, LFTs Ca19-9 prior \par \par 71 yo with ECOG 0 has pancreas adenocarcinoma, metastatic to omentum, stage IV. Newly diagnosed. \par explained that the goal of therapy is palliation and not cure\par c6 mFOLFIRINOX 11.16.22\par  has a dramatic response\par plan CT CAP to restage after 2-3 months after the last one

## 2022-11-18 ENCOUNTER — APPOINTMENT (OUTPATIENT)
Dept: INFUSION THERAPY | Facility: CLINIC | Age: 72
End: 2022-11-18

## 2022-11-18 ENCOUNTER — OUTPATIENT (OUTPATIENT)
Dept: OUTPATIENT SERVICES | Facility: HOSPITAL | Age: 72
LOS: 1 days | Discharge: HOME | End: 2022-11-18

## 2022-11-18 DIAGNOSIS — Z98.890 OTHER SPECIFIED POSTPROCEDURAL STATES: Chronic | ICD-10-CM

## 2022-11-18 DIAGNOSIS — Z51.11 ENCOUNTER FOR ANTINEOPLASTIC CHEMOTHERAPY: ICD-10-CM

## 2022-11-18 DIAGNOSIS — C25.9 MALIGNANT NEOPLASM OF PANCREAS, UNSPECIFIED: ICD-10-CM

## 2022-11-18 DIAGNOSIS — R97.8 OTHER ABNORMAL TUMOR MARKERS: ICD-10-CM

## 2022-11-18 RX ORDER — PEGFILGRASTIM-CBQV 6 MG/.6ML
6 INJECTION, SOLUTION SUBCUTANEOUS ONCE
Refills: 0 | Status: COMPLETED | OUTPATIENT
Start: 2022-11-18 | End: 2022-11-18

## 2022-11-18 RX ADMIN — PEGFILGRASTIM-CBQV 6 MILLIGRAM(S): 6 INJECTION, SOLUTION SUBCUTANEOUS at 14:59

## 2022-11-29 ENCOUNTER — APPOINTMENT (OUTPATIENT)
Dept: HEMATOLOGY ONCOLOGY | Facility: CLINIC | Age: 72
End: 2022-11-29

## 2022-11-30 ENCOUNTER — APPOINTMENT (OUTPATIENT)
Dept: INFUSION THERAPY | Facility: CLINIC | Age: 72
End: 2022-11-30
Payer: MEDICARE

## 2022-11-30 ENCOUNTER — APPOINTMENT (OUTPATIENT)
Dept: HEMATOLOGY ONCOLOGY | Facility: CLINIC | Age: 72
End: 2022-11-30
Payer: MEDICARE

## 2022-11-30 VITALS
DIASTOLIC BLOOD PRESSURE: 76 MMHG | TEMPERATURE: 97.6 F | BODY MASS INDEX: 22.5 KG/M2 | RESPIRATION RATE: 18 BRPM | HEIGHT: 66 IN | OXYGEN SATURATION: 97 % | HEART RATE: 77 BPM | SYSTOLIC BLOOD PRESSURE: 151 MMHG | WEIGHT: 140 LBS

## 2022-11-30 DIAGNOSIS — E87.6 HYPOKALEMIA: ICD-10-CM

## 2022-11-30 LAB
ALBUMIN SERPL ELPH-MCNC: 3.8 G/DL
ALP BLD-CCNC: 138 U/L
ALT SERPL-CCNC: 14 U/L
ANION GAP SERPL CALC-SCNC: 10 MMOL/L
AST SERPL-CCNC: 17 U/L
BASOPHILS # BLD AUTO: 0.09 K/UL
BASOPHILS NFR BLD AUTO: 0.4 %
BILIRUB DIRECT SERPL-MCNC: <0.2 MG/DL
BILIRUB INDIRECT SERPL-MCNC: >0 MG/DL
BILIRUB SERPL-MCNC: 0.2 MG/DL
BUN SERPL-MCNC: 9 MG/DL
CALCIUM SERPL-MCNC: 8.8 MG/DL
CANCER AG19-9 SERPL-ACNC: 1683 U/ML
CHLORIDE SERPL-SCNC: 100 MMOL/L
CO2 SERPL-SCNC: 31 MMOL/L
CREAT SERPL-MCNC: 0.8 MG/DL
EGFR: 95 ML/MIN/1.73M2
EOSINOPHIL # BLD AUTO: 0.3 K/UL
EOSINOPHIL NFR BLD AUTO: 1.3 %
GLUCOSE SERPL-MCNC: 111 MG/DL
HCT VFR BLD CALC: 31.9 %
HGB BLD-MCNC: 10.9 G/DL
IMM GRANULOCYTES NFR BLD AUTO: 1.9 %
LYMPHOCYTES # BLD AUTO: 1.91 K/UL
LYMPHOCYTES NFR BLD AUTO: 8.5 %
MAGNESIUM SERPL-MCNC: 2 MG/DL
MAN DIFF?: NORMAL
MCHC RBC-ENTMCNC: 29.8 PG
MCHC RBC-ENTMCNC: 34.2 G/DL
MCV RBC AUTO: 87.2 FL
MONOCYTES # BLD AUTO: 1.19 K/UL
MONOCYTES NFR BLD AUTO: 5.3 %
NEUTROPHILS # BLD AUTO: 18.64 K/UL
NEUTROPHILS NFR BLD AUTO: 82.6 %
PLATELET # BLD AUTO: 150 K/UL
POTASSIUM SERPL-SCNC: 3.2 MMOL/L
PROT SERPL-MCNC: 6.7 G/DL
RBC # BLD: 3.66 M/UL
RBC # FLD: 17.5 %
SODIUM SERPL-SCNC: 141 MMOL/L
WBC # FLD AUTO: 22.55 K/UL

## 2022-11-30 PROCEDURE — 99215 OFFICE O/P EST HI 40 MIN: CPT

## 2022-11-30 RX ORDER — POTASSIUM CHLORIDE 750 MG/1
10 TABLET, EXTENDED RELEASE ORAL TWICE DAILY
Qty: 4 | Refills: 0 | Status: COMPLETED | COMMUNITY
Start: 2022-11-30 | End: 2022-12-02

## 2022-11-30 RX ORDER — ATROPINE SULFATE 0.1 MG/ML
0.6 SYRINGE (ML) INJECTION ONCE
Refills: 0 | Status: COMPLETED | OUTPATIENT
Start: 2022-11-30 | End: 2022-11-30

## 2022-11-30 RX ORDER — FAMOTIDINE 10 MG/ML
20 INJECTION INTRAVENOUS ONCE
Refills: 0 | Status: COMPLETED | OUTPATIENT
Start: 2022-11-30 | End: 2022-11-30

## 2022-11-30 RX ORDER — DEXAMETHASONE 0.5 MG/5ML
12 ELIXIR ORAL ONCE
Refills: 0 | Status: COMPLETED | OUTPATIENT
Start: 2022-11-30 | End: 2022-11-30

## 2022-11-30 RX ORDER — IRINOTECAN HYDROCHLORIDE 100 MG/5ML
270 INJECTION, SOLUTION INTRAVENOUS ONCE
Refills: 0 | Status: COMPLETED | OUTPATIENT
Start: 2022-11-30 | End: 2022-11-30

## 2022-11-30 RX ORDER — LEUCOVORIN CALCIUM 5 MG
720 TABLET ORAL ONCE
Refills: 0 | Status: COMPLETED | OUTPATIENT
Start: 2022-11-30 | End: 2022-11-30

## 2022-11-30 RX ORDER — OXALIPLATIN 5 MG/ML
150 INJECTION, SOLUTION INTRAVENOUS ONCE
Refills: 0 | Status: COMPLETED | OUTPATIENT
Start: 2022-11-30 | End: 2022-11-30

## 2022-11-30 RX ORDER — DIPHENHYDRAMINE HCL 50 MG
50 CAPSULE ORAL ONCE
Refills: 0 | Status: COMPLETED | OUTPATIENT
Start: 2022-11-30 | End: 2022-11-30

## 2022-11-30 RX ORDER — FLUOROURACIL 50 MG/ML
4150 INJECTION, SOLUTION INTRAVENOUS ONCE
Refills: 0 | Status: COMPLETED | OUTPATIENT
Start: 2022-11-30 | End: 2022-11-30

## 2022-11-30 RX ORDER — FOSAPREPITANT DIMEGLUMINE 150 MG/5ML
150 INJECTION, POWDER, LYOPHILIZED, FOR SOLUTION INTRAVENOUS ONCE
Refills: 0 | Status: COMPLETED | OUTPATIENT
Start: 2022-11-30 | End: 2022-11-30

## 2022-11-30 RX ADMIN — Medication 122 MILLIGRAM(S): at 11:50

## 2022-11-30 RX ADMIN — FLUOROURACIL 4150 MILLIGRAM(S): 50 INJECTION, SOLUTION INTRAVENOUS at 17:21

## 2022-11-30 RX ADMIN — Medication 0.6 MILLIGRAM(S): at 11:50

## 2022-11-30 RX ADMIN — OXALIPLATIN 150 MILLIGRAM(S): 5 INJECTION, SOLUTION INTRAVENOUS at 12:38

## 2022-11-30 RX ADMIN — FOSAPREPITANT DIMEGLUMINE 500 MILLIGRAM(S): 150 INJECTION, POWDER, LYOPHILIZED, FOR SOLUTION INTRAVENOUS at 12:50

## 2022-11-30 RX ADMIN — FAMOTIDINE 104 MILLIGRAM(S): 10 INJECTION INTRAVENOUS at 12:10

## 2022-11-30 RX ADMIN — FAMOTIDINE 20 MILLIGRAM(S): 10 INJECTION INTRAVENOUS at 12:30

## 2022-11-30 RX ADMIN — Medication 102 MILLIGRAM(S): at 12:30

## 2022-11-30 RX ADMIN — Medication 720 MILLIGRAM(S): at 12:38

## 2022-11-30 RX ADMIN — Medication 12 MILLIGRAM(S): at 12:10

## 2022-11-30 RX ADMIN — IRINOTECAN HYDROCHLORIDE 270 MILLIGRAM(S): 100 INJECTION, SOLUTION INTRAVENOUS at 12:36

## 2022-11-30 RX ADMIN — Medication 50 MILLIGRAM(S): at 12:50

## 2022-11-30 NOTE — PHYSICAL EXAM
[Restricted in physically strenuous activity but ambulatory and able to carry out work of a light or sedentary nature] : Status 1- Restricted in physically strenuous activity but ambulatory and able to carry out work of a light or sedentary nature, e.g., light house work, office work [Normal] : affect appropriate [de-identified] : wearing glasses  [de-identified] : L chest port present

## 2022-11-30 NOTE — HISTORY OF PRESENT ILLNESS
[Disease: _____________________] : Disease: [unfilled] [Therapy: ___] : Therapy: [unfilled] [Cycle: ___] : Cycle: [unfilled] [de-identified] : Mr. JINA SINGER is a 72 year old male here today for evaluation and management of Pancreatic Cancer.  \par \par He was referred by SURG, Dr. Morgan.  JINA is a 72 year old M with PMHx including HTN, HLD, T2DM, who presents to clinic to establish care.  Patient presented to ER back in 07/2022 with complaint of gradually worsening abdominal pain for the prior month, worse after eating and associated with 40lb weight loss in the last 2 months.  He underwent CT imaging which shows evidence suggestive of metastatic disease.  He also endorses constipation.  He underwent laparoscopy with findings of ~1L malignant ascites and peritoneal implants on 8.22.2022.  He is presently feeling well with no new complaints.  Patient denies fever, chills, nausea, vomiting, dysphagia, dyspnea, neuropathy or bleeding.  Patient reports family history of malignancy in his father (possibly stomach cancer) and mother (possibly colon).  Never a smoker.  NKDA.  \par \par RADIOLOGIC WORKUP\par CT Chest (7.25.2022) IMPRESSION:Perigastric infiltrative process inseparable from the tail the pancreas extending to the proximal duodenum. Associated ascites. Findings again  suspicious for pancreatic or gastrointestinal neoplasm. Differential diagnosis includes partially contained ruptured gastric or duodenal ulcer as well as infectious process/pancreatitis.Hyperaerated lungs. Scattered granulomata. Vascular calcifications. Nonspecific areas of subpleural thickening bilaterally.\par CT A/P (7.24.2022) IMPRESSION:Predominantly perigastric infiltrative soft tissue changes which are inseparable from the tail of pancreas and extend around the proximal  duodenum. Small volume abdominopelvic ascites. Differential includes underlying pancreatic or gastrointestinal neoplasm, sequelae of partially contained ruptured gastric or duodenal  ulcer, and infectious process or perhaps pancreatitis. Clinical correlation and/or further evaluation with EGD should be considered.Findings compatible with BPH. Superimposed cystitis is difficult to exclude on imaging.\par \par LAB WORKUP\par (8.17.2022) WBC 8.36, Hgb 14.2, MCV 84, , normal diff, Cr 0.9, eGFR 91, normal LFTs\par (7.25.2022) Ca19-9 is 6481, CEA 6.8, TSH 2.62\par (10.23.2019) WBC 6.83, Hgb 15, MCV 85.7, \par \par PATHOLOGY\par (see results section)\par \par HCM\par Colonoscopy done about 5 years , polyps removed \par Upper Endoscopy done 07/2022 showed 5x5 hypoechoic infiltrative mass (irregular borders) extending between the  muscularis propria (abuting it) and the territory of the pancreatic tail \par COVID Vaccinated? [FreeTextEntry1] : Started mFOLFIRINOX on 9.6.2022 [de-identified] : 9/14/22\[facility] Patient is here for a follow-up visit for pancreatic cancer.  He is s/p initial cycle of mFOLFIRINOX on 9.6.2022.  He is feeling well with no new complaints.  Reviewed most recent CBC, which is stable.  Patient denies fever, chills, nausea, vomiting, dyspnea, neuropathy, persistent diarrhea or bleeding.  He states his appetite is still poor but slightly improving.  He is due for cycle 2 of chemotherapy next week.  \par \par 10/4/22\[facility] Patient is here for a follow-up visit for pancreatic cancer.  He is due for cycle 3 of mFOLFIRINOX today, initiated on 9.6.2022.  He is feeling well with no new complaints.  Reviewed most recent CBC, which shows mild anemia, hgb 11.6g/dL and persistent leukocytosis, likely due to GCSF administration with chemotherapy.  ALK Phos is slightly higher at 198 but Ca19-9 is decreasing down to 64378W/mL.  Patient denies fever, chills, vomiting, dyspnea, neuropathy, CP, palpitations, abdominal pain, persistent diarrhea or bleeding.  He is endorses transient nausea which was not bothersome and he took anti-emetics.  \par \par 11/2/22\[facility] Patient is here for a follow-up visit for pancreatic cancer.  He is due for cycle 4 of mFOLFIRINOX today, initiated on 9.6.2022.  He is feeling well with no new complaints.  Reviewed most recent CBC, which shows mild anemia, hgb 11.1g/dL and persistent leukocytosis, likely due to GCSF administration with chemotherapy.  ALK Phos is slightly lower at 138 but Ca19-9 is decreasing down to 4619U/mL.  Patient denies fever, chills, vomiting, dyspnea, CP, palpitations, abdominal pain, persistent diarrhea or bleeding.  He endorses occasional neuropathy with long exposure to cold items.  \par \par 11/16/22\[facility] Patient is here for a follow-up visit for pancreatic cancer, accompanied by spouse.  He is due for cycle 6 of mFOLFIRINOX today, initiated on 9.6.2022.  He is feeling well with no new complaints.  Reviewed most recent CBC, which shows mild anemia, hgb 11.1g/dL and persistent leukocytosis, likely due to GCSF administration with chemotherapy.  ALK Phos is essentially stable at 146 but Ca19-9 is decreasing down to 2708U/mL.  Patient denies fever, chills, vomiting, dyspnea, CP, palpitations, abdominal pain, persistent diarrhea, mouth sores or bleeding.  He still notes occasional neuropathy with long exposure to cold items.  Reviewed most recent CT imaging which is essentially stable.  \par CT C/A/P (11.8.2022) IMPRESSION:1. No definite evidence of intrathoracic metastasis.Since 7/24/2022, no significant interval change.Near diffuse peritoneal carcinomatosis is not significantly changed.Diffuse tumor infiltration in the pancreatic tail (4/226), unchanged.Completely attenuated splenic vein is unchanged. Prominent venous collaterals in the left upper quadrant is unchanged.\par \par 11/30/22\par Patient is here for a follow-up visit for pancreatic cancer.  He is due for cycle 7 of mFOLFIRINOX today, initiated on 9.6.2022.  Reviewed most recent CBC, which shows mild anemia, hgb 10.9g/dL and persistent leukocytosis, likely due to GCSF administration with chemotherapy.  ALK Phos is essentially stable at 138 and Ca19-9 is decreasing down to 1683U/mL.  Patient denies fever, chills, vomiting, dyspnea, CP, palpitations, abdominal pain, persistent diarrhea, mouth sores or bleeding.  He still notes occasional neuropathy with long exposure to cold items.

## 2022-11-30 NOTE — REVIEW OF SYSTEMS
[Negative] : Allergic/Immunologic [Fever] : no fever [Night Sweats] : no night sweats [Dysphagia] : no dysphagia [Chest Pain] : no chest pain [Shortness Of Breath] : no shortness of breath [Abdominal Pain] : no abdominal pain [Vomiting] : no vomiting [Constipation] : no constipation [Easy Bleeding] : no tendency for easy bleeding [FreeTextEntry2] : appetite slightly improved but still poor  [FreeTextEntry7] : reports transient nausea  [de-identified] : L chest port present  [de-identified] : endorses occasional neuropathy with long exposure to cold items

## 2022-11-30 NOTE — ASSESSMENT
[Palliative] : Goals of care discussed with patient: Palliative [FreeTextEntry1] : # Metastatic moderate to poorly differentiated adenocarcinoma , dx 07/2022 \par - BRCA (-) \par - s/p laparoscopy on 8.22.2022 with findings of ~1L malignant ascites and peritoneal implants \par - Ca19-9 is 6481, CEA 6.8 from 07/2022 \par - reviewed radiology, pathology and lab workup and had a discussion regarding implications of diagnosis, prognosis and options for management including but not limited to chemotherapy for palliative intent ; had a lengthy discussion regarding Stage IV disease and not curative \par - s/p L Chest port placement with SURG, Dr. Morgan \par - CT CAP from 11.8.2022 is essentially stable, tumor marker is decreasing \par - c/w cycle 7 of FOLFIRINOX on 11/30 , initiated on 9.6.2022 \par - plan to reimage PET/CT after 2-3 months of treatment (late ~01/2023) \par \par # H. Pylori infection , dx 07/2022 \par - s/p upper EGD in 07/2022 ; completed antibiotic treatment \par - followup with GI, Dr. Maximilian Larson, as scheduled for management \par \par # Hypokalemia \par - denies vomiting \par - KCl 10mEq PO BiD x 2 days , Rx sent \par - Labwork today: Mg STAT ([facility])\par \par RTC in 2 weeks with CBC, BMP, LFTs Ca19-9 prior \par \par 71 yo with ECOG 0 has pancreas adenocarcinoma, metastatic to omentum, stage IV. Newly diagnosed. \par explained that the goal of therapy is palliation and not cure\par c6 mFOLFIRINOX 11.16.22\par  has a dramatic response\par \par SEEN/examined w/ NP [facility].Smart; note revieewed; case discussed\par again explained the extent of disease\par explaned the goal of treatment again: NOT CURE, but treatment\par will follow with PET scan , not CT scan since CT CAP failed to reveal the extent of disease\par so far his symptoms have been improving and tumor marker has been decreasing\par do not envison to stop chemo at this time

## 2022-12-02 ENCOUNTER — APPOINTMENT (OUTPATIENT)
Dept: INFUSION THERAPY | Facility: CLINIC | Age: 72
End: 2022-12-02

## 2022-12-02 RX ORDER — PEGFILGRASTIM-CBQV 6 MG/.6ML
6 INJECTION, SOLUTION SUBCUTANEOUS ONCE
Refills: 0 | Status: COMPLETED | OUTPATIENT
Start: 2022-12-02 | End: 2022-12-02

## 2022-12-02 RX ADMIN — PEGFILGRASTIM-CBQV 6 MILLIGRAM(S): 6 INJECTION, SOLUTION SUBCUTANEOUS at 16:29

## 2022-12-13 ENCOUNTER — APPOINTMENT (OUTPATIENT)
Dept: HEMATOLOGY ONCOLOGY | Facility: CLINIC | Age: 72
End: 2022-12-13

## 2022-12-13 LAB
BASOPHILS # BLD AUTO: 0.08 K/UL
BASOPHILS NFR BLD AUTO: 0.4 %
EOSINOPHIL # BLD AUTO: 0.15 K/UL
EOSINOPHIL NFR BLD AUTO: 0.8 %
HCT VFR BLD CALC: 31.5 %
HGB BLD-MCNC: 10.5 G/DL
IMM GRANULOCYTES NFR BLD AUTO: 4.9 %
LYMPHOCYTES # BLD AUTO: 1.98 K/UL
LYMPHOCYTES NFR BLD AUTO: 10.3 %
MAN DIFF?: NORMAL
MCHC RBC-ENTMCNC: 29.2 PG
MCHC RBC-ENTMCNC: 33.3 G/DL
MCV RBC AUTO: 87.5 FL
MONOCYTES # BLD AUTO: 1.08 K/UL
MONOCYTES NFR BLD AUTO: 5.6 %
NEUTROPHILS # BLD AUTO: 14.96 K/UL
NEUTROPHILS NFR BLD AUTO: 78 %
PLATELET # BLD AUTO: 166 K/UL
RBC # BLD: 3.6 M/UL
RBC # FLD: 17.1 %
WBC # FLD AUTO: 19.19 K/UL

## 2022-12-14 ENCOUNTER — APPOINTMENT (OUTPATIENT)
Dept: INFUSION THERAPY | Facility: CLINIC | Age: 72
End: 2022-12-14
Payer: MEDICARE

## 2022-12-14 ENCOUNTER — RESULT REVIEW (OUTPATIENT)
Age: 72
End: 2022-12-14

## 2022-12-14 ENCOUNTER — APPOINTMENT (OUTPATIENT)
Dept: HEMATOLOGY ONCOLOGY | Facility: CLINIC | Age: 72
End: 2022-12-14
Payer: MEDICARE

## 2022-12-14 ENCOUNTER — OUTPATIENT (OUTPATIENT)
Dept: OUTPATIENT SERVICES | Facility: HOSPITAL | Age: 72
LOS: 1 days | Discharge: HOME | End: 2022-12-14

## 2022-12-14 VITALS
WEIGHT: 136 LBS | HEIGHT: 66 IN | HEART RATE: 82 BPM | OXYGEN SATURATION: 98 % | BODY MASS INDEX: 21.86 KG/M2 | RESPIRATION RATE: 18 BRPM | DIASTOLIC BLOOD PRESSURE: 74 MMHG | TEMPERATURE: 98.2 F | SYSTOLIC BLOOD PRESSURE: 139 MMHG

## 2022-12-14 DIAGNOSIS — C25.9 MALIGNANT NEOPLASM OF PANCREAS, UNSPECIFIED: ICD-10-CM

## 2022-12-14 DIAGNOSIS — Z98.890 OTHER SPECIFIED POSTPROCEDURAL STATES: Chronic | ICD-10-CM

## 2022-12-14 LAB
ALBUMIN SERPL ELPH-MCNC: 3.7 G/DL
ALP BLD-CCNC: 124 U/L
ALT SERPL-CCNC: 13 U/L
ANION GAP SERPL CALC-SCNC: 10 MMOL/L
AST SERPL-CCNC: 15 U/L
BILIRUB DIRECT SERPL-MCNC: <0.2 MG/DL
BILIRUB INDIRECT SERPL-MCNC: NORMAL MG/DL
BILIRUB SERPL-MCNC: <0.2 MG/DL
BUN SERPL-MCNC: 7 MG/DL
CALCIUM SERPL-MCNC: 8.8 MG/DL
CANCER AG19-9 SERPL-ACNC: 1177 U/ML
CHLORIDE SERPL-SCNC: 103 MMOL/L
CO2 SERPL-SCNC: 28 MMOL/L
CREAT SERPL-MCNC: 0.8 MG/DL
EGFR: 95 ML/MIN/1.73M2
GLUCOSE SERPL-MCNC: 111 MG/DL
MAGNESIUM SERPL-MCNC: 1.8 MG/DL
POTASSIUM SERPL-SCNC: 3.5 MMOL/L
PROT SERPL-MCNC: 6.5 G/DL
SODIUM SERPL-SCNC: 141 MMOL/L

## 2022-12-14 PROCEDURE — 99214 OFFICE O/P EST MOD 30 MIN: CPT

## 2022-12-14 PROCEDURE — 71046 X-RAY EXAM CHEST 2 VIEWS: CPT | Mod: 26

## 2022-12-14 RX ORDER — IRINOTECAN HYDROCHLORIDE 100 MG/5ML
270 INJECTION, SOLUTION INTRAVENOUS ONCE
Refills: 0 | Status: COMPLETED | OUTPATIENT
Start: 2022-12-14 | End: 2022-12-14

## 2022-12-14 RX ORDER — OXALIPLATIN 5 MG/ML
150 INJECTION, SOLUTION INTRAVENOUS ONCE
Refills: 0 | Status: COMPLETED | OUTPATIENT
Start: 2022-12-14 | End: 2022-12-14

## 2022-12-14 RX ORDER — ATROPINE SULFATE 0.1 MG/ML
0.6 SYRINGE (ML) INJECTION ONCE
Refills: 0 | Status: COMPLETED | OUTPATIENT
Start: 2022-12-14 | End: 2022-12-14

## 2022-12-14 RX ORDER — FAMOTIDINE 10 MG/ML
20 INJECTION INTRAVENOUS ONCE
Refills: 0 | Status: COMPLETED | OUTPATIENT
Start: 2022-12-14 | End: 2022-12-14

## 2022-12-14 RX ORDER — FLUOROURACIL 50 MG/ML
4150 INJECTION, SOLUTION INTRAVENOUS ONCE
Refills: 0 | Status: COMPLETED | OUTPATIENT
Start: 2022-12-14 | End: 2022-12-14

## 2022-12-14 RX ORDER — DIPHENHYDRAMINE HCL 50 MG
50 CAPSULE ORAL ONCE
Refills: 0 | Status: COMPLETED | OUTPATIENT
Start: 2022-12-14 | End: 2022-12-14

## 2022-12-14 RX ORDER — LEUCOVORIN CALCIUM 5 MG
720 TABLET ORAL ONCE
Refills: 0 | Status: COMPLETED | OUTPATIENT
Start: 2022-12-14 | End: 2022-12-14

## 2022-12-14 RX ORDER — FOSAPREPITANT DIMEGLUMINE 150 MG/5ML
150 INJECTION, POWDER, LYOPHILIZED, FOR SOLUTION INTRAVENOUS ONCE
Refills: 0 | Status: COMPLETED | OUTPATIENT
Start: 2022-12-14 | End: 2022-12-14

## 2022-12-14 RX ORDER — DEXAMETHASONE 0.5 MG/5ML
12 ELIXIR ORAL ONCE
Refills: 0 | Status: COMPLETED | OUTPATIENT
Start: 2022-12-14 | End: 2022-12-14

## 2022-12-14 RX ADMIN — Medication 720 MILLIGRAM(S): at 12:04

## 2022-12-14 RX ADMIN — FAMOTIDINE 104 MILLIGRAM(S): 10 INJECTION INTRAVENOUS at 10:48

## 2022-12-14 RX ADMIN — Medication 0.6 MILLIGRAM(S): at 10:48

## 2022-12-14 RX ADMIN — OXALIPLATIN 150 MILLIGRAM(S): 5 INJECTION, SOLUTION INTRAVENOUS at 12:04

## 2022-12-14 RX ADMIN — Medication 102 MILLIGRAM(S): at 10:48

## 2022-12-14 RX ADMIN — FLUOROURACIL 4150 MILLIGRAM(S): 50 INJECTION, SOLUTION INTRAVENOUS at 16:59

## 2022-12-14 RX ADMIN — FOSAPREPITANT DIMEGLUMINE 500 MILLIGRAM(S): 150 INJECTION, POWDER, LYOPHILIZED, FOR SOLUTION INTRAVENOUS at 10:48

## 2022-12-14 RX ADMIN — Medication 122 MILLIGRAM(S): at 10:48

## 2022-12-14 RX ADMIN — IRINOTECAN HYDROCHLORIDE 270 MILLIGRAM(S): 100 INJECTION, SOLUTION INTRAVENOUS at 12:05

## 2022-12-14 NOTE — ASSESSMENT
[Palliative] : Goals of care discussed with patient: Palliative [FreeTextEntry1] : # Metastatic moderate to poorly differentiated adenocarcinoma , dx 07/2022 \par - BRCA (-) \par - s/p laparoscopy on 8.22.2022 with findings of ~1L malignant ascites and peritoneal implants \par - Ca19-9 is 6481, CEA 6.8 from 07/2022 \par - reviewed radiology, pathology and lab workup and had a discussion regarding implications of diagnosis, prognosis and options for management including but not limited to chemotherapy for palliative intent ; had a lengthy discussion regarding Stage IV disease and not curative \par - s/p L Chest port placement with SURG, Dr. Morgan \par - CT CAP from 11.8.2022 is essentially stable, tumor marker is decreasing \par - c/w cycle 8 of FOLFIRINOX on 12/14 , initiated on 9.6.2022 \par - plan to reimage PET/CT after 2-3 months of treatment (late ~01/2023) \par \par # H. Pylori infection , dx 07/2022 \par - s/p upper EGD in 07/2022 ; completed antibiotic treatment \par - followup with GI, Dr. Maximilian Larson, as scheduled for management \par \par # Hypokalemia \par - denies vomiting \par - s/p KCl 10mEq PO BiD x 2 days with normalization of level \par \par CXR ordered to further investigate  productive cough and cold symptoms, Rx given. \par \par RTC in 2 weeks with CBC, BMP, LFTs Ca19-9 prior \par \par 73 yo with ECOG 0 has pancreas adenocarcinoma, metastatic to omentum, stage IV. Newly diagnosed. \par explained that the goal of therapy is palliation and not cure\par c6 mFOLFIRINOX 11.16.22\par  has a dramatic response\par \par SEEN/examined w/ NP CSanjay; note reviewed; case discussed\par again explained the extent of disease\par explaned the goal of treatment again: NOT CURE, but treatment\par will follow with PET scan , not CT scan since CT CAP failed to reveal the extent of disease\par so far his symptoms have been improving and tumor marker has been decreasing\par do not envison to stop chemo at this time\par \par pt has had cough for the past 2 weeks;he did not disclose this to me last visit; symptoms improved; will get CXR ASAP; since symptoms has nearly resolved,will proceed with chemo

## 2022-12-14 NOTE — HISTORY OF PRESENT ILLNESS
[Disease: _____________________] : Disease: [unfilled] [Therapy: ___] : Therapy: [unfilled] [Cycle: ___] : Cycle: [unfilled] [de-identified] : Mr. JINA SINGER is a 72 year old male here today for evaluation and management of Pancreatic Cancer.  \par \par He was referred by SURG, Dr. Morgan.  JINA is a 72 year old M with PMHx including HTN, HLD, T2DM, who presents to clinic to establish care.  Patient presented to ER back in 07/2022 with complaint of gradually worsening abdominal pain for the prior month, worse after eating and associated with 40lb weight loss in the last 2 months.  He underwent CT imaging which shows evidence suggestive of metastatic disease.  He also endorses constipation.  He underwent laparoscopy with findings of ~1L malignant ascites and peritoneal implants on 8.22.2022.  He is presently feeling well with no new complaints.  Patient denies fever, chills, nausea, vomiting, dysphagia, dyspnea, neuropathy or bleeding.  Patient reports family history of malignancy in his father (possibly stomach cancer) and mother (possibly colon).  Never a smoker.  NKDA.  \par \par RADIOLOGIC WORKUP\par CT Chest (7.25.2022) IMPRESSION:Perigastric infiltrative process inseparable from the tail the pancreas extending to the proximal duodenum. Associated ascites. Findings again  suspicious for pancreatic or gastrointestinal neoplasm. Differential diagnosis includes partially contained ruptured gastric or duodenal ulcer as well as infectious process/pancreatitis.Hyperaerated lungs. Scattered granulomata. Vascular calcifications. Nonspecific areas of subpleural thickening bilaterally.\par CT A/P (7.24.2022) IMPRESSION:Predominantly perigastric infiltrative soft tissue changes which are inseparable from the tail of pancreas and extend around the proximal  duodenum. Small volume abdominopelvic ascites. Differential includes underlying pancreatic or gastrointestinal neoplasm, sequelae of partially contained ruptured gastric or duodenal  ulcer, and infectious process or perhaps pancreatitis. Clinical correlation and/or further evaluation with EGD should be considered.Findings compatible with BPH. Superimposed cystitis is difficult to exclude on imaging.\par \par LAB WORKUP\par (8.17.2022) WBC 8.36, Hgb 14.2, MCV 84, , normal diff, Cr 0.9, eGFR 91, normal LFTs\par (7.25.2022) Ca19-9 is 6481, CEA 6.8, TSH 2.62\par (10.23.2019) WBC 6.83, Hgb 15, MCV 85.7, \par \par PATHOLOGY\par (see results section)\par \par HCM\par Colonoscopy done about 5 years , polyps removed \par Upper Endoscopy done 07/2022 showed 5x5 hypoechoic infiltrative mass (irregular borders) extending between the  muscularis propria (abuting it) and the territory of the pancreatic tail \par COVID Vaccinated? [FreeTextEntry1] : Started mFOLFIRINOX on 9.6.2022 [de-identified] : 9/14/22\[facility] Patient is here for a follow-up visit for pancreatic cancer.  He is s/p initial cycle of mFOLFIRINOX on 9.6.2022.  He is feeling well with no new complaints.  Reviewed most recent CBC, which is stable.  Patient denies fever, chills, nausea, vomiting, dyspnea, neuropathy, persistent diarrhea or bleeding.  He states his appetite is still poor but slightly improving.  He is due for cycle 2 of chemotherapy next week.  \par \par 10/4/22\[facility] Patient is here for a follow-up visit for pancreatic cancer.  He is due for cycle 3 of mFOLFIRINOX today, initiated on 9.6.2022.  He is feeling well with no new complaints.  Reviewed most recent CBC, which shows mild anemia, hgb 11.6g/dL and persistent leukocytosis, likely due to GCSF administration with chemotherapy.  ALK Phos is slightly higher at 198 but Ca19-9 is decreasing down to 86731T/mL.  Patient denies fever, chills, vomiting, dyspnea, neuropathy, CP, palpitations, abdominal pain, persistent diarrhea or bleeding.  He is endorses transient nausea which was not bothersome and he took anti-emetics.  \par \par 11/2/22\[facility] Patient is here for a follow-up visit for pancreatic cancer.  He is due for cycle 4 of mFOLFIRINOX today, initiated on 9.6.2022.  He is feeling well with no new complaints.  Reviewed most recent CBC, which shows mild anemia, hgb 11.1g/dL and persistent leukocytosis, likely due to GCSF administration with chemotherapy.  ALK Phos is slightly lower at 138 but Ca19-9 is decreasing down to 4619U/mL.  Patient denies fever, chills, vomiting, dyspnea, CP, palpitations, abdominal pain, persistent diarrhea or bleeding.  He endorses occasional neuropathy with long exposure to cold items.  \par \par 11/16/22\[facility] Patient is here for a follow-up visit for pancreatic cancer, accompanied by spouse.  He is due for cycle 6 of mFOLFIRINOX today, initiated on 9.6.2022.  He is feeling well with no new complaints.  Reviewed most recent CBC, which shows mild anemia, hgb 11.1g/dL and persistent leukocytosis, likely due to GCSF administration with chemotherapy.  ALK Phos is essentially stable at 146 but Ca19-9 is decreasing down to 2708U/mL.  Patient denies fever, chills, vomiting, dyspnea, CP, palpitations, abdominal pain, persistent diarrhea, mouth sores or bleeding.  He still notes occasional neuropathy with long exposure to cold items.  Reviewed most recent CT imaging which is essentially stable.  \par CT C/A/P (11.8.2022) IMPRESSION:1. No definite evidence of intrathoracic metastasis.Since 7/24/2022, no significant interval change.Near diffuse peritoneal carcinomatosis is not significantly changed.Diffuse tumor infiltration in the pancreatic tail (4/226), unchanged.Completely attenuated splenic vein is unchanged. Prominent venous collaterals in the left upper quadrant is unchanged.\par \par 11/30/22\par Patient is here for a follow-up visit for pancreatic cancer.  He is due for cycle 7 of mFOLFIRINOX today, initiated on 9.6.2022.  Reviewed most recent CBC, which shows mild anemia, hgb 10.9g/dL and persistent leukocytosis, likely due to GCSF administration with chemotherapy.  ALK Phos is essentially stable at 138 and Ca19-9 is decreasing down to 1683U/mL.  Patient denies fever, chills, vomiting, dyspnea, CP, palpitations, abdominal pain, persistent diarrhea, mouth sores or bleeding.  He still notes occasional neuropathy with long exposure to cold items. \par \par 12/14/22\par Patient is here for a follow-up visit for pancreatic cancer, accompanied by spouse via telephone.  He is due for cycle 8 of mFOLFIRINOX today, initiated on 9.6.2022.  Reviewed most recent CBC, which shows mild anemia, hgb 10.5g/dL and persistent leukocytosis, likely due to GCSF administration with chemotherapy.  ALK Phos is essentially stable at 124 and Ca19-9 is decreasing down to 1177U/mL.  Patient denies fever, chills, dyspnea, CP, palpitations, abdominal pain, persistent diarrhea, mouth sores or bleeding.  He has an occasional cough x 2 weeks; believes he had a mild cold which has been improving over the last 2 weeks but the cough is persistent.  He still notes occasional neuropathy with long exposure to cold items.

## 2022-12-14 NOTE — PHYSICAL EXAM
[Restricted in physically strenuous activity but ambulatory and able to carry out work of a light or sedentary nature] : Status 1- Restricted in physically strenuous activity but ambulatory and able to carry out work of a light or sedentary nature, e.g., light house work, office work [Normal] : affect appropriate [de-identified] : wearing glasses  [de-identified] : L chest port present

## 2022-12-14 NOTE — REVIEW OF SYSTEMS
[Negative] : Allergic/Immunologic [Cough] : cough [Diarrhea] : diarrhea [Fever] : no fever [Night Sweats] : no night sweats [Dysphagia] : no dysphagia [Chest Pain] : no chest pain [Shortness Of Breath] : no shortness of breath [Abdominal Pain] : no abdominal pain [Vomiting] : no vomiting [Constipation] : no constipation [Easy Bleeding] : no tendency for easy bleeding [FreeTextEntry2] : appetite slightly improved but still poor  [FreeTextEntry6] : occasionally productive cough x 2 weeks , cold symptoms are improving  [FreeTextEntry7] : reports transient nausea , 1 episode of vomiting , occasional diarrhea  [de-identified] : L chest port present  [de-identified] : endorses occasional neuropathy with long exposure to cold items

## 2022-12-15 RX ORDER — AMOXICILLIN AND CLAVULANATE POTASSIUM 875; 125 MG/1; MG/1
875-125 TABLET, COATED ORAL
Qty: 10 | Refills: 0 | Status: COMPLETED | COMMUNITY
Start: 2022-12-15 | End: 2022-12-20

## 2022-12-15 RX ORDER — AZITHROMYCIN 500 MG/1
500 TABLET, FILM COATED ORAL DAILY
Qty: 5 | Refills: 0 | Status: COMPLETED | COMMUNITY
Start: 2022-12-15 | End: 2022-12-20

## 2022-12-16 ENCOUNTER — APPOINTMENT (OUTPATIENT)
Dept: INFUSION THERAPY | Facility: CLINIC | Age: 72
End: 2022-12-16

## 2022-12-16 RX ORDER — PEGFILGRASTIM-CBQV 6 MG/.6ML
6 INJECTION, SOLUTION SUBCUTANEOUS ONCE
Refills: 0 | Status: COMPLETED | OUTPATIENT
Start: 2022-12-16 | End: 2022-12-16

## 2022-12-16 RX ADMIN — PEGFILGRASTIM-CBQV 6 MILLIGRAM(S): 6 INJECTION, SOLUTION SUBCUTANEOUS at 15:03

## 2022-12-22 ENCOUNTER — APPOINTMENT (OUTPATIENT)
Age: 72
End: 2022-12-22
Payer: MEDICARE

## 2022-12-22 VITALS
HEIGHT: 68 IN | BODY MASS INDEX: 21.22 KG/M2 | DIASTOLIC BLOOD PRESSURE: 80 MMHG | HEART RATE: 93 BPM | SYSTOLIC BLOOD PRESSURE: 130 MMHG | WEIGHT: 140 LBS | OXYGEN SATURATION: 99 % | RESPIRATION RATE: 14 BRPM

## 2022-12-22 DIAGNOSIS — J18.9 PNEUMONIA, UNSPECIFIED ORGANISM: ICD-10-CM

## 2022-12-22 DIAGNOSIS — Z80.0 FAMILY HISTORY OF MALIGNANT NEOPLASM OF DIGESTIVE ORGANS: ICD-10-CM

## 2022-12-22 DIAGNOSIS — Z87.01 PERSONAL HISTORY OF PNEUMONIA (RECURRENT): ICD-10-CM

## 2022-12-22 DIAGNOSIS — Z78.9 OTHER SPECIFIED HEALTH STATUS: ICD-10-CM

## 2022-12-22 DIAGNOSIS — R05.9 COUGH, UNSPECIFIED: ICD-10-CM

## 2022-12-22 PROCEDURE — 99203 OFFICE O/P NEW LOW 30 MIN: CPT

## 2022-12-22 NOTE — HISTORY OF PRESENT ILLNESS
[TextBox_4] : 72 YEARS OLD PRESENTED FOR ABOVE, WAS CO PRODUCTIVE COUGH/ CHEST CONGESTION FOR 1 WEEK SAW PMD HAD CXR LLL PNA WAS GIVEN ABX, FEELS MUCH BETTER, PATIENT WAS RECENTLY DIAGNOSED WITH PANCREATIC CA ON ACTIVE CHEMO, HAD CHEST CT 11/8/22, WEIGHT LOSS NO STABLE, NON SMOKER, NO PRIOR LUNG DISEASE

## 2022-12-22 NOTE — DISCUSSION/SUMMARY
[FreeTextEntry1] : COUGH/ CHEST CONGESTION/ CXR REVIEWED / PNA SP ABX FEELS BETTER NON SMOKER\par PANCREATIC CA ON ACTIVE CHEMO\par CHEST CT 11/8/22 REVIEWED\par REPEAT CXR IN 6 WEEKS\par HEMOC REVIEWED

## 2022-12-27 ENCOUNTER — APPOINTMENT (OUTPATIENT)
Dept: HEMATOLOGY ONCOLOGY | Facility: CLINIC | Age: 72
End: 2022-12-27

## 2022-12-28 ENCOUNTER — APPOINTMENT (OUTPATIENT)
Dept: INFUSION THERAPY | Facility: CLINIC | Age: 72
End: 2022-12-28
Payer: MEDICARE

## 2022-12-28 ENCOUNTER — APPOINTMENT (OUTPATIENT)
Dept: HEMATOLOGY ONCOLOGY | Facility: CLINIC | Age: 72
End: 2022-12-28
Payer: MEDICARE

## 2022-12-28 VITALS
HEART RATE: 82 BPM | HEIGHT: 67.5 IN | WEIGHT: 140 LBS | TEMPERATURE: 97.5 F | DIASTOLIC BLOOD PRESSURE: 71 MMHG | OXYGEN SATURATION: 98 % | SYSTOLIC BLOOD PRESSURE: 134 MMHG | BODY MASS INDEX: 21.72 KG/M2

## 2022-12-28 LAB
ALBUMIN SERPL ELPH-MCNC: 4 G/DL
ALP BLD-CCNC: 130 U/L
ALT SERPL-CCNC: 23 U/L
ANION GAP SERPL CALC-SCNC: 9 MMOL/L
AST SERPL-CCNC: 22 U/L
BASOPHILS # BLD AUTO: 0.05 K/UL
BASOPHILS NFR BLD AUTO: 0.2 %
BILIRUB DIRECT SERPL-MCNC: <0.2 MG/DL
BILIRUB INDIRECT SERPL-MCNC: NORMAL MG/DL
BILIRUB SERPL-MCNC: <0.2 MG/DL
BUN SERPL-MCNC: 10 MG/DL
CALCIUM SERPL-MCNC: 8.8 MG/DL
CANCER AG19-9 SERPL-ACNC: 957 U/ML
CHLORIDE SERPL-SCNC: 103 MMOL/L
CO2 SERPL-SCNC: 27 MMOL/L
CREAT SERPL-MCNC: 0.8 MG/DL
EGFR: 95 ML/MIN/1.73M2
EOSINOPHIL # BLD AUTO: 0.11 K/UL
EOSINOPHIL NFR BLD AUTO: 0.5 %
GLUCOSE SERPL-MCNC: 131 MG/DL
HCT VFR BLD CALC: 31.3 %
HGB BLD-MCNC: 10.9 G/DL
IMM GRANULOCYTES NFR BLD AUTO: 1.9 %
LYMPHOCYTES # BLD AUTO: 1.66 K/UL
LYMPHOCYTES NFR BLD AUTO: 8.1 %
MAGNESIUM SERPL-MCNC: 1.9 MG/DL
MAN DIFF?: NORMAL
MCHC RBC-ENTMCNC: 30.4 PG
MCHC RBC-ENTMCNC: 34.8 G/DL
MCV RBC AUTO: 87.2 FL
MONOCYTES # BLD AUTO: 1.08 K/UL
MONOCYTES NFR BLD AUTO: 5.3 %
NEUTROPHILS # BLD AUTO: 17.23 K/UL
NEUTROPHILS NFR BLD AUTO: 84 %
PLATELET # BLD AUTO: 135 K/UL
POTASSIUM SERPL-SCNC: 3.5 MMOL/L
PROT SERPL-MCNC: 6.8 G/DL
RBC # BLD: 3.59 M/UL
RBC # FLD: 17.5 %
SODIUM SERPL-SCNC: 139 MMOL/L
WBC # FLD AUTO: 20.53 K/UL

## 2022-12-28 PROCEDURE — 99214 OFFICE O/P EST MOD 30 MIN: CPT

## 2022-12-28 RX ORDER — DEXAMETHASONE 0.5 MG/5ML
12 ELIXIR ORAL ONCE
Refills: 0 | Status: COMPLETED | OUTPATIENT
Start: 2022-12-28 | End: 2022-12-28

## 2022-12-28 RX ORDER — LEUCOVORIN CALCIUM 5 MG
720 TABLET ORAL ONCE
Refills: 0 | Status: COMPLETED | OUTPATIENT
Start: 2022-12-28 | End: 2022-12-28

## 2022-12-28 RX ORDER — FAMOTIDINE 10 MG/ML
20 INJECTION INTRAVENOUS ONCE
Refills: 0 | Status: COMPLETED | OUTPATIENT
Start: 2022-12-28 | End: 2022-12-28

## 2022-12-28 RX ORDER — FOSAPREPITANT DIMEGLUMINE 150 MG/5ML
150 INJECTION, POWDER, LYOPHILIZED, FOR SOLUTION INTRAVENOUS ONCE
Refills: 0 | Status: COMPLETED | OUTPATIENT
Start: 2022-12-28 | End: 2022-12-28

## 2022-12-28 RX ORDER — DIPHENHYDRAMINE HCL 50 MG
50 CAPSULE ORAL ONCE
Refills: 0 | Status: COMPLETED | OUTPATIENT
Start: 2022-12-28 | End: 2022-12-28

## 2022-12-28 RX ORDER — IRINOTECAN HYDROCHLORIDE 100 MG/5ML
270 INJECTION, SOLUTION INTRAVENOUS ONCE
Refills: 0 | Status: COMPLETED | OUTPATIENT
Start: 2022-12-28 | End: 2022-12-28

## 2022-12-28 RX ORDER — FLUOROURACIL 50 MG/ML
4150 INJECTION, SOLUTION INTRAVENOUS ONCE
Refills: 0 | Status: COMPLETED | OUTPATIENT
Start: 2022-12-28 | End: 2022-12-28

## 2022-12-28 RX ORDER — ATROPINE SULFATE 0.1 MG/ML
0.6 SYRINGE (ML) INJECTION ONCE
Refills: 0 | Status: COMPLETED | OUTPATIENT
Start: 2022-12-28 | End: 2022-12-28

## 2022-12-28 RX ORDER — OXALIPLATIN 5 MG/ML
150 INJECTION, SOLUTION INTRAVENOUS ONCE
Refills: 0 | Status: COMPLETED | OUTPATIENT
Start: 2022-12-28 | End: 2022-12-28

## 2022-12-28 RX ADMIN — IRINOTECAN HYDROCHLORIDE 270 MILLIGRAM(S): 100 INJECTION, SOLUTION INTRAVENOUS at 14:38

## 2022-12-28 RX ADMIN — FOSAPREPITANT DIMEGLUMINE 500 MILLIGRAM(S): 150 INJECTION, POWDER, LYOPHILIZED, FOR SOLUTION INTRAVENOUS at 11:26

## 2022-12-28 RX ADMIN — FAMOTIDINE 104 MILLIGRAM(S): 10 INJECTION INTRAVENOUS at 10:46

## 2022-12-28 RX ADMIN — Medication 122 MILLIGRAM(S): at 10:26

## 2022-12-28 RX ADMIN — Medication 50 MILLIGRAM(S): at 11:26

## 2022-12-28 RX ADMIN — Medication 102 MILLIGRAM(S): at 11:06

## 2022-12-28 RX ADMIN — OXALIPLATIN 150 MILLIGRAM(S): 5 INJECTION, SOLUTION INTRAVENOUS at 11:56

## 2022-12-28 RX ADMIN — FAMOTIDINE 20 MILLIGRAM(S): 10 INJECTION INTRAVENOUS at 11:06

## 2022-12-28 RX ADMIN — OXALIPLATIN 150 MILLIGRAM(S): 5 INJECTION, SOLUTION INTRAVENOUS at 13:56

## 2022-12-28 RX ADMIN — Medication 0.6 MILLIGRAM(S): at 10:26

## 2022-12-28 RX ADMIN — Medication 720 MILLIGRAM(S): at 14:00

## 2022-12-28 RX ADMIN — IRINOTECAN HYDROCHLORIDE 270 MILLIGRAM(S): 100 INJECTION, SOLUTION INTRAVENOUS at 16:18

## 2022-12-28 RX ADMIN — Medication 720 MILLIGRAM(S): at 16:18

## 2022-12-28 RX ADMIN — FLUOROURACIL 4150 MILLIGRAM(S): 50 INJECTION, SOLUTION INTRAVENOUS at 16:08

## 2022-12-28 RX ADMIN — Medication 12 MILLIGRAM(S): at 10:46

## 2022-12-28 RX ADMIN — FOSAPREPITANT DIMEGLUMINE 150 MILLIGRAM(S): 150 INJECTION, POWDER, LYOPHILIZED, FOR SOLUTION INTRAVENOUS at 11:56

## 2022-12-28 NOTE — ASSESSMENT
[Palliative] : Goals of care discussed with patient: Palliative [FreeTextEntry1] : # Metastatic moderate to poorly differentiated adenocarcinoma , dx 07/2022 \par - BRCA (-) \par - s/p laparoscopy on 8.22.2022 with findings of ~1L malignant ascites and peritoneal implants \par - Ca19-9 is 6481, CEA 6.8 from 07/2022 \par - reviewed radiology, pathology and lab workup and had a discussion regarding implications of diagnosis, prognosis and options for management including but not limited to chemotherapy for palliative intent ; had a lengthy discussion regarding Stage IV disease and not curative \par - s/p L Chest port placement with SURG, Dr. Morgan \par - CT CAP from 11.8.2022 is essentially stable, tumor marker is decreasing \par - c/w cycle 9 of FOLFIRINOX on 12/28 , initiated on 9.6.2022 \par - plan to reimage PET/CT after 2-3 months of treatment (plan to order in late ~01/2023) \par \par # H. Pylori infection , dx 07/2022 \par - s/p upper EGD in 07/2022 ; completed antibiotic treatment \par - followup with GI, Dr. Maximilian Larson, as scheduled for management \par \par # Hypokalemia \par - denies vomiting \par - s/p KCl 10mEq PO BiD x 2 days with normalization of level \par \par RTC in 2 weeks with SMART NP with CBC, BMP, LFTs Ca19-9 prior \par RTC in 4 weekks with SMART NP with CBC, BMP, LFTs Ca19-9 prior \par \par seen/examined with AMPARO Iraheta; note reviewed; case discussed

## 2022-12-28 NOTE — PHYSICAL EXAM
[Restricted in physically strenuous activity but ambulatory and able to carry out work of a light or sedentary nature] : Status 1- Restricted in physically strenuous activity but ambulatory and able to carry out work of a light or sedentary nature, e.g., light house work, office work [Normal] : affect appropriate [de-identified] : wearing glasses  [de-identified] : L chest port present

## 2022-12-28 NOTE — HISTORY OF PRESENT ILLNESS
[Disease: _____________________] : Disease: [unfilled] [Therapy: ___] : Therapy: [unfilled] [Cycle: ___] : Cycle: [unfilled] [de-identified] : Mr. JINA SINGER is a 72 year old male here today for evaluation and management of Pancreatic Cancer.  \par \par He was referred by SURG, Dr. Mogran.  JINA is a 72 year old M with PMHx including HTN, HLD, T2DM, who presents to clinic to establish care.  Patient presented to ER back in 07/2022 with complaint of gradually worsening abdominal pain for the prior month, worse after eating and associated with 40lb weight loss in the last 2 months.  He underwent CT imaging which shows evidence suggestive of metastatic disease.  He also endorses constipation.  He underwent laparoscopy with findings of ~1L malignant ascites and peritoneal implants on 8.22.2022.  He is presently feeling well with no new complaints.  Patient denies fever, chills, nausea, vomiting, dysphagia, dyspnea, neuropathy or bleeding.  Patient reports family history of malignancy in his father (possibly stomach cancer) and mother (possibly colon).  Never a smoker.  NKDA.  \par \par RADIOLOGIC WORKUP\par CT Chest (7.25.2022) IMPRESSION:Perigastric infiltrative process inseparable from the tail the pancreas extending to the proximal duodenum. Associated ascites. Findings again  suspicious for pancreatic or gastrointestinal neoplasm. Differential diagnosis includes partially contained ruptured gastric or duodenal ulcer as well as infectious process/pancreatitis.Hyperaerated lungs. Scattered granulomata. Vascular calcifications. Nonspecific areas of subpleural thickening bilaterally.\par CT A/P (7.24.2022) IMPRESSION:Predominantly perigastric infiltrative soft tissue changes which are inseparable from the tail of pancreas and extend around the proximal  duodenum. Small volume abdominopelvic ascites. Differential includes underlying pancreatic or gastrointestinal neoplasm, sequelae of partially contained ruptured gastric or duodenal  ulcer, and infectious process or perhaps pancreatitis. Clinical correlation and/or further evaluation with EGD should be considered.Findings compatible with BPH. Superimposed cystitis is difficult to exclude on imaging.\par \par LAB WORKUP\par (8.17.2022) WBC 8.36, Hgb 14.2, MCV 84, , normal diff, Cr 0.9, eGFR 91, normal LFTs\par (7.25.2022) Ca19-9 is 6481, CEA 6.8, TSH 2.62\par (10.23.2019) WBC 6.83, Hgb 15, MCV 85.7, \par \par PATHOLOGY\par (see results section)\par \par HCM\par Colonoscopy done about 5 years , polyps removed \par Upper Endoscopy done 07/2022 showed 5x5 hypoechoic infiltrative mass (irregular borders) extending between the  muscularis propria (abuting it) and the territory of the pancreatic tail \par COVID Vaccinated? [FreeTextEntry1] : Started mFOLFIRINOX on 9.6.2022 [de-identified] : 9/14/22\[facility] Patient is here for a follow-up visit for pancreatic cancer.  He is s/p initial cycle of mFOLFIRINOX on 9.6.2022.  He is feeling well with no new complaints.  Reviewed most recent CBC, which is stable.  Patient denies fever, chills, nausea, vomiting, dyspnea, neuropathy, persistent diarrhea or bleeding.  He states his appetite is still poor but slightly improving.  He is due for cycle 2 of chemotherapy next week.  \par \par 10/4/22\[facility] Patient is here for a follow-up visit for pancreatic cancer.  He is due for cycle 3 of mFOLFIRINOX today, initiated on 9.6.2022.  He is feeling well with no new complaints.  Reviewed most recent CBC, which shows mild anemia, hgb 11.6g/dL and persistent leukocytosis, likely due to GCSF administration with chemotherapy.  ALK Phos is slightly higher at 198 but Ca19-9 is decreasing down to 44359Y/mL.  Patient denies fever, chills, vomiting, dyspnea, neuropathy, CP, palpitations, abdominal pain, persistent diarrhea or bleeding.  He is endorses transient nausea which was not bothersome and he took anti-emetics.  \par \par 11/2/22\[facility] Patient is here for a follow-up visit for pancreatic cancer.  He is due for cycle 4 of mFOLFIRINOX today, initiated on 9.6.2022.  He is feeling well with no new complaints.  Reviewed most recent CBC, which shows mild anemia, hgb 11.1g/dL and persistent leukocytosis, likely due to GCSF administration with chemotherapy.  ALK Phos is slightly lower at 138 but Ca19-9 is decreasing down to 4619U/mL.  Patient denies fever, chills, vomiting, dyspnea, CP, palpitations, abdominal pain, persistent diarrhea or bleeding.  He endorses occasional neuropathy with long exposure to cold items.  \par \par 11/16/22\[facility] Patient is here for a follow-up visit for pancreatic cancer, accompanied by spouse.  He is due for cycle 6 of mFOLFIRINOX today, initiated on 9.6.2022.  He is feeling well with no new complaints.  Reviewed most recent CBC, which shows mild anemia, hgb 11.1g/dL and persistent leukocytosis, likely due to GCSF administration with chemotherapy.  ALK Phos is essentially stable at 146 but Ca19-9 is decreasing down to 2708U/mL.  Patient denies fever, chills, vomiting, dyspnea, CP, palpitations, abdominal pain, persistent diarrhea, mouth sores or bleeding.  He still notes occasional neuropathy with long exposure to cold items.  Reviewed most recent CT imaging which is essentially stable.  \par CT C/A/P (11.8.2022) IMPRESSION:1. No definite evidence of intrathoracic metastasis.Since 7/24/2022, no significant interval change.Near diffuse peritoneal carcinomatosis is not significantly changed.Diffuse tumor infiltration in the pancreatic tail (4/226), unchanged.Completely attenuated splenic vein is unchanged. Prominent venous collaterals in the left upper quadrant is unchanged.\par \par 11/30/22\par Patient is here for a follow-up visit for pancreatic cancer.  He is due for cycle 7 of mFOLFIRINOX today, initiated on 9.6.2022.  Reviewed most recent CBC, which shows mild anemia, hgb 10.9g/dL and persistent leukocytosis, likely due to GCSF administration with chemotherapy.  ALK Phos is essentially stable at 138 and Ca19-9 is decreasing down to 1683U/mL.  Patient denies fever, chills, vomiting, dyspnea, CP, palpitations, abdominal pain, persistent diarrhea, mouth sores or bleeding.  He still notes occasional neuropathy with long exposure to cold items. \par \par 12/14/22\par Patient is here for a follow-up visit for pancreatic cancer, accompanied by spouse via telephone.  He is due for cycle 8 of mFOLFIRINOX today, initiated on 9.6.2022.  Reviewed most recent CBC, which shows mild anemia, hgb 10.5g/dL and persistent leukocytosis, likely due to GCSF administration with chemotherapy.  ALK Phos is essentially stable at 124 and Ca19-9 is decreasing down to 1177U/mL.  Patient denies fever, chills, dyspnea, CP, palpitations, abdominal pain, persistent diarrhea, mouth sores or bleeding.  He has an occasional cough x 2 weeks; believes he had a mild cold which has been improving over the last 2 weeks but the cough is persistent.  He still notes occasional neuropathy with long exposure to cold items. \par \par 12/28/22\par Patient is here for a follow-up visit for pancreatic cancer, accompanied by spouse via telephone.  Since last visit, patient had CXR which was suggestive for pneumonia.  He has completed antibiotics with improvement in symptoms.  Reviewed PULM consult with Dr. Escobar: plans to repeat CXR in about 6 weeks.  Patient is agreeable to resume chemotherapy.  He is due for cycle 8 of mFOLFIRINOX today, initiated on 9.6.2022.  Reviewed most recent CBC which shows mild anemia and neutrophil-predominant leukocytosis.  Tumor marker is down to 957 U/mL from 12/2022.  \par CXR (12.15.2022) Impression:Subsegmental left lower lobe consolidation.

## 2022-12-28 NOTE — REVIEW OF SYSTEMS
[Cough] : cough [Negative] : Allergic/Immunologic [Fever] : no fever [Night Sweats] : no night sweats [Dysphagia] : no dysphagia [Chest Pain] : no chest pain [Shortness Of Breath] : no shortness of breath [Abdominal Pain] : no abdominal pain [Vomiting] : no vomiting [Constipation] : no constipation [Easy Bleeding] : no tendency for easy bleeding [FreeTextEntry2] : appetite slightly improved but still poor  [FreeTextEntry6] : cough has been improving  [FreeTextEntry7] : reports occasional nausea [de-identified] : L chest port present  [de-identified] : endorses occasional neuropathy with long exposure to cold items

## 2022-12-30 ENCOUNTER — APPOINTMENT (OUTPATIENT)
Dept: INFUSION THERAPY | Facility: CLINIC | Age: 72
End: 2022-12-30

## 2022-12-30 RX ORDER — PEGFILGRASTIM-CBQV 6 MG/.6ML
6 INJECTION, SOLUTION SUBCUTANEOUS ONCE
Refills: 0 | Status: COMPLETED | OUTPATIENT
Start: 2022-12-30 | End: 2022-12-30

## 2022-12-30 RX ADMIN — PEGFILGRASTIM-CBQV 6 MILLIGRAM(S): 6 INJECTION, SOLUTION SUBCUTANEOUS at 15:07

## 2023-01-10 ENCOUNTER — LABORATORY RESULT (OUTPATIENT)
Age: 73
End: 2023-01-10

## 2023-01-10 ENCOUNTER — APPOINTMENT (OUTPATIENT)
Dept: HEMATOLOGY ONCOLOGY | Facility: CLINIC | Age: 73
End: 2023-01-10

## 2023-01-11 ENCOUNTER — APPOINTMENT (OUTPATIENT)
Dept: INFUSION THERAPY | Facility: CLINIC | Age: 73
End: 2023-01-11
Payer: MEDICARE

## 2023-01-11 ENCOUNTER — APPOINTMENT (OUTPATIENT)
Dept: HEMATOLOGY ONCOLOGY | Facility: CLINIC | Age: 73
End: 2023-01-11
Payer: MEDICARE

## 2023-01-11 VITALS
HEART RATE: 55 BPM | BODY MASS INDEX: 22.13 KG/M2 | WEIGHT: 141 LBS | SYSTOLIC BLOOD PRESSURE: 144 MMHG | DIASTOLIC BLOOD PRESSURE: 76 MMHG | HEIGHT: 67 IN | OXYGEN SATURATION: 96 % | TEMPERATURE: 97.8 F

## 2023-01-11 PROCEDURE — 99214 OFFICE O/P EST MOD 30 MIN: CPT

## 2023-01-11 RX ORDER — FAMOTIDINE 10 MG/ML
20 INJECTION INTRAVENOUS ONCE
Refills: 0 | Status: COMPLETED | OUTPATIENT
Start: 2023-01-11 | End: 2023-01-11

## 2023-01-11 RX ORDER — DEXAMETHASONE 0.5 MG/5ML
12 ELIXIR ORAL ONCE
Refills: 0 | Status: COMPLETED | OUTPATIENT
Start: 2023-01-11 | End: 2023-01-11

## 2023-01-11 RX ORDER — FOSAPREPITANT DIMEGLUMINE 150 MG/5ML
150 INJECTION, POWDER, LYOPHILIZED, FOR SOLUTION INTRAVENOUS ONCE
Refills: 0 | Status: COMPLETED | OUTPATIENT
Start: 2023-01-11 | End: 2023-01-11

## 2023-01-11 RX ORDER — IRINOTECAN HYDROCHLORIDE 100 MG/5ML
270 INJECTION, SOLUTION INTRAVENOUS ONCE
Refills: 0 | Status: COMPLETED | OUTPATIENT
Start: 2023-01-11 | End: 2023-01-11

## 2023-01-11 RX ORDER — FLUOROURACIL 50 MG/ML
4150 INJECTION, SOLUTION INTRAVENOUS ONCE
Refills: 0 | Status: COMPLETED | OUTPATIENT
Start: 2023-01-11 | End: 2023-01-11

## 2023-01-11 RX ORDER — OXALIPLATIN 5 MG/ML
150 INJECTION, SOLUTION INTRAVENOUS ONCE
Refills: 0 | Status: COMPLETED | OUTPATIENT
Start: 2023-01-11 | End: 2023-01-11

## 2023-01-11 RX ORDER — LEUCOVORIN CALCIUM 5 MG
720 TABLET ORAL ONCE
Refills: 0 | Status: COMPLETED | OUTPATIENT
Start: 2023-01-11 | End: 2023-01-11

## 2023-01-11 RX ORDER — DIPHENHYDRAMINE HCL 50 MG
25 CAPSULE ORAL ONCE
Refills: 0 | Status: COMPLETED | OUTPATIENT
Start: 2023-01-11 | End: 2023-03-22

## 2023-01-11 RX ORDER — ATROPINE SULFATE 0.1 MG/ML
0.6 SYRINGE (ML) INJECTION ONCE
Refills: 0 | Status: COMPLETED | OUTPATIENT
Start: 2023-01-11 | End: 2023-01-11

## 2023-01-11 RX ADMIN — Medication 0.6 MILLIGRAM(S): at 11:06

## 2023-01-11 RX ADMIN — Medication 720 MILLIGRAM(S): at 12:51

## 2023-01-11 RX ADMIN — IRINOTECAN HYDROCHLORIDE 270 MILLIGRAM(S): 100 INJECTION, SOLUTION INTRAVENOUS at 12:51

## 2023-01-11 RX ADMIN — Medication 122 MILLIGRAM(S): at 11:04

## 2023-01-11 RX ADMIN — FAMOTIDINE 104 MILLIGRAM(S): 10 INJECTION INTRAVENOUS at 11:04

## 2023-01-11 RX ADMIN — FLUOROURACIL 4150 MILLIGRAM(S): 50 INJECTION, SOLUTION INTRAVENOUS at 17:05

## 2023-01-11 RX ADMIN — OXALIPLATIN 150 MILLIGRAM(S): 5 INJECTION, SOLUTION INTRAVENOUS at 12:50

## 2023-01-11 RX ADMIN — FOSAPREPITANT DIMEGLUMINE 500 MILLIGRAM(S): 150 INJECTION, POWDER, LYOPHILIZED, FOR SOLUTION INTRAVENOUS at 11:04

## 2023-01-11 NOTE — PHYSICAL EXAM
[Restricted in physically strenuous activity but ambulatory and able to carry out work of a light or sedentary nature] : Status 1- Restricted in physically strenuous activity but ambulatory and able to carry out work of a light or sedentary nature, e.g., light house work, office work [Normal] : affect appropriate [de-identified] : wearing glasses  [de-identified] : L chest port present

## 2023-01-11 NOTE — HISTORY OF PRESENT ILLNESS
[Disease: _____________________] : Disease: [unfilled] [Therapy: ___] : Therapy: [unfilled] [Cycle: ___] : Cycle: [unfilled] [de-identified] : Mr. JINA SINGER is a 72 year old male here today for evaluation and management of Pancreatic Cancer.  \par \par He was referred by SURG, Dr. Morgan.  JINA is a 72 year old M with PMHx including HTN, HLD, T2DM, who presents to clinic to establish care.  Patient presented to ER back in 07/2022 with complaint of gradually worsening abdominal pain for the prior month, worse after eating and associated with 40lb weight loss in the last 2 months.  He underwent CT imaging which shows evidence suggestive of metastatic disease.  He also endorses constipation.  He underwent laparoscopy with findings of ~1L malignant ascites and peritoneal implants on 8.22.2022.  He is presently feeling well with no new complaints.  Patient denies fever, chills, nausea, vomiting, dysphagia, dyspnea, neuropathy or bleeding.  Patient reports family history of malignancy in his father (possibly stomach cancer) and mother (possibly colon).  Never a smoker.  NKDA.  \par \par RADIOLOGIC WORKUP\par CT Chest (7.25.2022) IMPRESSION:Perigastric infiltrative process inseparable from the tail the pancreas extending to the proximal duodenum. Associated ascites. Findings again  suspicious for pancreatic or gastrointestinal neoplasm. Differential diagnosis includes partially contained ruptured gastric or duodenal ulcer as well as infectious process/pancreatitis.Hyperaerated lungs. Scattered granulomata. Vascular calcifications. Nonspecific areas of subpleural thickening bilaterally.\par CT A/P (7.24.2022) IMPRESSION:Predominantly perigastric infiltrative soft tissue changes which are inseparable from the tail of pancreas and extend around the proximal  duodenum. Small volume abdominopelvic ascites. Differential includes underlying pancreatic or gastrointestinal neoplasm, sequelae of partially contained ruptured gastric or duodenal  ulcer, and infectious process or perhaps pancreatitis. Clinical correlation and/or further evaluation with EGD should be considered.Findings compatible with BPH. Superimposed cystitis is difficult to exclude on imaging.\par \par LAB WORKUP\par (8.17.2022) WBC 8.36, Hgb 14.2, MCV 84, , normal diff, Cr 0.9, eGFR 91, normal LFTs\par (7.25.2022) Ca19-9 is 6481, CEA 6.8, TSH 2.62\par (10.23.2019) WBC 6.83, Hgb 15, MCV 85.7, \par \par PATHOLOGY\par (see results section)\par \par HCM\par Colonoscopy done about 5 years , polyps removed \par Upper Endoscopy done 07/2022 showed 5x5 hypoechoic infiltrative mass (irregular borders) extending between the  muscularis propria (abuting it) and the territory of the pancreatic tail \par COVID Vaccinated? [FreeTextEntry1] : Started mFOLFIRINOX on 9.6.2022 [de-identified] : 9/14/22\[facility] Patient is here for a follow-up visit for pancreatic cancer.  He is s/p initial cycle of mFOLFIRINOX on 9.6.2022.  He is feeling well with no new complaints.  Reviewed most recent CBC, which is stable.  Patient denies fever, chills, nausea, vomiting, dyspnea, neuropathy, persistent diarrhea or bleeding.  He states his appetite is still poor but slightly improving.  He is due for cycle 2 of chemotherapy next week.  \par \par 10/4/22\[facility] Patient is here for a follow-up visit for pancreatic cancer.  He is due for cycle 3 of mFOLFIRINOX today, initiated on 9.6.2022.  He is feeling well with no new complaints.  Reviewed most recent CBC, which shows mild anemia, hgb 11.6g/dL and persistent leukocytosis, likely due to GCSF administration with chemotherapy.  ALK Phos is slightly higher at 198 but Ca19-9 is decreasing down to 39392C/mL.  Patient denies fever, chills, vomiting, dyspnea, neuropathy, CP, palpitations, abdominal pain, persistent diarrhea or bleeding.  He is endorses transient nausea which was not bothersome and he took anti-emetics.  \par \par 11/2/22\[facility] Patient is here for a follow-up visit for pancreatic cancer.  He is due for cycle 4 of mFOLFIRINOX today, initiated on 9.6.2022.  He is feeling well with no new complaints.  Reviewed most recent CBC, which shows mild anemia, hgb 11.1g/dL and persistent leukocytosis, likely due to GCSF administration with chemotherapy.  ALK Phos is slightly lower at 138 but Ca19-9 is decreasing down to 4619U/mL.  Patient denies fever, chills, vomiting, dyspnea, CP, palpitations, abdominal pain, persistent diarrhea or bleeding.  He endorses occasional neuropathy with long exposure to cold items.  \par \par 11/16/22\[facility] Patient is here for a follow-up visit for pancreatic cancer, accompanied by spouse.  He is due for cycle 6 of mFOLFIRINOX today, initiated on 9.6.2022.  He is feeling well with no new complaints.  Reviewed most recent CBC, which shows mild anemia, hgb 11.1g/dL and persistent leukocytosis, likely due to GCSF administration with chemotherapy.  ALK Phos is essentially stable at 146 but Ca19-9 is decreasing down to 2708U/mL.  Patient denies fever, chills, vomiting, dyspnea, CP, palpitations, abdominal pain, persistent diarrhea, mouth sores or bleeding.  He still notes occasional neuropathy with long exposure to cold items.  Reviewed most recent CT imaging which is essentially stable.  \par CT C/A/P (11.8.2022) IMPRESSION:1. No definite evidence of intrathoracic metastasis.Since 7/24/2022, no significant interval change.Near diffuse peritoneal carcinomatosis is not significantly changed.Diffuse tumor infiltration in the pancreatic tail (4/226), unchanged.Completely attenuated splenic vein is unchanged. Prominent venous collaterals in the left upper quadrant is unchanged.\par \par 11/30/22\par Patient is here for a follow-up visit for pancreatic cancer.  He is due for cycle 7 of mFOLFIRINOX today, initiated on 9.6.2022.  Reviewed most recent CBC, which shows mild anemia, hgb 10.9g/dL and persistent leukocytosis, likely due to GCSF administration with chemotherapy.  ALK Phos is essentially stable at 138 and Ca19-9 is decreasing down to 1683U/mL.  Patient denies fever, chills, vomiting, dyspnea, CP, palpitations, abdominal pain, persistent diarrhea, mouth sores or bleeding.  He still notes occasional neuropathy with long exposure to cold items. \par \par 12/14/22\par Patient is here for a follow-up visit for pancreatic cancer, accompanied by spouse via telephone.  He is due for cycle 8 of mFOLFIRINOX today, initiated on 9.6.2022.  Reviewed most recent CBC, which shows mild anemia, hgb 10.5g/dL and persistent leukocytosis, likely due to GCSF administration with chemotherapy.  ALK Phos is essentially stable at 124 and Ca19-9 is decreasing down to 1177U/mL.  Patient denies fever, chills, dyspnea, CP, palpitations, abdominal pain, persistent diarrhea, mouth sores or bleeding.  He has an occasional cough x 2 weeks; believes he had a mild cold which has been improving over the last 2 weeks but the cough is persistent.  He still notes occasional neuropathy with long exposure to cold items. \par \par 12/28/22\par Patient is here for a follow-up visit for pancreatic cancer, accompanied by spouse via telephone.  Since last visit, patient had CXR which was suggestive for pneumonia.  He has completed antibiotics with improvement in symptoms.  Reviewed PULM consult with Dr. Escobar: plans to repeat CXR in about 6 weeks.  Patient is agreeable to resume chemotherapy.  He is due for cycle 9 of mFOLFIRINOX today, initiated on 9.6.2022.  Reviewed most recent CBC which shows mild anemia and neutrophil-predominant leukocytosis.  Tumor marker is down to 957 U/mL from 12/2022.  \par CXR (12.15.2022) Impression:Subsegmental left lower lobe consolidation.\par \par 1/11/23\par Patient is here for a follow-up visit for pancreatic cancer, accompanied by spouse via telephone.  As per PULM consult with Dr. Escobar: plan to repeat CXR about 6 weeks following prior imaging.  He is due for cycle 10 of mFOLFIRINOX today, initiated on 9.6.2022.  Reviewed most recent CBC which shows mild anemia with hgb 10.6g/dL and neutrophil-predominant leukocytosis likely from GCSF administration.  Tumor marker is down to 693 U/mL from 1/2023.  He denies fevers, chills, worsening of neuropathy, dyspnea, mouth sores or bleeding.  Patient states he dislikes the effect of Benadryl pre-medication because it makes him drowsy and feel "off."  He is requesting to decrease Benadryl from 50mg to 25mg IV as pre-medicaiton.  Risks vs benefits discussed at length.

## 2023-01-11 NOTE — ASSESSMENT
[Palliative] : Goals of care discussed with patient: Palliative [FreeTextEntry1] : # Metastatic moderate to poorly differentiated adenocarcinoma , dx 07/2022 \par - BRCA (-) \par - s/p laparoscopy on 8.22.2022 with findings of ~1L malignant ascites and peritoneal implants \par - Ca19-9 is 6481, CEA 6.8 from 07/2022 \par - reviewed radiology, pathology and lab workup and had a discussion regarding implications of diagnosis, prognosis and options for management including but not limited to chemotherapy for palliative intent ; had a lengthy discussion regarding Stage IV disease and not curative \par - s/p L Chest port placement with SURG, Dr. Morgan \par - CT CAP from 11.8.2022 is essentially stable, tumor marker is decreasing \par - c/w cycle 10 of FOLFIRINOX on 1/11 , initiated on 9.6.2022 ; will decrease Benadryl pre-medication down to 25mg IV due to tolerability. Risks v benefits discussed.  \par - plan to reimage PET/CT after 2-3 months of treatment (plan to order in late ~01/2023) \par \par # H. Pylori infection , dx 07/2022 \par - s/p upper EGD in 07/2022 ; completed antibiotic treatment \par - followup with GI, Dr. Maximilian Larson, as scheduled for management \par \par # Hypokalemia \par - denies vomiting \par - s/p KCl 10mEq PO BiD x 2 days with normalization of level \par \par RTC in 2 weeks with SMART NP with CBC, BMP, LFTs Ca19-9 prior

## 2023-01-11 NOTE — REVIEW OF SYSTEMS
[Cough] : cough [Negative] : Allergic/Immunologic [Fever] : no fever [Night Sweats] : no night sweats [Dysphagia] : no dysphagia [Chest Pain] : no chest pain [Shortness Of Breath] : no shortness of breath [Abdominal Pain] : no abdominal pain [Vomiting] : no vomiting [Constipation] : no constipation [Easy Bleeding] : no tendency for easy bleeding [FreeTextEntry2] : appetite slightly improved but still poor  [FreeTextEntry6] : cough has been improving  [FreeTextEntry7] : reports occasional nausea [de-identified] : L chest port present  [de-identified] : endorses occasional neuropathy with long exposure to cold items

## 2023-01-13 ENCOUNTER — APPOINTMENT (OUTPATIENT)
Dept: INFUSION THERAPY | Facility: CLINIC | Age: 73
End: 2023-01-13

## 2023-01-13 RX ORDER — PEGFILGRASTIM-CBQV 6 MG/.6ML
6 INJECTION, SOLUTION SUBCUTANEOUS ONCE
Refills: 0 | Status: COMPLETED | OUTPATIENT
Start: 2023-01-13 | End: 2023-01-13

## 2023-01-13 RX ADMIN — PEGFILGRASTIM-CBQV 6 MILLIGRAM(S): 6 INJECTION, SOLUTION SUBCUTANEOUS at 15:02

## 2023-01-17 LAB
ALBUMIN SERPL ELPH-MCNC: 3.8 G/DL
ALP BLD-CCNC: 127 U/L
ALT SERPL-CCNC: 19 U/L
ANION GAP SERPL CALC-SCNC: 7 MMOL/L
AST SERPL-CCNC: 21 U/L
BILIRUB DIRECT SERPL-MCNC: <0.2 MG/DL
BILIRUB INDIRECT SERPL-MCNC: NORMAL MG/DL
BILIRUB SERPL-MCNC: <0.2 MG/DL
BUN SERPL-MCNC: 6 MG/DL
CALCIUM SERPL-MCNC: 8.9 MG/DL
CANCER AG19-9 SERPL-ACNC: 693 U/ML
CHLORIDE SERPL-SCNC: 104 MMOL/L
CO2 SERPL-SCNC: 30 MMOL/L
CREAT SERPL-MCNC: 0.8 MG/DL
EGFR: 95 ML/MIN/1.73M2
GLUCOSE SERPL-MCNC: 112 MG/DL
HCT VFR BLD CALC: 32.1 %
HGB BLD-MCNC: 10.6 G/DL
MAGNESIUM SERPL-MCNC: 1.9 MG/DL
MCHC RBC-ENTMCNC: 29.3 PG
MCHC RBC-ENTMCNC: 33 G/DL
MCV RBC AUTO: 88.7 FL
PLATELET # BLD AUTO: 125 K/UL
PMV BLD: 10.2 FL
POTASSIUM SERPL-SCNC: 3.8 MMOL/L
PROT SERPL-MCNC: 6.7 G/DL
RBC # BLD: 3.62 M/UL
RBC # FLD: 17.2 %
SODIUM SERPL-SCNC: 141 MMOL/L
WBC # FLD AUTO: 18.79 K/UL

## 2023-01-24 ENCOUNTER — APPOINTMENT (OUTPATIENT)
Dept: HEMATOLOGY ONCOLOGY | Facility: CLINIC | Age: 73
End: 2023-01-24
Payer: MEDICARE

## 2023-01-24 ENCOUNTER — LABORATORY RESULT (OUTPATIENT)
Age: 73
End: 2023-01-24

## 2023-01-24 VITALS
SYSTOLIC BLOOD PRESSURE: 155 MMHG | WEIGHT: 142 LBS | HEART RATE: 76 BPM | DIASTOLIC BLOOD PRESSURE: 83 MMHG | BODY MASS INDEX: 22.82 KG/M2 | HEIGHT: 66 IN | OXYGEN SATURATION: 99 % | RESPIRATION RATE: 16 BRPM

## 2023-01-24 LAB
HCT VFR BLD CALC: 32.7 %
HGB BLD-MCNC: 11.3 G/DL
MCHC RBC-ENTMCNC: 30.1 PG
MCHC RBC-ENTMCNC: 34.6 G/DL
MCV RBC AUTO: 87 FL
PLATELET # BLD AUTO: 106 K/UL
PMV BLD: 9.6 FL
RBC # BLD: 3.76 M/UL
RBC # FLD: 17 %
WBC # FLD AUTO: 15.96 K/UL

## 2023-01-24 PROCEDURE — 99214 OFFICE O/P EST MOD 30 MIN: CPT

## 2023-01-24 NOTE — PHYSICAL EXAM
[Restricted in physically strenuous activity but ambulatory and able to carry out work of a light or sedentary nature] : Status 1- Restricted in physically strenuous activity but ambulatory and able to carry out work of a light or sedentary nature, e.g., light house work, office work [Normal] : affect appropriate [de-identified] : wearing glasses  [de-identified] : L chest port present

## 2023-01-24 NOTE — HISTORY OF PRESENT ILLNESS
[Disease: _____________________] : Disease: [unfilled] [Therapy: ___] : Therapy: [unfilled] [Cycle: ___] : Cycle: [unfilled] [de-identified] : Mr. JINA SINGER is a 72 year old male here today for evaluation and management of Pancreatic Cancer.  \par \par He was referred by SURG, Dr. Morgan.  JINA is a 72 year old M with PMHx including HTN, HLD, T2DM, who presents to clinic to establish care.  Patient presented to ER back in 07/2022 with complaint of gradually worsening abdominal pain for the prior month, worse after eating and associated with 40lb weight loss in the last 2 months.  He underwent CT imaging which shows evidence suggestive of metastatic disease.  He also endorses constipation.  He underwent laparoscopy with findings of ~1L malignant ascites and peritoneal implants on 8.22.2022.  He is presently feeling well with no new complaints.  Patient denies fever, chills, nausea, vomiting, dysphagia, dyspnea, neuropathy or bleeding.  Patient reports family history of malignancy in his father (possibly stomach cancer) and mother (possibly colon).  Never a smoker.  NKDA.  \par \par RADIOLOGIC WORKUP\par CT Chest (7.25.2022) IMPRESSION:Perigastric infiltrative process inseparable from the tail the pancreas extending to the proximal duodenum. Associated ascites. Findings again  suspicious for pancreatic or gastrointestinal neoplasm. Differential diagnosis includes partially contained ruptured gastric or duodenal ulcer as well as infectious process/pancreatitis.Hyperaerated lungs. Scattered granulomata. Vascular calcifications. Nonspecific areas of subpleural thickening bilaterally.\par CT A/P (7.24.2022) IMPRESSION:Predominantly perigastric infiltrative soft tissue changes which are inseparable from the tail of pancreas and extend around the proximal  duodenum. Small volume abdominopelvic ascites. Differential includes underlying pancreatic or gastrointestinal neoplasm, sequelae of partially contained ruptured gastric or duodenal  ulcer, and infectious process or perhaps pancreatitis. Clinical correlation and/or further evaluation with EGD should be considered.Findings compatible with BPH. Superimposed cystitis is difficult to exclude on imaging.\par \par LAB WORKUP\par (8.17.2022) WBC 8.36, Hgb 14.2, MCV 84, , normal diff, Cr 0.9, eGFR 91, normal LFTs\par (7.25.2022) Ca19-9 is 6481, CEA 6.8, TSH 2.62\par (10.23.2019) WBC 6.83, Hgb 15, MCV 85.7, \par \par PATHOLOGY\par (see results section)\par \par HCM\par Colonoscopy done about 5 years , polyps removed \par Upper Endoscopy done 07/2022 showed 5x5 hypoechoic infiltrative mass (irregular borders) extending between the  muscularis propria (abuting it) and the territory of the pancreatic tail \par COVID Vaccinated? [FreeTextEntry1] : Started mFOLFIRINOX on 9.6.2022 [de-identified] : 9/14/22\[facility] Patient is here for a follow-up visit for pancreatic cancer.  He is s/p initial cycle of mFOLFIRINOX on 9.6.2022.  He is feeling well with no new complaints.  Reviewed most recent CBC, which is stable.  Patient denies fever, chills, nausea, vomiting, dyspnea, neuropathy, persistent diarrhea or bleeding.  He states his appetite is still poor but slightly improving.  He is due for cycle 2 of chemotherapy next week.  \par \par 10/4/22\[facility] Patient is here for a follow-up visit for pancreatic cancer.  He is due for cycle 3 of mFOLFIRINOX today, initiated on 9.6.2022.  He is feeling well with no new complaints.  Reviewed most recent CBC, which shows mild anemia, hgb 11.6g/dL and persistent leukocytosis, likely due to GCSF administration with chemotherapy.  ALK Phos is slightly higher at 198 but Ca19-9 is decreasing down to 86497X/mL.  Patient denies fever, chills, vomiting, dyspnea, neuropathy, CP, palpitations, abdominal pain, persistent diarrhea or bleeding.  He is endorses transient nausea which was not bothersome and he took anti-emetics.  \par \par 11/2/22\[facility] Patient is here for a follow-up visit for pancreatic cancer.  He is due for cycle 4 of mFOLFIRINOX today, initiated on 9.6.2022.  He is feeling well with no new complaints.  Reviewed most recent CBC, which shows mild anemia, hgb 11.1g/dL and persistent leukocytosis, likely due to GCSF administration with chemotherapy.  ALK Phos is slightly lower at 138 but Ca19-9 is decreasing down to 4619U/mL.  Patient denies fever, chills, vomiting, dyspnea, CP, palpitations, abdominal pain, persistent diarrhea or bleeding.  He endorses occasional neuropathy with long exposure to cold items.  \par \par 11/16/22\[facility] Patient is here for a follow-up visit for pancreatic cancer, accompanied by spouse.  He is due for cycle 6 of mFOLFIRINOX today, initiated on 9.6.2022.  He is feeling well with no new complaints.  Reviewed most recent CBC, which shows mild anemia, hgb 11.1g/dL and persistent leukocytosis, likely due to GCSF administration with chemotherapy.  ALK Phos is essentially stable at 146 but Ca19-9 is decreasing down to 2708U/mL.  Patient denies fever, chills, vomiting, dyspnea, CP, palpitations, abdominal pain, persistent diarrhea, mouth sores or bleeding.  He still notes occasional neuropathy with long exposure to cold items.  Reviewed most recent CT imaging which is essentially stable.  \par CT C/A/P (11.8.2022) IMPRESSION:1. No definite evidence of intrathoracic metastasis.Since 7/24/2022, no significant interval change.Near diffuse peritoneal carcinomatosis is not significantly changed.Diffuse tumor infiltration in the pancreatic tail (4/226), unchanged.Completely attenuated splenic vein is unchanged. Prominent venous collaterals in the left upper quadrant is unchanged.\par \par 11/30/22\par Patient is here for a follow-up visit for pancreatic cancer.  He is due for cycle 7 of mFOLFIRINOX today, initiated on 9.6.2022.  Reviewed most recent CBC, which shows mild anemia, hgb 10.9g/dL and persistent leukocytosis, likely due to GCSF administration with chemotherapy.  ALK Phos is essentially stable at 138 and Ca19-9 is decreasing down to 1683U/mL.  Patient denies fever, chills, vomiting, dyspnea, CP, palpitations, abdominal pain, persistent diarrhea, mouth sores or bleeding.  He still notes occasional neuropathy with long exposure to cold items. \par \par 12/14/22\par Patient is here for a follow-up visit for pancreatic cancer, accompanied by spouse via telephone.  He is due for cycle 8 of mFOLFIRINOX today, initiated on 9.6.2022.  Reviewed most recent CBC, which shows mild anemia, hgb 10.5g/dL and persistent leukocytosis, likely due to GCSF administration with chemotherapy.  ALK Phos is essentially stable at 124 and Ca19-9 is decreasing down to 1177U/mL.  Patient denies fever, chills, dyspnea, CP, palpitations, abdominal pain, persistent diarrhea, mouth sores or bleeding.  He has an occasional cough x 2 weeks; believes he had a mild cold which has been improving over the last 2 weeks but the cough is persistent.  He still notes occasional neuropathy with long exposure to cold items. \par \par 12/28/22\par Patient is here for a follow-up visit for pancreatic cancer, accompanied by spouse via telephone.  Since last visit, patient had CXR which was suggestive for pneumonia.  He has completed antibiotics with improvement in symptoms.  Reviewed PULM consult with Dr. Escobar: plans to repeat CXR in about 6 weeks.  Patient is agreeable to resume chemotherapy.  He is due for cycle 9 of mFOLFIRINOX today, initiated on 9.6.2022.  Reviewed most recent CBC which shows mild anemia and neutrophil-predominant leukocytosis.  Tumor marker is down to 957 U/mL from 12/2022.  \par CXR (12.15.2022) Impression:Subsegmental left lower lobe consolidation.\par \par 1/11/23\par Patient is here for a follow-up visit for pancreatic cancer, accompanied by spouse via telephone.  As per PULM consult with Dr. Escobar: plan to repeat CXR about 6 weeks following prior imaging.  He is due for cycle 10 of mFOLFIRINOX today, initiated on 9.6.2022.  Reviewed most recent CBC which shows mild anemia with hgb 10.6g/dL and neutrophil-predominant leukocytosis likely from GCSF administration.  Tumor marker is down to 693 U/mL from 1/2023.  He denies fevers, chills, worsening of neuropathy, dyspnea, mouth sores or bleeding.  Patient states he dislikes the effect of Benadryl pre-medication because it makes him drowsy and feel "off."  He is requesting to decrease Benadryl from 50mg to 25mg IV as pre-medicaiton.  Risks vs benefits discussed at length.  \par \par 1/24/23\par Patient is here for a follow-up visit for pancreatic cancer.  He is due for cycle 11 of mFOLFIRINOX today, initiated on 9.6.2022.  Reviewed most recent CBC which shows mild anemia with hgb 11.3g/dL, mild thrombocytopenia with PLTs 106,000 and neutrophil-predominant leukocytosis likely from GCSF administration.  Tumor marker is down to 693 U/mL from 1/2023.  He denies fevers, chills, worsening of neuropathy, dyspnea, new cough, mouth sores or bleeding.

## 2023-01-24 NOTE — CONSULT LETTER
[Dear  ___] : Dear  [unfilled], [Consult Letter:] : I had the pleasure of evaluating your patient, [unfilled]. [Please see my note below.] : Please see my note below. [Consult Closing:] : Thank you very much for allowing me to participate in the care of this patient.  If you have any questions, please do not hesitate to contact me. [Sincerely,] : Sincerely, [FreeTextEntry3] : Faustino Che\inna Holley NP

## 2023-01-24 NOTE — REVIEW OF SYSTEMS
[Cough] : cough [Negative] : Allergic/Immunologic [Fever] : no fever [Night Sweats] : no night sweats [Dysphagia] : no dysphagia [Chest Pain] : no chest pain [Shortness Of Breath] : no shortness of breath [Abdominal Pain] : no abdominal pain [Vomiting] : no vomiting [Constipation] : no constipation [Easy Bleeding] : no tendency for easy bleeding [FreeTextEntry2] : appetite slightly improved but still poor  [FreeTextEntry6] : cough has been improving  [FreeTextEntry7] : reports occasional nausea [de-identified] : L chest port present  [de-identified] : endorses occasional neuropathy with long exposure to cold items

## 2023-01-24 NOTE — ASSESSMENT
[Palliative] : Goals of care discussed with patient: Palliative [FreeTextEntry1] : # Metastatic moderate to poorly differentiated adenocarcinoma , dx 07/2022 \par - BRCA (-) \par - s/p laparoscopy on 8.22.2022 with findings of ~1L malignant ascites and peritoneal implants \par - Ca19-9 is 6481, CEA 6.8 from 07/2022 \par - reviewed radiology, pathology and lab workup and had a discussion regarding implications of diagnosis, prognosis and options for management including but not limited to chemotherapy for palliative intent ; had a lengthy discussion regarding Stage IV disease and not curative \par - s/p L Chest port placement with SURG, Dr. Morgan \par - CT CAP from 11.8.2022 is essentially stable, tumor marker is decreasing \par - c/w cycle 11 of FOLFIRINOX on 1/25 , initiated on 9.6.2022 \par - PET/CT ordered to be done after 2-3 months of treatment , Rx given \par \par # H. Pylori infection , dx 07/2022 \par - s/p upper EGD in 07/2022 ; completed antibiotic treatment \par - followup with GI, Dr. Maximilian Larson, as scheduled for management \par \par # Hypokalemia \par - denies vomiting \par - s/p KCl 10mEq PO BiD x 2 days with normalization of level \par \par RTC in 2 weeks with CBC, BMP, LFTs Ca19-9 prior \par seen/examined w/ NP Myrtle; note reviewed; case discussed\par continue chemo until progression as long as he tolerates\par PET is ordered to better evaluate if there is peritoneal involvement

## 2023-01-25 ENCOUNTER — APPOINTMENT (OUTPATIENT)
Dept: INFUSION THERAPY | Facility: CLINIC | Age: 73
End: 2023-01-25

## 2023-01-25 RX ORDER — ATROPINE SULFATE 0.1 MG/ML
0.6 SYRINGE (ML) INJECTION ONCE
Refills: 0 | Status: COMPLETED | OUTPATIENT
Start: 2023-01-25 | End: 2023-01-25

## 2023-01-25 RX ORDER — IRINOTECAN HYDROCHLORIDE 100 MG/5ML
270 INJECTION, SOLUTION INTRAVENOUS ONCE
Refills: 0 | Status: COMPLETED | OUTPATIENT
Start: 2023-01-25 | End: 2023-01-25

## 2023-01-25 RX ORDER — LEUCOVORIN CALCIUM 5 MG
720 TABLET ORAL ONCE
Refills: 0 | Status: COMPLETED | OUTPATIENT
Start: 2023-01-25 | End: 2023-01-25

## 2023-01-25 RX ORDER — FOSAPREPITANT DIMEGLUMINE 150 MG/5ML
150 INJECTION, POWDER, LYOPHILIZED, FOR SOLUTION INTRAVENOUS ONCE
Refills: 0 | Status: COMPLETED | OUTPATIENT
Start: 2023-01-25 | End: 2023-01-25

## 2023-01-25 RX ORDER — DEXAMETHASONE 0.5 MG/5ML
12 ELIXIR ORAL ONCE
Refills: 0 | Status: COMPLETED | OUTPATIENT
Start: 2023-01-25 | End: 2023-01-25

## 2023-01-25 RX ORDER — FLUOROURACIL 50 MG/ML
4150 INJECTION, SOLUTION INTRAVENOUS ONCE
Refills: 0 | Status: COMPLETED | OUTPATIENT
Start: 2023-01-25 | End: 2023-01-25

## 2023-01-25 RX ORDER — FAMOTIDINE 10 MG/ML
20 INJECTION INTRAVENOUS ONCE
Refills: 0 | Status: COMPLETED | OUTPATIENT
Start: 2023-01-25 | End: 2023-01-25

## 2023-01-25 RX ORDER — OXALIPLATIN 5 MG/ML
150 INJECTION, SOLUTION INTRAVENOUS ONCE
Refills: 0 | Status: COMPLETED | OUTPATIENT
Start: 2023-01-25 | End: 2023-01-25

## 2023-01-25 RX ORDER — DIPHENHYDRAMINE HCL 50 MG
25 CAPSULE ORAL ONCE
Refills: 0 | Status: COMPLETED | OUTPATIENT
Start: 2023-01-25 | End: 2023-01-25

## 2023-01-25 RX ADMIN — Medication 0.6 MILLIGRAM(S): at 11:57

## 2023-01-25 RX ADMIN — Medication 122 MILLIGRAM(S): at 11:58

## 2023-01-25 RX ADMIN — IRINOTECAN HYDROCHLORIDE 270 MILLIGRAM(S): 100 INJECTION, SOLUTION INTRAVENOUS at 12:55

## 2023-01-25 RX ADMIN — FAMOTIDINE 104 MILLIGRAM(S): 10 INJECTION INTRAVENOUS at 11:58

## 2023-01-25 RX ADMIN — Medication 101 MILLIGRAM(S): at 11:58

## 2023-01-25 RX ADMIN — Medication 720 MILLIGRAM(S): at 12:54

## 2023-01-25 RX ADMIN — FLUOROURACIL 4150 MILLIGRAM(S): 50 INJECTION, SOLUTION INTRAVENOUS at 17:54

## 2023-01-25 RX ADMIN — OXALIPLATIN 150 MILLIGRAM(S): 5 INJECTION, SOLUTION INTRAVENOUS at 12:55

## 2023-01-25 RX ADMIN — FOSAPREPITANT DIMEGLUMINE 500 MILLIGRAM(S): 150 INJECTION, POWDER, LYOPHILIZED, FOR SOLUTION INTRAVENOUS at 11:57

## 2023-01-27 ENCOUNTER — NON-APPOINTMENT (OUTPATIENT)
Age: 73
End: 2023-01-27

## 2023-01-27 ENCOUNTER — APPOINTMENT (OUTPATIENT)
Dept: INFUSION THERAPY | Facility: CLINIC | Age: 73
End: 2023-01-27

## 2023-01-27 ENCOUNTER — OUTPATIENT (OUTPATIENT)
Dept: OUTPATIENT SERVICES | Facility: HOSPITAL | Age: 73
LOS: 1 days | End: 2023-01-27

## 2023-01-27 DIAGNOSIS — E87.6 HYPOKALEMIA: ICD-10-CM

## 2023-01-27 DIAGNOSIS — Z51.11 ENCOUNTER FOR ANTINEOPLASTIC CHEMOTHERAPY: ICD-10-CM

## 2023-01-27 DIAGNOSIS — C25.9 MALIGNANT NEOPLASM OF PANCREAS, UNSPECIFIED: ICD-10-CM

## 2023-01-27 DIAGNOSIS — Z98.890 OTHER SPECIFIED POSTPROCEDURAL STATES: Chronic | ICD-10-CM

## 2023-01-27 DIAGNOSIS — R97.8 OTHER ABNORMAL TUMOR MARKERS: ICD-10-CM

## 2023-01-27 RX ORDER — PEGFILGRASTIM-CBQV 6 MG/.6ML
6 INJECTION, SOLUTION SUBCUTANEOUS ONCE
Refills: 0 | Status: COMPLETED | OUTPATIENT
Start: 2023-01-27 | End: 2023-01-27

## 2023-01-27 RX ADMIN — PEGFILGRASTIM-CBQV 6 MILLIGRAM(S): 6 INJECTION, SOLUTION SUBCUTANEOUS at 15:22

## 2023-01-30 LAB
ALBUMIN SERPL ELPH-MCNC: 3.5 G/DL
ALP BLD-CCNC: 138 U/L
ALT SERPL-CCNC: 37 U/L
ANION GAP SERPL CALC-SCNC: 10 MMOL/L
AST SERPL-CCNC: 30 U/L
BILIRUB DIRECT SERPL-MCNC: <0.2 MG/DL
BILIRUB INDIRECT SERPL-MCNC: NORMAL MG/DL
BILIRUB SERPL-MCNC: <0.2 MG/DL
BUN SERPL-MCNC: 8 MG/DL
CALCIUM SERPL-MCNC: 8.6 MG/DL
CANCER AG19-9 SERPL-ACNC: 554 U/ML
CHLORIDE SERPL-SCNC: 104 MMOL/L
CO2 SERPL-SCNC: 27 MMOL/L
CREAT SERPL-MCNC: 0.7 MG/DL
EGFR: 99 ML/MIN/1.73M2
GLUCOSE SERPL-MCNC: 86 MG/DL
POTASSIUM SERPL-SCNC: 3.5 MMOL/L
PROT SERPL-MCNC: 6.3 G/DL
SODIUM SERPL-SCNC: 141 MMOL/L

## 2023-02-07 ENCOUNTER — APPOINTMENT (OUTPATIENT)
Dept: HEMATOLOGY ONCOLOGY | Facility: CLINIC | Age: 73
End: 2023-02-07
Payer: MEDICARE

## 2023-02-07 ENCOUNTER — OUTPATIENT (OUTPATIENT)
Dept: OUTPATIENT SERVICES | Facility: HOSPITAL | Age: 73
LOS: 1 days | Discharge: HOME | End: 2023-02-07
Payer: MEDICARE

## 2023-02-07 ENCOUNTER — APPOINTMENT (OUTPATIENT)
Dept: HEMATOLOGY ONCOLOGY | Facility: CLINIC | Age: 73
End: 2023-02-07

## 2023-02-07 ENCOUNTER — LABORATORY RESULT (OUTPATIENT)
Age: 73
End: 2023-02-07

## 2023-02-07 VITALS
OXYGEN SATURATION: 99 % | HEART RATE: 59 BPM | SYSTOLIC BLOOD PRESSURE: 155 MMHG | TEMPERATURE: 97.8 F | DIASTOLIC BLOOD PRESSURE: 71 MMHG | HEIGHT: 66 IN | BODY MASS INDEX: 22.66 KG/M2 | WEIGHT: 141 LBS | RESPIRATION RATE: 16 BRPM

## 2023-02-07 DIAGNOSIS — C25.9 MALIGNANT NEOPLASM OF PANCREAS, UNSPECIFIED: ICD-10-CM

## 2023-02-07 DIAGNOSIS — Z98.890 OTHER SPECIFIED POSTPROCEDURAL STATES: Chronic | ICD-10-CM

## 2023-02-07 LAB
ALBUMIN SERPL ELPH-MCNC: 3.7 G/DL
ALP BLD-CCNC: 151 U/L
ALT SERPL-CCNC: 33 U/L
ANION GAP SERPL CALC-SCNC: 7 MMOL/L
AST SERPL-CCNC: 26 U/L
BILIRUB DIRECT SERPL-MCNC: <0.2 MG/DL
BILIRUB INDIRECT SERPL-MCNC: NORMAL MG/DL
BILIRUB SERPL-MCNC: <0.2 MG/DL
BUN SERPL-MCNC: 8 MG/DL
CALCIUM SERPL-MCNC: 8.9 MG/DL
CHLORIDE SERPL-SCNC: 104 MMOL/L
CO2 SERPL-SCNC: 30 MMOL/L
CREAT SERPL-MCNC: 0.8 MG/DL
EGFR: 94 ML/MIN/1.73M2
GLUCOSE SERPL-MCNC: 93 MG/DL
HCT VFR BLD CALC: 33.2 %
HGB BLD-MCNC: 11.3 G/DL
MCHC RBC-ENTMCNC: 30.1 PG
MCHC RBC-ENTMCNC: 34 G/DL
MCV RBC AUTO: 88.3 FL
PLATELET # BLD AUTO: 128 K/UL
PMV BLD: 9.9 FL
POTASSIUM SERPL-SCNC: 4 MMOL/L
PROT SERPL-MCNC: 7 G/DL
RBC # BLD: 3.76 M/UL
RBC # FLD: 17.3 %
SODIUM SERPL-SCNC: 141 MMOL/L
WBC # FLD AUTO: 15.03 K/UL

## 2023-02-07 PROCEDURE — 85027 COMPLETE CBC AUTOMATED: CPT

## 2023-02-07 PROCEDURE — 99214 OFFICE O/P EST MOD 30 MIN: CPT

## 2023-02-07 PROCEDURE — 86301 IMMUNOASSAY TUMOR CA 19-9: CPT

## 2023-02-07 PROCEDURE — 80076 HEPATIC FUNCTION PANEL: CPT

## 2023-02-07 PROCEDURE — 80048 BASIC METABOLIC PNL TOTAL CA: CPT

## 2023-02-07 NOTE — HISTORY OF PRESENT ILLNESS
[Disease: _____________________] : Disease: [unfilled] [Therapy: ___] : Therapy: [unfilled] [Cycle: ___] : Cycle: [unfilled] [de-identified] : Mr. JINA SINGER is a 72 year old male here today for evaluation and management of Pancreatic Cancer.  \par \par He was referred by SURG, Dr. Morgan.  JINA is a 72 year old M with PMHx including HTN, HLD, T2DM, who presents to clinic to establish care.  Patient presented to ER back in 07/2022 with complaint of gradually worsening abdominal pain for the prior month, worse after eating and associated with 40lb weight loss in the last 2 months.  He underwent CT imaging which shows evidence suggestive of metastatic disease.  He also endorses constipation.  He underwent laparoscopy with findings of ~1L malignant ascites and peritoneal implants on 8.22.2022.  He is presently feeling well with no new complaints.  Patient denies fever, chills, nausea, vomiting, dysphagia, dyspnea, neuropathy or bleeding.  Patient reports family history of malignancy in his father (possibly stomach cancer) and mother (possibly colon).  Never a smoker.  NKDA.  \par \par RADIOLOGIC WORKUP\par CT Chest (7.25.2022) IMPRESSION:Perigastric infiltrative process inseparable from the tail the pancreas extending to the proximal duodenum. Associated ascites. Findings again  suspicious for pancreatic or gastrointestinal neoplasm. Differential diagnosis includes partially contained ruptured gastric or duodenal ulcer as well as infectious process/pancreatitis.Hyperaerated lungs. Scattered granulomata. Vascular calcifications. Nonspecific areas of subpleural thickening bilaterally.\par CT A/P (7.24.2022) IMPRESSION:Predominantly perigastric infiltrative soft tissue changes which are inseparable from the tail of pancreas and extend around the proximal  duodenum. Small volume abdominopelvic ascites. Differential includes underlying pancreatic or gastrointestinal neoplasm, sequelae of partially contained ruptured gastric or duodenal  ulcer, and infectious process or perhaps pancreatitis. Clinical correlation and/or further evaluation with EGD should be considered.Findings compatible with BPH. Superimposed cystitis is difficult to exclude on imaging.\par \par LAB WORKUP\par (8.17.2022) WBC 8.36, Hgb 14.2, MCV 84, , normal diff, Cr 0.9, eGFR 91, normal LFTs\par (7.25.2022) Ca19-9 is 6481, CEA 6.8, TSH 2.62\par (10.23.2019) WBC 6.83, Hgb 15, MCV 85.7, \par \par PATHOLOGY\par (see results section)\par \par HCM\par Colonoscopy done about 5 years , polyps removed \par Upper Endoscopy done 07/2022 showed 5x5 hypoechoic infiltrative mass (irregular borders) extending between the  muscularis propria (abuting it) and the territory of the pancreatic tail \par COVID Vaccinated? [FreeTextEntry1] : Started mFOLFIRINOX on 9.6.2022 (discontinued oxaliplatin beginning C12 on 2/7/23) [de-identified] : 9/14/22\[facility] Patient is here for a follow-up visit for pancreatic cancer.  He is s/p initial cycle of mFOLFIRINOX on 9.6.2022.  He is feeling well with no new complaints.  Reviewed most recent CBC, which is stable.  Patient denies fever, chills, nausea, vomiting, dyspnea, neuropathy, persistent diarrhea or bleeding.  He states his appetite is still poor but slightly improving.  He is due for cycle 2 of chemotherapy next week.  \par \par 10/4/22\[facility] Patient is here for a follow-up visit for pancreatic cancer.  He is due for cycle 3 of mFOLFIRINOX today, initiated on 9.6.2022.  He is feeling well with no new complaints.  Reviewed most recent CBC, which shows mild anemia, hgb 11.6g/dL and persistent leukocytosis, likely due to GCSF administration with chemotherapy.  ALK Phos is slightly higher at 198 but Ca19-9 is decreasing down to 09321C/mL.  Patient denies fever, chills, vomiting, dyspnea, neuropathy, CP, palpitations, abdominal pain, persistent diarrhea or bleeding.  He is endorses transient nausea which was not bothersome and he took anti-emetics.  \par \par 11/2/22\[facility] Patient is here for a follow-up visit for pancreatic cancer.  He is due for cycle 4 of mFOLFIRINOX today, initiated on 9.6.2022.  He is feeling well with no new complaints.  Reviewed most recent CBC, which shows mild anemia, hgb 11.1g/dL and persistent leukocytosis, likely due to GCSF administration with chemotherapy.  ALK Phos is slightly lower at 138 but Ca19-9 is decreasing down to 4619U/mL.  Patient denies fever, chills, vomiting, dyspnea, CP, palpitations, abdominal pain, persistent diarrhea or bleeding.  He endorses occasional neuropathy with long exposure to cold items.  \par \par 11/16/22\[facility] Patient is here for a follow-up visit for pancreatic cancer, accompanied by spouse.  He is due for cycle 6 of mFOLFIRINOX today, initiated on 9.6.2022.  He is feeling well with no new complaints.  Reviewed most recent CBC, which shows mild anemia, hgb 11.1g/dL and persistent leukocytosis, likely due to GCSF administration with chemotherapy.  ALK Phos is essentially stable at 146 but Ca19-9 is decreasing down to 2708U/mL.  Patient denies fever, chills, vomiting, dyspnea, CP, palpitations, abdominal pain, persistent diarrhea, mouth sores or bleeding.  He still notes occasional neuropathy with long exposure to cold items.  Reviewed most recent CT imaging which is essentially stable.  \par CT C/A/P (11.8.2022) IMPRESSION:1. No definite evidence of intrathoracic metastasis.Since 7/24/2022, no significant interval change.Near diffuse peritoneal carcinomatosis is not significantly changed.Diffuse tumor infiltration in the pancreatic tail (4/226), unchanged.Completely attenuated splenic vein is unchanged. Prominent venous collaterals in the left upper quadrant is unchanged.\par \par 11/30/22\par Patient is here for a follow-up visit for pancreatic cancer.  He is due for cycle 7 of mFOLFIRINOX today, initiated on 9.6.2022.  Reviewed most recent CBC, which shows mild anemia, hgb 10.9g/dL and persistent leukocytosis, likely due to GCSF administration with chemotherapy.  ALK Phos is essentially stable at 138 and Ca19-9 is decreasing down to 1683U/mL.  Patient denies fever, chills, vomiting, dyspnea, CP, palpitations, abdominal pain, persistent diarrhea, mouth sores or bleeding.  He still notes occasional neuropathy with long exposure to cold items. \par \par 12/14/22\par Patient is here for a follow-up visit for pancreatic cancer, accompanied by spouse via telephone.  He is due for cycle 8 of mFOLFIRINOX today, initiated on 9.6.2022.  Reviewed most recent CBC, which shows mild anemia, hgb 10.5g/dL and persistent leukocytosis, likely due to GCSF administration with chemotherapy.  ALK Phos is essentially stable at 124 and Ca19-9 is decreasing down to 1177U/mL.  Patient denies fever, chills, dyspnea, CP, palpitations, abdominal pain, persistent diarrhea, mouth sores or bleeding.  He has an occasional cough x 2 weeks; believes he had a mild cold which has been improving over the last 2 weeks but the cough is persistent.  He still notes occasional neuropathy with long exposure to cold items. \par \par 12/28/22\par Patient is here for a follow-up visit for pancreatic cancer, accompanied by spouse via telephone.  Since last visit, patient had CXR which was suggestive for pneumonia.  He has completed antibiotics with improvement in symptoms.  Reviewed PULM consult with Dr. Escobar: plans to repeat CXR in about 6 weeks.  Patient is agreeable to resume chemotherapy.  He is due for cycle 9 of mFOLFIRINOX today, initiated on 9.6.2022.  Reviewed most recent CBC which shows mild anemia and neutrophil-predominant leukocytosis.  Tumor marker is down to 957 U/mL from 12/2022.  \par CXR (12.15.2022) Impression:Subsegmental left lower lobe consolidation.\par \par 1/11/23\par Patient is here for a follow-up visit for pancreatic cancer, accompanied by spouse via telephone.  As per PULM consult with Dr. Escobar: plan to repeat CXR about 6 weeks following prior imaging.  He is due for cycle 10 of mFOLFIRINOX today, initiated on 9.6.2022.  Reviewed most recent CBC which shows mild anemia with hgb 10.6g/dL and neutrophil-predominant leukocytosis likely from GCSF administration.  Tumor marker is down to 693 U/mL from 1/2023.  He denies fevers, chills, worsening of neuropathy, dyspnea, mouth sores or bleeding.  Patient states he dislikes the effect of Benadryl pre-medication because it makes him drowsy and feel "off."  He is requesting to decrease Benadryl from 50mg to 25mg IV as pre-medicaiton.  Risks vs benefits discussed at length.  \par \par 1/24/23\par Patient is here for a follow-up visit for pancreatic cancer.  He is due for cycle 11 of mFOLFIRINOX today, initiated on 9.6.2022.  Reviewed most recent CBC which shows mild anemia with hgb 11.3g/dL, mild thrombocytopenia with PLTs 106,000 and neutrophil-predominant leukocytosis likely from GCSF administration.  Tumor marker is down to 693 U/mL from 1/2023.  He denies fevers, chills, worsening of neuropathy, dyspnea, new cough, mouth sores or bleeding.  \par \par 2/7/23\par Patient is here for a follow-up visit for pancreatic cancer.  He is due for cycle 12 of mFOLFIRINOX today, initiated on 9.6.2022.  Reviewed most recent CBC which shows mild anemia with hgb 11.3g/dL, mild thrombocytopenia with PLTs 106,000 and neutrophil-predominant leukocytosis likely from GCSF administration.  Tumor marker is down to 693 U/mL from 1/2023.  He denies fevers, chills, dyspnea, new cough, mouth sores or bleeding.  He states his neuropathy in his feet is slightly worse in both feet.

## 2023-02-07 NOTE — REVIEW OF SYSTEMS
[Negative] : Allergic/Immunologic [Fever] : no fever [Night Sweats] : no night sweats [Dysphagia] : no dysphagia [Chest Pain] : no chest pain [Shortness Of Breath] : no shortness of breath [Abdominal Pain] : no abdominal pain [Vomiting] : no vomiting [Constipation] : no constipation [Easy Bleeding] : no tendency for easy bleeding [FreeTextEntry2] : appetite slightly improved but still poor  [FreeTextEntry6] : cough has been improving  [FreeTextEntry7] : reports occasional nausea [de-identified] : L chest port present  [de-identified] : endorses occasional neuropathy with long exposure to cold items , neuropathy in his feet is slightly more apparent

## 2023-02-07 NOTE — ASSESSMENT
[Palliative] : Goals of care discussed with patient: Palliative [FreeTextEntry1] : # Metastatic moderate to poorly differentiated adenocarcinoma , dx 07/2022 \par - BRCA (-) \par - s/p laparoscopy on 8.22.2022 with findings of ~1L malignant ascites and peritoneal implants \par - Ca19-9 is 6481, CEA 6.8 from 07/2022 \par - reviewed radiology, pathology and lab workup and had a discussion regarding implications of diagnosis, prognosis and options for management including but not limited to chemotherapy for palliative intent ; had a lengthy discussion regarding Stage IV disease and not curative \par - s/p L Chest port placement with SURG, Dr. Morgan \par - CT CAP from 11.8.2022 is essentially stable, tumor marker is decreasing \par - s/p cycle 11 of FOLFIRINOX on 1/25 , initiated on 9.6.2022 ; plan to discontinue oxaliplatin beginning C12 depending on PET/CT results \par - HOLD chemotherapy today to allow him to rest prior to PET/CT and due to worsening neuropathy, plan to reassess and will consider discontinuing oxaliplatin prior to next dose \par - PET/CT ordered to be done after 2-3 months of treatment , Rx given \par \par # Peripheral neuropathy, likely due to chemotherapy and hx of DM\par - we will omit oxaliplatin beginning C12\par - START gabapentin 100mg daily and then titrate upward gradually as tolerated ; side effects discussed + Rx sent\par \par # H. Pylori infection , dx 07/2022 \par - s/p upper EGD in 07/2022 ; completed antibiotic treatment \par - followup with GI, Dr. 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

## 2023-02-07 NOTE — PHYSICAL EXAM
[Restricted in physically strenuous activity but ambulatory and able to carry out work of a light or sedentary nature] : Status 1- Restricted in physically strenuous activity but ambulatory and able to carry out work of a light or sedentary nature, e.g., light house work, office work [Normal] : affect appropriate [de-identified] : wearing glasses  [de-identified] : L chest port present

## 2023-02-08 ENCOUNTER — APPOINTMENT (OUTPATIENT)
Dept: INFUSION THERAPY | Facility: CLINIC | Age: 73
End: 2023-02-08

## 2023-02-08 ENCOUNTER — APPOINTMENT (OUTPATIENT)
Age: 73
End: 2023-02-08

## 2023-02-08 DIAGNOSIS — Z51.11 ENCOUNTER FOR ANTINEOPLASTIC CHEMOTHERAPY: ICD-10-CM

## 2023-02-08 DIAGNOSIS — C25.9 MALIGNANT NEOPLASM OF PANCREAS, UNSPECIFIED: ICD-10-CM

## 2023-02-08 DIAGNOSIS — G62.9 POLYNEUROPATHY, UNSPECIFIED: ICD-10-CM

## 2023-02-08 DIAGNOSIS — R97.8 OTHER ABNORMAL TUMOR MARKERS: ICD-10-CM

## 2023-02-08 LAB — CANCER AG19-9 SERPL-ACNC: 457 U/ML

## 2023-02-10 ENCOUNTER — APPOINTMENT (OUTPATIENT)
Dept: INFUSION THERAPY | Facility: CLINIC | Age: 73
End: 2023-02-10

## 2023-02-10 ENCOUNTER — APPOINTMENT (OUTPATIENT)
Age: 73
End: 2023-02-10

## 2023-02-21 ENCOUNTER — LABORATORY RESULT (OUTPATIENT)
Age: 73
End: 2023-02-21

## 2023-02-21 ENCOUNTER — APPOINTMENT (OUTPATIENT)
Dept: HEMATOLOGY ONCOLOGY | Facility: CLINIC | Age: 73
End: 2023-02-21
Payer: MEDICARE

## 2023-02-21 ENCOUNTER — APPOINTMENT (OUTPATIENT)
Dept: HEMATOLOGY ONCOLOGY | Facility: CLINIC | Age: 73
End: 2023-02-21

## 2023-02-21 ENCOUNTER — OUTPATIENT (OUTPATIENT)
Dept: OUTPATIENT SERVICES | Facility: HOSPITAL | Age: 73
LOS: 1 days | Discharge: HOME | End: 2023-02-21
Payer: MEDICARE

## 2023-02-21 VITALS
RESPIRATION RATE: 16 BRPM | BODY MASS INDEX: 24.11 KG/M2 | WEIGHT: 150 LBS | HEIGHT: 66 IN | SYSTOLIC BLOOD PRESSURE: 142 MMHG | TEMPERATURE: 98 F | DIASTOLIC BLOOD PRESSURE: 81 MMHG | HEART RATE: 67 BPM

## 2023-02-21 DIAGNOSIS — C25.9 MALIGNANT NEOPLASM OF PANCREAS, UNSPECIFIED: ICD-10-CM

## 2023-02-21 DIAGNOSIS — Z98.890 OTHER SPECIFIED POSTPROCEDURAL STATES: Chronic | ICD-10-CM

## 2023-02-21 DIAGNOSIS — G62.9 POLYNEUROPATHY, UNSPECIFIED: ICD-10-CM

## 2023-02-21 DIAGNOSIS — R97.8 OTHER ABNORMAL TUMOR MARKERS: ICD-10-CM

## 2023-02-21 DIAGNOSIS — Z51.11 ENCOUNTER FOR ANTINEOPLASTIC CHEMOTHERAPY: ICD-10-CM

## 2023-02-21 LAB
HCT VFR BLD CALC: 32.4 %
HGB BLD-MCNC: 11 G/DL
MCHC RBC-ENTMCNC: 29.9 PG
MCHC RBC-ENTMCNC: 34 G/DL
MCV RBC AUTO: 88 FL
PLATELET # BLD AUTO: 154 K/UL
PMV BLD: 9 FL
RBC # BLD: 3.68 M/UL
RBC # FLD: 17 %
WBC # FLD AUTO: 5.01 K/UL

## 2023-02-21 PROCEDURE — 85027 COMPLETE CBC AUTOMATED: CPT

## 2023-02-21 PROCEDURE — 80076 HEPATIC FUNCTION PANEL: CPT

## 2023-02-21 PROCEDURE — 80048 BASIC METABOLIC PNL TOTAL CA: CPT

## 2023-02-21 PROCEDURE — 99215 OFFICE O/P EST HI 40 MIN: CPT

## 2023-02-21 PROCEDURE — 86301 IMMUNOASSAY TUMOR CA 19-9: CPT

## 2023-02-21 NOTE — REVIEW OF SYSTEMS
[Negative] : Allergic/Immunologic [Lower Ext Edema] : lower extremity edema [Fever] : no fever [Night Sweats] : no night sweats [Dysphagia] : no dysphagia [Chest Pain] : no chest pain [Shortness Of Breath] : no shortness of breath [Abdominal Pain] : no abdominal pain [Vomiting] : no vomiting [Constipation] : no constipation [Easy Bleeding] : no tendency for easy bleeding [FreeTextEntry2] : appetite slightly improved but still poor  [FreeTextEntry5] : reports trace bilateral LE edema x 1 week [FreeTextEntry6] : cough has been improving  [FreeTextEntry7] : reports occasional nausea [de-identified] : L chest port present  [de-identified] : endorses occasional neuropathy with long exposure to cold items , neuropathy in his feet is slightly more apparent

## 2023-02-21 NOTE — HISTORY OF PRESENT ILLNESS
[Disease: _____________________] : Disease: [unfilled] [Therapy: ___] : Therapy: [unfilled] [Cycle: ___] : Cycle: [unfilled] [de-identified] : Mr. JINA SINGER is a 72 year old male here today for evaluation and management of Pancreatic Cancer.  \par \par He was referred by SURG, Dr. Morgan.  JINA is a 72 year old M with PMHx including HTN, HLD, T2DM, who presents to clinic to establish care.  Patient presented to ER back in 07/2022 with complaint of gradually worsening abdominal pain for the prior month, worse after eating and associated with 40lb weight loss in the last 2 months.  He underwent CT imaging which shows evidence suggestive of metastatic disease.  He also endorses constipation.  He underwent laparoscopy with findings of ~1L malignant ascites and peritoneal implants on 8.22.2022.  He is presently feeling well with no new complaints.  Patient denies fever, chills, nausea, vomiting, dysphagia, dyspnea, neuropathy or bleeding.  Patient reports family history of malignancy in his father (possibly stomach cancer) and mother (possibly colon).  Never a smoker.  NKDA.  \par \par RADIOLOGIC WORKUP\par CT Chest (7.25.2022) IMPRESSION:Perigastric infiltrative process inseparable from the tail the pancreas extending to the proximal duodenum. Associated ascites. Findings again  suspicious for pancreatic or gastrointestinal neoplasm. Differential diagnosis includes partially contained ruptured gastric or duodenal ulcer as well as infectious process/pancreatitis.Hyperaerated lungs. Scattered granulomata. Vascular calcifications. Nonspecific areas of subpleural thickening bilaterally.\par CT A/P (7.24.2022) IMPRESSION:Predominantly perigastric infiltrative soft tissue changes which are inseparable from the tail of pancreas and extend around the proximal  duodenum. Small volume abdominopelvic ascites. Differential includes underlying pancreatic or gastrointestinal neoplasm, sequelae of partially contained ruptured gastric or duodenal  ulcer, and infectious process or perhaps pancreatitis. Clinical correlation and/or further evaluation with EGD should be considered.Findings compatible with BPH. Superimposed cystitis is difficult to exclude on imaging.\par \par LAB WORKUP\par (8.17.2022) WBC 8.36, Hgb 14.2, MCV 84, , normal diff, Cr 0.9, eGFR 91, normal LFTs\par (7.25.2022) Ca19-9 is 6481, CEA 6.8, TSH 2.62\par (10.23.2019) WBC 6.83, Hgb 15, MCV 85.7, \par \par PATHOLOGY\par (see results section)\par \par HCM\par Colonoscopy done about 5 years , polyps removed \par Upper Endoscopy done 07/2022 showed 5x5 hypoechoic infiltrative mass (irregular borders) extending between the  muscularis propria (abuting it) and the territory of the pancreatic tail \par COVID Vaccinated? [FreeTextEntry1] : Started mFOLFIRINOX on 9.6.2022 (discontinued oxaliplatin beginning C12 on 2/22/23)  [de-identified] : 9/14/22\[facility] Patient is here for a follow-up visit for pancreatic cancer.  He is s/p initial cycle of mFOLFIRINOX on 9.6.2022.  He is feeling well with no new complaints.  Reviewed most recent CBC, which is stable.  Patient denies fever, chills, nausea, vomiting, dyspnea, neuropathy, persistent diarrhea or bleeding.  He states his appetite is still poor but slightly improving.  He is due for cycle 2 of chemotherapy next week.  \par \par 10/4/22\[facility] Patient is here for a follow-up visit for pancreatic cancer.  He is due for cycle 3 of mFOLFIRINOX today, initiated on 9.6.2022.  He is feeling well with no new complaints.  Reviewed most recent CBC, which shows mild anemia, hgb 11.6g/dL and persistent leukocytosis, likely due to GCSF administration with chemotherapy.  ALK Phos is slightly higher at 198 but Ca19-9 is decreasing down to 08508E/mL.  Patient denies fever, chills, vomiting, dyspnea, neuropathy, CP, palpitations, abdominal pain, persistent diarrhea or bleeding.  He is endorses transient nausea which was not bothersome and he took anti-emetics.  \par \par 11/2/22\[facility] Patient is here for a follow-up visit for pancreatic cancer.  He is due for cycle 4 of mFOLFIRINOX today, initiated on 9.6.2022.  He is feeling well with no new complaints.  Reviewed most recent CBC, which shows mild anemia, hgb 11.1g/dL and persistent leukocytosis, likely due to GCSF administration with chemotherapy.  ALK Phos is slightly lower at 138 but Ca19-9 is decreasing down to 4619U/mL.  Patient denies fever, chills, vomiting, dyspnea, CP, palpitations, abdominal pain, persistent diarrhea or bleeding.  He endorses occasional neuropathy with long exposure to cold items.  \par \par 11/16/22\[facility] Patient is here for a follow-up visit for pancreatic cancer, accompanied by spouse.  He is due for cycle 6 of mFOLFIRINOX today, initiated on 9.6.2022.  He is feeling well with no new complaints.  Reviewed most recent CBC, which shows mild anemia, hgb 11.1g/dL and persistent leukocytosis, likely due to GCSF administration with chemotherapy.  ALK Phos is essentially stable at 146 but Ca19-9 is decreasing down to 2708U/mL.  Patient denies fever, chills, vomiting, dyspnea, CP, palpitations, abdominal pain, persistent diarrhea, mouth sores or bleeding.  He still notes occasional neuropathy with long exposure to cold items.  Reviewed most recent CT imaging which is essentially stable.  \par CT C/A/P (11.8.2022) IMPRESSION:1. No definite evidence of intrathoracic metastasis.Since 7/24/2022, no significant interval change.Near diffuse peritoneal carcinomatosis is not significantly changed.Diffuse tumor infiltration in the pancreatic tail (4/226), unchanged.Completely attenuated splenic vein is unchanged. Prominent venous collaterals in the left upper quadrant is unchanged.\par \par 11/30/22\par Patient is here for a follow-up visit for pancreatic cancer.  He is due for cycle 7 of mFOLFIRINOX today, initiated on 9.6.2022.  Reviewed most recent CBC, which shows mild anemia, hgb 10.9g/dL and persistent leukocytosis, likely due to GCSF administration with chemotherapy.  ALK Phos is essentially stable at 138 and Ca19-9 is decreasing down to 1683U/mL.  Patient denies fever, chills, vomiting, dyspnea, CP, palpitations, abdominal pain, persistent diarrhea, mouth sores or bleeding.  He still notes occasional neuropathy with long exposure to cold items. \par \par 12/14/22\par Patient is here for a follow-up visit for pancreatic cancer, accompanied by spouse via telephone.  He is due for cycle 8 of mFOLFIRINOX today, initiated on 9.6.2022.  Reviewed most recent CBC, which shows mild anemia, hgb 10.5g/dL and persistent leukocytosis, likely due to GCSF administration with chemotherapy.  ALK Phos is essentially stable at 124 and Ca19-9 is decreasing down to 1177U/mL.  Patient denies fever, chills, dyspnea, CP, palpitations, abdominal pain, persistent diarrhea, mouth sores or bleeding.  He has an occasional cough x 2 weeks; believes he had a mild cold which has been improving over the last 2 weeks but the cough is persistent.  He still notes occasional neuropathy with long exposure to cold items. \par \par 12/28/22\par Patient is here for a follow-up visit for pancreatic cancer, accompanied by spouse via telephone.  Since last visit, patient had CXR which was suggestive for pneumonia.  He has completed antibiotics with improvement in symptoms.  Reviewed PULM consult with Dr. Escobar: plans to repeat CXR in about 6 weeks.  Patient is agreeable to resume chemotherapy.  He is due for cycle 9 of mFOLFIRINOX today, initiated on 9.6.2022.  Reviewed most recent CBC which shows mild anemia and neutrophil-predominant leukocytosis.  Tumor marker is down to 957 U/mL from 12/2022.  \par CXR (12.15.2022) Impression:Subsegmental left lower lobe consolidation.\par \par 1/11/23\par Patient is here for a follow-up visit for pancreatic cancer, accompanied by spouse via telephone.  As per PULM consult with Dr. Escobar: plan to repeat CXR about 6 weeks following prior imaging.  He is due for cycle 10 of mFOLFIRINOX today, initiated on 9.6.2022.  Reviewed most recent CBC which shows mild anemia with hgb 10.6g/dL and neutrophil-predominant leukocytosis likely from GCSF administration.  Tumor marker is down to 693 U/mL from 1/2023.  He denies fevers, chills, worsening of neuropathy, dyspnea, mouth sores or bleeding.  Patient states he dislikes the effect of Benadryl pre-medication because it makes him drowsy and feel "off."  He is requesting to decrease Benadryl from 50mg to 25mg IV as pre-medicaiton.  Risks vs benefits discussed at length.  \par \par 1/24/23\par Patient is here for a follow-up visit for pancreatic cancer.  He is due for cycle 11 of mFOLFIRINOX today, initiated on 9.6.2022.  Reviewed most recent CBC which shows mild anemia with hgb 11.3g/dL, mild thrombocytopenia with PLTs 106,000 and neutrophil-predominant leukocytosis likely from GCSF administration.  Tumor marker is down to 693 U/mL from 1/2023.  He denies fevers, chills, worsening of neuropathy, dyspnea, new cough, mouth sores or bleeding.  \par \par 2/7/23\par Patient is here for a follow-up visit for pancreatic cancer.  He is due for cycle 12 of mFOLFIRINOX today, initiated on 9.6.2022.  Reviewed most recent CBC which shows mild anemia with hgb 11.3g/dL, mild thrombocytopenia with PLTs 106,000 and neutrophil-predominant leukocytosis likely from GCSF administration.  Tumor marker is down to 693 U/mL from 1/2023.  He denies fevers, chills, dyspnea, new cough, mouth sores or bleeding.  He states his neuropathy in his feet is slightly worse in both feet.  \par \par 2/21/23\par Patient is here for a follow-up visit for pancreatic cancer.  He is due for cycle 12 of chemotherapy on 2/22, initiated on 9.6.2022.  Reviewed most recent CBC which shows mild anemia with hgb 11g/dL.  Tumor marker is down to 457 U/mL from 02/2023.  He denies fevers, chills, dyspnea, new cough, mouth sores, persistent diarrhea or bleeding.  He endorses neuropathy in both feet and fingertips; started gabapentin 100mg TiD without resolution of symptoms.

## 2023-02-21 NOTE — ASSESSMENT
[Palliative] : Goals of care discussed with patient: Palliative [FreeTextEntry1] : # Metastatic moderate to poorly differentiated adenocarcinoma , dx 07/2022 \par - BRCA (-) \par - s/p laparoscopy on 8.22.2022 with findings of ~1L malignant ascites and peritoneal implants \par - Ca19-9 is 6481, CEA 6.8 from 07/2022 \par - reviewed radiology, pathology and lab workup and had a discussion regarding implications of diagnosis, prognosis and options for management including but not limited to chemotherapy for palliative intent ; had a lengthy discussion regarding Stage IV disease and not curative \par - s/p L Chest port placement with SURG, Dr. Morgan \par - CT CAP from 11.8.2022 is essentially stable, tumor marker is decreasing \par - s/p cycle 11 of FOLFIRINOX on 1/25 , initiated on 9.6.2022 \par - c/w cycle 12 of 5FU/Leucovorin/Irinotecan on 2/22  , initiated on 9.6.2022 ; discontinue oxaliplatin beginning C12 due to neuropathy \par - PET/CT ordered to be done after another 2 months of treatment since he is doing well (~04/2023) , tasked [employer] for assistance with scheduling + Rx given\par - Labwork today: CBC, BMP, LFTs, Ca19-9 \par \par # Peripheral neuropathy, likely due to chemotherapy and hx of DM \par - we will omit oxaliplatin beginning C12 \par - he can increase gabapentin 100mg daily up to 9 tabs , initiated 01/2023 \par \par # H. Pylori infection , dx 07/2022 \par - s/p upper EGD in 07/2022 ; completed antibiotic treatment \par - followup with GI, Dr. Maximilian Larson, as scheduled for management \par \par # Hypokalemia \par - denies vomiting \par - s/p KCl 10mEq PO BiD x 2 days with normalization of level \par \par US Duplex BI LE ordered to r/o DVT, Rx given.  \par \par RTC in 2 weeks with CBC, BMP, LFTs Ca19-9 prior \par \par seen/examined w/ NP CArnulfoSmart; note reviewed; case discussed\par - grade 2 neuropathy: hold oxaliplatin and continue chemo\par - repeat imaging (PET/CT) in 6-7 weeks and f/u CA 19-9:\par A) if disease is stable/ improves - continue same regimen without oxaliplatin (he is a  and still works: do not want to impair his quality of life\par b) if disease progresses, recommend to change treatment to gemzar/ abraxane

## 2023-02-21 NOTE — PHYSICAL EXAM
[Restricted in physically strenuous activity but ambulatory and able to carry out work of a light or sedentary nature] : Status 1- Restricted in physically strenuous activity but ambulatory and able to carry out work of a light or sedentary nature, e.g., light house work, office work [Normal] : RRR, normal S1S2, no murmurs, rubs, gallops [de-identified] : wearing glasses  [de-identified] : L chest port present ; trace bilateral LE edema without redness or pain

## 2023-02-22 ENCOUNTER — APPOINTMENT (OUTPATIENT)
Dept: INFUSION THERAPY | Facility: CLINIC | Age: 73
End: 2023-02-22

## 2023-02-22 ENCOUNTER — OUTPATIENT (OUTPATIENT)
Dept: OUTPATIENT SERVICES | Facility: HOSPITAL | Age: 73
LOS: 1 days | Discharge: HOME | End: 2023-02-22
Payer: MEDICARE

## 2023-02-22 DIAGNOSIS — C25.9 MALIGNANT NEOPLASM OF PANCREAS, UNSPECIFIED: ICD-10-CM

## 2023-02-22 DIAGNOSIS — Z51.11 ENCOUNTER FOR ANTINEOPLASTIC CHEMOTHERAPY: ICD-10-CM

## 2023-02-22 DIAGNOSIS — Z98.890 OTHER SPECIFIED POSTPROCEDURAL STATES: Chronic | ICD-10-CM

## 2023-02-22 DIAGNOSIS — G62.9 POLYNEUROPATHY, UNSPECIFIED: ICD-10-CM

## 2023-02-22 DIAGNOSIS — R97.8 OTHER ABNORMAL TUMOR MARKERS: ICD-10-CM

## 2023-02-22 LAB
ALBUMIN SERPL ELPH-MCNC: 3.5 G/DL
ALP BLD-CCNC: 100 U/L
ALT SERPL-CCNC: 17 U/L
ANION GAP SERPL CALC-SCNC: 9 MMOL/L
AST SERPL-CCNC: 23 U/L
BILIRUB DIRECT SERPL-MCNC: <0.2 MG/DL
BILIRUB INDIRECT SERPL-MCNC: >0.1 MG/DL
BILIRUB SERPL-MCNC: 0.3 MG/DL
BUN SERPL-MCNC: 11 MG/DL
CALCIUM SERPL-MCNC: 8.7 MG/DL
CANCER AG19-9 SERPL-ACNC: 570 U/ML
CHLORIDE SERPL-SCNC: 105 MMOL/L
CO2 SERPL-SCNC: 26 MMOL/L
CREAT SERPL-MCNC: 0.8 MG/DL
EGFR: 94 ML/MIN/1.73M2
GLUCOSE SERPL-MCNC: 88 MG/DL
POTASSIUM SERPL-SCNC: 4 MMOL/L
PROT SERPL-MCNC: 6.4 G/DL
SODIUM SERPL-SCNC: 140 MMOL/L

## 2023-02-22 PROCEDURE — 96413 CHEMO IV INFUSION 1 HR: CPT | Mod: 59

## 2023-02-22 PROCEDURE — 96415 CHEMO IV INFUSION ADDL HR: CPT

## 2023-02-22 PROCEDURE — 96374 THER/PROPH/DIAG INJ IV PUSH: CPT | Mod: 59

## 2023-02-22 PROCEDURE — 96375 TX/PRO/DX INJ NEW DRUG ADDON: CPT

## 2023-02-22 PROCEDURE — 96416 CHEMO PROLONG INFUSE W/PUMP: CPT

## 2023-02-22 PROCEDURE — 96365 THER/PROPH/DIAG IV INF INIT: CPT | Mod: 59

## 2023-02-22 RX ORDER — ATROPINE SULFATE 0.1 MG/ML
0.6 SYRINGE (ML) INJECTION ONCE
Refills: 0 | Status: COMPLETED | OUTPATIENT
Start: 2023-02-22 | End: 2023-02-22

## 2023-02-22 RX ORDER — FAMOTIDINE 10 MG/ML
20 INJECTION INTRAVENOUS ONCE
Refills: 0 | Status: COMPLETED | OUTPATIENT
Start: 2023-02-22 | End: 2023-02-22

## 2023-02-22 RX ORDER — IRINOTECAN HYDROCHLORIDE 100 MG/5ML
270 INJECTION, SOLUTION INTRAVENOUS ONCE
Refills: 0 | Status: COMPLETED | OUTPATIENT
Start: 2023-02-22 | End: 2023-02-22

## 2023-02-22 RX ORDER — DEXAMETHASONE 0.5 MG/5ML
12 ELIXIR ORAL ONCE
Refills: 0 | Status: COMPLETED | OUTPATIENT
Start: 2023-02-22 | End: 2023-02-22

## 2023-02-22 RX ORDER — DIPHENHYDRAMINE HCL 50 MG
25 CAPSULE ORAL ONCE
Refills: 0 | Status: COMPLETED | OUTPATIENT
Start: 2023-02-22 | End: 2023-02-22

## 2023-02-22 RX ORDER — FLUOROURACIL 50 MG/ML
4150 INJECTION, SOLUTION INTRAVENOUS ONCE
Refills: 0 | Status: COMPLETED | OUTPATIENT
Start: 2023-02-22 | End: 2023-02-22

## 2023-02-22 RX ORDER — FOSAPREPITANT DIMEGLUMINE 150 MG/5ML
150 INJECTION, POWDER, LYOPHILIZED, FOR SOLUTION INTRAVENOUS ONCE
Refills: 0 | Status: COMPLETED | OUTPATIENT
Start: 2023-02-22 | End: 2023-02-22

## 2023-02-22 RX ORDER — LEUCOVORIN CALCIUM 5 MG
720 TABLET ORAL ONCE
Refills: 0 | Status: COMPLETED | OUTPATIENT
Start: 2023-02-22 | End: 2023-02-22

## 2023-02-22 RX ADMIN — Medication 122 MILLIGRAM(S): at 10:51

## 2023-02-22 RX ADMIN — FOSAPREPITANT DIMEGLUMINE 500 MILLIGRAM(S): 150 INJECTION, POWDER, LYOPHILIZED, FOR SOLUTION INTRAVENOUS at 10:51

## 2023-02-22 RX ADMIN — Medication 720 MILLIGRAM(S): at 11:35

## 2023-02-22 RX ADMIN — Medication 491 MILLIGRAM(S): at 10:50

## 2023-02-22 RX ADMIN — FAMOTIDINE 494 MILLIGRAM(S): 10 INJECTION INTRAVENOUS at 10:51

## 2023-02-22 RX ADMIN — FLUOROURACIL 4150 MILLIGRAM(S): 50 INJECTION, SOLUTION INTRAVENOUS at 14:56

## 2023-02-22 RX ADMIN — IRINOTECAN HYDROCHLORIDE 270 MILLIGRAM(S): 100 INJECTION, SOLUTION INTRAVENOUS at 14:56

## 2023-02-22 RX ADMIN — Medication 0.6 MILLIGRAM(S): at 10:51

## 2023-02-24 ENCOUNTER — APPOINTMENT (OUTPATIENT)
Dept: INFUSION THERAPY | Facility: CLINIC | Age: 73
End: 2023-02-24

## 2023-02-24 ENCOUNTER — OUTPATIENT (OUTPATIENT)
Dept: OUTPATIENT SERVICES | Facility: HOSPITAL | Age: 73
LOS: 1 days | End: 2023-02-24
Payer: MEDICARE

## 2023-02-24 DIAGNOSIS — C25.9 MALIGNANT NEOPLASM OF PANCREAS, UNSPECIFIED: ICD-10-CM

## 2023-02-24 DIAGNOSIS — Z98.890 OTHER SPECIFIED POSTPROCEDURAL STATES: Chronic | ICD-10-CM

## 2023-02-24 PROCEDURE — 96372 THER/PROPH/DIAG INJ SC/IM: CPT

## 2023-02-24 RX ORDER — PEGFILGRASTIM-CBQV 6 MG/.6ML
6 INJECTION, SOLUTION SUBCUTANEOUS ONCE
Refills: 0 | Status: COMPLETED | OUTPATIENT
Start: 2023-02-24 | End: 2023-02-24

## 2023-02-24 RX ADMIN — PEGFILGRASTIM-CBQV 6 MILLIGRAM(S): 6 INJECTION, SOLUTION SUBCUTANEOUS at 12:55

## 2023-02-25 DIAGNOSIS — C25.9 MALIGNANT NEOPLASM OF PANCREAS, UNSPECIFIED: ICD-10-CM

## 2023-02-27 ENCOUNTER — APPOINTMENT (OUTPATIENT)
Dept: GASTROENTEROLOGY | Facility: CLINIC | Age: 73
End: 2023-02-27
Payer: MEDICARE

## 2023-02-27 PROCEDURE — 99443: CPT | Mod: 95

## 2023-02-27 NOTE — ASSESSMENT
[FreeTextEntry1] : Pt with a hx of pancreatic adenocarcinoma signet cell- pterional involvment\par Sp surgery and chemotherapy\par HPylori Sp treatment\par abdominal pain/discomfort\par \par Plan:\par Stool ag for HP\par EGD\par follow up after the above

## 2023-02-27 NOTE — HISTORY OF PRESENT ILLNESS
[Home] : at home, [unfilled] , at the time of the visit. [Medical Office: ([facility])___] : at the medical office located in  [FreeTextEntry1] : 73-year-old man history of pancreatic adenocarcinoma status post surgery currently on chemotherapy.  Feels good tolerating chemo.  Denies weight loss endorses tolerating diet denies weight loss. Hid only complaint is stomach gurgling after meals\par History of H. pylori treated.  No documentation of eradication.  Denies melena hematochezia

## 2023-03-03 ENCOUNTER — RESULT REVIEW (OUTPATIENT)
Age: 73
End: 2023-03-03

## 2023-03-03 ENCOUNTER — OUTPATIENT (OUTPATIENT)
Dept: OUTPATIENT SERVICES | Facility: HOSPITAL | Age: 73
LOS: 1 days | End: 2023-03-03
Payer: MEDICARE

## 2023-03-03 DIAGNOSIS — Z98.890 OTHER SPECIFIED POSTPROCEDURAL STATES: Chronic | ICD-10-CM

## 2023-03-03 DIAGNOSIS — Z00.8 ENCOUNTER FOR OTHER GENERAL EXAMINATION: ICD-10-CM

## 2023-03-03 DIAGNOSIS — R22.9 LOCALIZED SWELLING, MASS AND LUMP, UNSPECIFIED: ICD-10-CM

## 2023-03-03 PROCEDURE — 93970 EXTREMITY STUDY: CPT

## 2023-03-03 PROCEDURE — 93970 EXTREMITY STUDY: CPT | Mod: 26

## 2023-03-04 DIAGNOSIS — R22.9 LOCALIZED SWELLING, MASS AND LUMP, UNSPECIFIED: ICD-10-CM

## 2023-03-07 ENCOUNTER — APPOINTMENT (OUTPATIENT)
Dept: HEMATOLOGY ONCOLOGY | Facility: CLINIC | Age: 73
End: 2023-03-07
Payer: MEDICARE

## 2023-03-07 ENCOUNTER — LABORATORY RESULT (OUTPATIENT)
Age: 73
End: 2023-03-07

## 2023-03-07 ENCOUNTER — APPOINTMENT (OUTPATIENT)
Dept: HEMATOLOGY ONCOLOGY | Facility: CLINIC | Age: 73
End: 2023-03-07

## 2023-03-07 ENCOUNTER — OUTPATIENT (OUTPATIENT)
Dept: OUTPATIENT SERVICES | Facility: HOSPITAL | Age: 73
LOS: 1 days | End: 2023-03-07
Payer: MEDICARE

## 2023-03-07 VITALS
WEIGHT: 149 LBS | SYSTOLIC BLOOD PRESSURE: 153 MMHG | RESPIRATION RATE: 16 BRPM | DIASTOLIC BLOOD PRESSURE: 70 MMHG | TEMPERATURE: 97.2 F | HEIGHT: 66 IN | BODY MASS INDEX: 23.95 KG/M2 | HEART RATE: 83 BPM

## 2023-03-07 DIAGNOSIS — Z98.890 OTHER SPECIFIED POSTPROCEDURAL STATES: Chronic | ICD-10-CM

## 2023-03-07 DIAGNOSIS — C16.9 MALIGNANT NEOPLASM OF STOMACH, UNSPECIFIED: ICD-10-CM

## 2023-03-07 LAB
ALBUMIN SERPL ELPH-MCNC: 3.8 G/DL
ALP BLD-CCNC: 159 U/L
ALT SERPL-CCNC: 26 U/L
ANION GAP SERPL CALC-SCNC: 13 MMOL/L
AST SERPL-CCNC: 20 U/L
BILIRUB DIRECT SERPL-MCNC: <0.2 MG/DL
BILIRUB INDIRECT SERPL-MCNC: >0 MG/DL
BILIRUB SERPL-MCNC: 0.2 MG/DL
BUN SERPL-MCNC: 17 MG/DL
CALCIUM SERPL-MCNC: 8.9 MG/DL
CHLORIDE SERPL-SCNC: 102 MMOL/L
CO2 SERPL-SCNC: 24 MMOL/L
CREAT SERPL-MCNC: 0.8 MG/DL
EGFR: 94 ML/MIN/1.73M2
GLUCOSE SERPL-MCNC: 117 MG/DL
HCT VFR BLD CALC: 34 %
HGB BLD-MCNC: 11.6 G/DL
MCHC RBC-ENTMCNC: 29.9 PG
MCHC RBC-ENTMCNC: 34.1 G/DL
MCV RBC AUTO: 87.6 FL
PLATELET # BLD AUTO: 112 K/UL
PMV BLD: 9.4 FL
POTASSIUM SERPL-SCNC: 3.8 MMOL/L
PROT SERPL-MCNC: 6.9 G/DL
RBC # BLD: 3.88 M/UL
RBC # FLD: 16.6 %
SODIUM SERPL-SCNC: 139 MMOL/L
WBC # FLD AUTO: 13.63 K/UL

## 2023-03-07 PROCEDURE — 99214 OFFICE O/P EST MOD 30 MIN: CPT

## 2023-03-07 PROCEDURE — 80048 BASIC METABOLIC PNL TOTAL CA: CPT

## 2023-03-07 PROCEDURE — 86301 IMMUNOASSAY TUMOR CA 19-9: CPT

## 2023-03-07 PROCEDURE — 85027 COMPLETE CBC AUTOMATED: CPT

## 2023-03-07 PROCEDURE — 36415 COLL VENOUS BLD VENIPUNCTURE: CPT

## 2023-03-07 PROCEDURE — 80076 HEPATIC FUNCTION PANEL: CPT

## 2023-03-07 NOTE — ASSESSMENT
[Palliative] : Goals of care discussed with patient: Palliative [FreeTextEntry1] : # Metastatic moderate to poorly differentiated adenocarcinoma , dx 07/2022 \par - BRCA (-) \par - s/p laparoscopy on 8.22.2022 with findings of ~1L malignant ascites and peritoneal implants \par - Ca19-9 is 6481, CEA 6.8 from 07/2022 \par - reviewed radiology, pathology and lab workup and had a discussion regarding implications of diagnosis, prognosis and options for management including but not limited to chemotherapy for palliative intent ; had a lengthy discussion regarding Stage IV disease and not curative \par - s/p L Chest port placement with SURG, Dr. Morgan \par - CT CAP from 11.8.2022 is essentially stable, tumor marker is decreasing \par - s/p cycle 11 of FOLFIRINOX on 1/25 , initiated on 9.6.2022 \par - c/w cycle 12 of 5FU/Leucovorin/Irinotecan on 2/22  , initiated on 9.6.2022 ; discontinue oxaliplatin beginning C12 due to neuropathy \par - PET/CT ordered to be done prior to next visit ; if Ca19-9 is rising will request to expedite appointment for PET/CT \par - Labwork today: CBC, BMP, LFTs, Ca19-9 \par \par # Peripheral neuropathy, likely due to chemotherapy and hx of DM \par - we will omit oxaliplatin beginning C12 \par - c/w gabapentin 600mg daily , initiated 01/2023 \par \par # H. Pylori infection , dx 07/2022 \par - s/p upper EGD in 07/2022 ; completed antibiotic treatment \par - followup with GI, Dr. Maximilian Larson, as scheduled for management \par \par RTC in 2 weeks with CBC, BMP, LFTs Ca19-9 prior \par \par seen/examined w/ NP Myrtle; note reviewed; case discussed\par - grade 2 neuropathy: hold oxaliplatin and continue chemo\par - repeat imaging (PET/CT) is scheduled for 4/10/23: if CA 19-9 keeps rising will recommend to move sooner so that could evaluate for the change of therapy\par A) if disease is stable/ improves - continue same regimen without oxaliplatin (he is a  and still works: do not want to impair his quality of life\par b) if disease progresses, recommend to change treatment to gemzar/ abraxane

## 2023-03-07 NOTE — REVIEW OF SYSTEMS
[Lower Ext Edema] : lower extremity edema [Negative] : Allergic/Immunologic [Fever] : no fever [Night Sweats] : no night sweats [Dysphagia] : no dysphagia [Chest Pain] : no chest pain [Shortness Of Breath] : no shortness of breath [Abdominal Pain] : no abdominal pain [Vomiting] : no vomiting [Constipation] : no constipation [Easy Bleeding] : no tendency for easy bleeding [FreeTextEntry2] : appetite slightly improved but still poor  [FreeTextEntry5] : reports trace bilateral LE edema x 1 week [FreeTextEntry6] : cough has been improving  [de-identified] : L chest port present  [FreeTextEntry7] : reports occasional nausea [de-identified] : endorses occasional neuropathy with long exposure to cold items , neuropathy in his feet is slightly more apparent

## 2023-03-07 NOTE — PHYSICAL EXAM
[Restricted in physically strenuous activity but ambulatory and able to carry out work of a light or sedentary nature] : Status 1- Restricted in physically strenuous activity but ambulatory and able to carry out work of a light or sedentary nature, e.g., light house work, office work [Normal] : affect appropriate [de-identified] : wearing glasses  [de-identified] : L chest port present ; trace bilateral LE edema without redness or pain

## 2023-03-07 NOTE — HISTORY OF PRESENT ILLNESS
[Disease: _____________________] : Disease: [unfilled] [Therapy: ___] : Therapy: [unfilled] [Cycle: ___] : Cycle: [unfilled] [de-identified] : Mr. JINA SINGER is a 72 year old male here today for evaluation and management of Pancreatic Cancer.  \par \par He was referred by SURG, Dr. Mrogan.  JINA is a 72 year old M with PMHx including HTN, HLD, T2DM, who presents to clinic to establish care.  Patient presented to ER back in 07/2022 with complaint of gradually worsening abdominal pain for the prior month, worse after eating and associated with 40lb weight loss in the last 2 months.  He underwent CT imaging which shows evidence suggestive of metastatic disease.  He also endorses constipation.  He underwent laparoscopy with findings of ~1L malignant ascites and peritoneal implants on 8.22.2022.  He is presently feeling well with no new complaints.  Patient denies fever, chills, nausea, vomiting, dysphagia, dyspnea, neuropathy or bleeding.  Patient reports family history of malignancy in his father (possibly stomach cancer) and mother (possibly colon).  Never a smoker.  NKDA.  \par \par RADIOLOGIC WORKUP\par CT Chest (7.25.2022) IMPRESSION:Perigastric infiltrative process inseparable from the tail the pancreas extending to the proximal duodenum. Associated ascites. Findings again  suspicious for pancreatic or gastrointestinal neoplasm. Differential diagnosis includes partially contained ruptured gastric or duodenal ulcer as well as infectious process/pancreatitis.Hyperaerated lungs. Scattered granulomata. Vascular calcifications. Nonspecific areas of subpleural thickening bilaterally.\par CT A/P (7.24.2022) IMPRESSION:Predominantly perigastric infiltrative soft tissue changes which are inseparable from the tail of pancreas and extend around the proximal  duodenum. Small volume abdominopelvic ascites. Differential includes underlying pancreatic or gastrointestinal neoplasm, sequelae of partially contained ruptured gastric or duodenal  ulcer, and infectious process or perhaps pancreatitis. Clinical correlation and/or further evaluation with EGD should be considered.Findings compatible with BPH. Superimposed cystitis is difficult to exclude on imaging.\par \par LAB WORKUP\par (8.17.2022) WBC 8.36, Hgb 14.2, MCV 84, , normal diff, Cr 0.9, eGFR 91, normal LFTs\par (7.25.2022) Ca19-9 is 6481, CEA 6.8, TSH 2.62\par (10.23.2019) WBC 6.83, Hgb 15, MCV 85.7, \par \par PATHOLOGY\par (see results section)\par \par HCM\par Colonoscopy done about 5 years , polyps removed \par Upper Endoscopy done 07/2022 showed 5x5 hypoechoic infiltrative mass (irregular borders) extending between the  muscularis propria (abuting it) and the territory of the pancreatic tail \par COVID Vaccinated? [FreeTextEntry1] : Started mFOLFIRINOX on 9.6.2022 (discontinued oxaliplatin beginning C12 on 2/22/23)  [de-identified] : 9/14/22\[facility] Patient is here for a follow-up visit for pancreatic cancer.  He is s/p initial cycle of mFOLFIRINOX on 9.6.2022.  He is feeling well with no new complaints.  Reviewed most recent CBC, which is stable.  Patient denies fever, chills, nausea, vomiting, dyspnea, neuropathy, persistent diarrhea or bleeding.  He states his appetite is still poor but slightly improving.  He is due for cycle 2 of chemotherapy next week.  \par \par 10/4/22\[facility] Patient is here for a follow-up visit for pancreatic cancer.  He is due for cycle 3 of mFOLFIRINOX today, initiated on 9.6.2022.  He is feeling well with no new complaints.  Reviewed most recent CBC, which shows mild anemia, hgb 11.6g/dL and persistent leukocytosis, likely due to GCSF administration with chemotherapy.  ALK Phos is slightly higher at 198 but Ca19-9 is decreasing down to 10068A/mL.  Patient denies fever, chills, vomiting, dyspnea, neuropathy, CP, palpitations, abdominal pain, persistent diarrhea or bleeding.  He is endorses transient nausea which was not bothersome and he took anti-emetics.  \par \par 11/2/22\[facility] Patient is here for a follow-up visit for pancreatic cancer.  He is due for cycle 4 of mFOLFIRINOX today, initiated on 9.6.2022.  He is feeling well with no new complaints.  Reviewed most recent CBC, which shows mild anemia, hgb 11.1g/dL and persistent leukocytosis, likely due to GCSF administration with chemotherapy.  ALK Phos is slightly lower at 138 but Ca19-9 is decreasing down to 4619U/mL.  Patient denies fever, chills, vomiting, dyspnea, CP, palpitations, abdominal pain, persistent diarrhea or bleeding.  He endorses occasional neuropathy with long exposure to cold items.  \par \par 11/16/22\[facility] Patient is here for a follow-up visit for pancreatic cancer, accompanied by spouse.  He is due for cycle 6 of mFOLFIRINOX today, initiated on 9.6.2022.  He is feeling well with no new complaints.  Reviewed most recent CBC, which shows mild anemia, hgb 11.1g/dL and persistent leukocytosis, likely due to GCSF administration with chemotherapy.  ALK Phos is essentially stable at 146 but Ca19-9 is decreasing down to 2708U/mL.  Patient denies fever, chills, vomiting, dyspnea, CP, palpitations, abdominal pain, persistent diarrhea, mouth sores or bleeding.  He still notes occasional neuropathy with long exposure to cold items.  Reviewed most recent CT imaging which is essentially stable.  \par CT C/A/P (11.8.2022) IMPRESSION:1. No definite evidence of intrathoracic metastasis.Since 7/24/2022, no significant interval change.Near diffuse peritoneal carcinomatosis is not significantly changed.Diffuse tumor infiltration in the pancreatic tail (4/226), unchanged.Completely attenuated splenic vein is unchanged. Prominent venous collaterals in the left upper quadrant is unchanged.\par \par 11/30/22\par Patient is here for a follow-up visit for pancreatic cancer.  He is due for cycle 7 of mFOLFIRINOX today, initiated on 9.6.2022.  Reviewed most recent CBC, which shows mild anemia, hgb 10.9g/dL and persistent leukocytosis, likely due to GCSF administration with chemotherapy.  ALK Phos is essentially stable at 138 and Ca19-9 is decreasing down to 1683U/mL.  Patient denies fever, chills, vomiting, dyspnea, CP, palpitations, abdominal pain, persistent diarrhea, mouth sores or bleeding.  He still notes occasional neuropathy with long exposure to cold items. \par \par 12/14/22\par Patient is here for a follow-up visit for pancreatic cancer, accompanied by spouse via telephone.  He is due for cycle 8 of mFOLFIRINOX today, initiated on 9.6.2022.  Reviewed most recent CBC, which shows mild anemia, hgb 10.5g/dL and persistent leukocytosis, likely due to GCSF administration with chemotherapy.  ALK Phos is essentially stable at 124 and Ca19-9 is decreasing down to 1177U/mL.  Patient denies fever, chills, dyspnea, CP, palpitations, abdominal pain, persistent diarrhea, mouth sores or bleeding.  He has an occasional cough x 2 weeks; believes he had a mild cold which has been improving over the last 2 weeks but the cough is persistent.  He still notes occasional neuropathy with long exposure to cold items. \par \par 12/28/22\par Patient is here for a follow-up visit for pancreatic cancer, accompanied by spouse via telephone.  Since last visit, patient had CXR which was suggestive for pneumonia.  He has completed antibiotics with improvement in symptoms.  Reviewed PULM consult with Dr. Escobar: plans to repeat CXR in about 6 weeks.  Patient is agreeable to resume chemotherapy.  He is due for cycle 9 of mFOLFIRINOX today, initiated on 9.6.2022.  Reviewed most recent CBC which shows mild anemia and neutrophil-predominant leukocytosis.  Tumor marker is down to 957 U/mL from 12/2022.  \par CXR (12.15.2022) Impression:Subsegmental left lower lobe consolidation.\par \par 1/11/23\par Patient is here for a follow-up visit for pancreatic cancer, accompanied by spouse via telephone.  As per PULM consult with Dr. Escobar: plan to repeat CXR about 6 weeks following prior imaging.  He is due for cycle 10 of mFOLFIRINOX today, initiated on 9.6.2022.  Reviewed most recent CBC which shows mild anemia with hgb 10.6g/dL and neutrophil-predominant leukocytosis likely from GCSF administration.  Tumor marker is down to 693 U/mL from 1/2023.  He denies fevers, chills, worsening of neuropathy, dyspnea, mouth sores or bleeding.  Patient states he dislikes the effect of Benadryl pre-medication because it makes him drowsy and feel "off."  He is requesting to decrease Benadryl from 50mg to 25mg IV as pre-medicaiton.  Risks vs benefits discussed at length.  \par \par 1/24/23\par Patient is here for a follow-up visit for pancreatic cancer.  He is due for cycle 11 of mFOLFIRINOX today, initiated on 9.6.2022.  Reviewed most recent CBC which shows mild anemia with hgb 11.3g/dL, mild thrombocytopenia with PLTs 106,000 and neutrophil-predominant leukocytosis likely from GCSF administration.  Tumor marker is down to 693 U/mL from 1/2023.  He denies fevers, chills, worsening of neuropathy, dyspnea, new cough, mouth sores or bleeding.  \par \par 2/7/23\par Patient is here for a follow-up visit for pancreatic cancer.  He is due for cycle 12 of mFOLFIRINOX today, initiated on 9.6.2022.  Reviewed most recent CBC which shows mild anemia with hgb 11.3g/dL, mild thrombocytopenia with PLTs 106,000 and neutrophil-predominant leukocytosis likely from GCSF administration.  Tumor marker is down to 693 U/mL from 1/2023.  He denies fevers, chills, dyspnea, new cough, mouth sores or bleeding.  He states his neuropathy in his feet is slightly worse in both feet.  \par \par 2/21/23\par Patient is here for a follow-up visit for pancreatic cancer.  He is due for cycle 12 of chemotherapy on 2/22, initiated on 9.6.2022.  Reviewed most recent CBC which shows mild anemia with hgb 11g/dL.  Tumor marker is down to 457 U/mL from 02/2023.  He denies fevers, chills, dyspnea, new cough, mouth sores, persistent diarrhea or bleeding.  He endorses neuropathy in both feet and fingertips; started gabapentin 100mg TiD without resolution of symptoms.  \par \par 3/7/23\par Patient is here for a follow-up visit for pancreatic cancer.  He is due for cycle 13 of chemotherapy on 3/8, initiated on 9.6.2022.  Reviewed most recent CBC which shows mild anemia with hgb 11.6g/dL.  Tumor marker is slightly higher at 570 U/mL from 02/2023.  He denies fevers, chills, dyspnea, new cough, mouth sores, persistent diarrhea, new pain or bleeding.  He endorses neuropathy in both feet and fingertips; he is on gabapentin 600mg daily without resolution of symptoms.  He recently followed with GI, Dr. Maximilian Larson, and is anticipating upper EGD.  Reviewed US Duplex which showed no DVT.  \par US Duplex BI LE (3.3.2023) IMPRESSION:No evidence of deep venous thrombosis in either lower extremity.Bilateral popliteal cysts.

## 2023-03-08 ENCOUNTER — OUTPATIENT (OUTPATIENT)
Dept: OUTPATIENT SERVICES | Facility: HOSPITAL | Age: 73
LOS: 1 days | End: 2023-03-08
Payer: MEDICARE

## 2023-03-08 ENCOUNTER — APPOINTMENT (OUTPATIENT)
Dept: INFUSION THERAPY | Facility: CLINIC | Age: 73
End: 2023-03-08

## 2023-03-08 DIAGNOSIS — C16.9 MALIGNANT NEOPLASM OF STOMACH, UNSPECIFIED: ICD-10-CM

## 2023-03-08 DIAGNOSIS — C25.9 MALIGNANT NEOPLASM OF PANCREAS, UNSPECIFIED: ICD-10-CM

## 2023-03-08 DIAGNOSIS — G62.9 POLYNEUROPATHY, UNSPECIFIED: ICD-10-CM

## 2023-03-08 DIAGNOSIS — R97.8 OTHER ABNORMAL TUMOR MARKERS: ICD-10-CM

## 2023-03-08 DIAGNOSIS — Z51.11 ENCOUNTER FOR ANTINEOPLASTIC CHEMOTHERAPY: ICD-10-CM

## 2023-03-08 LAB — CANCER AG19-9 SERPL-ACNC: 1154 U/ML

## 2023-03-08 PROCEDURE — 96360 HYDRATION IV INFUSION INIT: CPT

## 2023-03-08 RX ORDER — SODIUM CHLORIDE 9 MG/ML
500 INJECTION INTRAMUSCULAR; INTRAVENOUS; SUBCUTANEOUS ONCE
Refills: 0 | Status: DISCONTINUED | OUTPATIENT
Start: 2023-03-08 | End: 2023-06-07

## 2023-03-09 ENCOUNTER — OUTPATIENT (OUTPATIENT)
Dept: OUTPATIENT SERVICES | Facility: HOSPITAL | Age: 73
LOS: 1 days | End: 2023-03-09
Payer: MEDICARE

## 2023-03-09 ENCOUNTER — RESULT REVIEW (OUTPATIENT)
Age: 73
End: 2023-03-09

## 2023-03-09 DIAGNOSIS — Z98.890 OTHER SPECIFIED POSTPROCEDURAL STATES: Chronic | ICD-10-CM

## 2023-03-09 DIAGNOSIS — C25.9 MALIGNANT NEOPLASM OF PANCREAS, UNSPECIFIED: ICD-10-CM

## 2023-03-09 LAB — GLUCOSE BLDC GLUCOMTR-MCNC: 82 MG/DL — SIGNIFICANT CHANGE UP (ref 70–99)

## 2023-03-09 PROCEDURE — A9552: CPT

## 2023-03-09 PROCEDURE — 78815 PET IMAGE W/CT SKULL-THIGH: CPT | Mod: 26,PI,MH

## 2023-03-09 PROCEDURE — 78815 PET IMAGE W/CT SKULL-THIGH: CPT | Mod: PI

## 2023-03-09 PROCEDURE — 82962 GLUCOSE BLOOD TEST: CPT

## 2023-03-10 ENCOUNTER — APPOINTMENT (OUTPATIENT)
Dept: INFUSION THERAPY | Facility: CLINIC | Age: 73
End: 2023-03-10

## 2023-03-10 DIAGNOSIS — C25.9 MALIGNANT NEOPLASM OF PANCREAS, UNSPECIFIED: ICD-10-CM

## 2023-03-15 ENCOUNTER — APPOINTMENT (OUTPATIENT)
Dept: HEMATOLOGY ONCOLOGY | Facility: CLINIC | Age: 73
End: 2023-03-15
Payer: MEDICARE

## 2023-03-15 ENCOUNTER — OUTPATIENT (OUTPATIENT)
Dept: OUTPATIENT SERVICES | Facility: HOSPITAL | Age: 73
LOS: 1 days | End: 2023-03-15
Payer: MEDICARE

## 2023-03-15 VITALS
DIASTOLIC BLOOD PRESSURE: 70 MMHG | BODY MASS INDEX: 24.43 KG/M2 | TEMPERATURE: 98 F | HEIGHT: 66 IN | SYSTOLIC BLOOD PRESSURE: 149 MMHG | RESPIRATION RATE: 16 BRPM | WEIGHT: 152 LBS | HEART RATE: 84 BPM | OXYGEN SATURATION: 98 %

## 2023-03-15 DIAGNOSIS — Z51.11 ENCOUNTER FOR ANTINEOPLASTIC CHEMOTHERAPY: ICD-10-CM

## 2023-03-15 DIAGNOSIS — C16.9 MALIGNANT NEOPLASM OF STOMACH, UNSPECIFIED: ICD-10-CM

## 2023-03-15 DIAGNOSIS — G62.9 POLYNEUROPATHY, UNSPECIFIED: ICD-10-CM

## 2023-03-15 DIAGNOSIS — C25.9 MALIGNANT NEOPLASM OF PANCREAS, UNSPECIFIED: ICD-10-CM

## 2023-03-15 DIAGNOSIS — R97.8 OTHER ABNORMAL TUMOR MARKERS: ICD-10-CM

## 2023-03-15 PROCEDURE — 99215 OFFICE O/P EST HI 40 MIN: CPT

## 2023-03-15 NOTE — REVIEW OF SYSTEMS
[Fever] : no fever [Night Sweats] : no night sweats [Dysphagia] : no dysphagia [Chest Pain] : no chest pain [Shortness Of Breath] : no shortness of breath [Abdominal Pain] : no abdominal pain [Vomiting] : no vomiting [Constipation] : no constipation [Easy Bleeding] : no tendency for easy bleeding [FreeTextEntry2] : appetite slightly improved but still poor  [FreeTextEntry5] : reports trace bilateral LE edema x 1 week [FreeTextEntry6] : cough has been improving  [FreeTextEntry7] : reports occasional nausea [de-identified] : L chest port present  [de-identified] : endorses occasional neuropathy with long exposure to cold items , neuropathy in his feet is slightly more apparent

## 2023-03-15 NOTE — HISTORY OF PRESENT ILLNESS
[de-identified] : Mr. JINA SINGER is a 72 year old male here today for evaluation and management of Pancreatic Cancer.  \par \par He was referred by SURG, Dr. Morgan.  JINA is a 72 year old M with PMHx including HTN, HLD, T2DM, who presents to clinic to establish care.  Patient presented to ER back in 07/2022 with complaint of gradually worsening abdominal pain for the prior month, worse after eating and associated with 40lb weight loss in the last 2 months.  He underwent CT imaging which shows evidence suggestive of metastatic disease.  He also endorses constipation.  He underwent laparoscopy with findings of ~1L malignant ascites and peritoneal implants on 8.22.2022.  He is presently feeling well with no new complaints.  Patient denies fever, chills, nausea, vomiting, dysphagia, dyspnea, neuropathy or bleeding.  Patient reports family history of malignancy in his father (possibly stomach cancer) and mother (possibly colon).  Never a smoker.  NKDA.  \par \par RADIOLOGIC WORKUP\par CT Chest (7.25.2022) IMPRESSION:Perigastric infiltrative process inseparable from the tail the pancreas extending to the proximal duodenum. Associated ascites. Findings again  suspicious for pancreatic or gastrointestinal neoplasm. Differential diagnosis includes partially contained ruptured gastric or duodenal ulcer as well as infectious process/pancreatitis.Hyperaerated lungs. Scattered granulomata. Vascular calcifications. Nonspecific areas of subpleural thickening bilaterally.\par CT A/P (7.24.2022) IMPRESSION:Predominantly perigastric infiltrative soft tissue changes which are inseparable from the tail of pancreas and extend around the proximal  duodenum. Small volume abdominopelvic ascites. Differential includes underlying pancreatic or gastrointestinal neoplasm, sequelae of partially contained ruptured gastric or duodenal  ulcer, and infectious process or perhaps pancreatitis. Clinical correlation and/or further evaluation with EGD should be considered.Findings compatible with BPH. Superimposed cystitis is difficult to exclude on imaging.\par \par LAB WORKUP\par (8.17.2022) WBC 8.36, Hgb 14.2, MCV 84, , normal diff, Cr 0.9, eGFR 91, normal LFTs\par (7.25.2022) Ca19-9 is 6481, CEA 6.8, TSH 2.62\par (10.23.2019) WBC 6.83, Hgb 15, MCV 85.7, \par \par PATHOLOGY\par (see results section)\par \par HCM\par Colonoscopy done about 5 years , polyps removed \par Upper Endoscopy done 07/2022 showed 5x5 hypoechoic infiltrative mass (irregular borders) extending between the  muscularis propria (abuting it) and the territory of the pancreatic tail \par COVID Vaccinated? [FreeTextEntry1] : Started mFOLFIRINOX on 9.6.2022 (discontinued oxaliplatin beginning C12 on 2/22/23) - 2.24.2023 due to rising Ca19-9.   [de-identified] : 9/14/22\[facility] Patient is here for a follow-up visit for pancreatic cancer.  He is s/p initial cycle of mFOLFIRINOX on 9.6.2022.  He is feeling well with no new complaints.  Reviewed most recent CBC, which is stable.  Patient denies fever, chills, nausea, vomiting, dyspnea, neuropathy, persistent diarrhea or bleeding.  He states his appetite is still poor but slightly improving.  He is due for cycle 2 of chemotherapy next week.  \par \par 10/4/22\[facility] Patient is here for a follow-up visit for pancreatic cancer.  He is due for cycle 3 of mFOLFIRINOX today, initiated on 9.6.2022.  He is feeling well with no new complaints.  Reviewed most recent CBC, which shows mild anemia, hgb 11.6g/dL and persistent leukocytosis, likely due to GCSF administration with chemotherapy.  ALK Phos is slightly higher at 198 but Ca19-9 is decreasing down to 99260L/mL.  Patient denies fever, chills, vomiting, dyspnea, neuropathy, CP, palpitations, abdominal pain, persistent diarrhea or bleeding.  He is endorses transient nausea which was not bothersome and he took anti-emetics.  \par \par 11/2/22\[facility] Patient is here for a follow-up visit for pancreatic cancer.  He is due for cycle 4 of mFOLFIRINOX today, initiated on 9.6.2022.  He is feeling well with no new complaints.  Reviewed most recent CBC, which shows mild anemia, hgb 11.1g/dL and persistent leukocytosis, likely due to GCSF administration with chemotherapy.  ALK Phos is slightly lower at 138 but Ca19-9 is decreasing down to 4619U/mL.  Patient denies fever, chills, vomiting, dyspnea, CP, palpitations, abdominal pain, persistent diarrhea or bleeding.  He endorses occasional neuropathy with long exposure to cold items.  \par \par 11/16/22\[facility] Patient is here for a follow-up visit for pancreatic cancer, accompanied by spouse.  He is due for cycle 6 of mFOLFIRINOX today, initiated on 9.6.2022.  He is feeling well with no new complaints.  Reviewed most recent CBC, which shows mild anemia, hgb 11.1g/dL and persistent leukocytosis, likely due to GCSF administration with chemotherapy.  ALK Phos is essentially stable at 146 but Ca19-9 is decreasing down to 2708U/mL.  Patient denies fever, chills, vomiting, dyspnea, CP, palpitations, abdominal pain, persistent diarrhea, mouth sores or bleeding.  He still notes occasional neuropathy with long exposure to cold items.  Reviewed most recent CT imaging which is essentially stable.  \par CT C/A/P (11.8.2022) IMPRESSION:1. No definite evidence of intrathoracic metastasis.Since 7/24/2022, no significant interval change.Near diffuse peritoneal carcinomatosis is not significantly changed.Diffuse tumor infiltration in the pancreatic tail (4/226), unchanged.Completely attenuated splenic vein is unchanged. Prominent venous collaterals in the left upper quadrant is unchanged.\par \par 11/30/22\par Patient is here for a follow-up visit for pancreatic cancer.  He is due for cycle 7 of mFOLFIRINOX today, initiated on 9.6.2022.  Reviewed most recent CBC, which shows mild anemia, hgb 10.9g/dL and persistent leukocytosis, likely due to GCSF administration with chemotherapy.  ALK Phos is essentially stable at 138 and Ca19-9 is decreasing down to 1683U/mL.  Patient denies fever, chills, vomiting, dyspnea, CP, palpitations, abdominal pain, persistent diarrhea, mouth sores or bleeding.  He still notes occasional neuropathy with long exposure to cold items. \par \par 12/14/22\par Patient is here for a follow-up visit for pancreatic cancer, accompanied by spouse via telephone.  He is due for cycle 8 of mFOLFIRINOX today, initiated on 9.6.2022.  Reviewed most recent CBC, which shows mild anemia, hgb 10.5g/dL and persistent leukocytosis, likely due to GCSF administration with chemotherapy.  ALK Phos is essentially stable at 124 and Ca19-9 is decreasing down to 1177U/mL.  Patient denies fever, chills, dyspnea, CP, palpitations, abdominal pain, persistent diarrhea, mouth sores or bleeding.  He has an occasional cough x 2 weeks; believes he had a mild cold which has been improving over the last 2 weeks but the cough is persistent.  He still notes occasional neuropathy with long exposure to cold items. \par \par 12/28/22\par Patient is here for a follow-up visit for pancreatic cancer, accompanied by spouse via telephone.  Since last visit, patient had CXR which was suggestive for pneumonia.  He has completed antibiotics with improvement in symptoms.  Reviewed PULM consult with Dr. Escobar: plans to repeat CXR in about 6 weeks.  Patient is agreeable to resume chemotherapy.  He is due for cycle 9 of mFOLFIRINOX today, initiated on 9.6.2022.  Reviewed most recent CBC which shows mild anemia and neutrophil-predominant leukocytosis.  Tumor marker is down to 957 U/mL from 12/2022.  \par CXR (12.15.2022) Impression:Subsegmental left lower lobe consolidation.\par \par 1/11/23\par Patient is here for a follow-up visit for pancreatic cancer, accompanied by spouse via telephone.  As per PULM consult with Dr. Escobar: plan to repeat CXR about 6 weeks following prior imaging.  He is due for cycle 10 of mFOLFIRINOX today, initiated on 9.6.2022.  Reviewed most recent CBC which shows mild anemia with hgb 10.6g/dL and neutrophil-predominant leukocytosis likely from GCSF administration.  Tumor marker is down to 693 U/mL from 1/2023.  He denies fevers, chills, worsening of neuropathy, dyspnea, mouth sores or bleeding.  Patient states he dislikes the effect of Benadryl pre-medication because it makes him drowsy and feel "off."  He is requesting to decrease Benadryl from 50mg to 25mg IV as pre-medicaiton.  Risks vs benefits discussed at length.  \par \par 1/24/23\par Patient is here for a follow-up visit for pancreatic cancer.  He is due for cycle 11 of mFOLFIRINOX today, initiated on 9.6.2022.  Reviewed most recent CBC which shows mild anemia with hgb 11.3g/dL, mild thrombocytopenia with PLTs 106,000 and neutrophil-predominant leukocytosis likely from GCSF administration.  Tumor marker is down to 693 U/mL from 1/2023.  He denies fevers, chills, worsening of neuropathy, dyspnea, new cough, mouth sores or bleeding.  \par \par 2/7/23\par Patient is here for a follow-up visit for pancreatic cancer.  He is due for cycle 12 of mFOLFIRINOX today, initiated on 9.6.2022.  Reviewed most recent CBC which shows mild anemia with hgb 11.3g/dL, mild thrombocytopenia with PLTs 106,000 and neutrophil-predominant leukocytosis likely from GCSF administration.  Tumor marker is down to 693 U/mL from 1/2023.  He denies fevers, chills, dyspnea, new cough, mouth sores or bleeding.  He states his neuropathy in his feet is slightly worse in both feet.  \par \par 2/21/23\par Patient is here for a follow-up visit for pancreatic cancer.  He is due for cycle 12 of chemotherapy on 2/22, initiated on 9.6.2022.  Reviewed most recent CBC which shows mild anemia with hgb 11g/dL.  Tumor marker is down to 457 U/mL from 02/2023.  He denies fevers, chills, dyspnea, new cough, mouth sores, persistent diarrhea or bleeding.  He endorses neuropathy in both feet and fingertips; started gabapentin 100mg TiD without resolution of symptoms.  \par \par 3/7/23\par Patient is here for a follow-up visit for pancreatic cancer.  He is due for cycle 13 of chemotherapy on 3/8, initiated on 9.6.2022.  Reviewed most recent CBC which shows mild anemia with hgb 11.6g/dL.  Tumor marker is slightly higher at 570 U/mL from 02/2023.  He denies fevers, chills, dyspnea, new cough, mouth sores, persistent diarrhea, new pain or bleeding.  He endorses neuropathy in both feet and fingertips; he is on gabapentin 600mg daily without resolution of symptoms.  He recently followed with GI, Dr. Maximilian Larson, and is anticipating upper EGD.  Reviewed US Duplex which showed no DVT.  \par US Duplex BI LE (3.3.2023) IMPRESSION:No evidence of deep venous thrombosis in either lower extremity.Bilateral popliteal cysts.\par \par 3/15/23\par Patient is here for a follow-up visit for pancreatic cancer, accompanied by spouse Renate via telephone.  He is s/p cycle 12 of chemotherapy on 2/24, initiated on 9.6.2022.  Tumor marker almost doubled up to 1154 U/mL from 03/2023.  He was sent for PET/CT which showed FDG uptake within upper abdominal peritoneal nodularity adjacent to the stomach and pancreas, pancreatic tail, and a subcentimeter right pelvic sidewall lymph node.  He is presently feeling fine without new complaints.  \par PET/CT (3.9.2023) IMPRESSION:Abnormal FDG uptake within upper abdominal peritoneal nodularity adjacent to the stomach and pancreas (SUV up to 5.4), pancreatic tail (SUV 3.4) and a subcentimeter right pelvic sidewall lymph node (SUV 3.6).No other definite sites of abnormal FDG uptake to suggest biologic tumor activity.

## 2023-03-15 NOTE — PHYSICAL EXAM
[de-identified] : wearing glasses  [de-identified] : L chest port present ; trace bilateral LE edema without redness or pain

## 2023-03-21 ENCOUNTER — OUTPATIENT (OUTPATIENT)
Dept: OUTPATIENT SERVICES | Facility: HOSPITAL | Age: 73
LOS: 1 days | End: 2023-03-21
Payer: MEDICARE

## 2023-03-21 ENCOUNTER — LABORATORY RESULT (OUTPATIENT)
Age: 73
End: 2023-03-21

## 2023-03-21 ENCOUNTER — APPOINTMENT (OUTPATIENT)
Dept: HEMATOLOGY ONCOLOGY | Facility: CLINIC | Age: 73
End: 2023-03-21
Payer: MEDICARE

## 2023-03-21 VITALS
HEIGHT: 66 IN | WEIGHT: 154 LBS | HEART RATE: 76 BPM | TEMPERATURE: 98 F | BODY MASS INDEX: 24.75 KG/M2 | RESPIRATION RATE: 16 BRPM | SYSTOLIC BLOOD PRESSURE: 150 MMHG | DIASTOLIC BLOOD PRESSURE: 83 MMHG

## 2023-03-21 DIAGNOSIS — Z51.11 ENCOUNTER FOR ANTINEOPLASTIC CHEMOTHERAPY: ICD-10-CM

## 2023-03-21 DIAGNOSIS — C25.9 MALIGNANT NEOPLASM OF PANCREAS, UNSPECIFIED: ICD-10-CM

## 2023-03-21 DIAGNOSIS — G62.9 POLYNEUROPATHY, UNSPECIFIED: ICD-10-CM

## 2023-03-21 DIAGNOSIS — C16.9 MALIGNANT NEOPLASM OF STOMACH, UNSPECIFIED: ICD-10-CM

## 2023-03-21 DIAGNOSIS — R97.8 OTHER ABNORMAL TUMOR MARKERS: ICD-10-CM

## 2023-03-21 DIAGNOSIS — Z98.890 OTHER SPECIFIED POSTPROCEDURAL STATES: Chronic | ICD-10-CM

## 2023-03-21 LAB
ALBUMIN SERPL ELPH-MCNC: 3.9 G/DL
ALP BLD-CCNC: 108 U/L
ALT SERPL-CCNC: 16 U/L
ANION GAP SERPL CALC-SCNC: 13 MMOL/L
AST SERPL-CCNC: 17 U/L
BILIRUB DIRECT SERPL-MCNC: <0.2 MG/DL
BILIRUB INDIRECT SERPL-MCNC: >0.2 MG/DL
BILIRUB SERPL-MCNC: 0.4 MG/DL
BUN SERPL-MCNC: 17 MG/DL
CALCIUM SERPL-MCNC: 9.4 MG/DL
CHLORIDE SERPL-SCNC: 102 MMOL/L
CO2 SERPL-SCNC: 23 MMOL/L
CREAT SERPL-MCNC: 0.8 MG/DL
EGFR: 94 ML/MIN/1.73M2
GLUCOSE SERPL-MCNC: 130 MG/DL
HCT VFR BLD CALC: 35.6 %
HGB BLD-MCNC: 12 G/DL
MCHC RBC-ENTMCNC: 29.9 PG
MCHC RBC-ENTMCNC: 33.7 G/DL
MCV RBC AUTO: 88.6 FL
PLATELET # BLD AUTO: 182 K/UL
PMV BLD: 8.9 FL
POTASSIUM SERPL-SCNC: 4.3 MMOL/L
PROT SERPL-MCNC: 7.2 G/DL
RBC # BLD: 4.02 M/UL
RBC # FLD: 15.9 %
SODIUM SERPL-SCNC: 138 MMOL/L
WBC # FLD AUTO: 5.38 K/UL

## 2023-03-21 PROCEDURE — 99215 OFFICE O/P EST HI 40 MIN: CPT

## 2023-03-21 PROCEDURE — 86301 IMMUNOASSAY TUMOR CA 19-9: CPT

## 2023-03-21 PROCEDURE — 85027 COMPLETE CBC AUTOMATED: CPT

## 2023-03-21 PROCEDURE — 80076 HEPATIC FUNCTION PANEL: CPT

## 2023-03-21 PROCEDURE — 80048 BASIC METABOLIC PNL TOTAL CA: CPT

## 2023-03-22 ENCOUNTER — APPOINTMENT (OUTPATIENT)
Dept: INFUSION THERAPY | Facility: CLINIC | Age: 73
End: 2023-03-22

## 2023-03-22 ENCOUNTER — OUTPATIENT (OUTPATIENT)
Dept: OUTPATIENT SERVICES | Facility: HOSPITAL | Age: 73
LOS: 1 days | End: 2023-03-22
Payer: MEDICARE

## 2023-03-22 DIAGNOSIS — C16.9 MALIGNANT NEOPLASM OF STOMACH, UNSPECIFIED: ICD-10-CM

## 2023-03-22 DIAGNOSIS — R97.8 OTHER ABNORMAL TUMOR MARKERS: ICD-10-CM

## 2023-03-22 DIAGNOSIS — G62.9 POLYNEUROPATHY, UNSPECIFIED: ICD-10-CM

## 2023-03-22 DIAGNOSIS — Z98.890 OTHER SPECIFIED POSTPROCEDURAL STATES: Chronic | ICD-10-CM

## 2023-03-22 DIAGNOSIS — C25.9 MALIGNANT NEOPLASM OF PANCREAS, UNSPECIFIED: ICD-10-CM

## 2023-03-22 DIAGNOSIS — Z51.11 ENCOUNTER FOR ANTINEOPLASTIC CHEMOTHERAPY: ICD-10-CM

## 2023-03-22 LAB — CANCER AG19-9 SERPL-ACNC: 2410 U/ML

## 2023-03-22 PROCEDURE — 96417 CHEMO IV INFUS EACH ADDL SEQ: CPT

## 2023-03-22 PROCEDURE — 96367 TX/PROPH/DG ADDL SEQ IV INF: CPT

## 2023-03-22 PROCEDURE — 96413 CHEMO IV INFUSION 1 HR: CPT

## 2023-03-22 RX ORDER — PACLITAXEL 100 MG/20ML
220 INJECTION, POWDER, LYOPHILIZED, FOR SUSPENSION INTRAVENOUS ONCE
Refills: 0 | Status: COMPLETED | OUTPATIENT
Start: 2023-03-22 | End: 2023-03-22

## 2023-03-22 RX ORDER — GEMCITABINE 38 MG/ML
1790 INJECTION, SOLUTION INTRAVENOUS ONCE
Refills: 0 | Status: COMPLETED | OUTPATIENT
Start: 2023-03-22 | End: 2023-03-22

## 2023-03-22 RX ORDER — FAMOTIDINE 10 MG/ML
20 INJECTION INTRAVENOUS ONCE
Refills: 0 | Status: COMPLETED | OUTPATIENT
Start: 2023-03-22 | End: 2023-03-22

## 2023-03-22 RX ORDER — FOSAPREPITANT DIMEGLUMINE 150 MG/5ML
150 INJECTION, POWDER, LYOPHILIZED, FOR SOLUTION INTRAVENOUS ONCE
Refills: 0 | Status: COMPLETED | OUTPATIENT
Start: 2023-03-22 | End: 2023-03-22

## 2023-03-22 RX ORDER — DIPHENHYDRAMINE HCL 50 MG
50 CAPSULE ORAL ONCE
Refills: 0 | Status: COMPLETED | OUTPATIENT
Start: 2023-03-22 | End: 2023-03-22

## 2023-03-22 RX ORDER — DEXAMETHASONE 0.5 MG/5ML
10 ELIXIR ORAL ONCE
Refills: 0 | Status: COMPLETED | OUTPATIENT
Start: 2023-03-22 | End: 2023-03-22

## 2023-03-22 RX ADMIN — Medication 101 MILLIGRAM(S): at 10:49

## 2023-03-22 RX ADMIN — GEMCITABINE 1790 MILLIGRAM(S): 38 INJECTION, SOLUTION INTRAVENOUS at 13:00

## 2023-03-22 RX ADMIN — Medication 50 MILLIGRAM(S): at 12:21

## 2023-03-22 RX ADMIN — PACLITAXEL 220 MILLIGRAM(S): 100 INJECTION, POWDER, LYOPHILIZED, FOR SUSPENSION INTRAVENOUS at 12:30

## 2023-03-22 RX ADMIN — Medication 118 MILLIGRAM(S): at 10:57

## 2023-03-22 RX ADMIN — PACLITAXEL 220 MILLIGRAM(S): 100 INJECTION, POWDER, LYOPHILIZED, FOR SUSPENSION INTRAVENOUS at 13:00

## 2023-03-22 RX ADMIN — Medication 102 MILLIGRAM(S): at 12:05

## 2023-03-22 RX ADMIN — FAMOTIDINE 20 MILLIGRAM(S): 10 INJECTION INTRAVENOUS at 12:05

## 2023-03-22 RX ADMIN — Medication 10 MILLIGRAM(S): at 11:45

## 2023-03-22 RX ADMIN — FOSAPREPITANT DIMEGLUMINE 300 MILLIGRAM(S): 150 INJECTION, POWDER, LYOPHILIZED, FOR SOLUTION INTRAVENOUS at 10:48

## 2023-03-22 RX ADMIN — FAMOTIDINE 104 MILLIGRAM(S): 10 INJECTION INTRAVENOUS at 11:45

## 2023-03-22 RX ADMIN — GEMCITABINE 1790 MILLIGRAM(S): 38 INJECTION, SOLUTION INTRAVENOUS at 14:00

## 2023-03-22 RX ADMIN — FOSAPREPITANT DIMEGLUMINE 150 MILLIGRAM(S): 150 INJECTION, POWDER, LYOPHILIZED, FOR SOLUTION INTRAVENOUS at 11:25

## 2023-03-24 ENCOUNTER — APPOINTMENT (OUTPATIENT)
Dept: INFUSION THERAPY | Facility: CLINIC | Age: 73
End: 2023-03-24

## 2023-03-29 ENCOUNTER — OUTPATIENT (OUTPATIENT)
Dept: OUTPATIENT SERVICES | Facility: HOSPITAL | Age: 73
LOS: 1 days | End: 2023-03-29
Payer: MEDICARE

## 2023-03-29 ENCOUNTER — APPOINTMENT (OUTPATIENT)
Dept: INFUSION THERAPY | Facility: CLINIC | Age: 73
End: 2023-03-29
Payer: MEDICARE

## 2023-03-29 ENCOUNTER — APPOINTMENT (OUTPATIENT)
Dept: HEMATOLOGY ONCOLOGY | Facility: CLINIC | Age: 73
End: 2023-03-29
Payer: MEDICARE

## 2023-03-29 ENCOUNTER — LABORATORY RESULT (OUTPATIENT)
Age: 73
End: 2023-03-29

## 2023-03-29 VITALS
BODY MASS INDEX: 24.91 KG/M2 | RESPIRATION RATE: 16 BRPM | SYSTOLIC BLOOD PRESSURE: 138 MMHG | TEMPERATURE: 98.1 F | HEIGHT: 66 IN | HEART RATE: 77 BPM | OXYGEN SATURATION: 99 % | DIASTOLIC BLOOD PRESSURE: 68 MMHG | WEIGHT: 155 LBS

## 2023-03-29 DIAGNOSIS — Z51.11 ENCOUNTER FOR ANTINEOPLASTIC CHEMOTHERAPY: ICD-10-CM

## 2023-03-29 DIAGNOSIS — C16.9 MALIGNANT NEOPLASM OF STOMACH, UNSPECIFIED: ICD-10-CM

## 2023-03-29 DIAGNOSIS — G62.9 POLYNEUROPATHY, UNSPECIFIED: ICD-10-CM

## 2023-03-29 DIAGNOSIS — Z98.890 OTHER SPECIFIED POSTPROCEDURAL STATES: Chronic | ICD-10-CM

## 2023-03-29 DIAGNOSIS — R97.8 OTHER ABNORMAL TUMOR MARKERS: ICD-10-CM

## 2023-03-29 DIAGNOSIS — C25.9 MALIGNANT NEOPLASM OF PANCREAS, UNSPECIFIED: ICD-10-CM

## 2023-03-29 LAB
HCT VFR BLD CALC: 31.5 %
HGB BLD-MCNC: 10.9 G/DL
MCHC RBC-ENTMCNC: 30.5 PG
MCHC RBC-ENTMCNC: 34.6 G/DL
MCV RBC AUTO: 88.2 FL
PLATELET # BLD AUTO: 115 K/UL
PMV BLD: 8.7 FL
RBC # BLD: 3.57 M/UL
RBC # FLD: 15.1 %
WBC # FLD AUTO: 3.14 K/UL

## 2023-03-29 PROCEDURE — 96417 CHEMO IV INFUS EACH ADDL SEQ: CPT

## 2023-03-29 PROCEDURE — 99214 OFFICE O/P EST MOD 30 MIN: CPT

## 2023-03-29 PROCEDURE — 96367 TX/PROPH/DG ADDL SEQ IV INF: CPT

## 2023-03-29 PROCEDURE — 85027 COMPLETE CBC AUTOMATED: CPT

## 2023-03-29 PROCEDURE — 96413 CHEMO IV INFUSION 1 HR: CPT

## 2023-03-29 PROCEDURE — 96375 TX/PRO/DX INJ NEW DRUG ADDON: CPT

## 2023-03-29 RX ORDER — GEMCITABINE 38 MG/ML
1790 INJECTION, SOLUTION INTRAVENOUS ONCE
Refills: 0 | Status: COMPLETED | OUTPATIENT
Start: 2023-03-29 | End: 2023-03-29

## 2023-03-29 RX ORDER — PACLITAXEL 100 MG/20ML
220 INJECTION, POWDER, LYOPHILIZED, FOR SUSPENSION INTRAVENOUS ONCE
Refills: 0 | Status: COMPLETED | OUTPATIENT
Start: 2023-03-29 | End: 2023-06-14

## 2023-03-29 RX ORDER — GABAPENTIN 300 MG/1
300 CAPSULE ORAL 3 TIMES DAILY
Qty: 270 | Refills: 3 | Status: ACTIVE | COMMUNITY
Start: 2023-02-07 | End: 1900-01-01

## 2023-03-29 RX ORDER — FAMOTIDINE 10 MG/ML
20 INJECTION INTRAVENOUS ONCE
Refills: 0 | Status: COMPLETED | OUTPATIENT
Start: 2023-03-29 | End: 2023-03-29

## 2023-03-29 RX ORDER — DEXAMETHASONE 0.5 MG/5ML
10 ELIXIR ORAL ONCE
Refills: 0 | Status: COMPLETED | OUTPATIENT
Start: 2023-03-29 | End: 2023-03-29

## 2023-03-29 RX ORDER — FOSAPREPITANT DIMEGLUMINE 150 MG/5ML
150 INJECTION, POWDER, LYOPHILIZED, FOR SOLUTION INTRAVENOUS ONCE
Refills: 0 | Status: COMPLETED | OUTPATIENT
Start: 2023-03-29 | End: 2023-03-29

## 2023-03-29 RX ORDER — DIPHENHYDRAMINE HCL 50 MG
50 CAPSULE ORAL ONCE
Refills: 0 | Status: COMPLETED | OUTPATIENT
Start: 2023-03-29 | End: 2023-03-29

## 2023-03-29 RX ADMIN — Medication 118 MILLIGRAM(S): at 14:42

## 2023-03-29 RX ADMIN — FOSAPREPITANT DIMEGLUMINE 300 MILLIGRAM(S): 150 INJECTION, POWDER, LYOPHILIZED, FOR SOLUTION INTRAVENOUS at 14:42

## 2023-03-29 RX ADMIN — GEMCITABINE 396.15 MILLIGRAM(S): 38 INJECTION, SOLUTION INTRAVENOUS at 16:22

## 2023-03-29 RX ADMIN — Medication 102 MILLIGRAM(S): at 14:42

## 2023-03-29 RX ADMIN — FAMOTIDINE 104 MILLIGRAM(S): 10 INJECTION INTRAVENOUS at 14:42

## 2023-03-29 NOTE — CONSULT LETTER
[Dear  ___] : Dear  [unfilled], [Consult Letter:] : I had the pleasure of evaluating your patient, [unfilled]. [Please see my note below.] : Please see my note below. [Consult Closing:] : Thank you very much for allowing me to participate in the care of this patient.  If you have any questions, please do not hesitate to contact me. [Sincerely,] : Sincerely, [DrArnulfo  ___] : Dr. PRABHAKAR [FreeTextEntry3] : Faustino Che\inna Holley NP

## 2023-03-29 NOTE — REVIEW OF SYSTEMS
[Lower Ext Edema] : lower extremity edema [Negative] : Heme/Lymph [Fever] : no fever [Night Sweats] : no night sweats [Dysphagia] : no dysphagia [Chest Pain] : no chest pain [Shortness Of Breath] : no shortness of breath [Abdominal Pain] : no abdominal pain [Vomiting] : no vomiting [Constipation] : no constipation [Easy Bleeding] : no tendency for easy bleeding [FreeTextEntry2] : appetite slightly improved but still poor  [FreeTextEntry5] : reports trace bilateral LE edema x 1 week [FreeTextEntry6] : cough has been improving  [FreeTextEntry7] : reports occasional nausea [de-identified] : L chest port present  [de-identified] : endorses occasional neuropathy with long exposure to cold items , neuropathy in his feet is slightly more apparent

## 2023-03-29 NOTE — PHYSICAL EXAM
[Restricted in physically strenuous activity but ambulatory and able to carry out work of a light or sedentary nature] : Status 1- Restricted in physically strenuous activity but ambulatory and able to carry out work of a light or sedentary nature, e.g., light house work, office work [Normal] : affect appropriate [de-identified] : wearing glasses  [de-identified] : L chest port present ; trace bilateral LE edema without redness or pain

## 2023-03-29 NOTE — HISTORY OF PRESENT ILLNESS
[Disease: _____________________] : Disease: [unfilled] [Therapy: ___] : Therapy: [unfilled] [Cycle: ___] : Cycle: [unfilled] [de-identified] : Mr. JINA SINGER is a 72 year old male here today for evaluation and management of Pancreatic Cancer.  \par \par He was referred by SURG, Dr. Morgan.  JINA is a 72 year old M with PMHx including HTN, HLD, T2DM, who presents to clinic to establish care.  Patient presented to ER back in 07/2022 with complaint of gradually worsening abdominal pain for the prior month, worse after eating and associated with 40lb weight loss in the last 2 months.  He underwent CT imaging which shows evidence suggestive of metastatic disease.  He also endorses constipation.  He underwent laparoscopy with findings of ~1L malignant ascites and peritoneal implants on 8.22.2022.  He is presently feeling well with no new complaints.  Patient denies fever, chills, nausea, vomiting, dysphagia, dyspnea, neuropathy or bleeding.  Patient reports family history of malignancy in his father (possibly stomach cancer) and mother (possibly colon).  Never a smoker.  NKDA.  \par \par RADIOLOGIC WORKUP\par CT Chest (7.25.2022) IMPRESSION:Perigastric infiltrative process inseparable from the tail the pancreas extending to the proximal duodenum. Associated ascites. Findings again  suspicious for pancreatic or gastrointestinal neoplasm. Differential diagnosis includes partially contained ruptured gastric or duodenal ulcer as well as infectious process/pancreatitis.Hyperaerated lungs. Scattered granulomata. Vascular calcifications. Nonspecific areas of subpleural thickening bilaterally.\par CT A/P (7.24.2022) IMPRESSION:Predominantly perigastric infiltrative soft tissue changes which are inseparable from the tail of pancreas and extend around the proximal  duodenum. Small volume abdominopelvic ascites. Differential includes underlying pancreatic or gastrointestinal neoplasm, sequelae of partially contained ruptured gastric or duodenal  ulcer, and infectious process or perhaps pancreatitis. Clinical correlation and/or further evaluation with EGD should be considered.Findings compatible with BPH. Superimposed cystitis is difficult to exclude on imaging.\par \par LAB WORKUP\par (8.17.2022) WBC 8.36, Hgb 14.2, MCV 84, , normal diff, Cr 0.9, eGFR 91, normal LFTs\par (7.25.2022) Ca19-9 is 6481, CEA 6.8, TSH 2.62\par (10.23.2019) WBC 6.83, Hgb 15, MCV 85.7, \par \par PATHOLOGY\par (see results section)\par \par HCM\par Colonoscopy done about 5 years , polyps removed \par Upper Endoscopy done 07/2022 showed 5x5 hypoechoic infiltrative mass (irregular borders) extending between the  muscularis propria (abuting it) and the territory of the pancreatic tail \par COVID Vaccinated? [FreeTextEntry1] : Started Gemcitabine + Abraxane with onbody neulasta (D1, D8, D15 every 28 days) on 3.22.2023 \par Started mFOLFIRINOX on 9.6.2022 (discontinued oxaliplatin beginning C12 on 2/22/23) - 2.24.2023 due to rising Ca19-9.   [de-identified] : 9/14/22\[facility] Patient is here for a follow-up visit for pancreatic cancer.  He is s/p initial cycle of mFOLFIRINOX on 9.6.2022.  He is feeling well with no new complaints.  Reviewed most recent CBC, which is stable.  Patient denies fever, chills, nausea, vomiting, dyspnea, neuropathy, persistent diarrhea or bleeding.  He states his appetite is still poor but slightly improving.  He is due for cycle 2 of chemotherapy next week.  \par \par 10/4/22\[facility] Patient is here for a follow-up visit for pancreatic cancer.  He is due for cycle 3 of mFOLFIRINOX today, initiated on 9.6.2022.  He is feeling well with no new complaints.  Reviewed most recent CBC, which shows mild anemia, hgb 11.6g/dL and persistent leukocytosis, likely due to GCSF administration with chemotherapy.  ALK Phos is slightly higher at 198 but Ca19-9 is decreasing down to 27464E/mL.  Patient denies fever, chills, vomiting, dyspnea, neuropathy, CP, palpitations, abdominal pain, persistent diarrhea or bleeding.  He is endorses transient nausea which was not bothersome and he took anti-emetics.  \par \par 11/2/22\[facility] Patient is here for a follow-up visit for pancreatic cancer.  He is due for cycle 4 of mFOLFIRINOX today, initiated on 9.6.2022.  He is feeling well with no new complaints.  Reviewed most recent CBC, which shows mild anemia, hgb 11.1g/dL and persistent leukocytosis, likely due to GCSF administration with chemotherapy.  ALK Phos is slightly lower at 138 but Ca19-9 is decreasing down to 4619U/mL.  Patient denies fever, chills, vomiting, dyspnea, CP, palpitations, abdominal pain, persistent diarrhea or bleeding.  He endorses occasional neuropathy with long exposure to cold items.  \par \par 11/16/22\[facility] Patient is here for a follow-up visit for pancreatic cancer, accompanied by spouse.  He is due for cycle 6 of mFOLFIRINOX today, initiated on 9.6.2022.  He is feeling well with no new complaints.  Reviewed most recent CBC, which shows mild anemia, hgb 11.1g/dL and persistent leukocytosis, likely due to GCSF administration with chemotherapy.  ALK Phos is essentially stable at 146 but Ca19-9 is decreasing down to 2708U/mL.  Patient denies fever, chills, vomiting, dyspnea, CP, palpitations, abdominal pain, persistent diarrhea, mouth sores or bleeding.  He still notes occasional neuropathy with long exposure to cold items.  Reviewed most recent CT imaging which is essentially stable.  \par CT C/A/P (11.8.2022) IMPRESSION:1. No definite evidence of intrathoracic metastasis.Since 7/24/2022, no significant interval change.Near diffuse peritoneal carcinomatosis is not significantly changed.Diffuse tumor infiltration in the pancreatic tail (4/226), unchanged.Completely attenuated splenic vein is unchanged. Prominent venous collaterals in the left upper quadrant is unchanged.\par \par 11/30/22\par Patient is here for a follow-up visit for pancreatic cancer.  He is due for cycle 7 of mFOLFIRINOX today, initiated on 9.6.2022.  Reviewed most recent CBC, which shows mild anemia, hgb 10.9g/dL and persistent leukocytosis, likely due to GCSF administration with chemotherapy.  ALK Phos is essentially stable at 138 and Ca19-9 is decreasing down to 1683U/mL.  Patient denies fever, chills, vomiting, dyspnea, CP, palpitations, abdominal pain, persistent diarrhea, mouth sores or bleeding.  He still notes occasional neuropathy with long exposure to cold items. \par \par 12/14/22\par Patient is here for a follow-up visit for pancreatic cancer, accompanied by spouse via telephone.  He is due for cycle 8 of mFOLFIRINOX today, initiated on 9.6.2022.  Reviewed most recent CBC, which shows mild anemia, hgb 10.5g/dL and persistent leukocytosis, likely due to GCSF administration with chemotherapy.  ALK Phos is essentially stable at 124 and Ca19-9 is decreasing down to 1177U/mL.  Patient denies fever, chills, dyspnea, CP, palpitations, abdominal pain, persistent diarrhea, mouth sores or bleeding.  He has an occasional cough x 2 weeks; believes he had a mild cold which has been improving over the last 2 weeks but the cough is persistent.  He still notes occasional neuropathy with long exposure to cold items. \par \par 12/28/22\par Patient is here for a follow-up visit for pancreatic cancer, accompanied by spouse via telephone.  Since last visit, patient had CXR which was suggestive for pneumonia.  He has completed antibiotics with improvement in symptoms.  Reviewed PULM consult with Dr. Escobar: plans to repeat CXR in about 6 weeks.  Patient is agreeable to resume chemotherapy.  He is due for cycle 9 of mFOLFIRINOX today, initiated on 9.6.2022.  Reviewed most recent CBC which shows mild anemia and neutrophil-predominant leukocytosis.  Tumor marker is down to 957 U/mL from 12/2022.  \par CXR (12.15.2022) Impression:Subsegmental left lower lobe consolidation.\par \par 1/11/23\par Patient is here for a follow-up visit for pancreatic cancer, accompanied by spouse via telephone.  As per PULM consult with Dr. Escobar: plan to repeat CXR about 6 weeks following prior imaging.  He is due for cycle 10 of mFOLFIRINOX today, initiated on 9.6.2022.  Reviewed most recent CBC which shows mild anemia with hgb 10.6g/dL and neutrophil-predominant leukocytosis likely from GCSF administration.  Tumor marker is down to 693 U/mL from 1/2023.  He denies fevers, chills, worsening of neuropathy, dyspnea, mouth sores or bleeding.  Patient states he dislikes the effect of Benadryl pre-medication because it makes him drowsy and feel "off."  He is requesting to decrease Benadryl from 50mg to 25mg IV as pre-medicaiton.  Risks vs benefits discussed at length.  \par \par 1/24/23\par Patient is here for a follow-up visit for pancreatic cancer.  He is due for cycle 11 of mFOLFIRINOX today, initiated on 9.6.2022.  Reviewed most recent CBC which shows mild anemia with hgb 11.3g/dL, mild thrombocytopenia with PLTs 106,000 and neutrophil-predominant leukocytosis likely from GCSF administration.  Tumor marker is down to 693 U/mL from 1/2023.  He denies fevers, chills, worsening of neuropathy, dyspnea, new cough, mouth sores or bleeding.  \par \par 2/7/23\par Patient is here for a follow-up visit for pancreatic cancer.  He is due for cycle 12 of mFOLFIRINOX today, initiated on 9.6.2022.  Reviewed most recent CBC which shows mild anemia with hgb 11.3g/dL, mild thrombocytopenia with PLTs 106,000 and neutrophil-predominant leukocytosis likely from GCSF administration.  Tumor marker is down to 693 U/mL from 1/2023.  He denies fevers, chills, dyspnea, new cough, mouth sores or bleeding.  He states his neuropathy in his feet is slightly worse in both feet.  \par \par 2/21/23\par Patient is here for a follow-up visit for pancreatic cancer.  He is due for cycle 12 of chemotherapy on 2/22, initiated on 9.6.2022.  Reviewed most recent CBC which shows mild anemia with hgb 11g/dL.  Tumor marker is down to 457 U/mL from 02/2023.  He denies fevers, chills, dyspnea, new cough, mouth sores, persistent diarrhea or bleeding.  He endorses neuropathy in both feet and fingertips; started gabapentin 100mg TiD without resolution of symptoms.  \par \par 3/7/23\par Patient is here for a follow-up visit for pancreatic cancer.  He is due for cycle 13 of chemotherapy on 3/8, initiated on 9.6.2022.  Reviewed most recent CBC which shows mild anemia with hgb 11.6g/dL.  Tumor marker is slightly higher at 570 U/mL from 02/2023.  He denies fevers, chills, dyspnea, new cough, mouth sores, persistent diarrhea, new pain or bleeding.  He endorses neuropathy in both feet and fingertips; he is on gabapentin 600mg daily without resolution of symptoms.  He recently followed with GI, Dr. Maximilian Larson, and is anticipating upper EGD.  Reviewed US Duplex which showed no DVT.  \par US Duplex BI LE (3.3.2023) IMPRESSION:No evidence of deep venous thrombosis in either lower extremity.Bilateral popliteal cysts.\par \par 3/15/23\par Patient is here for a follow-up visit for pancreatic cancer, accompanied by spouse Renate via telephone.  He is s/p cycle 12 of chemotherapy on 2/24, initiated on 9.6.2022.  Tumor marker almost doubled up to 1154 U/mL from 03/2023.  He was sent for PET/CT which showed FDG uptake within upper abdominal peritoneal nodularity adjacent to the stomach and pancreas, pancreatic tail, and a subcentimeter right pelvic sidewall lymph node.  He is presently feeling fine without new complaints.  \par PET/CT (3.9.2023) IMPRESSION:Abnormal FDG uptake within upper abdominal peritoneal nodularity adjacent to the stomach and pancreas (SUV up to 5.4), pancreatic tail (SUV 3.4) and a subcentimeter right pelvic sidewall lymph node (SUV 3.6).No other definite sites of abnormal FDG uptake to suggest biologic tumor activity.\par \par 3/21/23\par Patient is here for a follow-up visit for pancreatic cancer, accompanied by spouse.  He is s/p cycle 12 of chemotherapy on 2/24, initiated on 9.6.2022.  Tumor marker almost doubled up to 1154 U/mL from 03/2023.  He is anticipating initiation of new chemotherapy regimen using Gemcitabine + Abraxane on 3.22.2023.  \par \par 3/29/23\[facility] Patient is here for a follow-up visit for pancreatic cancer, accompanied by spouse.  Since last visit, patient started new chemotherapy regimen using Gemcitabine + Abraxane, initiated on 3.22.2023.  Tumor marker doubled again up to 2410U/mL from 03/2023.  Reviewed most recent CBC, which shows mild pancytopenia.  Patient denies fever, chills, nausea, vomiting, dyspnea, diarrhea or bleeding.  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

## 2023-04-05 ENCOUNTER — NON-APPOINTMENT (OUTPATIENT)
Age: 73
End: 2023-04-05

## 2023-04-05 ENCOUNTER — LABORATORY RESULT (OUTPATIENT)
Age: 73
End: 2023-04-05

## 2023-04-05 ENCOUNTER — APPOINTMENT (OUTPATIENT)
Dept: INFUSION THERAPY | Facility: CLINIC | Age: 73
End: 2023-04-05

## 2023-04-05 ENCOUNTER — OUTPATIENT (OUTPATIENT)
Dept: OUTPATIENT SERVICES | Facility: HOSPITAL | Age: 73
LOS: 1 days | End: 2023-04-05
Payer: MEDICARE

## 2023-04-05 ENCOUNTER — APPOINTMENT (OUTPATIENT)
Dept: HEMATOLOGY ONCOLOGY | Facility: CLINIC | Age: 73
End: 2023-04-05

## 2023-04-05 ENCOUNTER — OUTPATIENT (OUTPATIENT)
Dept: OUTPATIENT SERVICES | Facility: HOSPITAL | Age: 73
LOS: 1 days | End: 2023-04-05

## 2023-04-05 DIAGNOSIS — G62.9 POLYNEUROPATHY, UNSPECIFIED: ICD-10-CM

## 2023-04-05 DIAGNOSIS — C16.9 MALIGNANT NEOPLASM OF STOMACH, UNSPECIFIED: ICD-10-CM

## 2023-04-05 DIAGNOSIS — Z98.890 OTHER SPECIFIED POSTPROCEDURAL STATES: Chronic | ICD-10-CM

## 2023-04-05 DIAGNOSIS — C25.9 MALIGNANT NEOPLASM OF PANCREAS, UNSPECIFIED: ICD-10-CM

## 2023-04-05 DIAGNOSIS — Z51.11 ENCOUNTER FOR ANTINEOPLASTIC CHEMOTHERAPY: ICD-10-CM

## 2023-04-05 DIAGNOSIS — R97.8 OTHER ABNORMAL TUMOR MARKERS: ICD-10-CM

## 2023-04-05 LAB
HCT VFR BLD CALC: 34.2 %
HGB BLD-MCNC: 11.5 G/DL
MCHC RBC-ENTMCNC: 29.7 PG
MCHC RBC-ENTMCNC: 33.6 G/DL
MCV RBC AUTO: 88.4 FL
PLATELET # BLD AUTO: 49 K/UL
PMV BLD: 10.7 FL
RBC # BLD: 3.87 M/UL
RBC # FLD: 14.7 %
WBC # FLD AUTO: 2.15 K/UL

## 2023-04-05 PROCEDURE — 85027 COMPLETE CBC AUTOMATED: CPT

## 2023-04-12 ENCOUNTER — OUTPATIENT (OUTPATIENT)
Dept: OUTPATIENT SERVICES | Facility: HOSPITAL | Age: 73
LOS: 1 days | End: 2023-04-12
Payer: MEDICARE

## 2023-04-12 ENCOUNTER — APPOINTMENT (OUTPATIENT)
Dept: HEMATOLOGY ONCOLOGY | Facility: CLINIC | Age: 73
End: 2023-04-12
Payer: MEDICARE

## 2023-04-12 ENCOUNTER — LABORATORY RESULT (OUTPATIENT)
Age: 73
End: 2023-04-12

## 2023-04-12 VITALS
WEIGHT: 156 LBS | RESPIRATION RATE: 16 BRPM | BODY MASS INDEX: 25.07 KG/M2 | HEART RATE: 78 BPM | OXYGEN SATURATION: 99 % | SYSTOLIC BLOOD PRESSURE: 151 MMHG | DIASTOLIC BLOOD PRESSURE: 72 MMHG | TEMPERATURE: 97.8 F | HEIGHT: 66 IN

## 2023-04-12 DIAGNOSIS — C16.9 MALIGNANT NEOPLASM OF STOMACH, UNSPECIFIED: ICD-10-CM

## 2023-04-12 DIAGNOSIS — C25.9 MALIGNANT NEOPLASM OF PANCREAS, UNSPECIFIED: ICD-10-CM

## 2023-04-12 DIAGNOSIS — Z51.11 ENCOUNTER FOR ANTINEOPLASTIC CHEMOTHERAPY: ICD-10-CM

## 2023-04-12 DIAGNOSIS — R97.8 OTHER ABNORMAL TUMOR MARKERS: ICD-10-CM

## 2023-04-12 DIAGNOSIS — G62.9 POLYNEUROPATHY, UNSPECIFIED: ICD-10-CM

## 2023-04-12 DIAGNOSIS — Z98.890 OTHER SPECIFIED POSTPROCEDURAL STATES: Chronic | ICD-10-CM

## 2023-04-12 LAB
HCT VFR BLD CALC: 32.9 %
HGB BLD-MCNC: 11 G/DL
MCHC RBC-ENTMCNC: 29.3 PG
MCHC RBC-ENTMCNC: 33.4 G/DL
MCV RBC AUTO: 87.5 FL
PLATELET # BLD AUTO: 219 K/UL
PMV BLD: 9.4 FL
RBC # BLD: 3.76 M/UL
RBC # FLD: 14.7 %
WBC # FLD AUTO: 3.5 K/UL

## 2023-04-12 PROCEDURE — 85027 COMPLETE CBC AUTOMATED: CPT

## 2023-04-12 PROCEDURE — 99214 OFFICE O/P EST MOD 30 MIN: CPT

## 2023-04-12 NOTE — PHYSICAL EXAM
[Restricted in physically strenuous activity but ambulatory and able to carry out work of a light or sedentary nature] : Status 1- Restricted in physically strenuous activity but ambulatory and able to carry out work of a light or sedentary nature, e.g., light house work, office work [Normal] : affect appropriate [de-identified] : wearing glasses  [de-identified] : L chest port present

## 2023-04-12 NOTE — REVIEW OF SYSTEMS
[Lower Ext Edema] : lower extremity edema [Negative] : Allergic/Immunologic [Fever] : no fever [Night Sweats] : no night sweats [Dysphagia] : no dysphagia [Chest Pain] : no chest pain [Shortness Of Breath] : no shortness of breath [Abdominal Pain] : no abdominal pain [Vomiting] : no vomiting [Constipation] : no constipation [Easy Bleeding] : no tendency for easy bleeding [FreeTextEntry2] : appetite slightly improved but still poor  [FreeTextEntry5] : reports trace bilateral LE edema x 1 week [FreeTextEntry6] : cough has been improving  [FreeTextEntry7] : reports occasional nausea [de-identified] : L chest port present  [de-identified] : endorses occasional neuropathy with long exposure to cold items , neuropathy in his feet is slightly more apparent

## 2023-04-12 NOTE — HISTORY OF PRESENT ILLNESS
[Disease: _____________________] : Disease: [unfilled] [Therapy: ___] : Therapy: [unfilled] [Cycle: ___] : Cycle: [unfilled] [de-identified] : Mr. JINA SINGER is a 72 year old male here today for evaluation and management of Pancreatic Cancer.  \par \par He was referred by SURG, Dr. Morgan.  JINA is a 72 year old M with PMHx including HTN, HLD, T2DM, who presents to clinic to establish care.  Patient presented to ER back in 07/2022 with complaint of gradually worsening abdominal pain for the prior month, worse after eating and associated with 40lb weight loss in the last 2 months.  He underwent CT imaging which shows evidence suggestive of metastatic disease.  He also endorses constipation.  He underwent laparoscopy with findings of ~1L malignant ascites and peritoneal implants on 8.22.2022.  He is presently feeling well with no new complaints.  Patient denies fever, chills, nausea, vomiting, dysphagia, dyspnea, neuropathy or bleeding.  Patient reports family history of malignancy in his father (possibly stomach cancer) and mother (possibly colon).  Never a smoker.  NKDA.  \par \par RADIOLOGIC WORKUP\par CT Chest (7.25.2022) IMPRESSION:Perigastric infiltrative process inseparable from the tail the pancreas extending to the proximal duodenum. Associated ascites. Findings again  suspicious for pancreatic or gastrointestinal neoplasm. Differential diagnosis includes partially contained ruptured gastric or duodenal ulcer as well as infectious process/pancreatitis.Hyperaerated lungs. Scattered granulomata. Vascular calcifications. Nonspecific areas of subpleural thickening bilaterally.\par CT A/P (7.24.2022) IMPRESSION:Predominantly perigastric infiltrative soft tissue changes which are inseparable from the tail of pancreas and extend around the proximal  duodenum. Small volume abdominopelvic ascites. Differential includes underlying pancreatic or gastrointestinal neoplasm, sequelae of partially contained ruptured gastric or duodenal  ulcer, and infectious process or perhaps pancreatitis. Clinical correlation and/or further evaluation with EGD should be considered.Findings compatible with BPH. Superimposed cystitis is difficult to exclude on imaging.\par \par LAB WORKUP\par (8.17.2022) WBC 8.36, Hgb 14.2, MCV 84, , normal diff, Cr 0.9, eGFR 91, normal LFTs\par (7.25.2022) Ca19-9 is 6481, CEA 6.8, TSH 2.62\par (10.23.2019) WBC 6.83, Hgb 15, MCV 85.7, \par \par PATHOLOGY\par (see results section)\par \par HCM\par Colonoscopy done about 5 years , polyps removed \par Upper Endoscopy done 07/2022 showed 5x5 hypoechoic infiltrative mass (irregular borders) extending between the  muscularis propria (abuting it) and the territory of the pancreatic tail \par COVID Vaccinated? [FreeTextEntry1] : Started Gemcitabine + Abraxane with onbody neulasta (D1, D8, D15 every 28 days) on 3.22.2023 \par Started mFOLFIRINOX on 9.6.2022 (discontinued oxaliplatin beginning C12 on 2/22/23) - 2.24.2023 due to rising Ca19-9.   [de-identified] : 9/14/22\[facility] Patient is here for a follow-up visit for pancreatic cancer.  He is s/p initial cycle of mFOLFIRINOX on 9.6.2022.  He is feeling well with no new complaints.  Reviewed most recent CBC, which is stable.  Patient denies fever, chills, nausea, vomiting, dyspnea, neuropathy, persistent diarrhea or bleeding.  He states his appetite is still poor but slightly improving.  He is due for cycle 2 of chemotherapy next week.  \par \par 10/4/22\[facility] Patient is here for a follow-up visit for pancreatic cancer.  He is due for cycle 3 of mFOLFIRINOX today, initiated on 9.6.2022.  He is feeling well with no new complaints.  Reviewed most recent CBC, which shows mild anemia, hgb 11.6g/dL and persistent leukocytosis, likely due to GCSF administration with chemotherapy.  ALK Phos is slightly higher at 198 but Ca19-9 is decreasing down to 94787M/mL.  Patient denies fever, chills, vomiting, dyspnea, neuropathy, CP, palpitations, abdominal pain, persistent diarrhea or bleeding.  He is endorses transient nausea which was not bothersome and he took anti-emetics.  \par \par 11/2/22\[facility] Patient is here for a follow-up visit for pancreatic cancer.  He is due for cycle 4 of mFOLFIRINOX today, initiated on 9.6.2022.  He is feeling well with no new complaints.  Reviewed most recent CBC, which shows mild anemia, hgb 11.1g/dL and persistent leukocytosis, likely due to GCSF administration with chemotherapy.  ALK Phos is slightly lower at 138 but Ca19-9 is decreasing down to 4619U/mL.  Patient denies fever, chills, vomiting, dyspnea, CP, palpitations, abdominal pain, persistent diarrhea or bleeding.  He endorses occasional neuropathy with long exposure to cold items.  \par \par 11/16/22\[facility] Patient is here for a follow-up visit for pancreatic cancer, accompanied by spouse.  He is due for cycle 6 of mFOLFIRINOX today, initiated on 9.6.2022.  He is feeling well with no new complaints.  Reviewed most recent CBC, which shows mild anemia, hgb 11.1g/dL and persistent leukocytosis, likely due to GCSF administration with chemotherapy.  ALK Phos is essentially stable at 146 but Ca19-9 is decreasing down to 2708U/mL.  Patient denies fever, chills, vomiting, dyspnea, CP, palpitations, abdominal pain, persistent diarrhea, mouth sores or bleeding.  He still notes occasional neuropathy with long exposure to cold items.  Reviewed most recent CT imaging which is essentially stable.  \par CT C/A/P (11.8.2022) IMPRESSION:1. No definite evidence of intrathoracic metastasis.Since 7/24/2022, no significant interval change.Near diffuse peritoneal carcinomatosis is not significantly changed.Diffuse tumor infiltration in the pancreatic tail (4/226), unchanged.Completely attenuated splenic vein is unchanged. Prominent venous collaterals in the left upper quadrant is unchanged.\par \par 11/30/22\par Patient is here for a follow-up visit for pancreatic cancer.  He is due for cycle 7 of mFOLFIRINOX today, initiated on 9.6.2022.  Reviewed most recent CBC, which shows mild anemia, hgb 10.9g/dL and persistent leukocytosis, likely due to GCSF administration with chemotherapy.  ALK Phos is essentially stable at 138 and Ca19-9 is decreasing down to 1683U/mL.  Patient denies fever, chills, vomiting, dyspnea, CP, palpitations, abdominal pain, persistent diarrhea, mouth sores or bleeding.  He still notes occasional neuropathy with long exposure to cold items. \par \par 12/14/22\par Patient is here for a follow-up visit for pancreatic cancer, accompanied by spouse via telephone.  He is due for cycle 8 of mFOLFIRINOX today, initiated on 9.6.2022.  Reviewed most recent CBC, which shows mild anemia, hgb 10.5g/dL and persistent leukocytosis, likely due to GCSF administration with chemotherapy.  ALK Phos is essentially stable at 124 and Ca19-9 is decreasing down to 1177U/mL.  Patient denies fever, chills, dyspnea, CP, palpitations, abdominal pain, persistent diarrhea, mouth sores or bleeding.  He has an occasional cough x 2 weeks; believes he had a mild cold which has been improving over the last 2 weeks but the cough is persistent.  He still notes occasional neuropathy with long exposure to cold items. \par \par 12/28/22\par Patient is here for a follow-up visit for pancreatic cancer, accompanied by spouse via telephone.  Since last visit, patient had CXR which was suggestive for pneumonia.  He has completed antibiotics with improvement in symptoms.  Reviewed PULM consult with Dr. Escobar: plans to repeat CXR in about 6 weeks.  Patient is agreeable to resume chemotherapy.  He is due for cycle 9 of mFOLFIRINOX today, initiated on 9.6.2022.  Reviewed most recent CBC which shows mild anemia and neutrophil-predominant leukocytosis.  Tumor marker is down to 957 U/mL from 12/2022.  \par CXR (12.15.2022) Impression:Subsegmental left lower lobe consolidation.\par \par 1/11/23\par Patient is here for a follow-up visit for pancreatic cancer, accompanied by spouse via telephone.  As per PULM consult with Dr. Escobar: plan to repeat CXR about 6 weeks following prior imaging.  He is due for cycle 10 of mFOLFIRINOX today, initiated on 9.6.2022.  Reviewed most recent CBC which shows mild anemia with hgb 10.6g/dL and neutrophil-predominant leukocytosis likely from GCSF administration.  Tumor marker is down to 693 U/mL from 1/2023.  He denies fevers, chills, worsening of neuropathy, dyspnea, mouth sores or bleeding.  Patient states he dislikes the effect of Benadryl pre-medication because it makes him drowsy and feel "off."  He is requesting to decrease Benadryl from 50mg to 25mg IV as pre-medicaiton.  Risks vs benefits discussed at length.  \par \par 1/24/23\par Patient is here for a follow-up visit for pancreatic cancer.  He is due for cycle 11 of mFOLFIRINOX today, initiated on 9.6.2022.  Reviewed most recent CBC which shows mild anemia with hgb 11.3g/dL, mild thrombocytopenia with PLTs 106,000 and neutrophil-predominant leukocytosis likely from GCSF administration.  Tumor marker is down to 693 U/mL from 1/2023.  He denies fevers, chills, worsening of neuropathy, dyspnea, new cough, mouth sores or bleeding.  \par \par 2/7/23\par Patient is here for a follow-up visit for pancreatic cancer.  He is due for cycle 12 of mFOLFIRINOX today, initiated on 9.6.2022.  Reviewed most recent CBC which shows mild anemia with hgb 11.3g/dL, mild thrombocytopenia with PLTs 106,000 and neutrophil-predominant leukocytosis likely from GCSF administration.  Tumor marker is down to 693 U/mL from 1/2023.  He denies fevers, chills, dyspnea, new cough, mouth sores or bleeding.  He states his neuropathy in his feet is slightly worse in both feet.  \par \par 2/21/23\par Patient is here for a follow-up visit for pancreatic cancer.  He is due for cycle 12 of chemotherapy on 2/22, initiated on 9.6.2022.  Reviewed most recent CBC which shows mild anemia with hgb 11g/dL.  Tumor marker is down to 457 U/mL from 02/2023.  He denies fevers, chills, dyspnea, new cough, mouth sores, persistent diarrhea or bleeding.  He endorses neuropathy in both feet and fingertips; started gabapentin 100mg TiD without resolution of symptoms.  \par \par 3/7/23\par Patient is here for a follow-up visit for pancreatic cancer.  He is due for cycle 13 of chemotherapy on 3/8, initiated on 9.6.2022.  Reviewed most recent CBC which shows mild anemia with hgb 11.6g/dL.  Tumor marker is slightly higher at 570 U/mL from 02/2023.  He denies fevers, chills, dyspnea, new cough, mouth sores, persistent diarrhea, new pain or bleeding.  He endorses neuropathy in both feet and fingertips; he is on gabapentin 600mg daily without resolution of symptoms.  He recently followed with GI, Dr. Maximilian Larson, and is anticipating upper EGD.  Reviewed US Duplex which showed no DVT.  \par US Duplex BI LE (3.3.2023) IMPRESSION:No evidence of deep venous thrombosis in either lower extremity.Bilateral popliteal cysts.\par \par 3/15/23\par Patient is here for a follow-up visit for pancreatic cancer, accompanied by spouse Renate via telephone.  He is s/p cycle 12 of chemotherapy on 2/24, initiated on 9.6.2022.  Tumor marker almost doubled up to 1154 U/mL from 03/2023.  He was sent for PET/CT which showed FDG uptake within upper abdominal peritoneal nodularity adjacent to the stomach and pancreas, pancreatic tail, and a subcentimeter right pelvic sidewall lymph node.  He is presently feeling fine without new complaints.  \par PET/CT (3.9.2023) IMPRESSION:Abnormal FDG uptake within upper abdominal peritoneal nodularity adjacent to the stomach and pancreas (SUV up to 5.4), pancreatic tail (SUV 3.4) and a subcentimeter right pelvic sidewall lymph node (SUV 3.6).No other definite sites of abnormal FDG uptake to suggest biologic tumor activity.\par \par 3/21/23\par Patient is here for a follow-up visit for pancreatic cancer, accompanied by spouse.  He is s/p cycle 12 of chemotherapy on 2/24, initiated on 9.6.2022.  Tumor marker almost doubled up to 1154 U/mL from 03/2023.  He is anticipating initiation of new chemotherapy regimen using Gemcitabine + Abraxane on 3.22.2023.  \par \par 3/29/23\[facility] Patient is here for a follow-up visit for pancreatic cancer, accompanied by spouse.  Since last visit, patient started new chemotherapy regimen using Gemcitabine + Abraxane, initiated on 3.22.2023.  Tumor marker doubled again up to 2410U/mL from 03/2023.  Reviewed most recent CBC, which shows mild pancytopenia.  Patient denies fever, chills, nausea, vomiting, dyspnea, diarrhea or bleeding.  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  \par \par 4/12/23\[facility] Patient is here for a follow-up visit for pancreatic cancer.  Patient is due for cycle 2 of Gemcitabine + Abraxane, initiated on 3.22.2023.  Reviewed most recent CBC, which shows mild leukopenia and anemia, hgb 11g/dL.  Patient denies fever, chills, nausea, vomiting, dyspnea, diarrhea or bleeding.  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

## 2023-04-18 ENCOUNTER — APPOINTMENT (OUTPATIENT)
Dept: HEMATOLOGY ONCOLOGY | Facility: CLINIC | Age: 73
End: 2023-04-18

## 2023-04-18 ENCOUNTER — LABORATORY RESULT (OUTPATIENT)
Age: 73
End: 2023-04-18

## 2023-04-18 ENCOUNTER — OUTPATIENT (OUTPATIENT)
Dept: OUTPATIENT SERVICES | Facility: HOSPITAL | Age: 73
LOS: 1 days | End: 2023-04-18

## 2023-04-18 DIAGNOSIS — R97.8 OTHER ABNORMAL TUMOR MARKERS: ICD-10-CM

## 2023-04-18 DIAGNOSIS — C25.9 MALIGNANT NEOPLASM OF PANCREAS, UNSPECIFIED: ICD-10-CM

## 2023-04-18 DIAGNOSIS — C16.9 MALIGNANT NEOPLASM OF STOMACH, UNSPECIFIED: ICD-10-CM

## 2023-04-18 DIAGNOSIS — Z51.11 ENCOUNTER FOR ANTINEOPLASTIC CHEMOTHERAPY: ICD-10-CM

## 2023-04-18 DIAGNOSIS — G62.9 POLYNEUROPATHY, UNSPECIFIED: ICD-10-CM

## 2023-04-19 ENCOUNTER — APPOINTMENT (OUTPATIENT)
Dept: INFUSION THERAPY | Facility: CLINIC | Age: 73
End: 2023-04-19
Payer: MEDICARE

## 2023-04-19 ENCOUNTER — APPOINTMENT (OUTPATIENT)
Dept: HEMATOLOGY ONCOLOGY | Facility: CLINIC | Age: 73
End: 2023-04-19
Payer: MEDICARE

## 2023-04-19 ENCOUNTER — OUTPATIENT (OUTPATIENT)
Dept: OUTPATIENT SERVICES | Facility: HOSPITAL | Age: 73
LOS: 1 days | End: 2023-04-19
Payer: MEDICARE

## 2023-04-19 VITALS
RESPIRATION RATE: 16 BRPM | BODY MASS INDEX: 24.43 KG/M2 | TEMPERATURE: 97.8 F | DIASTOLIC BLOOD PRESSURE: 84 MMHG | WEIGHT: 152 LBS | SYSTOLIC BLOOD PRESSURE: 137 MMHG | HEART RATE: 59 BPM | HEIGHT: 66 IN

## 2023-04-19 DIAGNOSIS — C16.9 MALIGNANT NEOPLASM OF STOMACH, UNSPECIFIED: ICD-10-CM

## 2023-04-19 DIAGNOSIS — Z51.11 ENCOUNTER FOR ANTINEOPLASTIC CHEMOTHERAPY: ICD-10-CM

## 2023-04-19 DIAGNOSIS — G62.9 POLYNEUROPATHY, UNSPECIFIED: ICD-10-CM

## 2023-04-19 DIAGNOSIS — R97.8 OTHER ABNORMAL TUMOR MARKERS: ICD-10-CM

## 2023-04-19 DIAGNOSIS — C25.9 MALIGNANT NEOPLASM OF PANCREAS, UNSPECIFIED: ICD-10-CM

## 2023-04-19 DIAGNOSIS — Z98.890 OTHER SPECIFIED POSTPROCEDURAL STATES: Chronic | ICD-10-CM

## 2023-04-19 LAB
ALBUMIN SERPL ELPH-MCNC: 4.2 G/DL
ALP BLD-CCNC: 94 U/L
ALT SERPL-CCNC: 14 U/L
ANION GAP SERPL CALC-SCNC: 11 MMOL/L
AST SERPL-CCNC: 17 U/L
BILIRUB DIRECT SERPL-MCNC: <0.2 MG/DL
BILIRUB INDIRECT SERPL-MCNC: >0 MG/DL
BILIRUB SERPL-MCNC: 0.2 MG/DL
BUN SERPL-MCNC: 17 MG/DL
CALCIUM SERPL-MCNC: 9.5 MG/DL
CANCER AG19-9 SERPL-ACNC: 2253 U/ML
CHLORIDE SERPL-SCNC: 105 MMOL/L
CO2 SERPL-SCNC: 24 MMOL/L
CREAT SERPL-MCNC: 0.9 MG/DL
EGFR: 91 ML/MIN/1.73M2
GLUCOSE SERPL-MCNC: 124 MG/DL
HCT VFR BLD CALC: 32.2 %
HGB BLD-MCNC: 11 G/DL
MCHC RBC-ENTMCNC: 30 PG
MCHC RBC-ENTMCNC: 34.2 G/DL
MCV RBC AUTO: 87.7 FL
PLATELET # BLD AUTO: 237 K/UL
PMV BLD: 8.7 FL
POTASSIUM SERPL-SCNC: 4.1 MMOL/L
PROT SERPL-MCNC: 7.1 G/DL
RBC # BLD: 3.67 M/UL
RBC # FLD: 14.8 %
SODIUM SERPL-SCNC: 140 MMOL/L
WBC # FLD AUTO: 3.84 K/UL

## 2023-04-19 PROCEDURE — 96375 TX/PRO/DX INJ NEW DRUG ADDON: CPT

## 2023-04-19 PROCEDURE — 96413 CHEMO IV INFUSION 1 HR: CPT

## 2023-04-19 PROCEDURE — 96417 CHEMO IV INFUS EACH ADDL SEQ: CPT

## 2023-04-19 PROCEDURE — 99214 OFFICE O/P EST MOD 30 MIN: CPT

## 2023-04-19 PROCEDURE — 96367 TX/PROPH/DG ADDL SEQ IV INF: CPT

## 2023-04-19 RX ORDER — DULOXETINE HYDROCHLORIDE 30 MG/1
30 CAPSULE, DELAYED RELEASE PELLETS ORAL
Qty: 7 | Refills: 0 | Status: COMPLETED | COMMUNITY
Start: 2023-04-19 | End: 2023-04-26

## 2023-04-19 RX ORDER — DEXAMETHASONE 0.5 MG/5ML
10 ELIXIR ORAL ONCE
Refills: 0 | Status: COMPLETED | OUTPATIENT
Start: 2023-04-19 | End: 2023-04-19

## 2023-04-19 RX ORDER — DIPHENHYDRAMINE HCL 50 MG
50 CAPSULE ORAL ONCE
Refills: 0 | Status: COMPLETED | OUTPATIENT
Start: 2023-04-19 | End: 2023-04-19

## 2023-04-19 RX ORDER — PACLITAXEL 100 MG/20ML
220 INJECTION, POWDER, LYOPHILIZED, FOR SUSPENSION INTRAVENOUS ONCE
Refills: 0 | Status: COMPLETED | OUTPATIENT
Start: 2023-04-19 | End: 2023-04-19

## 2023-04-19 RX ORDER — FOSAPREPITANT DIMEGLUMINE 150 MG/5ML
150 INJECTION, POWDER, LYOPHILIZED, FOR SOLUTION INTRAVENOUS ONCE
Refills: 0 | Status: COMPLETED | OUTPATIENT
Start: 2023-04-19 | End: 2023-04-19

## 2023-04-19 RX ORDER — GEMCITABINE 38 MG/ML
1790 INJECTION, SOLUTION INTRAVENOUS ONCE
Refills: 0 | Status: COMPLETED | OUTPATIENT
Start: 2023-04-19 | End: 2023-04-19

## 2023-04-19 RX ADMIN — FOSAPREPITANT DIMEGLUMINE 500 MILLIGRAM(S): 150 INJECTION, POWDER, LYOPHILIZED, FOR SOLUTION INTRAVENOUS at 11:34

## 2023-04-19 RX ADMIN — FOSAPREPITANT DIMEGLUMINE 150 MILLIGRAM(S): 150 INJECTION, POWDER, LYOPHILIZED, FOR SOLUTION INTRAVENOUS at 12:10

## 2023-04-19 RX ADMIN — PACLITAXEL 220 MILLIGRAM(S): 100 INJECTION, POWDER, LYOPHILIZED, FOR SUSPENSION INTRAVENOUS at 12:11

## 2023-04-19 RX ADMIN — Medication 102 MILLIGRAM(S): at 11:34

## 2023-04-19 RX ADMIN — Medication 118 MILLIGRAM(S): at 11:34

## 2023-04-19 RX ADMIN — GEMCITABINE 1790 MILLIGRAM(S): 38 INJECTION, SOLUTION INTRAVENOUS at 12:10

## 2023-04-19 RX ADMIN — Medication 50 MILLIGRAM(S): at 12:25

## 2023-04-19 RX ADMIN — GEMCITABINE 1790 MILLIGRAM(S): 38 INJECTION, SOLUTION INTRAVENOUS at 14:10

## 2023-04-19 RX ADMIN — PACLITAXEL 220 MILLIGRAM(S): 100 INJECTION, POWDER, LYOPHILIZED, FOR SUSPENSION INTRAVENOUS at 13:10

## 2023-04-19 RX ADMIN — Medication 10 MILLIGRAM(S): at 12:47

## 2023-04-19 NOTE — HISTORY OF PRESENT ILLNESS
[Disease: _____________________] : Disease: [unfilled] [Therapy: ___] : Therapy: [unfilled] [Cycle: ___] : Cycle: [unfilled] [de-identified] : Mr. JINA SINGER is a 72 year old male here today for evaluation and management of Pancreatic Cancer.  \par \par He was referred by SURG, Dr. Morgan.  JINA is a 72 year old M with PMHx including HTN, HLD, T2DM, who presents to clinic to establish care.  Patient presented to ER back in 07/2022 with complaint of gradually worsening abdominal pain for the prior month, worse after eating and associated with 40lb weight loss in the last 2 months.  He underwent CT imaging which shows evidence suggestive of metastatic disease.  He also endorses constipation.  He underwent laparoscopy with findings of ~1L malignant ascites and peritoneal implants on 8.22.2022.  He is presently feeling well with no new complaints.  Patient denies fever, chills, nausea, vomiting, dysphagia, dyspnea, neuropathy or bleeding.  Patient reports family history of malignancy in his father (possibly stomach cancer) and mother (possibly colon).  Never a smoker.  NKDA.  \par \par RADIOLOGIC WORKUP\par CT Chest (7.25.2022) IMPRESSION:Perigastric infiltrative process inseparable from the tail the pancreas extending to the proximal duodenum. Associated ascites. Findings again  suspicious for pancreatic or gastrointestinal neoplasm. Differential diagnosis includes partially contained ruptured gastric or duodenal ulcer as well as infectious process/pancreatitis.Hyperaerated lungs. Scattered granulomata. Vascular calcifications. Nonspecific areas of subpleural thickening bilaterally.\par CT A/P (7.24.2022) IMPRESSION:Predominantly perigastric infiltrative soft tissue changes which are inseparable from the tail of pancreas and extend around the proximal  duodenum. Small volume abdominopelvic ascites. Differential includes underlying pancreatic or gastrointestinal neoplasm, sequelae of partially contained ruptured gastric or duodenal  ulcer, and infectious process or perhaps pancreatitis. Clinical correlation and/or further evaluation with EGD should be considered.Findings compatible with BPH. Superimposed cystitis is difficult to exclude on imaging.\par \par LAB WORKUP\par (8.17.2022) WBC 8.36, Hgb 14.2, MCV 84, , normal diff, Cr 0.9, eGFR 91, normal LFTs\par (7.25.2022) Ca19-9 is 6481, CEA 6.8, TSH 2.62\par (10.23.2019) WBC 6.83, Hgb 15, MCV 85.7, \par \par PATHOLOGY\par (see results section)\par \par HCM\par Colonoscopy done about 5 years , polyps removed \par Upper Endoscopy done 07/2022 showed 5x5 hypoechoic infiltrative mass (irregular borders) extending between the  muscularis propria (abuting it) and the territory of the pancreatic tail \par COVID Vaccinated? [FreeTextEntry1] : Started Gemcitabine + Abraxane with onbody neulasta (D1, D8, D15 every 28 days) on 3.22.2023 \par Started mFOLFIRINOX on 9.6.2022 (discontinued oxaliplatin beginning C12 on 2/22/23) - 2.24.2023 due to rising Ca19-9.   [de-identified] : 9/14/22\[facility] Patient is here for a follow-up visit for pancreatic cancer.  He is s/p initial cycle of mFOLFIRINOX on 9.6.2022.  He is feeling well with no new complaints.  Reviewed most recent CBC, which is stable.  Patient denies fever, chills, nausea, vomiting, dyspnea, neuropathy, persistent diarrhea or bleeding.  He states his appetite is still poor but slightly improving.  He is due for cycle 2 of chemotherapy next week.  \par \par 10/4/22\[facility] Patient is here for a follow-up visit for pancreatic cancer.  He is due for cycle 3 of mFOLFIRINOX today, initiated on 9.6.2022.  He is feeling well with no new complaints.  Reviewed most recent CBC, which shows mild anemia, hgb 11.6g/dL and persistent leukocytosis, likely due to GCSF administration with chemotherapy.  ALK Phos is slightly higher at 198 but Ca19-9 is decreasing down to 28964D/mL.  Patient denies fever, chills, vomiting, dyspnea, neuropathy, CP, palpitations, abdominal pain, persistent diarrhea or bleeding.  He is endorses transient nausea which was not bothersome and he took anti-emetics.  \par \par 11/2/22\[facility] Patient is here for a follow-up visit for pancreatic cancer.  He is due for cycle 4 of mFOLFIRINOX today, initiated on 9.6.2022.  He is feeling well with no new complaints.  Reviewed most recent CBC, which shows mild anemia, hgb 11.1g/dL and persistent leukocytosis, likely due to GCSF administration with chemotherapy.  ALK Phos is slightly lower at 138 but Ca19-9 is decreasing down to 4619U/mL.  Patient denies fever, chills, vomiting, dyspnea, CP, palpitations, abdominal pain, persistent diarrhea or bleeding.  He endorses occasional neuropathy with long exposure to cold items.  \par \par 11/16/22\[facility] Patient is here for a follow-up visit for pancreatic cancer, accompanied by spouse.  He is due for cycle 6 of mFOLFIRINOX today, initiated on 9.6.2022.  He is feeling well with no new complaints.  Reviewed most recent CBC, which shows mild anemia, hgb 11.1g/dL and persistent leukocytosis, likely due to GCSF administration with chemotherapy.  ALK Phos is essentially stable at 146 but Ca19-9 is decreasing down to 2708U/mL.  Patient denies fever, chills, vomiting, dyspnea, CP, palpitations, abdominal pain, persistent diarrhea, mouth sores or bleeding.  He still notes occasional neuropathy with long exposure to cold items.  Reviewed most recent CT imaging which is essentially stable.  \par CT C/A/P (11.8.2022) IMPRESSION:1. No definite evidence of intrathoracic metastasis.Since 7/24/2022, no significant interval change.Near diffuse peritoneal carcinomatosis is not significantly changed.Diffuse tumor infiltration in the pancreatic tail (4/226), unchanged.Completely attenuated splenic vein is unchanged. Prominent venous collaterals in the left upper quadrant is unchanged.\par \par 11/30/22\par Patient is here for a follow-up visit for pancreatic cancer.  He is due for cycle 7 of mFOLFIRINOX today, initiated on 9.6.2022.  Reviewed most recent CBC, which shows mild anemia, hgb 10.9g/dL and persistent leukocytosis, likely due to GCSF administration with chemotherapy.  ALK Phos is essentially stable at 138 and Ca19-9 is decreasing down to 1683U/mL.  Patient denies fever, chills, vomiting, dyspnea, CP, palpitations, abdominal pain, persistent diarrhea, mouth sores or bleeding.  He still notes occasional neuropathy with long exposure to cold items. \par \par 12/14/22\par Patient is here for a follow-up visit for pancreatic cancer, accompanied by spouse via telephone.  He is due for cycle 8 of mFOLFIRINOX today, initiated on 9.6.2022.  Reviewed most recent CBC, which shows mild anemia, hgb 10.5g/dL and persistent leukocytosis, likely due to GCSF administration with chemotherapy.  ALK Phos is essentially stable at 124 and Ca19-9 is decreasing down to 1177U/mL.  Patient denies fever, chills, dyspnea, CP, palpitations, abdominal pain, persistent diarrhea, mouth sores or bleeding.  He has an occasional cough x 2 weeks; believes he had a mild cold which has been improving over the last 2 weeks but the cough is persistent.  He still notes occasional neuropathy with long exposure to cold items. \par \par 12/28/22\par Patient is here for a follow-up visit for pancreatic cancer, accompanied by spouse via telephone.  Since last visit, patient had CXR which was suggestive for pneumonia.  He has completed antibiotics with improvement in symptoms.  Reviewed PULM consult with Dr. Escobar: plans to repeat CXR in about 6 weeks.  Patient is agreeable to resume chemotherapy.  He is due for cycle 9 of mFOLFIRINOX today, initiated on 9.6.2022.  Reviewed most recent CBC which shows mild anemia and neutrophil-predominant leukocytosis.  Tumor marker is down to 957 U/mL from 12/2022.  \par CXR (12.15.2022) Impression:Subsegmental left lower lobe consolidation.\par \par 1/11/23\par Patient is here for a follow-up visit for pancreatic cancer, accompanied by spouse via telephone.  As per PULM consult with Dr. Escobar: plan to repeat CXR about 6 weeks following prior imaging.  He is due for cycle 10 of mFOLFIRINOX today, initiated on 9.6.2022.  Reviewed most recent CBC which shows mild anemia with hgb 10.6g/dL and neutrophil-predominant leukocytosis likely from GCSF administration.  Tumor marker is down to 693 U/mL from 1/2023.  He denies fevers, chills, worsening of neuropathy, dyspnea, mouth sores or bleeding.  Patient states he dislikes the effect of Benadryl pre-medication because it makes him drowsy and feel "off."  He is requesting to decrease Benadryl from 50mg to 25mg IV as pre-medicaiton.  Risks vs benefits discussed at length.  \par \par 1/24/23\par Patient is here for a follow-up visit for pancreatic cancer.  He is due for cycle 11 of mFOLFIRINOX today, initiated on 9.6.2022.  Reviewed most recent CBC which shows mild anemia with hgb 11.3g/dL, mild thrombocytopenia with PLTs 106,000 and neutrophil-predominant leukocytosis likely from GCSF administration.  Tumor marker is down to 693 U/mL from 1/2023.  He denies fevers, chills, worsening of neuropathy, dyspnea, new cough, mouth sores or bleeding.  \par \par 2/7/23\par Patient is here for a follow-up visit for pancreatic cancer.  He is due for cycle 12 of mFOLFIRINOX today, initiated on 9.6.2022.  Reviewed most recent CBC which shows mild anemia with hgb 11.3g/dL, mild thrombocytopenia with PLTs 106,000 and neutrophil-predominant leukocytosis likely from GCSF administration.  Tumor marker is down to 693 U/mL from 1/2023.  He denies fevers, chills, dyspnea, new cough, mouth sores or bleeding.  He states his neuropathy in his feet is slightly worse in both feet.  \par \par 2/21/23\par Patient is here for a follow-up visit for pancreatic cancer.  He is due for cycle 12 of chemotherapy on 2/22, initiated on 9.6.2022.  Reviewed most recent CBC which shows mild anemia with hgb 11g/dL.  Tumor marker is down to 457 U/mL from 02/2023.  He denies fevers, chills, dyspnea, new cough, mouth sores, persistent diarrhea or bleeding.  He endorses neuropathy in both feet and fingertips; started gabapentin 100mg TiD without resolution of symptoms.  \par \par 3/7/23\par Patient is here for a follow-up visit for pancreatic cancer.  He is due for cycle 13 of chemotherapy on 3/8, initiated on 9.6.2022.  Reviewed most recent CBC which shows mild anemia with hgb 11.6g/dL.  Tumor marker is slightly higher at 570 U/mL from 02/2023.  He denies fevers, chills, dyspnea, new cough, mouth sores, persistent diarrhea, new pain or bleeding.  He endorses neuropathy in both feet and fingertips; he is on gabapentin 600mg daily without resolution of symptoms.  He recently followed with GI, Dr. Maximilian Larson, and is anticipating upper EGD.  Reviewed US Duplex which showed no DVT.  \par US Duplex BI LE (3.3.2023) IMPRESSION:No evidence of deep venous thrombosis in either lower extremity.Bilateral popliteal cysts.\par \par 3/15/23\par Patient is here for a follow-up visit for pancreatic cancer, accompanied by spouse Renate via telephone.  He is s/p cycle 12 of chemotherapy on 2/24, initiated on 9.6.2022.  Tumor marker almost doubled up to 1154 U/mL from 03/2023.  He was sent for PET/CT which showed FDG uptake within upper abdominal peritoneal nodularity adjacent to the stomach and pancreas, pancreatic tail, and a subcentimeter right pelvic sidewall lymph node.  He is presently feeling fine without new complaints.  \[facility] PET/CT (3.9.2023) IMPRESSION:Abnormal FDG uptake within upper abdominal peritoneal nodularity adjacent to the stomach and pancreas (SUV up to 5.4), pancreatic tail (SUV 3.4) and a subcentimeter right pelvic sidewall lymph node (SUV 3.6).No other definite sites of abnormal FDG uptake to suggest biologic tumor activity.\par \par 3/21/23\[facility] Patient is here for a follow-up visit for pancreatic cancer, accompanied by spouse.  He is s/p cycle 12 of chemotherapy on 2/24, initiated on 9.6.2022.  Tumor marker almost doubled up to 1154 U/mL from 03/2023.  He is anticipating initiation of new chemotherapy regimen using Gemcitabine + Abraxane on 3.22.2023.  \par \par 3/29/23[facility] Patient is here for a follow-up visit for pancreatic cancer, accompanied by spouse.  Since last visit, patient started new chemotherapy regimen using Gemcitabine + Abraxane, initiated on 3.22.2023.  Tumor marker doubled again up to 2410U/mL from 03/2023.  Reviewed most recent CBC, which shows mild pancytopenia.  Patient denies fever, chills, nausea, vomiting, dyspnea, diarrhea or bleeding.  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  \par \par 4/12/23\[facility] Patient is here for a follow-up visit for pancreatic cancer.  Patient is due for cycle 2 of Gemcitabine + Abraxane, initiated on 3.22.2023.  Reviewed most recent CBC, which shows mild leukopenia and anemia, hgb 11g/dL.  Patient denies fever, chills, nausea, vomiting, dyspnea, diarrhea or bleeding.  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  \par \par 4/19/23\[facility] Patient is here for a follow-up visit for pancreatic cancer.  Patient is due for cycle 2 of Gemcitabine + Abraxane, initiated on 3.22.2023.  Reviewed most recent CBC, which shows mild leukopenia and anemia, hgb 11g/dL.  Patient denies fever, chills, nausea, vomiting, dyspnea, diarrhea or bleeding.  He reports slight worsening of neuropathy of fingers despite Gabapentin 600mg total daily.  Ca19-9 level is down to 2253U/mL.

## 2023-04-19 NOTE — REVIEW OF SYSTEMS
[Lower Ext Edema] : lower extremity edema [Negative] : Allergic/Immunologic [Fever] : no fever [Night Sweats] : no night sweats [Dysphagia] : no dysphagia [Chest Pain] : no chest pain [Shortness Of Breath] : no shortness of breath [Abdominal Pain] : no abdominal pain [Vomiting] : no vomiting [Constipation] : no constipation [Easy Bleeding] : no tendency for easy bleeding [FreeTextEntry2] : appetite slightly improved but still poor  [FreeTextEntry5] : reports trace bilateral LE edema x 1 week [FreeTextEntry7] : reports occasional nausea [de-identified] : L chest port present  [de-identified] : endorses occasional neuropathy with long exposure to cold items , neuropathy in his hands is slightly more apparent

## 2023-04-19 NOTE — ASSESSMENT
[Palliative] : Goals of care discussed with patient: Palliative [FreeTextEntry1] : # Metastatic moderate to poorly differentiated adenocarcinoma , dx 07/2022 \par - BRCA (-) \par - s/p laparoscopy on 8.22.2022 with findings of ~1L malignant ascites and peritoneal implants \par - Ca19-9 is 6481, CEA 6.8 from 07/2022 \par - reviewed radiology, pathology and lab workup and had a discussion regarding implications of diagnosis, prognosis and options for management including but not limited to chemotherapy for palliative intent ; had a lengthy discussion regarding Stage IV disease and not curative \par - s/p L Chest port placement with SURG, Dr. Morgan \par - CT CAP from 11.8.2022 is essentially stable, tumor marker is decreasing \par - s/p cycle 12 of 5FU/Leucovorin/Irinotecan on 2/24 , initiated on 9.6.2022 ; discontinued oxaliplatin beginning C12 due to neuropathy \par - STOPPED 5FU/Leucovorin/Irinotecan due to rising Ca19-9 and PET/CT findings\par - PET/CT 3.9.23 showed FDG uptake within upper abdominal peritoneal nodularity adjacent to the stomach and pancreas, pancreatic tail, and a subcentimeter right pelvic sidewall lymph node.  \par - Patient inquired about certification for Medical Marijuana.  Risks and benefits discussed with patient at length.  Patient would like to proceed with certification today.  Patient successfully certified for utilization of Medical Marijuana; online submission made and certificate generated on 04/12/2023.  Patient is a permanent [facility] resident with diagnosis of cancer with associated symptoms who expressed interest and verbal consent for medical marijuana certification and utilization.  Patient educated on various formulations including but not limited to capsule, tablet, oil, powder or tincture with THC:CBD ratio to be determined and titrated per pharmacist consultation and recommendations.  Patient provided with signed copy of certification and patient specific instructions to apply for Registry ID Card Online.  Instructed to visit health.[place].gov/mmp or contact the [facility] Medical Marijuana Program at [phone] or by email at [email or web address] for more information.  Patient verbalized understanding and has no further questions at this time.\par - c/w cycle 2 of chemotherapy using Gemcitabine + Abraxane (D1,D8,D15 every 28 days) on 4/19 with onbody Neulasta D15 , initiated 3.22.2023 \par \par # Peripheral neuropathy, likely due to chemotherapy and hx of DM \par - oxaliplatin discontinued \par - c/w gabapentin 600mg daily , initiated 01/2023 \par - START Cymbalta 30mg x1 week then 60mg daily , Rx sent + side effects discussed \par \par # H. Pylori infection , dx 07/2022 \par - s/p upper EGD in 07/2022 ; completed antibiotic treatment \par - followup with GI, Dr. Maximilian Larson, as scheduled for management \par \par # Thrombocytopenia, likely due to chemotherapy \par - C1D15 of Gemcitabine/Abraxane held due to low PLTs \par - resolved \par \par RTC in 2 weeks with CBC \par \par seen/examined w/ NP Myrtle; note reviewed; case discused\par proceed w/ chemo as scheduled\par add cymbalta to treat neuropathy\par would not chage the regimen yet; however, if neuropathy is bad enough to affect his quality of life and ability to maintain employement would need to rediscuss\par f/u in 2 weeks

## 2023-04-19 NOTE — PHYSICAL EXAM
[Restricted in physically strenuous activity but ambulatory and able to carry out work of a light or sedentary nature] : Status 1- Restricted in physically strenuous activity but ambulatory and able to carry out work of a light or sedentary nature, e.g., light house work, office work [Normal] : affect appropriate [de-identified] : wearing glasses  [de-identified] : L chest port present

## 2023-04-26 ENCOUNTER — OUTPATIENT (OUTPATIENT)
Dept: OUTPATIENT SERVICES | Facility: HOSPITAL | Age: 73
LOS: 1 days | End: 2023-04-26
Payer: MEDICARE

## 2023-04-26 ENCOUNTER — APPOINTMENT (OUTPATIENT)
Dept: INFUSION THERAPY | Facility: CLINIC | Age: 73
End: 2023-04-26

## 2023-04-26 ENCOUNTER — LABORATORY RESULT (OUTPATIENT)
Age: 73
End: 2023-04-26

## 2023-04-26 DIAGNOSIS — Z98.890 OTHER SPECIFIED POSTPROCEDURAL STATES: Chronic | ICD-10-CM

## 2023-04-26 DIAGNOSIS — Z51.11 ENCOUNTER FOR ANTINEOPLASTIC CHEMOTHERAPY: ICD-10-CM

## 2023-04-26 DIAGNOSIS — G62.9 POLYNEUROPATHY, UNSPECIFIED: ICD-10-CM

## 2023-04-26 DIAGNOSIS — R97.8 OTHER ABNORMAL TUMOR MARKERS: ICD-10-CM

## 2023-04-26 DIAGNOSIS — C16.9 MALIGNANT NEOPLASM OF STOMACH, UNSPECIFIED: ICD-10-CM

## 2023-04-26 DIAGNOSIS — C25.9 MALIGNANT NEOPLASM OF PANCREAS, UNSPECIFIED: ICD-10-CM

## 2023-04-26 LAB
HCT VFR BLD CALC: 34.6 %
HGB BLD-MCNC: 11.6 G/DL
MCHC RBC-ENTMCNC: 29.3 PG
MCHC RBC-ENTMCNC: 33.5 G/DL
MCV RBC AUTO: 87.4 FL
PLATELET # BLD AUTO: 112 K/UL
PMV BLD: 10.7 FL
RBC # BLD: 3.96 M/UL
RBC # FLD: 14.5 %
WBC # FLD AUTO: 3.59 K/UL

## 2023-04-26 PROCEDURE — 96413 CHEMO IV INFUSION 1 HR: CPT

## 2023-04-26 PROCEDURE — 86301 IMMUNOASSAY TUMOR CA 19-9: CPT

## 2023-04-26 PROCEDURE — 80048 BASIC METABOLIC PNL TOTAL CA: CPT

## 2023-04-26 PROCEDURE — 96367 TX/PROPH/DG ADDL SEQ IV INF: CPT

## 2023-04-26 PROCEDURE — 96417 CHEMO IV INFUS EACH ADDL SEQ: CPT

## 2023-04-26 PROCEDURE — 80076 HEPATIC FUNCTION PANEL: CPT

## 2023-04-26 PROCEDURE — 85027 COMPLETE CBC AUTOMATED: CPT

## 2023-04-26 RX ORDER — FAMOTIDINE 10 MG/ML
20 INJECTION INTRAVENOUS ONCE
Refills: 0 | Status: COMPLETED | OUTPATIENT
Start: 2023-04-26 | End: 2023-04-26

## 2023-04-26 RX ORDER — DEXAMETHASONE 0.5 MG/5ML
10 ELIXIR ORAL ONCE
Refills: 0 | Status: COMPLETED | OUTPATIENT
Start: 2023-04-26 | End: 2023-04-26

## 2023-04-26 RX ORDER — FOSAPREPITANT DIMEGLUMINE 150 MG/5ML
150 INJECTION, POWDER, LYOPHILIZED, FOR SOLUTION INTRAVENOUS ONCE
Refills: 0 | Status: COMPLETED | OUTPATIENT
Start: 2023-04-26 | End: 2023-04-26

## 2023-04-26 RX ORDER — DIPHENHYDRAMINE HCL 50 MG
50 CAPSULE ORAL ONCE
Refills: 0 | Status: COMPLETED | OUTPATIENT
Start: 2023-04-26 | End: 2023-04-26

## 2023-04-26 RX ORDER — PACLITAXEL 100 MG/20ML
220 INJECTION, POWDER, LYOPHILIZED, FOR SUSPENSION INTRAVENOUS ONCE
Refills: 0 | Status: COMPLETED | OUTPATIENT
Start: 2023-04-26 | End: 2023-04-26

## 2023-04-26 RX ORDER — GEMCITABINE 38 MG/ML
1790 INJECTION, SOLUTION INTRAVENOUS ONCE
Refills: 0 | Status: COMPLETED | OUTPATIENT
Start: 2023-04-26 | End: 2023-04-26

## 2023-04-26 RX ADMIN — PACLITAXEL 220 MILLIGRAM(S): 100 INJECTION, POWDER, LYOPHILIZED, FOR SUSPENSION INTRAVENOUS at 15:30

## 2023-04-26 RX ADMIN — FAMOTIDINE 104 MILLIGRAM(S): 10 INJECTION INTRAVENOUS at 14:20

## 2023-04-26 RX ADMIN — Medication 50 MILLIGRAM(S): at 14:20

## 2023-04-26 RX ADMIN — Medication 118 MILLIGRAM(S): at 13:31

## 2023-04-26 RX ADMIN — FAMOTIDINE 20 MILLIGRAM(S): 10 INJECTION INTRAVENOUS at 14:40

## 2023-04-26 RX ADMIN — FOSAPREPITANT DIMEGLUMINE 150 MILLIGRAM(S): 150 INJECTION, POWDER, LYOPHILIZED, FOR SOLUTION INTRAVENOUS at 13:40

## 2023-04-26 RX ADMIN — GEMCITABINE 1790 MILLIGRAM(S): 38 INJECTION, SOLUTION INTRAVENOUS at 16:55

## 2023-04-26 RX ADMIN — PACLITAXEL 220 MILLIGRAM(S): 100 INJECTION, POWDER, LYOPHILIZED, FOR SUSPENSION INTRAVENOUS at 16:00

## 2023-04-26 RX ADMIN — Medication 10 MILLIGRAM(S): at 13:50

## 2023-04-26 RX ADMIN — Medication 102 MILLIGRAM(S): at 14:00

## 2023-04-26 RX ADMIN — GEMCITABINE 1790 MILLIGRAM(S): 38 INJECTION, SOLUTION INTRAVENOUS at 16:05

## 2023-04-26 RX ADMIN — FOSAPREPITANT DIMEGLUMINE 500 MILLIGRAM(S): 150 INJECTION, POWDER, LYOPHILIZED, FOR SOLUTION INTRAVENOUS at 13:11

## 2023-05-03 ENCOUNTER — APPOINTMENT (OUTPATIENT)
Dept: INFUSION THERAPY | Facility: CLINIC | Age: 73
End: 2023-05-03
Payer: MEDICARE

## 2023-05-03 ENCOUNTER — LABORATORY RESULT (OUTPATIENT)
Age: 73
End: 2023-05-03

## 2023-05-03 ENCOUNTER — OUTPATIENT (OUTPATIENT)
Dept: OUTPATIENT SERVICES | Facility: HOSPITAL | Age: 73
LOS: 1 days | End: 2023-05-03
Payer: MEDICARE

## 2023-05-03 ENCOUNTER — APPOINTMENT (OUTPATIENT)
Dept: HEMATOLOGY ONCOLOGY | Facility: CLINIC | Age: 73
End: 2023-05-03
Payer: MEDICARE

## 2023-05-03 VITALS
DIASTOLIC BLOOD PRESSURE: 73 MMHG | WEIGHT: 151 LBS | SYSTOLIC BLOOD PRESSURE: 148 MMHG | TEMPERATURE: 98.6 F | HEIGHT: 66 IN | BODY MASS INDEX: 24.27 KG/M2 | HEART RATE: 70 BPM | RESPIRATION RATE: 16 BRPM

## 2023-05-03 DIAGNOSIS — G62.9 POLYNEUROPATHY, UNSPECIFIED: ICD-10-CM

## 2023-05-03 DIAGNOSIS — C16.9 MALIGNANT NEOPLASM OF STOMACH, UNSPECIFIED: ICD-10-CM

## 2023-05-03 DIAGNOSIS — Z51.11 ENCOUNTER FOR ANTINEOPLASTIC CHEMOTHERAPY: ICD-10-CM

## 2023-05-03 DIAGNOSIS — R97.8 OTHER ABNORMAL TUMOR MARKERS: ICD-10-CM

## 2023-05-03 DIAGNOSIS — C25.9 MALIGNANT NEOPLASM OF PANCREAS, UNSPECIFIED: ICD-10-CM

## 2023-05-03 DIAGNOSIS — Z98.890 OTHER SPECIFIED POSTPROCEDURAL STATES: Chronic | ICD-10-CM

## 2023-05-03 LAB
HCT VFR BLD CALC: 34.4 %
HGB BLD-MCNC: 11.7 G/DL
MCHC RBC-ENTMCNC: 29.3 PG
MCHC RBC-ENTMCNC: 34 G/DL
MCV RBC AUTO: 86.2 FL
PLATELET # BLD AUTO: 64 K/UL
PMV BLD: 9.9 FL
RBC # BLD: 3.99 M/UL
RBC # FLD: 14.6 %
WBC # FLD AUTO: 2.76 K/UL

## 2023-05-03 PROCEDURE — 96413 CHEMO IV INFUSION 1 HR: CPT

## 2023-05-03 PROCEDURE — 80048 BASIC METABOLIC PNL TOTAL CA: CPT

## 2023-05-03 PROCEDURE — 36415 COLL VENOUS BLD VENIPUNCTURE: CPT

## 2023-05-03 PROCEDURE — 99214 OFFICE O/P EST MOD 30 MIN: CPT

## 2023-05-03 PROCEDURE — 85027 COMPLETE CBC AUTOMATED: CPT

## 2023-05-03 PROCEDURE — 96375 TX/PRO/DX INJ NEW DRUG ADDON: CPT

## 2023-05-03 PROCEDURE — 96377 APPLICATON ON-BODY INJECTOR: CPT | Mod: XU

## 2023-05-03 PROCEDURE — 80076 HEPATIC FUNCTION PANEL: CPT

## 2023-05-03 PROCEDURE — 96367 TX/PROPH/DG ADDL SEQ IV INF: CPT

## 2023-05-03 PROCEDURE — 96417 CHEMO IV INFUS EACH ADDL SEQ: CPT

## 2023-05-03 PROCEDURE — 86301 IMMUNOASSAY TUMOR CA 19-9: CPT

## 2023-05-03 RX ORDER — PEGFILGRASTIM-CBQV 6 MG/.6ML
6 INJECTION, SOLUTION SUBCUTANEOUS ONCE
Refills: 0 | Status: COMPLETED | OUTPATIENT
Start: 2023-05-03 | End: 2023-05-03

## 2023-05-03 RX ORDER — GEMCITABINE 38 MG/ML
1790 INJECTION, SOLUTION INTRAVENOUS ONCE
Refills: 0 | Status: COMPLETED | OUTPATIENT
Start: 2023-05-03 | End: 2023-05-03

## 2023-05-03 RX ORDER — PACLITAXEL 100 MG/20ML
220 INJECTION, POWDER, LYOPHILIZED, FOR SUSPENSION INTRAVENOUS ONCE
Refills: 0 | Status: COMPLETED | OUTPATIENT
Start: 2023-05-03 | End: 2023-05-03

## 2023-05-03 RX ORDER — DIPHENHYDRAMINE HCL 50 MG
50 CAPSULE ORAL ONCE
Refills: 0 | Status: COMPLETED | OUTPATIENT
Start: 2023-05-03 | End: 2023-05-03

## 2023-05-03 RX ORDER — FAMOTIDINE 10 MG/ML
20 INJECTION INTRAVENOUS ONCE
Refills: 0 | Status: COMPLETED | OUTPATIENT
Start: 2023-05-03 | End: 2023-05-03

## 2023-05-03 RX ORDER — DEXAMETHASONE 0.5 MG/5ML
10 ELIXIR ORAL ONCE
Refills: 0 | Status: COMPLETED | OUTPATIENT
Start: 2023-05-03 | End: 2023-05-03

## 2023-05-03 RX ORDER — FOSAPREPITANT DIMEGLUMINE 150 MG/5ML
150 INJECTION, POWDER, LYOPHILIZED, FOR SOLUTION INTRAVENOUS ONCE
Refills: 0 | Status: COMPLETED | OUTPATIENT
Start: 2023-05-03 | End: 2023-05-03

## 2023-05-03 RX ADMIN — Medication 10 MILLIGRAM(S): at 13:55

## 2023-05-03 RX ADMIN — GEMCITABINE 1790 MILLIGRAM(S): 38 INJECTION, SOLUTION INTRAVENOUS at 16:30

## 2023-05-03 RX ADMIN — GEMCITABINE 1790 MILLIGRAM(S): 38 INJECTION, SOLUTION INTRAVENOUS at 15:30

## 2023-05-03 RX ADMIN — Medication 118 MILLIGRAM(S): at 13:35

## 2023-05-03 RX ADMIN — FOSAPREPITANT DIMEGLUMINE 150 MILLIGRAM(S): 150 INJECTION, POWDER, LYOPHILIZED, FOR SOLUTION INTRAVENOUS at 15:00

## 2023-05-03 RX ADMIN — PACLITAXEL 220 MILLIGRAM(S): 100 INJECTION, POWDER, LYOPHILIZED, FOR SUSPENSION INTRAVENOUS at 15:30

## 2023-05-03 RX ADMIN — PEGFILGRASTIM-CBQV 6 MILLIGRAM(S): 6 INJECTION, SOLUTION SUBCUTANEOUS at 15:59

## 2023-05-03 RX ADMIN — FOSAPREPITANT DIMEGLUMINE 500 MILLIGRAM(S): 150 INJECTION, POWDER, LYOPHILIZED, FOR SOLUTION INTRAVENOUS at 14:25

## 2023-05-03 RX ADMIN — Medication 102 MILLIGRAM(S): at 14:10

## 2023-05-03 RX ADMIN — FAMOTIDINE 20 MILLIGRAM(S): 10 INJECTION INTRAVENOUS at 14:10

## 2023-05-03 RX ADMIN — PACLITAXEL 220 MILLIGRAM(S): 100 INJECTION, POWDER, LYOPHILIZED, FOR SUSPENSION INTRAVENOUS at 15:00

## 2023-05-03 RX ADMIN — FAMOTIDINE 104 MILLIGRAM(S): 10 INJECTION INTRAVENOUS at 13:55

## 2023-05-03 RX ADMIN — Medication 50 MILLIGRAM(S): at 14:25

## 2023-05-03 NOTE — REVIEW OF SYSTEMS
[Lower Ext Edema] : lower extremity edema [Negative] : Allergic/Immunologic [Fever] : no fever [Night Sweats] : no night sweats [Dysphagia] : no dysphagia [Chest Pain] : no chest pain [Shortness Of Breath] : no shortness of breath [Abdominal Pain] : no abdominal pain [Constipation] : no constipation [Vomiting] : no vomiting [Easy Bleeding] : no tendency for easy bleeding [FreeTextEntry2] : appetite slightly improved but still poor  [FreeTextEntry5] : reports trace bilateral LE edema x 1 week [FreeTextEntry7] : reports occasional nausea [de-identified] : endorses occasional neuropathy with long exposure to cold items , neuropathy in his hands is slightly more apparent  [de-identified] : L chest port present

## 2023-05-03 NOTE — HISTORY OF PRESENT ILLNESS
[Disease: _____________________] : Disease: [unfilled] [Therapy: ___] : Therapy: [unfilled] [Cycle: ___] : Cycle: [unfilled] [de-identified] : Mr. JINA SINGER is a 72 year old male here today for evaluation and management of Pancreatic Cancer.  \par \par He was referred by SURG, Dr. Morgan.  JINA is a 72 year old M with PMHx including HTN, HLD, T2DM, who presents to clinic to establish care.  Patient presented to ER back in 07/2022 with complaint of gradually worsening abdominal pain for the prior month, worse after eating and associated with 40lb weight loss in the last 2 months.  He underwent CT imaging which shows evidence suggestive of metastatic disease.  He also endorses constipation.  He underwent laparoscopy with findings of ~1L malignant ascites and peritoneal implants on 8.22.2022.  He is presently feeling well with no new complaints.  Patient denies fever, chills, nausea, vomiting, dysphagia, dyspnea, neuropathy or bleeding.  Patient reports family history of malignancy in his father (possibly stomach cancer) and mother (possibly colon).  Never a smoker.  NKDA.  \par \par RADIOLOGIC WORKUP\par CT Chest (7.25.2022) IMPRESSION:Perigastric infiltrative process inseparable from the tail the pancreas extending to the proximal duodenum. Associated ascites. Findings again  suspicious for pancreatic or gastrointestinal neoplasm. Differential diagnosis includes partially contained ruptured gastric or duodenal ulcer as well as infectious process/pancreatitis.Hyperaerated lungs. Scattered granulomata. Vascular calcifications. Nonspecific areas of subpleural thickening bilaterally.\par CT A/P (7.24.2022) IMPRESSION:Predominantly perigastric infiltrative soft tissue changes which are inseparable from the tail of pancreas and extend around the proximal  duodenum. Small volume abdominopelvic ascites. Differential includes underlying pancreatic or gastrointestinal neoplasm, sequelae of partially contained ruptured gastric or duodenal  ulcer, and infectious process or perhaps pancreatitis. Clinical correlation and/or further evaluation with EGD should be considered.Findings compatible with BPH. Superimposed cystitis is difficult to exclude on imaging.\par \par LAB WORKUP\par (8.17.2022) WBC 8.36, Hgb 14.2, MCV 84, , normal diff, Cr 0.9, eGFR 91, normal LFTs\par (7.25.2022) Ca19-9 is 6481, CEA 6.8, TSH 2.62\par (10.23.2019) WBC 6.83, Hgb 15, MCV 85.7, \par \par PATHOLOGY\par (see results section)\par \par HCM\par Colonoscopy done about 5 years , polyps removed \par Upper Endoscopy done 07/2022 showed 5x5 hypoechoic infiltrative mass (irregular borders) extending between the  muscularis propria (abuting it) and the territory of the pancreatic tail \par COVID Vaccinated? [FreeTextEntry1] : Started Gemcitabine + Abraxane with onbody neulasta (D1, D8, D15 every 28 days) on 3.22.2023 \par Started mFOLFIRINOX on 9.6.2022 (discontinued oxaliplatin beginning C12 on 2/22/23) - 2.24.2023 due to rising Ca19-9.   [de-identified] : 9/14/22\[facility] Patient is here for a follow-up visit for pancreatic cancer.  He is s/p initial cycle of mFOLFIRINOX on 9.6.2022.  He is feeling well with no new complaints.  Reviewed most recent CBC, which is stable.  Patient denies fever, chills, nausea, vomiting, dyspnea, neuropathy, persistent diarrhea or bleeding.  He states his appetite is still poor but slightly improving.  He is due for cycle 2 of chemotherapy next week.  \par \par 10/4/22\[facility] Patient is here for a follow-up visit for pancreatic cancer.  He is due for cycle 3 of mFOLFIRINOX today, initiated on 9.6.2022.  He is feeling well with no new complaints.  Reviewed most recent CBC, which shows mild anemia, hgb 11.6g/dL and persistent leukocytosis, likely due to GCSF administration with chemotherapy.  ALK Phos is slightly higher at 198 but Ca19-9 is decreasing down to 38843B/mL.  Patient denies fever, chills, vomiting, dyspnea, neuropathy, CP, palpitations, abdominal pain, persistent diarrhea or bleeding.  He is endorses transient nausea which was not bothersome and he took anti-emetics.  \par \par 11/2/22\[facility] Patient is here for a follow-up visit for pancreatic cancer.  He is due for cycle 4 of mFOLFIRINOX today, initiated on 9.6.2022.  He is feeling well with no new complaints.  Reviewed most recent CBC, which shows mild anemia, hgb 11.1g/dL and persistent leukocytosis, likely due to GCSF administration with chemotherapy.  ALK Phos is slightly lower at 138 but Ca19-9 is decreasing down to 4619U/mL.  Patient denies fever, chills, vomiting, dyspnea, CP, palpitations, abdominal pain, persistent diarrhea or bleeding.  He endorses occasional neuropathy with long exposure to cold items.  \par \par 11/16/22\[facility] Patient is here for a follow-up visit for pancreatic cancer, accompanied by spouse.  He is due for cycle 6 of mFOLFIRINOX today, initiated on 9.6.2022.  He is feeling well with no new complaints.  Reviewed most recent CBC, which shows mild anemia, hgb 11.1g/dL and persistent leukocytosis, likely due to GCSF administration with chemotherapy.  ALK Phos is essentially stable at 146 but Ca19-9 is decreasing down to 2708U/mL.  Patient denies fever, chills, vomiting, dyspnea, CP, palpitations, abdominal pain, persistent diarrhea, mouth sores or bleeding.  He still notes occasional neuropathy with long exposure to cold items.  Reviewed most recent CT imaging which is essentially stable.  \par CT C/A/P (11.8.2022) IMPRESSION:1. No definite evidence of intrathoracic metastasis.Since 7/24/2022, no significant interval change.Near diffuse peritoneal carcinomatosis is not significantly changed.Diffuse tumor infiltration in the pancreatic tail (4/226), unchanged.Completely attenuated splenic vein is unchanged. Prominent venous collaterals in the left upper quadrant is unchanged.\par \par 11/30/22\par Patient is here for a follow-up visit for pancreatic cancer.  He is due for cycle 7 of mFOLFIRINOX today, initiated on 9.6.2022.  Reviewed most recent CBC, which shows mild anemia, hgb 10.9g/dL and persistent leukocytosis, likely due to GCSF administration with chemotherapy.  ALK Phos is essentially stable at 138 and Ca19-9 is decreasing down to 1683U/mL.  Patient denies fever, chills, vomiting, dyspnea, CP, palpitations, abdominal pain, persistent diarrhea, mouth sores or bleeding.  He still notes occasional neuropathy with long exposure to cold items. \par \par 12/14/22\par Patient is here for a follow-up visit for pancreatic cancer, accompanied by spouse via telephone.  He is due for cycle 8 of mFOLFIRINOX today, initiated on 9.6.2022.  Reviewed most recent CBC, which shows mild anemia, hgb 10.5g/dL and persistent leukocytosis, likely due to GCSF administration with chemotherapy.  ALK Phos is essentially stable at 124 and Ca19-9 is decreasing down to 1177U/mL.  Patient denies fever, chills, dyspnea, CP, palpitations, abdominal pain, persistent diarrhea, mouth sores or bleeding.  He has an occasional cough x 2 weeks; believes he had a mild cold which has been improving over the last 2 weeks but the cough is persistent.  He still notes occasional neuropathy with long exposure to cold items. \par \par 12/28/22\par Patient is here for a follow-up visit for pancreatic cancer, accompanied by spouse via telephone.  Since last visit, patient had CXR which was suggestive for pneumonia.  He has completed antibiotics with improvement in symptoms.  Reviewed PULM consult with Dr. Escobar: plans to repeat CXR in about 6 weeks.  Patient is agreeable to resume chemotherapy.  He is due for cycle 9 of mFOLFIRINOX today, initiated on 9.6.2022.  Reviewed most recent CBC which shows mild anemia and neutrophil-predominant leukocytosis.  Tumor marker is down to 957 U/mL from 12/2022.  \par CXR (12.15.2022) Impression:Subsegmental left lower lobe consolidation.\par \par 1/11/23\par Patient is here for a follow-up visit for pancreatic cancer, accompanied by spouse via telephone.  As per PULM consult with Dr. Escobar: plan to repeat CXR about 6 weeks following prior imaging.  He is due for cycle 10 of mFOLFIRINOX today, initiated on 9.6.2022.  Reviewed most recent CBC which shows mild anemia with hgb 10.6g/dL and neutrophil-predominant leukocytosis likely from GCSF administration.  Tumor marker is down to 693 U/mL from 1/2023.  He denies fevers, chills, worsening of neuropathy, dyspnea, mouth sores or bleeding.  Patient states he dislikes the effect of Benadryl pre-medication because it makes him drowsy and feel "off."  He is requesting to decrease Benadryl from 50mg to 25mg IV as pre-medicaiton.  Risks vs benefits discussed at length.  \par \par 1/24/23\par Patient is here for a follow-up visit for pancreatic cancer.  He is due for cycle 11 of mFOLFIRINOX today, initiated on 9.6.2022.  Reviewed most recent CBC which shows mild anemia with hgb 11.3g/dL, mild thrombocytopenia with PLTs 106,000 and neutrophil-predominant leukocytosis likely from GCSF administration.  Tumor marker is down to 693 U/mL from 1/2023.  He denies fevers, chills, worsening of neuropathy, dyspnea, new cough, mouth sores or bleeding.  \par \par 2/7/23\par Patient is here for a follow-up visit for pancreatic cancer.  He is due for cycle 12 of mFOLFIRINOX today, initiated on 9.6.2022.  Reviewed most recent CBC which shows mild anemia with hgb 11.3g/dL, mild thrombocytopenia with PLTs 106,000 and neutrophil-predominant leukocytosis likely from GCSF administration.  Tumor marker is down to 693 U/mL from 1/2023.  He denies fevers, chills, dyspnea, new cough, mouth sores or bleeding.  He states his neuropathy in his feet is slightly worse in both feet.  \par \par 2/21/23\par Patient is here for a follow-up visit for pancreatic cancer.  He is due for cycle 12 of chemotherapy on 2/22, initiated on 9.6.2022.  Reviewed most recent CBC which shows mild anemia with hgb 11g/dL.  Tumor marker is down to 457 U/mL from 02/2023.  He denies fevers, chills, dyspnea, new cough, mouth sores, persistent diarrhea or bleeding.  He endorses neuropathy in both feet and fingertips; started gabapentin 100mg TiD without resolution of symptoms.  \par \par 3/7/23\par Patient is here for a follow-up visit for pancreatic cancer.  He is due for cycle 13 of chemotherapy on 3/8, initiated on 9.6.2022.  Reviewed most recent CBC which shows mild anemia with hgb 11.6g/dL.  Tumor marker is slightly higher at 570 U/mL from 02/2023.  He denies fevers, chills, dyspnea, new cough, mouth sores, persistent diarrhea, new pain or bleeding.  He endorses neuropathy in both feet and fingertips; he is on gabapentin 600mg daily without resolution of symptoms.  He recently followed with GI, Dr. Maximilian Larson, and is anticipating upper EGD.  Reviewed US Duplex which showed no DVT.  \par US Duplex BI LE (3.3.2023) IMPRESSION:No evidence of deep venous thrombosis in either lower extremity.Bilateral popliteal cysts.\par \par 3/15/23\par Patient is here for a follow-up visit for pancreatic cancer, accompanied by spouse Renate via telephone.  He is s/p cycle 12 of chemotherapy on 2/24, initiated on 9.6.2022.  Tumor marker almost doubled up to 1154 U/mL from 03/2023.  He was sent for PET/CT which showed FDG uptake within upper abdominal peritoneal nodularity adjacent to the stomach and pancreas, pancreatic tail, and a subcentimeter right pelvic sidewall lymph node.  He is presently feeling fine without new complaints.  \[facility] PET/CT (3.9.2023) IMPRESSION:Abnormal FDG uptake within upper abdominal peritoneal nodularity adjacent to the stomach and pancreas (SUV up to 5.4), pancreatic tail (SUV 3.4) and a subcentimeter right pelvic sidewall lymph node (SUV 3.6).No other definite sites of abnormal FDG uptake to suggest biologic tumor activity.\par \par 3/21/23\[facility] Patient is here for a follow-up visit for pancreatic cancer, accompanied by spouse.  He is s/p cycle 12 of chemotherapy on 2/24, initiated on 9.6.2022.  Tumor marker almost doubled up to 1154 U/mL from 03/2023.  He is anticipating initiation of new chemotherapy regimen using Gemcitabine + Abraxane on 3.22.2023.  \par \par 3/29/23[facility] Patient is here for a follow-up visit for pancreatic cancer, accompanied by spouse.  Since last visit, patient started new chemotherapy regimen using Gemcitabine + Abraxane, initiated on 3.22.2023.  Tumor marker doubled again up to 2410U/mL from 03/2023.  Reviewed most recent CBC, which shows mild pancytopenia.  Patient denies fever, chills, nausea, vomiting, dyspnea, diarrhea or bleeding.  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  \par \par 4/12/23\[facility] Patient is here for a follow-up visit for pancreatic cancer.  Patient is due for cycle 2 of Gemcitabine + Abraxane, initiated on 3.22.2023.  Reviewed most recent CBC, which shows mild leukopenia and anemia, hgb 11g/dL.  Patient denies fever, chills, nausea, vomiting, dyspnea, diarrhea or bleeding.  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  \par \par 4/19/23\[facility] Patient is here for a follow-up visit for pancreatic cancer.  Patient is due for cycle 2 of Gemcitabine + Abraxane, initiated on 3.22.2023.  Reviewed most recent CBC, which shows mild leukopenia and anemia, hgb 11g/dL.  Patient denies fever, chills, nausea, vomiting, dyspnea, diarrhea or bleeding.  He reports slight worsening of neuropathy of fingers despite Gabapentin 600mg total daily.  Ca19-9 level is down to 2253U/mL.  \par \par 5/3/23\par

## 2023-05-03 NOTE — PHYSICAL EXAM
[Restricted in physically strenuous activity but ambulatory and able to carry out work of a light or sedentary nature] : Status 1- Restricted in physically strenuous activity but ambulatory and able to carry out work of a light or sedentary nature, e.g., light house work, office work [Normal] : affect appropriate [de-identified] : wearing glasses  [de-identified] : L chest port present

## 2023-05-03 NOTE — ASSESSMENT
[Palliative] : Goals of care discussed with patient: Palliative [FreeTextEntry1] : # Metastatic moderate to poorly differentiated adenocarcinoma , dx 07/2022 \par - BRCA (-) \par - s/p laparoscopy on 8.22.2022 with findings of ~1L malignant ascites and peritoneal implants \par - Ca19-9 is 6481, CEA 6.8 from 07/2022 \par - reviewed radiology, pathology and lab workup and had a discussion regarding implications of diagnosis, prognosis and options for management including but not limited to chemotherapy for palliative intent ; had a lengthy discussion regarding Stage IV disease and not curative \par - s/p L Chest port placement with SURG, Dr. Morgan \par - CT CAP from 11.8.2022 is essentially stable, tumor marker is decreasing \par - s/p cycle 12 of 5FU/Leucovorin/Irinotecan on 2/24 , initiated on 9.6.2022 ; discontinued oxaliplatin beginning C12 due to neuropathy \par - STOPPED 5FU/Leucovorin/Irinotecan due to rising Ca19-9 and PET/CT findings\par - PET/CT 3.9.23 showed FDG uptake within upper abdominal peritoneal nodularity adjacent to the stomach and pancreas, pancreatic tail, and a subcentimeter right pelvic sidewall lymph node.  \par - Patient inquired about certification for Medical Marijuana.  Risks and benefits discussed with patient at length.  Patient would like to proceed with certification today.  Patient successfully certified for utilization of Medical Marijuana; online submission made and certificate generated on 04/12/2023.  Patient is a permanent [facility] resident with diagnosis of cancer with associated symptoms who expressed interest and verbal consent for medical marijuana certification and utilization.  Patient educated on various formulations including but not limited to capsule, tablet, oil, powder or tincture with THC:CBD ratio to be determined and titrated per pharmacist consultation and recommendations.  Patient provided with signed copy of certification and patient specific instructions to apply for Registry ID Card Online.  Instructed to visit health.[place].gov/mmp or contact the [facility] Medical Marijuana Program at [phone] or by email at [email or web address] for more information.  Patient verbalized understanding and has no further questions at this time.\par - c/w cycle 2 of chemotherapy using Gemcitabine + Abraxane (D1,D8,D15 every 28 days) on 4/19 with onbody Neulasta D15 , initiated 3.22.2023 \par \par # Peripheral neuropathy, likely due to chemotherapy and hx of DM \par - oxaliplatin discontinued \par - c/w gabapentin 600mg daily , initiated 01/2023 \par - START Cymbalta 30mg x1 week then 60mg daily , Rx sent + side effects discussed \par \par # H. Pylori infection , dx 07/2022 \par - s/p upper EGD in 07/2022 ; completed antibiotic treatment \par - followup with GI, Dr. Maximilian Larson, as scheduled for management \par \par # Thrombocytopenia, likely due to chemotherapy \par - C1D15 of Gemcitabine/Abraxane held due to low PLTs \par - resolved \par \par RTC in 2 weeks with CBC \par \par seen/examined w/ NP Myrtle; note reviewed; case discused\par proceed w/ chemo c3d15 with neulasta\par added cymbalta to treat neuropathy\par would not chage the regimen yet; however, if neuropathy is bad enough to affect his quality of life and ability to maintain employement would need to rediscuss\par f/u in 2 weeks

## 2023-05-04 LAB
ALBUMIN SERPL ELPH-MCNC: 3.9 G/DL
ALP BLD-CCNC: 89 U/L
ALT SERPL-CCNC: 18 U/L
ANION GAP SERPL CALC-SCNC: 12 MMOL/L
AST SERPL-CCNC: 13 U/L
BILIRUB DIRECT SERPL-MCNC: <0.2 MG/DL
BILIRUB INDIRECT SERPL-MCNC: NORMAL MG/DL
BILIRUB SERPL-MCNC: <0.2 MG/DL
BUN SERPL-MCNC: 12 MG/DL
CALCIUM SERPL-MCNC: 8.7 MG/DL
CANCER AG19-9 SERPL-ACNC: 3087 U/ML
CHLORIDE SERPL-SCNC: 104 MMOL/L
CO2 SERPL-SCNC: 23 MMOL/L
CREAT SERPL-MCNC: 0.8 MG/DL
EGFR: 94 ML/MIN/1.73M2
GLUCOSE SERPL-MCNC: 102 MG/DL
POTASSIUM SERPL-SCNC: 4.2 MMOL/L
PROT SERPL-MCNC: 6.4 G/DL
SODIUM SERPL-SCNC: 139 MMOL/L

## 2023-05-17 ENCOUNTER — LABORATORY RESULT (OUTPATIENT)
Age: 73
End: 2023-05-17

## 2023-05-17 ENCOUNTER — APPOINTMENT (OUTPATIENT)
Dept: HEMATOLOGY ONCOLOGY | Facility: CLINIC | Age: 73
End: 2023-05-17
Payer: MEDICARE

## 2023-05-17 ENCOUNTER — OUTPATIENT (OUTPATIENT)
Dept: OUTPATIENT SERVICES | Facility: HOSPITAL | Age: 73
LOS: 1 days | End: 2023-05-17
Payer: MEDICARE

## 2023-05-17 ENCOUNTER — APPOINTMENT (OUTPATIENT)
Dept: INFUSION THERAPY | Facility: CLINIC | Age: 73
End: 2023-05-17
Payer: MEDICARE

## 2023-05-17 VITALS
RESPIRATION RATE: 16 BRPM | WEIGHT: 151 LBS | BODY MASS INDEX: 24.27 KG/M2 | DIASTOLIC BLOOD PRESSURE: 57 MMHG | SYSTOLIC BLOOD PRESSURE: 122 MMHG | HEIGHT: 66 IN | HEART RATE: 81 BPM | TEMPERATURE: 98 F

## 2023-05-17 DIAGNOSIS — G62.9 POLYNEUROPATHY, UNSPECIFIED: ICD-10-CM

## 2023-05-17 DIAGNOSIS — Z98.890 OTHER SPECIFIED POSTPROCEDURAL STATES: Chronic | ICD-10-CM

## 2023-05-17 DIAGNOSIS — C25.9 MALIGNANT NEOPLASM OF PANCREAS, UNSPECIFIED: ICD-10-CM

## 2023-05-17 DIAGNOSIS — C16.9 MALIGNANT NEOPLASM OF STOMACH, UNSPECIFIED: ICD-10-CM

## 2023-05-17 DIAGNOSIS — R97.8 OTHER ABNORMAL TUMOR MARKERS: ICD-10-CM

## 2023-05-17 DIAGNOSIS — Z51.11 ENCOUNTER FOR ANTINEOPLASTIC CHEMOTHERAPY: ICD-10-CM

## 2023-05-17 LAB
ALBUMIN SERPL ELPH-MCNC: 3.7 G/DL
ALP BLD-CCNC: 108 U/L
ALT SERPL-CCNC: 14 U/L
ANION GAP SERPL CALC-SCNC: 10 MMOL/L
AST SERPL-CCNC: 15 U/L
BILIRUB DIRECT SERPL-MCNC: <0.2 MG/DL
BILIRUB INDIRECT SERPL-MCNC: NORMAL MG/DL
BILIRUB SERPL-MCNC: <0.2 MG/DL
BUN SERPL-MCNC: 11 MG/DL
CALCIUM SERPL-MCNC: 8.4 MG/DL
CHLORIDE SERPL-SCNC: 103 MMOL/L
CO2 SERPL-SCNC: 24 MMOL/L
CREAT SERPL-MCNC: 0.8 MG/DL
EGFR: 94 ML/MIN/1.73M2
GLUCOSE SERPL-MCNC: 119 MG/DL
HCT VFR BLD CALC: 33.1 %
HGB BLD-MCNC: 11.6 G/DL
MCHC RBC-ENTMCNC: 29.7 PG
MCHC RBC-ENTMCNC: 35 G/DL
MCV RBC AUTO: 84.9 FL
PLATELET # BLD AUTO: 200 K/UL
PMV BLD: 10.6 FL
POTASSIUM SERPL-SCNC: 3.9 MMOL/L
PROT SERPL-MCNC: 6.3 G/DL
RBC # BLD: 3.9 M/UL
RBC # FLD: 16.6 %
SODIUM SERPL-SCNC: 137 MMOL/L
WBC # FLD AUTO: 16.87 K/UL

## 2023-05-17 PROCEDURE — 96375 TX/PRO/DX INJ NEW DRUG ADDON: CPT

## 2023-05-17 PROCEDURE — 86301 IMMUNOASSAY TUMOR CA 19-9: CPT

## 2023-05-17 PROCEDURE — 96367 TX/PROPH/DG ADDL SEQ IV INF: CPT

## 2023-05-17 PROCEDURE — 80076 HEPATIC FUNCTION PANEL: CPT

## 2023-05-17 PROCEDURE — 99214 OFFICE O/P EST MOD 30 MIN: CPT

## 2023-05-17 PROCEDURE — 80048 BASIC METABOLIC PNL TOTAL CA: CPT

## 2023-05-17 PROCEDURE — 96413 CHEMO IV INFUSION 1 HR: CPT

## 2023-05-17 PROCEDURE — 85027 COMPLETE CBC AUTOMATED: CPT

## 2023-05-17 PROCEDURE — 96417 CHEMO IV INFUS EACH ADDL SEQ: CPT

## 2023-05-17 RX ORDER — FAMOTIDINE 10 MG/ML
20 INJECTION INTRAVENOUS ONCE
Refills: 0 | Status: COMPLETED | OUTPATIENT
Start: 2023-05-17 | End: 2023-05-17

## 2023-05-17 RX ORDER — DIPHENHYDRAMINE HCL 50 MG
50 CAPSULE ORAL ONCE
Refills: 0 | Status: COMPLETED | OUTPATIENT
Start: 2023-05-17 | End: 2023-05-17

## 2023-05-17 RX ORDER — PACLITAXEL 100 MG/20ML
220 INJECTION, POWDER, LYOPHILIZED, FOR SUSPENSION INTRAVENOUS ONCE
Refills: 0 | Status: COMPLETED | OUTPATIENT
Start: 2023-05-17 | End: 2023-05-17

## 2023-05-17 RX ORDER — GEMCITABINE 38 MG/ML
1790 INJECTION, SOLUTION INTRAVENOUS ONCE
Refills: 0 | Status: COMPLETED | OUTPATIENT
Start: 2023-05-17 | End: 2023-05-17

## 2023-05-17 RX ORDER — DEXAMETHASONE 0.5 MG/5ML
10 ELIXIR ORAL ONCE
Refills: 0 | Status: COMPLETED | OUTPATIENT
Start: 2023-05-17 | End: 2023-05-17

## 2023-05-17 RX ORDER — FOSAPREPITANT DIMEGLUMINE 150 MG/5ML
150 INJECTION, POWDER, LYOPHILIZED, FOR SOLUTION INTRAVENOUS ONCE
Refills: 0 | Status: COMPLETED | OUTPATIENT
Start: 2023-05-17 | End: 2023-05-17

## 2023-05-17 RX ADMIN — GEMCITABINE 1790 MILLIGRAM(S): 38 INJECTION, SOLUTION INTRAVENOUS at 12:45

## 2023-05-17 RX ADMIN — Medication 50 MILLIGRAM(S): at 11:45

## 2023-05-17 RX ADMIN — PACLITAXEL 220 MILLIGRAM(S): 100 INJECTION, POWDER, LYOPHILIZED, FOR SUSPENSION INTRAVENOUS at 12:45

## 2023-05-17 RX ADMIN — PACLITAXEL 220 MILLIGRAM(S): 100 INJECTION, POWDER, LYOPHILIZED, FOR SUSPENSION INTRAVENOUS at 12:15

## 2023-05-17 RX ADMIN — Medication 10 MILLIGRAM(S): at 11:15

## 2023-05-17 RX ADMIN — FAMOTIDINE 20 MILLIGRAM(S): 10 INJECTION INTRAVENOUS at 11:30

## 2023-05-17 RX ADMIN — GEMCITABINE 1790 MILLIGRAM(S): 38 INJECTION, SOLUTION INTRAVENOUS at 13:45

## 2023-05-17 RX ADMIN — FOSAPREPITANT DIMEGLUMINE 150 MILLIGRAM(S): 150 INJECTION, POWDER, LYOPHILIZED, FOR SOLUTION INTRAVENOUS at 12:15

## 2023-05-17 RX ADMIN — Medication 118 MILLIGRAM(S): at 11:01

## 2023-05-17 RX ADMIN — Medication 102 MILLIGRAM(S): at 11:30

## 2023-05-17 RX ADMIN — FOSAPREPITANT DIMEGLUMINE 500 MILLIGRAM(S): 150 INJECTION, POWDER, LYOPHILIZED, FOR SOLUTION INTRAVENOUS at 11:45

## 2023-05-17 RX ADMIN — FAMOTIDINE 104 MILLIGRAM(S): 10 INJECTION INTRAVENOUS at 11:15

## 2023-05-17 NOTE — PHYSICAL EXAM
[Restricted in physically strenuous activity but ambulatory and able to carry out work of a light or sedentary nature] : Status 1- Restricted in physically strenuous activity but ambulatory and able to carry out work of a light or sedentary nature, e.g., light house work, office work [Normal] : affect appropriate [de-identified] : wearing glasses  [de-identified] : L chest port present

## 2023-05-17 NOTE — REVIEW OF SYSTEMS
[Lower Ext Edema] : lower extremity edema [Negative] : Allergic/Immunologic [Fever] : no fever [Night Sweats] : no night sweats [Dysphagia] : no dysphagia [Chest Pain] : no chest pain [Shortness Of Breath] : no shortness of breath [Abdominal Pain] : no abdominal pain [Vomiting] : no vomiting [Constipation] : no constipation [Easy Bleeding] : no tendency for easy bleeding [FreeTextEntry2] : appetite slightly improved but still poor  [FreeTextEntry5] : reports trace bilateral LE edema x 1 week [FreeTextEntry7] : reports occasional nausea [de-identified] : L chest port present  [de-identified] : endorses occasional neuropathy with long exposure to cold items , neuropathy in his hands is slightly more apparent

## 2023-05-17 NOTE — ASSESSMENT
[Palliative] : Goals of care discussed with patient: Palliative [FreeTextEntry1] : # Metastatic moderate to poorly differentiated adenocarcinoma , dx 07/2022 \par - BRCA (-) \par - s/p laparoscopy on 8.22.2022 with findings of ~1L malignant ascites and peritoneal implants \par - Ca19-9 is 6481, CEA 6.8 from 07/2022 \par - reviewed radiology, pathology and lab workup and had a discussion regarding implications of diagnosis, prognosis and options for management including but not limited to chemotherapy for palliative intent ; had a lengthy discussion regarding Stage IV disease and not curative \par - s/p L Chest port placement with SURG, Dr. Morgan \par - CT CAP from 11.8.2022 is essentially stable, tumor marker is decreasing \par - s/p cycle 12 of 5FU/Leucovorin/Irinotecan on 2/24 , initiated on 9.6.2022 ; discontinued oxaliplatin beginning C12 due to neuropathy \par - STOPPED 5FU/Leucovorin/Irinotecan due to rising Ca19-9 and PET/CT findings\par - PET/CT from 3.9.23 showed FDG uptake within upper abdominal peritoneal nodularity adjacent to the stomach and pancreas, pancreatic tail, and a subcentimeter right pelvic sidewall lymph node.  \par - Patient successfully certified for utilization of Medical Marijuana on 04/12/2023.  \par - c/w cycle 3 of chemotherapy using Gemcitabine + Abraxane (D1,D8,D15 every 28 days) on 5/17 with onbody Neulasta D15 , initiated 3.22.2023 \par - PET/CT ordered to be done early 06/2023 ; tasked [employer] for assistance with scheduling + Rx given \par \par # Peripheral neuropathy, likely due to chemotherapy and hx of DM \par - oxaliplatin discontinued \par - c/w gabapentin 600mg daily , initiated 01/2023 \par - c/w Cymbalta 60mg daily , initiated 04/2023 \par \par # H. Pylori infection , dx 07/2022 \par - s/p upper EGD in 07/2022 ; completed antibiotic treatment \par - followup with GI, Dr. Maximilian Larson, as scheduled for management \par \par # Thrombocytopenia, likely due to chemotherapy \par - C1D15 of Gemcitabine/Abraxane held due to low PLTs \par - resolved \par \par RTC in 2 weeks with CBC, BMP, LFTs, Ca19-9 \par \par seen/examined w/ NP Myrtle; note reviewed; case discused\par proceed w/ chemo c4 D1 with neulasta\par added cymbalta to treat neuropathy\par would not chage the regimen yet; however, if neuropathy is bad enough to affect his quality of life and ability to maintain employement would need to rediscuss\par - PET/CT ordered to be done early 06/2023 ; tasked [employer] for assistance with scheduling + Rx given \par f/u in 2 weeks

## 2023-05-18 LAB — CANCER AG19-9 SERPL-ACNC: 2884 U/ML

## 2023-05-24 ENCOUNTER — LABORATORY RESULT (OUTPATIENT)
Age: 73
End: 2023-05-24

## 2023-05-24 ENCOUNTER — OUTPATIENT (OUTPATIENT)
Dept: OUTPATIENT SERVICES | Facility: HOSPITAL | Age: 73
LOS: 1 days | End: 2023-05-24
Payer: MEDICARE

## 2023-05-24 ENCOUNTER — APPOINTMENT (OUTPATIENT)
Dept: INFUSION THERAPY | Facility: CLINIC | Age: 73
End: 2023-05-24

## 2023-05-24 DIAGNOSIS — Z98.890 OTHER SPECIFIED POSTPROCEDURAL STATES: Chronic | ICD-10-CM

## 2023-05-24 DIAGNOSIS — C16.9 MALIGNANT NEOPLASM OF STOMACH, UNSPECIFIED: ICD-10-CM

## 2023-05-24 DIAGNOSIS — G62.9 POLYNEUROPATHY, UNSPECIFIED: ICD-10-CM

## 2023-05-24 DIAGNOSIS — R97.8 OTHER ABNORMAL TUMOR MARKERS: ICD-10-CM

## 2023-05-24 DIAGNOSIS — C25.9 MALIGNANT NEOPLASM OF PANCREAS, UNSPECIFIED: ICD-10-CM

## 2023-05-24 DIAGNOSIS — Z51.11 ENCOUNTER FOR ANTINEOPLASTIC CHEMOTHERAPY: ICD-10-CM

## 2023-05-24 LAB
HCT VFR BLD CALC: 31.2 %
HGB BLD-MCNC: 10.9 G/DL
MCHC RBC-ENTMCNC: 29.3 PG
MCHC RBC-ENTMCNC: 34.9 G/DL
MCV RBC AUTO: 83.9 FL
PLATELET # BLD AUTO: 160 K/UL
PMV BLD: 9.7 FL
RBC # BLD: 3.72 M/UL
RBC # FLD: 16 %
WBC # FLD AUTO: 4.35 K/UL

## 2023-05-24 PROCEDURE — 85027 COMPLETE CBC AUTOMATED: CPT

## 2023-05-24 PROCEDURE — 96417 CHEMO IV INFUS EACH ADDL SEQ: CPT

## 2023-05-24 PROCEDURE — 96367 TX/PROPH/DG ADDL SEQ IV INF: CPT

## 2023-05-24 PROCEDURE — 96413 CHEMO IV INFUSION 1 HR: CPT

## 2023-05-24 RX ORDER — DEXAMETHASONE 0.5 MG/5ML
10 ELIXIR ORAL ONCE
Refills: 0 | Status: COMPLETED | OUTPATIENT
Start: 2023-05-24 | End: 2023-05-24

## 2023-05-24 RX ORDER — PACLITAXEL 100 MG/20ML
220 INJECTION, POWDER, LYOPHILIZED, FOR SUSPENSION INTRAVENOUS ONCE
Refills: 0 | Status: COMPLETED | OUTPATIENT
Start: 2023-05-24 | End: 2023-05-24

## 2023-05-24 RX ORDER — PEGFILGRASTIM-CBQV 6 MG/.6ML
6 INJECTION, SOLUTION SUBCUTANEOUS ONCE
Refills: 0 | Status: DISCONTINUED | OUTPATIENT
Start: 2023-05-24 | End: 2023-05-24

## 2023-05-24 RX ORDER — FAMOTIDINE 10 MG/ML
20 INJECTION INTRAVENOUS ONCE
Refills: 0 | Status: COMPLETED | OUTPATIENT
Start: 2023-05-24 | End: 2023-05-24

## 2023-05-24 RX ORDER — FOSAPREPITANT DIMEGLUMINE 150 MG/5ML
150 INJECTION, POWDER, LYOPHILIZED, FOR SOLUTION INTRAVENOUS ONCE
Refills: 0 | Status: COMPLETED | OUTPATIENT
Start: 2023-05-24 | End: 2023-05-24

## 2023-05-24 RX ORDER — DIPHENHYDRAMINE HCL 50 MG
50 CAPSULE ORAL ONCE
Refills: 0 | Status: COMPLETED | OUTPATIENT
Start: 2023-05-24 | End: 2023-05-24

## 2023-05-24 RX ORDER — GEMCITABINE 38 MG/ML
1790 INJECTION, SOLUTION INTRAVENOUS ONCE
Refills: 0 | Status: COMPLETED | OUTPATIENT
Start: 2023-05-24 | End: 2023-05-24

## 2023-05-24 RX ADMIN — PACLITAXEL 220 MILLIGRAM(S): 100 INJECTION, POWDER, LYOPHILIZED, FOR SUSPENSION INTRAVENOUS at 13:40

## 2023-05-24 RX ADMIN — FAMOTIDINE 104 MILLIGRAM(S): 10 INJECTION INTRAVENOUS at 12:20

## 2023-05-24 RX ADMIN — FAMOTIDINE 20 MILLIGRAM(S): 10 INJECTION INTRAVENOUS at 13:15

## 2023-05-24 RX ADMIN — Medication 118 MILLIGRAM(S): at 12:20

## 2023-05-24 RX ADMIN — PACLITAXEL 220 MILLIGRAM(S): 100 INJECTION, POWDER, LYOPHILIZED, FOR SUSPENSION INTRAVENOUS at 14:10

## 2023-05-24 RX ADMIN — Medication 102 MILLIGRAM(S): at 12:19

## 2023-05-24 RX ADMIN — GEMCITABINE 1790 MILLIGRAM(S): 38 INJECTION, SOLUTION INTRAVENOUS at 15:00

## 2023-05-24 RX ADMIN — FOSAPREPITANT DIMEGLUMINE 150 MILLIGRAM(S): 150 INJECTION, POWDER, LYOPHILIZED, FOR SOLUTION INTRAVENOUS at 12:35

## 2023-05-24 RX ADMIN — Medication 50 MILLIGRAM(S): at 13:35

## 2023-05-24 RX ADMIN — GEMCITABINE 1790 MILLIGRAM(S): 38 INJECTION, SOLUTION INTRAVENOUS at 13:40

## 2023-05-24 RX ADMIN — Medication 10 MILLIGRAM(S): at 12:55

## 2023-05-24 RX ADMIN — FOSAPREPITANT DIMEGLUMINE 500 MILLIGRAM(S): 150 INJECTION, POWDER, LYOPHILIZED, FOR SOLUTION INTRAVENOUS at 12:19

## 2023-05-31 ENCOUNTER — OUTPATIENT (OUTPATIENT)
Dept: OUTPATIENT SERVICES | Facility: HOSPITAL | Age: 73
LOS: 1 days | End: 2023-05-31
Payer: MEDICARE

## 2023-05-31 ENCOUNTER — APPOINTMENT (OUTPATIENT)
Dept: INFUSION THERAPY | Facility: CLINIC | Age: 73
End: 2023-05-31

## 2023-05-31 ENCOUNTER — LABORATORY RESULT (OUTPATIENT)
Age: 73
End: 2023-05-31

## 2023-05-31 DIAGNOSIS — C16.9 MALIGNANT NEOPLASM OF STOMACH, UNSPECIFIED: ICD-10-CM

## 2023-05-31 DIAGNOSIS — Z51.11 ENCOUNTER FOR ANTINEOPLASTIC CHEMOTHERAPY: ICD-10-CM

## 2023-05-31 DIAGNOSIS — G62.9 POLYNEUROPATHY, UNSPECIFIED: ICD-10-CM

## 2023-05-31 DIAGNOSIS — C25.9 MALIGNANT NEOPLASM OF PANCREAS, UNSPECIFIED: ICD-10-CM

## 2023-05-31 DIAGNOSIS — R97.8 OTHER ABNORMAL TUMOR MARKERS: ICD-10-CM

## 2023-05-31 DIAGNOSIS — Z98.890 OTHER SPECIFIED POSTPROCEDURAL STATES: Chronic | ICD-10-CM

## 2023-05-31 LAB
HCT VFR BLD CALC: 30.9 %
HGB BLD-MCNC: 10.9 G/DL
MCHC RBC-ENTMCNC: 29.5 PG
MCHC RBC-ENTMCNC: 35.3 G/DL
MCV RBC AUTO: 83.7 FL
PLATELET # BLD AUTO: 60 K/UL
PMV BLD: 9.5 FL
RBC # BLD: 3.69 M/UL
RBC # FLD: 16.2 %
WBC # FLD AUTO: 2.68 K/UL

## 2023-05-31 PROCEDURE — 96413 CHEMO IV INFUSION 1 HR: CPT

## 2023-05-31 PROCEDURE — 96417 CHEMO IV INFUS EACH ADDL SEQ: CPT

## 2023-05-31 PROCEDURE — 85027 COMPLETE CBC AUTOMATED: CPT

## 2023-05-31 PROCEDURE — 96375 TX/PRO/DX INJ NEW DRUG ADDON: CPT

## 2023-05-31 PROCEDURE — 96377 APPLICATON ON-BODY INJECTOR: CPT | Mod: XU

## 2023-05-31 PROCEDURE — 96367 TX/PROPH/DG ADDL SEQ IV INF: CPT

## 2023-05-31 RX ORDER — GEMCITABINE 38 MG/ML
1790 INJECTION, SOLUTION INTRAVENOUS ONCE
Refills: 0 | Status: COMPLETED | OUTPATIENT
Start: 2023-05-31 | End: 2023-05-31

## 2023-05-31 RX ORDER — DIPHENHYDRAMINE HCL 50 MG
50 CAPSULE ORAL ONCE
Refills: 0 | Status: COMPLETED | OUTPATIENT
Start: 2023-05-31 | End: 2023-05-31

## 2023-05-31 RX ORDER — PEGFILGRASTIM-CBQV 6 MG/.6ML
6 INJECTION, SOLUTION SUBCUTANEOUS ONCE
Refills: 0 | Status: COMPLETED | OUTPATIENT
Start: 2023-05-31 | End: 2023-05-31

## 2023-05-31 RX ORDER — FAMOTIDINE 10 MG/ML
20 INJECTION INTRAVENOUS ONCE
Refills: 0 | Status: COMPLETED | OUTPATIENT
Start: 2023-05-31 | End: 2023-05-31

## 2023-05-31 RX ORDER — DEXAMETHASONE 0.5 MG/5ML
10 ELIXIR ORAL ONCE
Refills: 0 | Status: COMPLETED | OUTPATIENT
Start: 2023-05-31 | End: 2023-05-31

## 2023-05-31 RX ORDER — FOSAPREPITANT DIMEGLUMINE 150 MG/5ML
150 INJECTION, POWDER, LYOPHILIZED, FOR SOLUTION INTRAVENOUS ONCE
Refills: 0 | Status: COMPLETED | OUTPATIENT
Start: 2023-05-31 | End: 2023-05-31

## 2023-05-31 RX ORDER — PACLITAXEL 100 MG/20ML
220 INJECTION, POWDER, LYOPHILIZED, FOR SUSPENSION INTRAVENOUS ONCE
Refills: 0 | Status: COMPLETED | OUTPATIENT
Start: 2023-05-31 | End: 2023-05-31

## 2023-05-31 RX ADMIN — Medication 50 MILLIGRAM(S): at 13:46

## 2023-05-31 RX ADMIN — GEMCITABINE 1790 MILLIGRAM(S): 38 INJECTION, SOLUTION INTRAVENOUS at 15:00

## 2023-05-31 RX ADMIN — FAMOTIDINE 20 MILLIGRAM(S): 10 INJECTION INTRAVENOUS at 13:00

## 2023-05-31 RX ADMIN — Medication 102 MILLIGRAM(S): at 13:01

## 2023-05-31 RX ADMIN — FOSAPREPITANT DIMEGLUMINE 150 MILLIGRAM(S): 150 INJECTION, POWDER, LYOPHILIZED, FOR SOLUTION INTRAVENOUS at 12:31

## 2023-05-31 RX ADMIN — Medication 118 MILLIGRAM(S): at 12:32

## 2023-05-31 RX ADMIN — FAMOTIDINE 104 MILLIGRAM(S): 10 INJECTION INTRAVENOUS at 12:45

## 2023-05-31 RX ADMIN — PACLITAXEL 220 MILLIGRAM(S): 100 INJECTION, POWDER, LYOPHILIZED, FOR SUSPENSION INTRAVENOUS at 13:30

## 2023-05-31 RX ADMIN — GEMCITABINE 1790 MILLIGRAM(S): 38 INJECTION, SOLUTION INTRAVENOUS at 14:13

## 2023-05-31 RX ADMIN — Medication 10 MILLIGRAM(S): at 12:44

## 2023-05-31 RX ADMIN — PACLITAXEL 220 MILLIGRAM(S): 100 INJECTION, POWDER, LYOPHILIZED, FOR SUSPENSION INTRAVENOUS at 14:00

## 2023-05-31 RX ADMIN — FOSAPREPITANT DIMEGLUMINE 500 MILLIGRAM(S): 150 INJECTION, POWDER, LYOPHILIZED, FOR SOLUTION INTRAVENOUS at 12:01

## 2023-05-31 RX ADMIN — PEGFILGRASTIM-CBQV 6 MILLIGRAM(S): 6 INJECTION, SOLUTION SUBCUTANEOUS at 12:02

## 2023-06-06 ENCOUNTER — RESULT REVIEW (OUTPATIENT)
Age: 73
End: 2023-06-06

## 2023-06-06 ENCOUNTER — OUTPATIENT (OUTPATIENT)
Dept: OUTPATIENT SERVICES | Facility: HOSPITAL | Age: 73
LOS: 1 days | End: 2023-06-06
Payer: MEDICARE

## 2023-06-06 DIAGNOSIS — Z98.890 OTHER SPECIFIED POSTPROCEDURAL STATES: Chronic | ICD-10-CM

## 2023-06-06 DIAGNOSIS — C25.9 MALIGNANT NEOPLASM OF PANCREAS, UNSPECIFIED: ICD-10-CM

## 2023-06-06 LAB — GLUCOSE BLDC GLUCOMTR-MCNC: 112 MG/DL — HIGH (ref 70–99)

## 2023-06-06 PROCEDURE — 78815 PET IMAGE W/CT SKULL-THIGH: CPT | Mod: 26,PS,MH

## 2023-06-06 PROCEDURE — 78815 PET IMAGE W/CT SKULL-THIGH: CPT | Mod: PS

## 2023-06-06 PROCEDURE — 82962 GLUCOSE BLOOD TEST: CPT

## 2023-06-06 PROCEDURE — A9552: CPT

## 2023-06-07 DIAGNOSIS — C25.9 MALIGNANT NEOPLASM OF PANCREAS, UNSPECIFIED: ICD-10-CM

## 2023-06-14 ENCOUNTER — LABORATORY RESULT (OUTPATIENT)
Age: 73
End: 2023-06-14

## 2023-06-14 ENCOUNTER — APPOINTMENT (OUTPATIENT)
Dept: INFUSION THERAPY | Facility: CLINIC | Age: 73
End: 2023-06-14
Payer: MEDICARE

## 2023-06-14 ENCOUNTER — OUTPATIENT (OUTPATIENT)
Dept: OUTPATIENT SERVICES | Facility: HOSPITAL | Age: 73
LOS: 1 days | End: 2023-06-14
Payer: MEDICARE

## 2023-06-14 ENCOUNTER — APPOINTMENT (OUTPATIENT)
Dept: HEMATOLOGY ONCOLOGY | Facility: CLINIC | Age: 73
End: 2023-06-14
Payer: MEDICARE

## 2023-06-14 VITALS
RESPIRATION RATE: 16 BRPM | DIASTOLIC BLOOD PRESSURE: 69 MMHG | TEMPERATURE: 98 F | HEART RATE: 103 BPM | WEIGHT: 152 LBS | SYSTOLIC BLOOD PRESSURE: 123 MMHG | HEIGHT: 67 IN | BODY MASS INDEX: 23.86 KG/M2

## 2023-06-14 DIAGNOSIS — C16.9 MALIGNANT NEOPLASM OF STOMACH, UNSPECIFIED: ICD-10-CM

## 2023-06-14 DIAGNOSIS — Z98.890 OTHER SPECIFIED POSTPROCEDURAL STATES: Chronic | ICD-10-CM

## 2023-06-14 LAB
ALBUMIN SERPL ELPH-MCNC: 3.8 G/DL
ALP BLD-CCNC: 116 U/L
ALT SERPL-CCNC: 15 U/L
ANION GAP SERPL CALC-SCNC: 9 MMOL/L
APPEARANCE: CLEAR
AST SERPL-CCNC: 15 U/L
BILIRUB DIRECT SERPL-MCNC: <0.2 MG/DL
BILIRUB INDIRECT SERPL-MCNC: NORMAL MG/DL
BILIRUB SERPL-MCNC: <0.2 MG/DL
BILIRUBIN URINE: NEGATIVE
BLOOD URINE: ABNORMAL
BUN SERPL-MCNC: 10 MG/DL
CALCIUM SERPL-MCNC: 8.6 MG/DL
CHLORIDE SERPL-SCNC: 104 MMOL/L
CO2 SERPL-SCNC: 25 MMOL/L
COLOR: NORMAL
CREAT SERPL-MCNC: 0.9 MG/DL
EGFR: 91 ML/MIN/1.73M2
GLUCOSE QUALITATIVE U: NEGATIVE
GLUCOSE SERPL-MCNC: 150 MG/DL
HCT VFR BLD CALC: 31.7 %
HGB BLD-MCNC: 10.9 G/DL
KETONES URINE: NEGATIVE
LEUKOCYTE ESTERASE URINE: ABNORMAL
MCHC RBC-ENTMCNC: 29.4 PG
MCHC RBC-ENTMCNC: 34.4 G/DL
MCV RBC AUTO: 85.4 FL
NITRITE URINE: NEGATIVE
PH URINE: 6
PLATELET # BLD AUTO: 254 K/UL
PMV BLD: 10.2 FL
POTASSIUM SERPL-SCNC: 4.6 MMOL/L
PROT SERPL-MCNC: 6.3 G/DL
PROTEIN URINE: ABNORMAL
RBC # BLD: 3.71 M/UL
RBC # FLD: 19.5 %
SODIUM SERPL-SCNC: 138 MMOL/L
SPECIFIC GRAVITY URINE: 1.01
UROBILINOGEN URINE: NORMAL
WBC # FLD AUTO: 16.02 K/UL

## 2023-06-14 PROCEDURE — 99214 OFFICE O/P EST MOD 30 MIN: CPT

## 2023-06-14 PROCEDURE — 80048 BASIC METABOLIC PNL TOTAL CA: CPT

## 2023-06-14 PROCEDURE — 96367 TX/PROPH/DG ADDL SEQ IV INF: CPT

## 2023-06-14 PROCEDURE — 81001 URINALYSIS AUTO W/SCOPE: CPT

## 2023-06-14 PROCEDURE — 87186 SC STD MICRODIL/AGAR DIL: CPT

## 2023-06-14 PROCEDURE — 86301 IMMUNOASSAY TUMOR CA 19-9: CPT

## 2023-06-14 PROCEDURE — 87086 URINE CULTURE/COLONY COUNT: CPT

## 2023-06-14 PROCEDURE — 80076 HEPATIC FUNCTION PANEL: CPT

## 2023-06-14 PROCEDURE — 96413 CHEMO IV INFUSION 1 HR: CPT

## 2023-06-14 PROCEDURE — 87077 CULTURE AEROBIC IDENTIFY: CPT

## 2023-06-14 PROCEDURE — 85027 COMPLETE CBC AUTOMATED: CPT

## 2023-06-14 PROCEDURE — 96417 CHEMO IV INFUS EACH ADDL SEQ: CPT

## 2023-06-14 PROCEDURE — 36415 COLL VENOUS BLD VENIPUNCTURE: CPT

## 2023-06-14 RX ORDER — CEPHALEXIN 500 MG/1
500 CAPSULE ORAL
Qty: 4 | Refills: 0 | Status: DISCONTINUED | COMMUNITY
Start: 2019-12-18 | End: 2023-06-14

## 2023-06-14 RX ORDER — ALLOPURINOL 200 MG/1
TABLET ORAL
Refills: 0 | Status: DISCONTINUED | COMMUNITY
End: 2023-06-14

## 2023-06-14 RX ORDER — DEXAMETHASONE 0.5 MG/5ML
10 ELIXIR ORAL ONCE
Refills: 0 | Status: COMPLETED | OUTPATIENT
Start: 2023-06-14 | End: 2023-06-14

## 2023-06-14 RX ORDER — DIPHENHYDRAMINE HCL 50 MG
50 CAPSULE ORAL ONCE
Refills: 0 | Status: COMPLETED | OUTPATIENT
Start: 2023-06-14 | End: 2023-06-14

## 2023-06-14 RX ORDER — FOSAPREPITANT DIMEGLUMINE 150 MG/5ML
150 INJECTION, POWDER, LYOPHILIZED, FOR SOLUTION INTRAVENOUS ONCE
Refills: 0 | Status: COMPLETED | OUTPATIENT
Start: 2023-06-14 | End: 2023-06-14

## 2023-06-14 RX ORDER — GEMCITABINE 38 MG/ML
1800 INJECTION, SOLUTION INTRAVENOUS ONCE
Refills: 0 | Status: COMPLETED | OUTPATIENT
Start: 2023-06-14 | End: 2023-06-14

## 2023-06-14 RX ORDER — FAMOTIDINE 10 MG/ML
20 INJECTION INTRAVENOUS ONCE
Refills: 0 | Status: COMPLETED | OUTPATIENT
Start: 2023-06-14 | End: 2023-06-14

## 2023-06-14 RX ORDER — PACLITAXEL 100 MG/20ML
220 INJECTION, POWDER, LYOPHILIZED, FOR SUSPENSION INTRAVENOUS ONCE
Refills: 0 | Status: COMPLETED | OUTPATIENT
Start: 2023-06-14 | End: 2023-06-14

## 2023-06-14 RX ADMIN — PACLITAXEL 220 MILLIGRAM(S): 100 INJECTION, POWDER, LYOPHILIZED, FOR SUSPENSION INTRAVENOUS at 12:36

## 2023-06-14 RX ADMIN — FAMOTIDINE 104 MILLIGRAM(S): 10 INJECTION INTRAVENOUS at 11:40

## 2023-06-14 RX ADMIN — FOSAPREPITANT DIMEGLUMINE 150 MILLIGRAM(S): 150 INJECTION, POWDER, LYOPHILIZED, FOR SOLUTION INTRAVENOUS at 12:20

## 2023-06-14 RX ADMIN — PACLITAXEL 220 MILLIGRAM(S): 100 INJECTION, POWDER, LYOPHILIZED, FOR SUSPENSION INTRAVENOUS at 13:06

## 2023-06-14 RX ADMIN — FAMOTIDINE 20 MILLIGRAM(S): 10 INJECTION INTRAVENOUS at 12:00

## 2023-06-14 RX ADMIN — GEMCITABINE 1800 MILLIGRAM(S): 38 INJECTION, SOLUTION INTRAVENOUS at 13:10

## 2023-06-14 RX ADMIN — Medication 50 MILLIGRAM(S): at 11:40

## 2023-06-14 RX ADMIN — Medication 118 MILLIGRAM(S): at 10:59

## 2023-06-14 RX ADMIN — GEMCITABINE 1800 MILLIGRAM(S): 38 INJECTION, SOLUTION INTRAVENOUS at 14:10

## 2023-06-14 RX ADMIN — Medication 102 MILLIGRAM(S): at 11:20

## 2023-06-14 RX ADMIN — FOSAPREPITANT DIMEGLUMINE 500 MILLIGRAM(S): 150 INJECTION, POWDER, LYOPHILIZED, FOR SOLUTION INTRAVENOUS at 12:00

## 2023-06-14 RX ADMIN — Medication 10 MILLIGRAM(S): at 11:20

## 2023-06-14 NOTE — HISTORY OF PRESENT ILLNESS
[Disease: _____________________] : Disease: [unfilled] [Therapy: ___] : Therapy: [unfilled] [Cycle: ___] : Cycle: [unfilled] [de-identified] : Mr. JINA SINGER is a 72 year old male here today for evaluation and management of Pancreatic Cancer.  \par \par He was referred by SURG, Dr. Morgan.  JINA is a 72 year old M with PMHx including HTN, HLD, T2DM, who presents to clinic to establish care.  Patient presented to ER back in 07/2022 with complaint of gradually worsening abdominal pain for the prior month, worse after eating and associated with 40lb weight loss in the last 2 months.  He underwent CT imaging which shows evidence suggestive of metastatic disease.  He also endorses constipation.  He underwent laparoscopy with findings of ~1L malignant ascites and peritoneal implants on 8.22.2022.  He is presently feeling well with no new complaints.  Patient denies fever, chills, nausea, vomiting, dysphagia, dyspnea, neuropathy or bleeding.  Patient reports family history of malignancy in his father (possibly stomach cancer) and mother (possibly colon).  Never a smoker.  NKDA.  \par \par RADIOLOGIC WORKUP\par CT Chest (7.25.2022) IMPRESSION:Perigastric infiltrative process inseparable from the tail the pancreas extending to the proximal duodenum. Associated ascites. Findings again  suspicious for pancreatic or gastrointestinal neoplasm. Differential diagnosis includes partially contained ruptured gastric or duodenal ulcer as well as infectious process/pancreatitis.Hyperaerated lungs. Scattered granulomata. Vascular calcifications. Nonspecific areas of subpleural thickening bilaterally.\par CT A/P (7.24.2022) IMPRESSION:Predominantly perigastric infiltrative soft tissue changes which are inseparable from the tail of pancreas and extend around the proximal  duodenum. Small volume abdominopelvic ascites. Differential includes underlying pancreatic or gastrointestinal neoplasm, sequelae of partially contained ruptured gastric or duodenal  ulcer, and infectious process or perhaps pancreatitis. Clinical correlation and/or further evaluation with EGD should be considered.Findings compatible with BPH. Superimposed cystitis is difficult to exclude on imaging.\par \par LAB WORKUP\par (8.17.2022) WBC 8.36, Hgb 14.2, MCV 84, , normal diff, Cr 0.9, eGFR 91, normal LFTs\par (7.25.2022) Ca19-9 is 6481, CEA 6.8, TSH 2.62\par (10.23.2019) WBC 6.83, Hgb 15, MCV 85.7, \par \par PATHOLOGY\par (see results section)\par \par HCM\par Colonoscopy done about 5 years , polyps removed \par Upper Endoscopy done 07/2022 showed 5x5 hypoechoic infiltrative mass (irregular borders) extending between the  muscularis propria (abuting it) and the territory of the pancreatic tail \par COVID Vaccinated? [FreeTextEntry1] : Started Gemcitabine + Abraxane with onbody neulasta (D1, D8, D15 every 28 days) on 3.22.2023 \par Started mFOLFIRINOX on 9.6.2022 (discontinued oxaliplatin beginning C12 on 2/22/23) - 2.24.2023 due to rising Ca19-9.   [de-identified] : 9/14/22\[facility] Patient is here for a follow-up visit for pancreatic cancer.  He is s/p initial cycle of mFOLFIRINOX on 9.6.2022.  He is feeling well with no new complaints.  Reviewed most recent CBC, which is stable.  Patient denies fever, chills, nausea, vomiting, dyspnea, neuropathy, persistent diarrhea or bleeding.  He states his appetite is still poor but slightly improving.  He is due for cycle 2 of chemotherapy next week.  \par \par 10/4/22\[facility] Patient is here for a follow-up visit for pancreatic cancer.  He is due for cycle 3 of mFOLFIRINOX today, initiated on 9.6.2022.  He is feeling well with no new complaints.  Reviewed most recent CBC, which shows mild anemia, hgb 11.6g/dL and persistent leukocytosis, likely due to GCSF administration with chemotherapy.  ALK Phos is slightly higher at 198 but Ca19-9 is decreasing down to 09622K/mL.  Patient denies fever, chills, vomiting, dyspnea, neuropathy, CP, palpitations, abdominal pain, persistent diarrhea or bleeding.  He is endorses transient nausea which was not bothersome and he took anti-emetics.  \par \par 11/2/22\[facility] Patient is here for a follow-up visit for pancreatic cancer.  He is due for cycle 4 of mFOLFIRINOX today, initiated on 9.6.2022.  He is feeling well with no new complaints.  Reviewed most recent CBC, which shows mild anemia, hgb 11.1g/dL and persistent leukocytosis, likely due to GCSF administration with chemotherapy.  ALK Phos is slightly lower at 138 but Ca19-9 is decreasing down to 4619U/mL.  Patient denies fever, chills, vomiting, dyspnea, CP, palpitations, abdominal pain, persistent diarrhea or bleeding.  He endorses occasional neuropathy with long exposure to cold items.  \par \par 11/16/22\[facility] Patient is here for a follow-up visit for pancreatic cancer, accompanied by spouse.  He is due for cycle 6 of mFOLFIRINOX today, initiated on 9.6.2022.  He is feeling well with no new complaints.  Reviewed most recent CBC, which shows mild anemia, hgb 11.1g/dL and persistent leukocytosis, likely due to GCSF administration with chemotherapy.  ALK Phos is essentially stable at 146 but Ca19-9 is decreasing down to 2708U/mL.  Patient denies fever, chills, vomiting, dyspnea, CP, palpitations, abdominal pain, persistent diarrhea, mouth sores or bleeding.  He still notes occasional neuropathy with long exposure to cold items.  Reviewed most recent CT imaging which is essentially stable.  \par CT C/A/P (11.8.2022) IMPRESSION:1. No definite evidence of intrathoracic metastasis.Since 7/24/2022, no significant interval change.Near diffuse peritoneal carcinomatosis is not significantly changed.Diffuse tumor infiltration in the pancreatic tail (4/226), unchanged.Completely attenuated splenic vein is unchanged. Prominent venous collaterals in the left upper quadrant is unchanged.\par \par 11/30/22\par Patient is here for a follow-up visit for pancreatic cancer.  He is due for cycle 7 of mFOLFIRINOX today, initiated on 9.6.2022.  Reviewed most recent CBC, which shows mild anemia, hgb 10.9g/dL and persistent leukocytosis, likely due to GCSF administration with chemotherapy.  ALK Phos is essentially stable at 138 and Ca19-9 is decreasing down to 1683U/mL.  Patient denies fever, chills, vomiting, dyspnea, CP, palpitations, abdominal pain, persistent diarrhea, mouth sores or bleeding.  He still notes occasional neuropathy with long exposure to cold items. \par \par 12/14/22\par Patient is here for a follow-up visit for pancreatic cancer, accompanied by spouse via telephone.  He is due for cycle 8 of mFOLFIRINOX today, initiated on 9.6.2022.  Reviewed most recent CBC, which shows mild anemia, hgb 10.5g/dL and persistent leukocytosis, likely due to GCSF administration with chemotherapy.  ALK Phos is essentially stable at 124 and Ca19-9 is decreasing down to 1177U/mL.  Patient denies fever, chills, dyspnea, CP, palpitations, abdominal pain, persistent diarrhea, mouth sores or bleeding.  He has an occasional cough x 2 weeks; believes he had a mild cold which has been improving over the last 2 weeks but the cough is persistent.  He still notes occasional neuropathy with long exposure to cold items. \par \par 12/28/22\par Patient is here for a follow-up visit for pancreatic cancer, accompanied by spouse via telephone.  Since last visit, patient had CXR which was suggestive for pneumonia.  He has completed antibiotics with improvement in symptoms.  Reviewed PULM consult with Dr. Escobar: plans to repeat CXR in about 6 weeks.  Patient is agreeable to resume chemotherapy.  He is due for cycle 9 of mFOLFIRINOX today, initiated on 9.6.2022.  Reviewed most recent CBC which shows mild anemia and neutrophil-predominant leukocytosis.  Tumor marker is down to 957 U/mL from 12/2022.  \par CXR (12.15.2022) Impression:Subsegmental left lower lobe consolidation.\par \par 1/11/23\par Patient is here for a follow-up visit for pancreatic cancer, accompanied by spouse via telephone.  As per PULM consult with Dr. Escobar: plan to repeat CXR about 6 weeks following prior imaging.  He is due for cycle 10 of mFOLFIRINOX today, initiated on 9.6.2022.  Reviewed most recent CBC which shows mild anemia with hgb 10.6g/dL and neutrophil-predominant leukocytosis likely from GCSF administration.  Tumor marker is down to 693 U/mL from 1/2023.  He denies fevers, chills, worsening of neuropathy, dyspnea, mouth sores or bleeding.  Patient states he dislikes the effect of Benadryl pre-medication because it makes him drowsy and feel "off."  He is requesting to decrease Benadryl from 50mg to 25mg IV as pre-medicaiton.  Risks vs benefits discussed at length.  \par \par 1/24/23\par Patient is here for a follow-up visit for pancreatic cancer.  He is due for cycle 11 of mFOLFIRINOX today, initiated on 9.6.2022.  Reviewed most recent CBC which shows mild anemia with hgb 11.3g/dL, mild thrombocytopenia with PLTs 106,000 and neutrophil-predominant leukocytosis likely from GCSF administration.  Tumor marker is down to 693 U/mL from 1/2023.  He denies fevers, chills, worsening of neuropathy, dyspnea, new cough, mouth sores or bleeding.  \par \par 2/7/23\par Patient is here for a follow-up visit for pancreatic cancer.  He is due for cycle 12 of mFOLFIRINOX today, initiated on 9.6.2022.  Reviewed most recent CBC which shows mild anemia with hgb 11.3g/dL, mild thrombocytopenia with PLTs 106,000 and neutrophil-predominant leukocytosis likely from GCSF administration.  Tumor marker is down to 693 U/mL from 1/2023.  He denies fevers, chills, dyspnea, new cough, mouth sores or bleeding.  He states his neuropathy in his feet is slightly worse in both feet.  \par \par 2/21/23\par Patient is here for a follow-up visit for pancreatic cancer.  He is due for cycle 12 of chemotherapy on 2/22, initiated on 9.6.2022.  Reviewed most recent CBC which shows mild anemia with hgb 11g/dL.  Tumor marker is down to 457 U/mL from 02/2023.  He denies fevers, chills, dyspnea, new cough, mouth sores, persistent diarrhea or bleeding.  He endorses neuropathy in both feet and fingertips; started gabapentin 100mg TiD without resolution of symptoms.  \par \par 3/7/23\par Patient is here for a follow-up visit for pancreatic cancer.  He is due for cycle 13 of chemotherapy on 3/8, initiated on 9.6.2022.  Reviewed most recent CBC which shows mild anemia with hgb 11.6g/dL.  Tumor marker is slightly higher at 570 U/mL from 02/2023.  He denies fevers, chills, dyspnea, new cough, mouth sores, persistent diarrhea, new pain or bleeding.  He endorses neuropathy in both feet and fingertips; he is on gabapentin 600mg daily without resolution of symptoms.  He recently followed with GI, Dr. Maximilian Larson, and is anticipating upper EGD.  Reviewed US Duplex which showed no DVT.  \par US Duplex BI LE (3.3.2023) IMPRESSION:No evidence of deep venous thrombosis in either lower extremity.Bilateral popliteal cysts.\par \par 3/15/23\par Patient is here for a follow-up visit for pancreatic cancer, accompanied by spouse Renate via telephone.  He is s/p cycle 12 of chemotherapy on 2/24, initiated on 9.6.2022.  Tumor marker almost doubled up to 1154 U/mL from 03/2023.  He was sent for PET/CT which showed FDG uptake within upper abdominal peritoneal nodularity adjacent to the stomach and pancreas, pancreatic tail, and a subcentimeter right pelvic sidewall lymph node.  He is presently feeling fine without new complaints.  \[facility] PET/CT (3.9.2023) IMPRESSION:Abnormal FDG uptake within upper abdominal peritoneal nodularity adjacent to the stomach and pancreas (SUV up to 5.4), pancreatic tail (SUV 3.4) and a subcentimeter right pelvic sidewall lymph node (SUV 3.6).No other definite sites of abnormal FDG uptake to suggest biologic tumor activity.\par \[facility] 3/21/23\[facility] Patient is here for a follow-up visit for pancreatic cancer, accompanied by spouse.  He is s/p cycle 12 of chemotherapy on 2/24, initiated on 9.6.2022.  Tumor marker almost doubled up to 1154 U/mL from 03/2023.  He is anticipating initiation of new chemotherapy regimen using Gemcitabine + Abraxane on 3.22.2023.  \par \[facility] 3/29/23[facility] Patient is here for a follow-up visit for pancreatic cancer, accompanied by spouse.  Since last visit, patient started new chemotherapy regimen using Gemcitabine + Abraxane, initiated on 3.22.2023.  Tumor marker doubled again up to 2410U/mL from 03/2023.  Reviewed most recent CBC, which shows mild pancytopenia.  Patient denies fever, chills, nausea, vomiting, dyspnea, diarrhea or bleeding.  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  \par \[facility] 4/12/23[facility] Patient is here for a follow-up visit for pancreatic cancer.  Patient is due for cycle 2 of Gemcitabine + Abraxane followed by Neulasta, initiated on 3.22.2023.  Reviewed most recent CBC, which shows mild leukopenia and anemia, hgb 11g/dL.  Patient denies fever, chills, nausea, vomiting, dyspnea, diarrhea or bleeding.  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  \par \[facility] 4/19/23[facility] Patient is here for a follow-up visit for pancreatic cancer.  Patient is due for cycle 2 of Gemcitabine + Abraxane, initiated on 3.22.2023.  Reviewed most recent CBC, which shows mild leukopenia and anemia, hgb 11g/dL.  Patient denies fever, chills, nausea, vomiting, dyspnea, diarrhea or bleeding.  He reports slight worsening of neuropathy of fingers despite Gabapentin 600mg total daily.  Ca19-9 level is down to 2253U/mL.  \par \par 5/17/23\par Patient is here for a follow-up visit for pancreatic cancer.  Patient is due for cycle 3 of Gemcitabine + Abraxane followed by Neulasta, initiated on 3.22.2023.  Reviewed most recent CBC, which shows mild anemia, hgb 11.6g/dL.  Leukocytosis is likely due to GCSF.  Ca19-9 level is up to 3087U/mL.  Patient denies fever, chills, nausea, vomiting, dyspnea, diarrhea or bleeding.  He reports slight worsening of neuropathy of fingers despite Gabapentin 600mg total daily.  \par \par 6/14/23\par Patient is here for a follow-up visit for pancreatic cancer.  Patient is due for cycle 4 of Gemcitabine + Abraxane followed by Neulasta, initiated on 3.22.2023.  Reviewed most recent CBC, which shows mild anemia, hgb 10.9g/dL.  Leukocytosis is likely due to GCSF.  Ca19-9 level is down to 2884U/mL from last month.  He still has neuropathy of fingers despite Gabapentin 900mg total daily + Cymbalta.  Patient denies fever, chills, nausea, vomiting, dyspnea, diarrhea or bleeding.  He reports urinary frequency and occasional burning over the last week (he has had UTI in the past).  Reviewed most recent PET/CT which shows previously FDG avid peritoneal nodularity adjacent to  stomach and pancreas, pancreatic tail and right pelvic sidewall lymph node have improved to non-FDG avid status consistent with good treatment response.  \par PET/CT (6.6.2023) IMPRESSION:No sites of pathologic FDG uptake; therefore compared to 3/9/2023, no new sites of pathologic FDG uptake.  Previously FDG avid peritoneal nodularity adjacent to  stomach and pancreas, pancreatic tail and right pelvic sidewall lymph node have improved to non-FDG avid status consistent with good treatment response.

## 2023-06-14 NOTE — REVIEW OF SYSTEMS
[Lower Ext Edema] : lower extremity edema [Negative] : Allergic/Immunologic [Fever] : no fever [Night Sweats] : no night sweats [Dysphagia] : no dysphagia [Chest Pain] : no chest pain [Shortness Of Breath] : no shortness of breath [Abdominal Pain] : no abdominal pain [Vomiting] : no vomiting [Constipation] : no constipation [Easy Bleeding] : no tendency for easy bleeding [FreeTextEntry2] : appetite slightly improved but still poor  [FreeTextEntry5] : reports trace bilateral LE edema x 1 week [FreeTextEntry7] : reports occasional nausea [FreeTextEntry8] : reports urinary frequency x 1 week with occasional burning  [de-identified] : L chest port present  [de-identified] : endorses occasional neuropathy with long exposure to cold items , neuropathy in his hands is slightly more apparent

## 2023-06-14 NOTE — PHYSICAL EXAM
[Restricted in physically strenuous activity but ambulatory and able to carry out work of a light or sedentary nature] : Status 1- Restricted in physically strenuous activity but ambulatory and able to carry out work of a light or sedentary nature, e.g., light house work, office work [Normal] : affect appropriate [de-identified] : wearing glasses  [de-identified] : L chest port present

## 2023-06-14 NOTE — ASSESSMENT
[Palliative] : Goals of care discussed with patient: Palliative [FreeTextEntry1] : # Metastatic moderate to poorly differentiated adenocarcinoma , dx 07/2022 \par - BRCA (-) \par - s/p laparoscopy on 8.22.2022 with findings of ~1L malignant ascites and peritoneal implants \par - Ca19-9 is 6481, CEA 6.8 from 07/2022 \par - reviewed radiology, pathology and lab workup and had a discussion regarding implications of diagnosis, prognosis and options for management including but not limited to chemotherapy for palliative intent ; had a lengthy discussion regarding Stage IV disease and not curative \par - s/p L Chest port placement with SURG, Dr. Morgan \par - CT CAP from 11.8.2022 is essentially stable, tumor marker is decreasing \par - s/p cycle 12 of 5FU/Leucovorin/Irinotecan on 2/24 , initiated on 9.6.2022 ; discontinued oxaliplatin beginning C12 due to neuropathy \par - STOPPED 5FU/Leucovorin/Irinotecan due to rising Ca19-9 and PET/CT findings\par - PET/CT from 3.9.23 showed FDG uptake within upper abdominal peritoneal nodularity adjacent to the stomach and pancreas, pancreatic tail, and a subcentimeter right pelvic sidewall lymph node.  \par - Patient successfully certified for utilization of Medical Marijuana on 04/12/2023.  \par - PET/CT 6.6.2023 shows previously FDG avid peritoneal nodularity adjacent to  stomach and pancreas, pancreatic tail and right pelvic sidewall lymph node have improved to non-FDG avid status consistent with good treatment response.  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 Pylori infection , dx 07/2022 \par - s/p upper EGD in 07/2022 ; completed antibiotic treatment \par - followup with GI, Dr. Maximilian Larson, as scheduled for management \par \par # Thrombocytopenia, likely due to chemotherapy \par - C1D15 of Gemcitabine/Abraxane held due to low PLTs \par - resolved \par \par Labwork in 2 weeks: Ca19-9 \par RTC in 4 weeks with CBC, BMP, LFTs, Ca19-9 \par \par seen/examined w/ NP Myrtle; note reviewed; case discused; AGREE w/ plan\par proceed w/ chemo c4 D1 with neulasta\par  reviewed\par PET/CT 06/2023 reviewed: good response;\par continue same chemo\par added cymbalta to treat neuropathy\par would not chage the regimen yet; however, if neuropathy is bad enough to affect his quality of life and ability to maintain employement would need to rediscuss\par f/u in 4  weeks

## 2023-06-15 DIAGNOSIS — C16.9 MALIGNANT NEOPLASM OF STOMACH, UNSPECIFIED: ICD-10-CM

## 2023-06-15 LAB — CANCER AG19-9 SERPL-ACNC: 2845 U/ML

## 2023-06-21 ENCOUNTER — OUTPATIENT (OUTPATIENT)
Dept: OUTPATIENT SERVICES | Facility: HOSPITAL | Age: 73
LOS: 1 days | End: 2023-06-21
Payer: MEDICARE

## 2023-06-21 ENCOUNTER — APPOINTMENT (OUTPATIENT)
Dept: INFUSION THERAPY | Facility: CLINIC | Age: 73
End: 2023-06-21

## 2023-06-21 ENCOUNTER — LABORATORY RESULT (OUTPATIENT)
Age: 73
End: 2023-06-21

## 2023-06-21 DIAGNOSIS — C16.9 MALIGNANT NEOPLASM OF STOMACH, UNSPECIFIED: ICD-10-CM

## 2023-06-21 DIAGNOSIS — Z98.890 OTHER SPECIFIED POSTPROCEDURAL STATES: Chronic | ICD-10-CM

## 2023-06-21 PROCEDURE — 96375 TX/PRO/DX INJ NEW DRUG ADDON: CPT

## 2023-06-21 PROCEDURE — 96413 CHEMO IV INFUSION 1 HR: CPT

## 2023-06-21 PROCEDURE — 96367 TX/PROPH/DG ADDL SEQ IV INF: CPT

## 2023-06-21 PROCEDURE — 85027 COMPLETE CBC AUTOMATED: CPT

## 2023-06-21 PROCEDURE — 96417 CHEMO IV INFUS EACH ADDL SEQ: CPT

## 2023-06-21 RX ORDER — FOSAPREPITANT DIMEGLUMINE 150 MG/5ML
150 INJECTION, POWDER, LYOPHILIZED, FOR SOLUTION INTRAVENOUS ONCE
Refills: 0 | Status: COMPLETED | OUTPATIENT
Start: 2023-06-21 | End: 2023-06-21

## 2023-06-21 RX ORDER — DEXAMETHASONE 0.5 MG/5ML
10 ELIXIR ORAL ONCE
Refills: 0 | Status: COMPLETED | OUTPATIENT
Start: 2023-06-21 | End: 2023-06-21

## 2023-06-21 RX ORDER — DIPHENHYDRAMINE HCL 50 MG
50 CAPSULE ORAL ONCE
Refills: 0 | Status: COMPLETED | OUTPATIENT
Start: 2023-06-21 | End: 2023-06-21

## 2023-06-21 RX ORDER — FAMOTIDINE 10 MG/ML
20 INJECTION INTRAVENOUS ONCE
Refills: 0 | Status: COMPLETED | OUTPATIENT
Start: 2023-06-21 | End: 2023-06-21

## 2023-06-21 RX ORDER — PACLITAXEL 100 MG/20ML
220 INJECTION, POWDER, LYOPHILIZED, FOR SUSPENSION INTRAVENOUS ONCE
Refills: 0 | Status: COMPLETED | OUTPATIENT
Start: 2023-06-21 | End: 2023-06-21

## 2023-06-21 RX ORDER — GEMCITABINE 38 MG/ML
1800 INJECTION, SOLUTION INTRAVENOUS ONCE
Refills: 0 | Status: COMPLETED | OUTPATIENT
Start: 2023-06-21 | End: 2023-06-21

## 2023-06-21 RX ADMIN — Medication 102 MILLIGRAM(S): at 12:50

## 2023-06-21 RX ADMIN — PACLITAXEL 220 MILLIGRAM(S): 100 INJECTION, POWDER, LYOPHILIZED, FOR SUSPENSION INTRAVENOUS at 13:10

## 2023-06-21 RX ADMIN — Medication 50 MILLIGRAM(S): at 13:05

## 2023-06-21 RX ADMIN — FOSAPREPITANT DIMEGLUMINE 150 MILLIGRAM(S): 150 INJECTION, POWDER, LYOPHILIZED, FOR SOLUTION INTRAVENOUS at 12:50

## 2023-06-21 RX ADMIN — Medication 118 MILLIGRAM(S): at 11:48

## 2023-06-21 RX ADMIN — FAMOTIDINE 20 MILLIGRAM(S): 10 INJECTION INTRAVENOUS at 12:20

## 2023-06-21 RX ADMIN — Medication 10 MILLIGRAM(S): at 12:05

## 2023-06-21 RX ADMIN — GEMCITABINE 1800 MILLIGRAM(S): 38 INJECTION, SOLUTION INTRAVENOUS at 14:30

## 2023-06-21 RX ADMIN — FAMOTIDINE 104 MILLIGRAM(S): 10 INJECTION INTRAVENOUS at 12:05

## 2023-06-21 RX ADMIN — PACLITAXEL 220 MILLIGRAM(S): 100 INJECTION, POWDER, LYOPHILIZED, FOR SUSPENSION INTRAVENOUS at 13:40

## 2023-06-21 RX ADMIN — FOSAPREPITANT DIMEGLUMINE 500 MILLIGRAM(S): 150 INJECTION, POWDER, LYOPHILIZED, FOR SOLUTION INTRAVENOUS at 12:20

## 2023-06-21 RX ADMIN — GEMCITABINE 1800 MILLIGRAM(S): 38 INJECTION, SOLUTION INTRAVENOUS at 13:45

## 2023-06-28 ENCOUNTER — APPOINTMENT (OUTPATIENT)
Dept: INFUSION THERAPY | Facility: CLINIC | Age: 73
End: 2023-06-28

## 2023-06-28 ENCOUNTER — OUTPATIENT (OUTPATIENT)
Dept: OUTPATIENT SERVICES | Facility: HOSPITAL | Age: 73
LOS: 1 days | End: 2023-06-28
Payer: MEDICARE

## 2023-06-28 ENCOUNTER — LABORATORY RESULT (OUTPATIENT)
Age: 73
End: 2023-06-28

## 2023-06-28 ENCOUNTER — NON-APPOINTMENT (OUTPATIENT)
Age: 73
End: 2023-06-28

## 2023-06-28 DIAGNOSIS — C16.9 MALIGNANT NEOPLASM OF STOMACH, UNSPECIFIED: ICD-10-CM

## 2023-06-28 DIAGNOSIS — Z98.890 OTHER SPECIFIED POSTPROCEDURAL STATES: Chronic | ICD-10-CM

## 2023-06-28 PROCEDURE — 96375 TX/PRO/DX INJ NEW DRUG ADDON: CPT

## 2023-06-28 PROCEDURE — 96417 CHEMO IV INFUS EACH ADDL SEQ: CPT

## 2023-06-28 PROCEDURE — 96413 CHEMO IV INFUSION 1 HR: CPT

## 2023-06-28 PROCEDURE — 96377 APPLICATON ON-BODY INJECTOR: CPT | Mod: XU

## 2023-06-28 PROCEDURE — 96367 TX/PROPH/DG ADDL SEQ IV INF: CPT

## 2023-06-28 PROCEDURE — 85027 COMPLETE CBC AUTOMATED: CPT

## 2023-06-28 RX ORDER — FOSAPREPITANT DIMEGLUMINE 150 MG/5ML
150 INJECTION, POWDER, LYOPHILIZED, FOR SOLUTION INTRAVENOUS ONCE
Refills: 0 | Status: COMPLETED | OUTPATIENT
Start: 2023-06-28 | End: 2023-06-28

## 2023-06-28 RX ORDER — FAMOTIDINE 10 MG/ML
20 INJECTION INTRAVENOUS ONCE
Refills: 0 | Status: COMPLETED | OUTPATIENT
Start: 2023-06-28 | End: 2023-06-28

## 2023-06-28 RX ORDER — DEXAMETHASONE 0.5 MG/5ML
10 ELIXIR ORAL ONCE
Refills: 0 | Status: COMPLETED | OUTPATIENT
Start: 2023-06-28 | End: 2023-06-28

## 2023-06-28 RX ORDER — PEGFILGRASTIM-CBQV 6 MG/.6ML
6 INJECTION, SOLUTION SUBCUTANEOUS ONCE
Refills: 0 | Status: COMPLETED | OUTPATIENT
Start: 2023-06-28 | End: 2023-06-28

## 2023-06-28 RX ORDER — GEMCITABINE 38 MG/ML
1350 INJECTION, SOLUTION INTRAVENOUS ONCE
Refills: 0 | Status: COMPLETED | OUTPATIENT
Start: 2023-06-28 | End: 2023-06-28

## 2023-06-28 RX ORDER — DIPHENHYDRAMINE HCL 50 MG
50 CAPSULE ORAL ONCE
Refills: 0 | Status: COMPLETED | OUTPATIENT
Start: 2023-06-28 | End: 2023-06-28

## 2023-06-28 RX ORDER — PACLITAXEL 100 MG/20ML
165 INJECTION, POWDER, LYOPHILIZED, FOR SUSPENSION INTRAVENOUS ONCE
Refills: 0 | Status: COMPLETED | OUTPATIENT
Start: 2023-06-28 | End: 2023-06-28

## 2023-06-28 RX ADMIN — FAMOTIDINE 104 MILLIGRAM(S): 10 INJECTION INTRAVENOUS at 13:15

## 2023-06-28 RX ADMIN — PACLITAXEL 165 MILLIGRAM(S): 100 INJECTION, POWDER, LYOPHILIZED, FOR SUSPENSION INTRAVENOUS at 14:30

## 2023-06-28 RX ADMIN — Medication 118 MILLIGRAM(S): at 13:00

## 2023-06-28 RX ADMIN — FOSAPREPITANT DIMEGLUMINE 150 MILLIGRAM(S): 150 INJECTION, POWDER, LYOPHILIZED, FOR SOLUTION INTRAVENOUS at 13:00

## 2023-06-28 RX ADMIN — FAMOTIDINE 20 MILLIGRAM(S): 10 INJECTION INTRAVENOUS at 13:30

## 2023-06-28 RX ADMIN — PACLITAXEL 165 MILLIGRAM(S): 100 INJECTION, POWDER, LYOPHILIZED, FOR SUSPENSION INTRAVENOUS at 13:59

## 2023-06-28 RX ADMIN — PEGFILGRASTIM-CBQV 6 MILLIGRAM(S): 6 INJECTION, SOLUTION SUBCUTANEOUS at 12:33

## 2023-06-28 RX ADMIN — Medication 50 MILLIGRAM(S): at 13:45

## 2023-06-28 RX ADMIN — Medication 102 MILLIGRAM(S): at 13:33

## 2023-06-28 RX ADMIN — GEMCITABINE 1350 MILLIGRAM(S): 38 INJECTION, SOLUTION INTRAVENOUS at 14:30

## 2023-06-28 RX ADMIN — GEMCITABINE 1350 MILLIGRAM(S): 38 INJECTION, SOLUTION INTRAVENOUS at 14:31

## 2023-06-28 RX ADMIN — FOSAPREPITANT DIMEGLUMINE 500 MILLIGRAM(S): 150 INJECTION, POWDER, LYOPHILIZED, FOR SOLUTION INTRAVENOUS at 12:32

## 2023-06-28 RX ADMIN — Medication 10 MILLIGRAM(S): at 13:15

## 2023-07-03 LAB
BACTERIA UR CULT: ABNORMAL
HCT VFR BLD CALC: 27.6 %
HCT VFR BLD CALC: 31.4 %
HGB BLD-MCNC: 10.9 G/DL
HGB BLD-MCNC: 9.7 G/DL
MCHC RBC-ENTMCNC: 29.6 PG
MCHC RBC-ENTMCNC: 29.8 PG
MCHC RBC-ENTMCNC: 34.7 G/DL
MCHC RBC-ENTMCNC: 35.1 G/DL
MCV RBC AUTO: 84.9 FL
MCV RBC AUTO: 85.3 FL
PLATELET # BLD AUTO: 155 K/UL
PLATELET # BLD AUTO: 50 K/UL
PMV BLD: 11.1 FL
PMV BLD: 9.4 FL
RBC # BLD: 3.25 M/UL
RBC # BLD: 3.68 M/UL
RBC # FLD: 18.7 %
RBC # FLD: 19 %
WBC # FLD AUTO: 3.01 K/UL
WBC # FLD AUTO: 3.37 K/UL

## 2023-07-12 ENCOUNTER — LABORATORY RESULT (OUTPATIENT)
Age: 73
End: 2023-07-12

## 2023-07-12 ENCOUNTER — APPOINTMENT (OUTPATIENT)
Dept: HEMATOLOGY ONCOLOGY | Facility: CLINIC | Age: 73
End: 2023-07-12
Payer: MEDICARE

## 2023-07-12 ENCOUNTER — APPOINTMENT (OUTPATIENT)
Dept: INFUSION THERAPY | Facility: CLINIC | Age: 73
End: 2023-07-12
Payer: MEDICARE

## 2023-07-12 ENCOUNTER — OUTPATIENT (OUTPATIENT)
Dept: OUTPATIENT SERVICES | Facility: HOSPITAL | Age: 73
LOS: 1 days | End: 2023-07-12
Payer: MEDICARE

## 2023-07-12 VITALS
HEART RATE: 111 BPM | WEIGHT: 139 LBS | TEMPERATURE: 98.2 F | BODY MASS INDEX: 21.82 KG/M2 | SYSTOLIC BLOOD PRESSURE: 134 MMHG | HEIGHT: 67 IN | DIASTOLIC BLOOD PRESSURE: 77 MMHG | RESPIRATION RATE: 16 BRPM

## 2023-07-12 DIAGNOSIS — Z51.11 ENCOUNTER FOR ANTINEOPLASTIC CHEMOTHERAPY: ICD-10-CM

## 2023-07-12 DIAGNOSIS — C16.9 MALIGNANT NEOPLASM OF STOMACH, UNSPECIFIED: ICD-10-CM

## 2023-07-12 DIAGNOSIS — Z98.890 OTHER SPECIFIED POSTPROCEDURAL STATES: Chronic | ICD-10-CM

## 2023-07-12 DIAGNOSIS — N39.0 URINARY TRACT INFECTION, SITE NOT SPECIFIED: ICD-10-CM

## 2023-07-12 DIAGNOSIS — C25.9 MALIGNANT NEOPLASM OF PANCREAS, UNSPECIFIED: ICD-10-CM

## 2023-07-12 LAB
ALBUMIN SERPL ELPH-MCNC: 3.7 G/DL
ALP BLD-CCNC: 139 U/L
ALT SERPL-CCNC: 50 U/L
ANION GAP SERPL CALC-SCNC: 12 MMOL/L
APPEARANCE: ABNORMAL
AST SERPL-CCNC: 46 U/L
BILIRUB DIRECT SERPL-MCNC: 0.2 MG/DL
BILIRUB INDIRECT SERPL-MCNC: 0.3 MG/DL
BILIRUB SERPL-MCNC: 0.5 MG/DL
BILIRUBIN URINE: NEGATIVE
BLOOD URINE: ABNORMAL
BUN SERPL-MCNC: 15 MG/DL
CALCIUM SERPL-MCNC: 8.9 MG/DL
CHLORIDE SERPL-SCNC: 99 MMOL/L
CO2 SERPL-SCNC: 27 MMOL/L
COLOR: YELLOW
CREAT SERPL-MCNC: 1.3 MG/DL
EGFR: 58 ML/MIN/1.73M2
GLUCOSE QUALITATIVE U: NEGATIVE
GLUCOSE SERPL-MCNC: 155 MG/DL
HCT VFR BLD CALC: 27.1 %
HGB BLD-MCNC: 9.4 G/DL
KETONES URINE: NEGATIVE
LEUKOCYTE ESTERASE URINE: ABNORMAL
MCHC RBC-ENTMCNC: 29.8 PG
MCHC RBC-ENTMCNC: 34.7 G/DL
MCV RBC AUTO: 86 FL
NITRITE URINE: NEGATIVE
PH URINE: 6.5
PLATELET # BLD AUTO: 445 K/UL
PMV BLD: 8.6 FL
POTASSIUM SERPL-SCNC: 4.6 MMOL/L
PROT SERPL-MCNC: 7 G/DL
PROTEIN URINE: ABNORMAL
RBC # BLD: 3.15 M/UL
RBC # FLD: 19 %
SODIUM SERPL-SCNC: 138 MMOL/L
SPECIFIC GRAVITY URINE: 1.01
UROBILINOGEN URINE: NORMAL
WBC # FLD AUTO: 18.77 K/UL

## 2023-07-12 PROCEDURE — 99214 OFFICE O/P EST MOD 30 MIN: CPT

## 2023-07-12 PROCEDURE — 85027 COMPLETE CBC AUTOMATED: CPT

## 2023-07-12 PROCEDURE — 87077 CULTURE AEROBIC IDENTIFY: CPT

## 2023-07-12 PROCEDURE — 87186 SC STD MICRODIL/AGAR DIL: CPT

## 2023-07-12 PROCEDURE — 86301 IMMUNOASSAY TUMOR CA 19-9: CPT

## 2023-07-12 PROCEDURE — 80076 HEPATIC FUNCTION PANEL: CPT

## 2023-07-12 PROCEDURE — 81001 URINALYSIS AUTO W/SCOPE: CPT

## 2023-07-12 PROCEDURE — 87086 URINE CULTURE/COLONY COUNT: CPT

## 2023-07-12 PROCEDURE — 80048 BASIC METABOLIC PNL TOTAL CA: CPT

## 2023-07-12 RX ORDER — CIPROFLOXACIN HYDROCHLORIDE 500 MG/1
500 TABLET, FILM COATED ORAL DAILY
Qty: 7 | Refills: 0 | Status: COMPLETED | COMMUNITY
Start: 2023-07-12 | End: 2023-07-19

## 2023-07-12 NOTE — PHYSICAL EXAM
[Restricted in physically strenuous activity but ambulatory and able to carry out work of a light or sedentary nature] : Status 1- Restricted in physically strenuous activity but ambulatory and able to carry out work of a light or sedentary nature, e.g., light house work, office work [Normal] : affect appropriate [de-identified] : wearing glasses  [de-identified] : L chest port present

## 2023-07-12 NOTE — HISTORY OF PRESENT ILLNESS
[Disease: _____________________] : Disease: [unfilled] [Therapy: ___] : Therapy: [unfilled] [Cycle: ___] : Cycle: [unfilled] [de-identified] : Mr. JINA SINGER is a 72 year old male here today for evaluation and management of Pancreatic Cancer.  \par \par He was referred by SURG, Dr. Morgan.  JINA is a 72 year old M with PMHx including HTN, HLD, T2DM, who presents to clinic to establish care.  Patient presented to ER back in 07/2022 with complaint of gradually worsening abdominal pain for the prior month, worse after eating and associated with 40lb weight loss in the last 2 months.  He underwent CT imaging which shows evidence suggestive of metastatic disease.  He also endorses constipation.  He underwent laparoscopy with findings of ~1L malignant ascites and peritoneal implants on 8.22.2022.  He is presently feeling well with no new complaints.  Patient denies fever, chills, nausea, vomiting, dysphagia, dyspnea, neuropathy or bleeding.  Patient reports family history of malignancy in his father (possibly stomach cancer) and mother (possibly colon).  Never a smoker.  NKDA.  \par \par RADIOLOGIC WORKUP\par CT Chest (7.25.2022) IMPRESSION:Perigastric infiltrative process inseparable from the tail the pancreas extending to the proximal duodenum. Associated ascites. Findings again  suspicious for pancreatic or gastrointestinal neoplasm. Differential diagnosis includes partially contained ruptured gastric or duodenal ulcer as well as infectious process/pancreatitis.Hyperaerated lungs. Scattered granulomata. Vascular calcifications. Nonspecific areas of subpleural thickening bilaterally.\par CT A/P (7.24.2022) IMPRESSION:Predominantly perigastric infiltrative soft tissue changes which are inseparable from the tail of pancreas and extend around the proximal  duodenum. Small volume abdominopelvic ascites. Differential includes underlying pancreatic or gastrointestinal neoplasm, sequelae of partially contained ruptured gastric or duodenal  ulcer, and infectious process or perhaps pancreatitis. Clinical correlation and/or further evaluation with EGD should be considered.Findings compatible with BPH. Superimposed cystitis is difficult to exclude on imaging.\par \par LAB WORKUP\par (8.17.2022) WBC 8.36, Hgb 14.2, MCV 84, , normal diff, Cr 0.9, eGFR 91, normal LFTs\par (7.25.2022) Ca19-9 is 6481, CEA 6.8, TSH 2.62\par (10.23.2019) WBC 6.83, Hgb 15, MCV 85.7, \par \par PATHOLOGY\par (see results section)\par \par HCM\par Colonoscopy done about 5 years , polyps removed \par Upper Endoscopy done 07/2022 showed 5x5 hypoechoic infiltrative mass (irregular borders) extending between the  muscularis propria (abuting it) and the territory of the pancreatic tail \par COVID Vaccinated? [FreeTextEntry1] : Started Gemcitabine + Abraxane with onbody neulasta (D1, D8, D15 every 28 days) on 3.22.2023 \par Started mFOLFIRINOX on 9.6.2022 (discontinued oxaliplatin beginning C12 on 2/22/23) - 2.24.2023 due to rising Ca19-9.   [de-identified] : 9/14/22\[facility] Patient is here for a follow-up visit for pancreatic cancer.  He is s/p initial cycle of mFOLFIRINOX on 9.6.2022.  He is feeling well with no new complaints.  Reviewed most recent CBC, which is stable.  Patient denies fever, chills, nausea, vomiting, dyspnea, neuropathy, persistent diarrhea or bleeding.  He states his appetite is still poor but slightly improving.  He is due for cycle 2 of chemotherapy next week.  \par \par 10/4/22\[facility] Patient is here for a follow-up visit for pancreatic cancer.  He is due for cycle 3 of mFOLFIRINOX today, initiated on 9.6.2022.  He is feeling well with no new complaints.  Reviewed most recent CBC, which shows mild anemia, hgb 11.6g/dL and persistent leukocytosis, likely due to GCSF administration with chemotherapy.  ALK Phos is slightly higher at 198 but Ca19-9 is decreasing down to 36553F/mL.  Patient denies fever, chills, vomiting, dyspnea, neuropathy, CP, palpitations, abdominal pain, persistent diarrhea or bleeding.  He is endorses transient nausea which was not bothersome and he took anti-emetics.  \par \par 11/2/22\[facility] Patient is here for a follow-up visit for pancreatic cancer.  He is due for cycle 4 of mFOLFIRINOX today, initiated on 9.6.2022.  He is feeling well with no new complaints.  Reviewed most recent CBC, which shows mild anemia, hgb 11.1g/dL and persistent leukocytosis, likely due to GCSF administration with chemotherapy.  ALK Phos is slightly lower at 138 but Ca19-9 is decreasing down to 4619U/mL.  Patient denies fever, chills, vomiting, dyspnea, CP, palpitations, abdominal pain, persistent diarrhea or bleeding.  He endorses occasional neuropathy with long exposure to cold items.  \par \par 11/16/22\[facility] Patient is here for a follow-up visit for pancreatic cancer, accompanied by spouse.  He is due for cycle 6 of mFOLFIRINOX today, initiated on 9.6.2022.  He is feeling well with no new complaints.  Reviewed most recent CBC, which shows mild anemia, hgb 11.1g/dL and persistent leukocytosis, likely due to GCSF administration with chemotherapy.  ALK Phos is essentially stable at 146 but Ca19-9 is decreasing down to 2708U/mL.  Patient denies fever, chills, vomiting, dyspnea, CP, palpitations, abdominal pain, persistent diarrhea, mouth sores or bleeding.  He still notes occasional neuropathy with long exposure to cold items.  Reviewed most recent CT imaging which is essentially stable.  \par CT C/A/P (11.8.2022) IMPRESSION:1. No definite evidence of intrathoracic metastasis.Since 7/24/2022, no significant interval change.Near diffuse peritoneal carcinomatosis is not significantly changed.Diffuse tumor infiltration in the pancreatic tail (4/226), unchanged.Completely attenuated splenic vein is unchanged. Prominent venous collaterals in the left upper quadrant is unchanged.\par \par 11/30/22\par Patient is here for a follow-up visit for pancreatic cancer.  He is due for cycle 7 of mFOLFIRINOX today, initiated on 9.6.2022.  Reviewed most recent CBC, which shows mild anemia, hgb 10.9g/dL and persistent leukocytosis, likely due to GCSF administration with chemotherapy.  ALK Phos is essentially stable at 138 and Ca19-9 is decreasing down to 1683U/mL.  Patient denies fever, chills, vomiting, dyspnea, CP, palpitations, abdominal pain, persistent diarrhea, mouth sores or bleeding.  He still notes occasional neuropathy with long exposure to cold items. \par \par 12/14/22\par Patient is here for a follow-up visit for pancreatic cancer, accompanied by spouse via telephone.  He is due for cycle 8 of mFOLFIRINOX today, initiated on 9.6.2022.  Reviewed most recent CBC, which shows mild anemia, hgb 10.5g/dL and persistent leukocytosis, likely due to GCSF administration with chemotherapy.  ALK Phos is essentially stable at 124 and Ca19-9 is decreasing down to 1177U/mL.  Patient denies fever, chills, dyspnea, CP, palpitations, abdominal pain, persistent diarrhea, mouth sores or bleeding.  He has an occasional cough x 2 weeks; believes he had a mild cold which has been improving over the last 2 weeks but the cough is persistent.  He still notes occasional neuropathy with long exposure to cold items. \par \par 12/28/22\par Patient is here for a follow-up visit for pancreatic cancer, accompanied by spouse via telephone.  Since last visit, patient had CXR which was suggestive for pneumonia.  He has completed antibiotics with improvement in symptoms.  Reviewed PULM consult with Dr. Escobar: plans to repeat CXR in about 6 weeks.  Patient is agreeable to resume chemotherapy.  He is due for cycle 9 of mFOLFIRINOX today, initiated on 9.6.2022.  Reviewed most recent CBC which shows mild anemia and neutrophil-predominant leukocytosis.  Tumor marker is down to 957 U/mL from 12/2022.  \par CXR (12.15.2022) Impression:Subsegmental left lower lobe consolidation.\par \par 1/11/23\par Patient is here for a follow-up visit for pancreatic cancer, accompanied by spouse via telephone.  As per PULM consult with Dr. Escobar: plan to repeat CXR about 6 weeks following prior imaging.  He is due for cycle 10 of mFOLFIRINOX today, initiated on 9.6.2022.  Reviewed most recent CBC which shows mild anemia with hgb 10.6g/dL and neutrophil-predominant leukocytosis likely from GCSF administration.  Tumor marker is down to 693 U/mL from 1/2023.  He denies fevers, chills, worsening of neuropathy, dyspnea, mouth sores or bleeding.  Patient states he dislikes the effect of Benadryl pre-medication because it makes him drowsy and feel "off."  He is requesting to decrease Benadryl from 50mg to 25mg IV as pre-medicaiton.  Risks vs benefits discussed at length.  \par \par 1/24/23\par Patient is here for a follow-up visit for pancreatic cancer.  He is due for cycle 11 of mFOLFIRINOX today, initiated on 9.6.2022.  Reviewed most recent CBC which shows mild anemia with hgb 11.3g/dL, mild thrombocytopenia with PLTs 106,000 and neutrophil-predominant leukocytosis likely from GCSF administration.  Tumor marker is down to 693 U/mL from 1/2023.  He denies fevers, chills, worsening of neuropathy, dyspnea, new cough, mouth sores or bleeding.  \par \par 2/7/23\par Patient is here for a follow-up visit for pancreatic cancer.  He is due for cycle 12 of mFOLFIRINOX today, initiated on 9.6.2022.  Reviewed most recent CBC which shows mild anemia with hgb 11.3g/dL, mild thrombocytopenia with PLTs 106,000 and neutrophil-predominant leukocytosis likely from GCSF administration.  Tumor marker is down to 693 U/mL from 1/2023.  He denies fevers, chills, dyspnea, new cough, mouth sores or bleeding.  He states his neuropathy in his feet is slightly worse in both feet.  \par \par 2/21/23\par Patient is here for a follow-up visit for pancreatic cancer.  He is due for cycle 12 of chemotherapy on 2/22, initiated on 9.6.2022.  Reviewed most recent CBC which shows mild anemia with hgb 11g/dL.  Tumor marker is down to 457 U/mL from 02/2023.  He denies fevers, chills, dyspnea, new cough, mouth sores, persistent diarrhea or bleeding.  He endorses neuropathy in both feet and fingertips; started gabapentin 100mg TiD without resolution of symptoms.  \par \par 3/7/23\par Patient is here for a follow-up visit for pancreatic cancer.  He is due for cycle 13 of chemotherapy on 3/8, initiated on 9.6.2022.  Reviewed most recent CBC which shows mild anemia with hgb 11.6g/dL.  Tumor marker is slightly higher at 570 U/mL from 02/2023.  He denies fevers, chills, dyspnea, new cough, mouth sores, persistent diarrhea, new pain or bleeding.  He endorses neuropathy in both feet and fingertips; he is on gabapentin 600mg daily without resolution of symptoms.  He recently followed with GI, Dr. Maximilian Larson, and is anticipating upper EGD.  Reviewed US Duplex which showed no DVT.  \par US Duplex BI LE (3.3.2023) IMPRESSION:No evidence of deep venous thrombosis in either lower extremity.Bilateral popliteal cysts.\par \par 3/15/23\par Patient is here for a follow-up visit for pancreatic cancer, accompanied by spouse Renate via telephone.  He is s/p cycle 12 of chemotherapy on 2/24, initiated on 9.6.2022.  Tumor marker almost doubled up to 1154 U/mL from 03/2023.  He was sent for PET/CT which showed FDG uptake within upper abdominal peritoneal nodularity adjacent to the stomach and pancreas, pancreatic tail, and a subcentimeter right pelvic sidewall lymph node.  He is presently feeling fine without new complaints.  \[facility] PET/CT (3.9.2023) IMPRESSION:Abnormal FDG uptake within upper abdominal peritoneal nodularity adjacent to the stomach and pancreas (SUV up to 5.4), pancreatic tail (SUV 3.4) and a subcentimeter right pelvic sidewall lymph node (SUV 3.6).No other definite sites of abnormal FDG uptake to suggest biologic tumor activity.\par \[facility] 3/21/23\[facility] Patient is here for a follow-up visit for pancreatic cancer, accompanied by spouse.  He is s/p cycle 12 of chemotherapy on 2/24, initiated on 9.6.2022.  Tumor marker almost doubled up to 1154 U/mL from 03/2023.  He is anticipating initiation of new chemotherapy regimen using Gemcitabine + Abraxane on 3.22.2023.  \par \[facility] 3/29/23[facility] Patient is here for a follow-up visit for pancreatic cancer, accompanied by spouse.  Since last visit, patient started new chemotherapy regimen using Gemcitabine + Abraxane, initiated on 3.22.2023.  Tumor marker doubled again up to 2410U/mL from 03/2023.  Reviewed most recent CBC, which shows mild pancytopenia.  Patient denies fever, chills, nausea, vomiting, dyspnea, diarrhea or bleeding.  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  \par \[facility] 4/12/23[facility] Patient is here for a follow-up visit for pancreatic cancer.  Patient is due for cycle 2 of Gemcitabine + Abraxane followed by Neulasta, initiated on 3.22.2023.  Reviewed most recent CBC, which shows mild leukopenia and anemia, hgb 11g/dL.  Patient denies fever, chills, nausea, vomiting, dyspnea, diarrhea or bleeding.  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  \par \[facility] 4/19/23[facility] Patient is here for a follow-up visit for pancreatic cancer.  Patient is due for cycle 2 of Gemcitabine + Abraxane, initiated on 3.22.2023.  Reviewed most recent CBC, which shows mild leukopenia and anemia, hgb 11g/dL.  Patient denies fever, chills, nausea, vomiting, dyspnea, diarrhea or bleeding.  He reports slight worsening of neuropathy of fingers despite Gabapentin 600mg total daily.  Ca19-9 level is down to 2253U/mL.  \par \par 5/17/23\par Patient is here for a follow-up visit for pancreatic cancer.  Patient is due for cycle 3 of Gemcitabine + Abraxane followed by Neulasta, initiated on 3.22.2023.  Reviewed most recent CBC, which shows mild anemia, hgb 11.6g/dL.  Leukocytosis is likely due to GCSF.  Ca19-9 level is up to 3087U/mL.  Patient denies fever, chills, nausea, vomiting, dyspnea, diarrhea or bleeding.  He reports slight worsening of neuropathy of fingers despite Gabapentin 600mg total daily.  \par \par 6/14/23\par Patient is here for a follow-up visit for pancreatic cancer.  Patient is due for cycle 4 of Gemcitabine + Abraxane followed by Neulasta, initiated on 3.22.2023.  Reviewed most recent CBC, which shows mild anemia, hgb 10.9g/dL.  Leukocytosis is likely due to GCSF.  Ca19-9 level is down to 2884U/mL from last month.  He still has neuropathy of fingers despite Gabapentin 900mg total daily + Cymbalta.  Patient denies fever, chills, nausea, vomiting, dyspnea, diarrhea or bleeding.  He reports urinary frequency and occasional burning over the last week (he has had UTI in the past).  Reviewed most recent PET/CT which shows previously FDG avid peritoneal nodularity adjacent to  stomach and pancreas, pancreatic tail and right pelvic sidewall lymph node have improved to non-FDG avid status consistent with good treatment response.  \par PET/CT (6.6.2023) IMPRESSION:No sites of pathologic FDG uptake; therefore compared to 3/9/2023, no new sites of pathologic FDG uptake.  Previously FDG avid peritoneal nodularity adjacent to  stomach and pancreas, pancreatic tail and right pelvic sidewall lymph node have improved to non-FDG avid status consistent with good treatment response.  \par \par 7/11/23\par Patient is here for a follow-up visit for pancreatic cancer.  Patient is due for cycle 5 of Gemcitabine + Abraxane followed by Neulasta, initiated on 3.22.2023.  He had thrombocytopenia with last cycle so dose was adjusted accordingly as per guidelines.  Patient reports abdominal discomfort for the last 3 weeks or so which has affected his appetite; he lost about 10lbs in the last month.  Reviewed most recent CBC, which shows mild anemia, hgb 10.9g/dL.  Ca19-9 level was essentially stable at 2845U/mL from last month.  He still has neuropathy of fingers despite Gabapentin 900mg total daily + Cymbalta.  Patient denies fever, chills, nausea, vomiting, dyspnea, diarrhea or bleeding.  He experiences burning with urination and increased frequency.

## 2023-07-12 NOTE — ASSESSMENT
[Palliative] : Goals of care discussed with patient: Palliative [FreeTextEntry1] : # Metastatic moderate to poorly differentiated adenocarcinoma , dx 07/2022 \par - BRCA (-) \par - s/p laparoscopy on 8.22.2022 with findings of ~1L malignant ascites and peritoneal implants \par - Ca19-9 is 6481, CEA 6.8 from 07/2022 \par - reviewed radiology, pathology and lab workup and had a discussion regarding implications of diagnosis, prognosis and options for management including but not limited to chemotherapy for palliative intent ; had a lengthy discussion regarding Stage IV disease and not curative \par - s/p L Chest port placement with SURG, Dr. Morgan \par - CT CAP from 11.8.2022 is essentially stable, tumor marker is decreasing \par - s/p cycle 12 of 5FU/Leucovorin/Irinotecan on 2/24 , initiated on 9.6.2022 ; discontinued oxaliplatin beginning C12 due to neuropathy \par - STOPPED 5FU/Leucovorin/Irinotecan due to rising Ca19-9 and PET/CT findings\par - PET/CT from 3.9.23 showed FDG uptake within upper abdominal peritoneal nodularity adjacent to the stomach and pancreas, pancreatic tail, and a subcentimeter right pelvic sidewall lymph node.  \par - Patient successfully certified for utilization of Medical Marijuana on 04/12/2023.  \par - PET/CT 6.6.2023 shows previously FDG avid peritoneal nodularity adjacent to  stomach and pancreas, pancreatic tail and right pelvic sidewall lymph node have improved to non-FDG avid status consistent with good treatment response.  \par - s/p cycle 4 of chemotherapy using Gemcitabine + Abraxane (D1,D8,D15 every 28 days) on 6/28 with onbody Neulasta D15 , initiated 3.22.2023 \par - HOLD chemotherapy today due to weight loss, poor appetite and weakness\par - CT A/P with IVC ordered as further investigation due to weight loss and new abdominal discomfort ; tasked Navigators for assistance with scheduling + Rx given\par - Labwork today: CBC, BMP, LFTs, Ca19-9, UA, urine culture \par \par # Peripheral neuropathy, likely due to chemotherapy and hx of DM \par - oxaliplatin discontinued \par - c/w gabapentin 900mg daily , initiated 01/2023 \par - c/w Cymbalta 60mg daily , initiated 04/2023 \par \par # H. Pylori infection , dx 07/2022 \par - s/p upper EGD in 07/2022 ; completed antibiotic treatment \par - followup with GI, Dr. Maximilian Larson, as scheduled for management \par \par # Thrombocytopenia, likely due to chemotherapy \par - C1D15 of Gemcitabine/Abraxane held due to low PLTs \par - resolved \par \par # UTI \par - associated with burning and urinary frequency\par - START Ciprofloxacin 500mg daily x 7 days \par - advised to take probiotic while on abx\par \par RTC in 1 week with CBC + possible chemotherapy \par \par seen/examined w/ NP Myrtle; note reviewed; case discussed; agree w/ above\par concerned about wt loss and poor appetite\par will get repeat CT scan and labs; hold chemo today; start tx of UTI

## 2023-07-12 NOTE — REVIEW OF SYSTEMS
[Abdominal Pain] : abdominal pain [Negative] : Cardiovascular [Fever] : no fever [Night Sweats] : no night sweats [Dysphagia] : no dysphagia [Chest Pain] : no chest pain [Shortness Of Breath] : no shortness of breath [Vomiting] : no vomiting [Constipation] : no constipation [Easy Bleeding] : no tendency for easy bleeding [FreeTextEntry2] : poor appetite, lost 10lbs since last month [FreeTextEntry7] : reports abdominal discomfort x 3 weeks  [FreeTextEntry8] : reports urinary frequency x 1 week with occasional burning  [de-identified] : L chest port present  [de-identified] : endorses occasional neuropathy with long exposure to cold items , neuropathy in his hands is the same

## 2023-07-13 ENCOUNTER — NON-APPOINTMENT (OUTPATIENT)
Age: 73
End: 2023-07-13

## 2023-07-13 DIAGNOSIS — C25.9 MALIGNANT NEOPLASM OF PANCREAS, UNSPECIFIED: ICD-10-CM

## 2023-07-13 DIAGNOSIS — Z51.11 ENCOUNTER FOR ANTINEOPLASTIC CHEMOTHERAPY: ICD-10-CM

## 2023-07-13 DIAGNOSIS — C16.9 MALIGNANT NEOPLASM OF STOMACH, UNSPECIFIED: ICD-10-CM

## 2023-07-13 DIAGNOSIS — G62.9 POLYNEUROPATHY, UNSPECIFIED: ICD-10-CM

## 2023-07-13 DIAGNOSIS — N39.0 URINARY TRACT INFECTION, SITE NOT SPECIFIED: ICD-10-CM

## 2023-07-13 DIAGNOSIS — R97.8 OTHER ABNORMAL TUMOR MARKERS: ICD-10-CM

## 2023-07-14 LAB — CANCER AG19-9 SERPL-ACNC: 2097 U/ML

## 2023-07-17 LAB — BACTERIA UR CULT: ABNORMAL

## 2023-07-19 ENCOUNTER — APPOINTMENT (OUTPATIENT)
Dept: INFUSION THERAPY | Facility: CLINIC | Age: 73
End: 2023-07-19

## 2023-07-21 ENCOUNTER — OUTPATIENT (OUTPATIENT)
Dept: OUTPATIENT SERVICES | Facility: HOSPITAL | Age: 73
LOS: 1 days | End: 2023-07-21
Payer: MEDICARE

## 2023-07-21 ENCOUNTER — RESULT REVIEW (OUTPATIENT)
Age: 73
End: 2023-07-21

## 2023-07-21 DIAGNOSIS — Z98.890 OTHER SPECIFIED POSTPROCEDURAL STATES: Chronic | ICD-10-CM

## 2023-07-21 DIAGNOSIS — Z00.8 ENCOUNTER FOR OTHER GENERAL EXAMINATION: ICD-10-CM

## 2023-07-21 DIAGNOSIS — C25.9 MALIGNANT NEOPLASM OF PANCREAS, UNSPECIFIED: ICD-10-CM

## 2023-07-21 PROCEDURE — 74177 CT ABD & PELVIS W/CONTRAST: CPT | Mod: 26

## 2023-07-21 PROCEDURE — 74177 CT ABD & PELVIS W/CONTRAST: CPT

## 2023-07-22 DIAGNOSIS — C25.9 MALIGNANT NEOPLASM OF PANCREAS, UNSPECIFIED: ICD-10-CM

## 2023-07-24 ENCOUNTER — EMERGENCY (EMERGENCY)
Facility: HOSPITAL | Age: 73
LOS: 0 days | Discharge: HOME | End: 2023-07-24
Attending: EMERGENCY MEDICINE
Payer: MEDICARE

## 2023-07-24 VITALS
TEMPERATURE: 99 F | SYSTOLIC BLOOD PRESSURE: 138 MMHG | OXYGEN SATURATION: 98 % | HEART RATE: 105 BPM | WEIGHT: 145.06 LBS | DIASTOLIC BLOOD PRESSURE: 78 MMHG | RESPIRATION RATE: 18 BRPM

## 2023-07-24 DIAGNOSIS — Z98.890 OTHER SPECIFIED POSTPROCEDURAL STATES: Chronic | ICD-10-CM

## 2023-07-24 DIAGNOSIS — C25.9 MALIGNANT NEOPLASM OF PANCREAS, UNSPECIFIED: ICD-10-CM

## 2023-07-24 DIAGNOSIS — R10.33 PERIUMBILICAL PAIN: ICD-10-CM

## 2023-07-24 DIAGNOSIS — Z91.013 ALLERGY TO SEAFOOD: ICD-10-CM

## 2023-07-24 DIAGNOSIS — R10.10 UPPER ABDOMINAL PAIN, UNSPECIFIED: ICD-10-CM

## 2023-07-24 LAB
ALBUMIN SERPL ELPH-MCNC: 3.7 G/DL — SIGNIFICANT CHANGE UP (ref 3.5–5.2)
ALP SERPL-CCNC: 90 U/L — SIGNIFICANT CHANGE UP (ref 30–115)
ALT FLD-CCNC: 16 U/L — SIGNIFICANT CHANGE UP (ref 0–41)
ANION GAP SERPL CALC-SCNC: 10 MMOL/L — SIGNIFICANT CHANGE UP (ref 7–14)
AST SERPL-CCNC: 17 U/L — SIGNIFICANT CHANGE UP (ref 0–41)
BASOPHILS # BLD AUTO: 0.06 K/UL — SIGNIFICANT CHANGE UP (ref 0–0.2)
BASOPHILS NFR BLD AUTO: 0.9 % — SIGNIFICANT CHANGE UP (ref 0–1)
BILIRUB DIRECT SERPL-MCNC: <0.2 MG/DL — SIGNIFICANT CHANGE UP (ref 0–0.3)
BILIRUB INDIRECT FLD-MCNC: >0.1 MG/DL — LOW (ref 0.2–1.2)
BILIRUB SERPL-MCNC: 0.3 MG/DL — SIGNIFICANT CHANGE UP (ref 0.2–1.2)
BUN SERPL-MCNC: 16 MG/DL — SIGNIFICANT CHANGE UP (ref 10–20)
CALCIUM SERPL-MCNC: 9.6 MG/DL — SIGNIFICANT CHANGE UP (ref 8.4–10.5)
CHLORIDE SERPL-SCNC: 100 MMOL/L — SIGNIFICANT CHANGE UP (ref 98–110)
CO2 SERPL-SCNC: 28 MMOL/L — SIGNIFICANT CHANGE UP (ref 17–32)
CREAT SERPL-MCNC: 1 MG/DL — SIGNIFICANT CHANGE UP (ref 0.7–1.5)
EGFR: 80 ML/MIN/1.73M2 — SIGNIFICANT CHANGE UP
EOSINOPHIL # BLD AUTO: 0.13 K/UL — SIGNIFICANT CHANGE UP (ref 0–0.7)
EOSINOPHIL NFR BLD AUTO: 1.8 % — SIGNIFICANT CHANGE UP (ref 0–8)
GLUCOSE SERPL-MCNC: 121 MG/DL — HIGH (ref 70–99)
HCT VFR BLD CALC: 31.5 % — LOW (ref 42–52)
HGB BLD-MCNC: 10.5 G/DL — LOW (ref 14–18)
IMM GRANULOCYTES NFR BLD AUTO: 0.3 % — SIGNIFICANT CHANGE UP (ref 0.1–0.3)
LACTATE SERPL-SCNC: 1.2 MMOL/L — SIGNIFICANT CHANGE UP (ref 0.7–2)
LIDOCAIN IGE QN: 26 U/L — SIGNIFICANT CHANGE UP (ref 7–60)
LYMPHOCYTES # BLD AUTO: 1.28 K/UL — SIGNIFICANT CHANGE UP (ref 1.2–3.4)
LYMPHOCYTES # BLD AUTO: 18.2 % — LOW (ref 20.5–51.1)
MCHC RBC-ENTMCNC: 29.2 PG — SIGNIFICANT CHANGE UP (ref 27–31)
MCHC RBC-ENTMCNC: 33.3 G/DL — SIGNIFICANT CHANGE UP (ref 32–37)
MCV RBC AUTO: 87.7 FL — SIGNIFICANT CHANGE UP (ref 80–94)
MONOCYTES # BLD AUTO: 0.86 K/UL — HIGH (ref 0.1–0.6)
MONOCYTES NFR BLD AUTO: 12.2 % — HIGH (ref 1.7–9.3)
NEUTROPHILS # BLD AUTO: 4.7 K/UL — SIGNIFICANT CHANGE UP (ref 1.4–6.5)
NEUTROPHILS NFR BLD AUTO: 66.6 % — SIGNIFICANT CHANGE UP (ref 42.2–75.2)
NRBC # BLD: 0 /100 WBCS — SIGNIFICANT CHANGE UP (ref 0–0)
PLATELET # BLD AUTO: 358 K/UL — SIGNIFICANT CHANGE UP (ref 130–400)
PMV BLD: 8.6 FL — SIGNIFICANT CHANGE UP (ref 7.4–10.4)
POTASSIUM SERPL-MCNC: 4.6 MMOL/L — SIGNIFICANT CHANGE UP (ref 3.5–5)
POTASSIUM SERPL-SCNC: 4.6 MMOL/L — SIGNIFICANT CHANGE UP (ref 3.5–5)
PROT SERPL-MCNC: 7.1 G/DL — SIGNIFICANT CHANGE UP (ref 6–8)
RBC # BLD: 3.59 M/UL — LOW (ref 4.7–6.1)
RBC # FLD: 18.4 % — HIGH (ref 11.5–14.5)
SODIUM SERPL-SCNC: 138 MMOL/L — SIGNIFICANT CHANGE UP (ref 135–146)
WBC # BLD: 7.05 K/UL — SIGNIFICANT CHANGE UP (ref 4.8–10.8)
WBC # FLD AUTO: 7.05 K/UL — SIGNIFICANT CHANGE UP (ref 4.8–10.8)

## 2023-07-24 PROCEDURE — 36415 COLL VENOUS BLD VENIPUNCTURE: CPT

## 2023-07-24 PROCEDURE — 83605 ASSAY OF LACTIC ACID: CPT

## 2023-07-24 PROCEDURE — 99285 EMERGENCY DEPT VISIT HI MDM: CPT | Mod: FS

## 2023-07-24 PROCEDURE — 93010 ELECTROCARDIOGRAM REPORT: CPT

## 2023-07-24 PROCEDURE — 80076 HEPATIC FUNCTION PANEL: CPT

## 2023-07-24 PROCEDURE — 85025 COMPLETE CBC W/AUTO DIFF WBC: CPT

## 2023-07-24 PROCEDURE — 93005 ELECTROCARDIOGRAM TRACING: CPT

## 2023-07-24 PROCEDURE — 80048 BASIC METABOLIC PNL TOTAL CA: CPT

## 2023-07-24 PROCEDURE — 99284 EMERGENCY DEPT VISIT MOD MDM: CPT | Mod: 25

## 2023-07-24 PROCEDURE — 83690 ASSAY OF LIPASE: CPT

## 2023-07-24 PROCEDURE — 96375 TX/PRO/DX INJ NEW DRUG ADDON: CPT

## 2023-07-24 PROCEDURE — 96374 THER/PROPH/DIAG INJ IV PUSH: CPT

## 2023-07-24 RX ORDER — FAMOTIDINE 10 MG/ML
20 INJECTION INTRAVENOUS ONCE
Refills: 0 | Status: COMPLETED | OUTPATIENT
Start: 2023-07-24 | End: 2023-07-24

## 2023-07-24 RX ORDER — SUCRALFATE 1 G
10 TABLET ORAL
Qty: 210 | Refills: 0
Start: 2023-07-24 | End: 2023-07-30

## 2023-07-24 RX ORDER — MORPHINE SULFATE 50 MG/1
4 CAPSULE, EXTENDED RELEASE ORAL ONCE
Refills: 0 | Status: DISCONTINUED | OUTPATIENT
Start: 2023-07-24 | End: 2023-07-24

## 2023-07-24 RX ORDER — OMEPRAZOLE 10 MG/1
1 CAPSULE, DELAYED RELEASE ORAL
Qty: 14 | Refills: 0
Start: 2023-07-24 | End: 2023-08-06

## 2023-07-24 RX ORDER — SODIUM CHLORIDE 9 MG/ML
1000 INJECTION INTRAMUSCULAR; INTRAVENOUS; SUBCUTANEOUS ONCE
Refills: 0 | Status: COMPLETED | OUTPATIENT
Start: 2023-07-24 | End: 2023-07-24

## 2023-07-24 RX ORDER — FAMOTIDINE 10 MG/ML
1 INJECTION INTRAVENOUS
Qty: 28 | Refills: 0
Start: 2023-07-24 | End: 2023-08-06

## 2023-07-24 RX ADMIN — FAMOTIDINE 20 MILLIGRAM(S): 10 INJECTION INTRAVENOUS at 21:14

## 2023-07-24 RX ADMIN — SODIUM CHLORIDE 1000 MILLILITER(S): 9 INJECTION INTRAMUSCULAR; INTRAVENOUS; SUBCUTANEOUS at 21:16

## 2023-07-24 RX ADMIN — MORPHINE SULFATE 4 MILLIGRAM(S): 50 CAPSULE, EXTENDED RELEASE ORAL at 21:14

## 2023-07-24 RX ADMIN — Medication 30 MILLILITER(S): at 21:14

## 2023-07-24 NOTE — ED PROVIDER NOTE - PATIENT PORTAL LINK FT
You can access the FollowMyHealth Patient Portal offered by [facility] by registering at the following website: [email or web address]. By joining [facility]’s FollowMyHealth portal, you will also be able to view your health information using other applications (apps) compatible with our system.

## 2023-07-24 NOTE — ED ADULT TRIAGE NOTE - CHIEF COMPLAINT QUOTE
Pt presents to the ED c/o of abd discomfort going on a while. Pt denies nausea/vomiting/diarrhea or fever. PT states he has h/x of Pancreatic CA stage 4. Next chemo treatment this Friday

## 2023-07-24 NOTE — ED PROVIDER NOTE - CARE PROVIDER_API CALL
Olga Aguirre  Gastroenterology  [address] [place] [address]  Phone: [phone]  Fax: [phone]  Follow Up Time:

## 2023-07-24 NOTE — ED PROVIDER NOTE - CLINICAL SUMMARY MEDICAL DECISION MAKING FREE TEXT BOX
Patients daughter calling again   Patient informed Patient evaluated for abdominal discomfort related to eating.  CT imaging reviewed 2 days ago which did not reveal any acute pathology.  His abdomen is benign.  His symptoms are more related to eating.  Symptomatic relief was given.  Patient to follow-up with GI as an outpatient.  Results were discussed with the patient and wife. Patient to be discharged from ED. Any available test results were discussed with patient and/or family. Verbal instructions given, including instructions to return to ED immediately for any new, worsening, or concerning symptoms. Patient endorsed understanding. Written discharge instructions additionally given, including follow-up plan.

## 2023-07-24 NOTE — ED ADULT NURSE NOTE - NS ED NURSE RECORD ANOTHER VITAL SIGN
Wellness Visit for Adults   AMBULATORY CARE:   A wellness visit  is when you see your healthcare provider to get screened for health problems  You can also get advice on how to stay healthy  Write down your questions so you remember to ask them  Ask your healthcare provider how often you should have a wellness visit  What happens at a wellness visit:  Your healthcare provider will ask about your health, and your family history of health problems  This includes high blood pressure, heart disease, and cancer  He or she will ask if you have symptoms that concern you, if you smoke, and about your mood  You may also be asked about your intake of medicines, supplements, food, and alcohol  Any of the following may be done:  · Your weight  will be checked  Your height may also be checked so your body mass index (BMI) can be calculated  Your BMI shows if you are at a healthy weight  · Your blood pressure  and heart rate will be checked  Your temperature may also be checked  · Blood and urine tests  may be done  Blood tests may be done to check your cholesterol levels  Abnormal cholesterol levels increase your risk for heart disease and stroke  You may also need a blood or urine test to check for diabetes if you are at increased risk  Urine tests may be done to look for signs of an infection or kidney disease  · A physical exam  includes checking your heartbeat and lungs with a stethoscope  Your healthcare provider may also check your skin to look for sun damage  · Screening tests  may be recommended  A screening test is done to check for diseases that may not cause symptoms  The screening tests you may need depend on your age, gender, family history, and lifestyle habits  For example, colorectal screening may be recommended if you are 48years old or older  Screening tests you need if you are a woman:   · A Pap smear  is used to screen for cervical cancer   Pap smears are usually done every 3 to 5 years depending on your age  You may need them more often if you have had abnormal Pap smear test results in the past  Ask your healthcare provider how often you should have a Pap smear  · A mammogram  is an x-ray of your breasts to screen for breast cancer  Experts recommend mammograms every 2 years starting at age 48 years  You may need a mammogram at age 52 years or younger if you have an increased risk for breast cancer  Talk to your healthcare provider about when you should start having mammograms and how often you need them  Vaccines you may need:   · Get an influenza vaccine  every year  The influenza vaccine protects you from the flu  Several types of viruses cause the flu  The viruses change over time, so new vaccines are made each year  · Get a tetanus-diphtheria (Td) booster vaccine  every 10 years  This vaccine protects you against tetanus and diphtheria  Tetanus is a severe infection that may cause painful muscle spasms and lockjaw  Diphtheria is a severe bacterial infection that causes a thick covering in the back of your mouth and throat  · Get a human papillomavirus (HPV) vaccine  if you are female and aged 23 to 32 or male 23 to 24 and never received it  This vaccine protects you from HPV infection  HPV is the most common infection spread by sexual contact  HPV may also cause vaginal, penile, and anal cancers  · Get a pneumococcal vaccine  if you are aged 72 years or older  The pneumococcal vaccine is an injection given to protect you from pneumococcal disease  Pneumococcal disease is an infection caused by pneumococcal bacteria  The infection may cause pneumonia, meningitis, or an ear infection  · Get a shingles vaccine  if you are aged 61 or older, even if you have had shingles before  The shingles vaccine is an injection to protect you from the varicella-zoster virus  This is the same virus that causes chickenpox   Shingles is a painful rash that develops in people who had chickenpox or have been exposed to the virus  How to eat healthy:  My Plate is a model for planning healthy meals  It shows the types and amounts of foods that should go on your plate  Fruits and vegetables make up about half of your plate, and grains and protein make up the other half  A serving of dairy is included on the side of your plate  The amount of calories and serving sizes you need depends on your age, gender, weight, and height  Examples of healthy foods are listed below:  · Eat a variety of vegetables  such as dark green, red, and orange vegetables  You can also include canned vegetables low in sodium (salt) and frozen vegetables without added butter or sauces  · Eat a variety of fresh fruits , canned fruit in 100% juice, frozen fruit, and dried fruit  · Include whole grains  At least half of the grains you eat should be whole grains  Examples include whole-wheat bread, wheat pasta, brown rice, and whole-grain cereals such as oatmeal     · Eat a variety of protein foods such as seafood (fish and shellfish), lean meat, and poultry without skin (turkey and chicken)  Examples of lean meats include pork leg, shoulder, or tenderloin, and beef round, sirloin, tenderloin, and extra lean ground beef  Other protein foods include eggs and egg substitutes, beans, peas, soy products, nuts, and seeds  · Choose low-fat dairy products such as skim or 1% milk or low-fat yogurt, cheese, and cottage cheese  · Limit unhealthy fats  such as butter, hard margarine, and shortening  Exercise:  Exercise at least 30 minutes per day on most days of the week  Some examples of exercise include walking, biking, dancing, and swimming  You can also fit in more physical activity by taking the stairs instead of the elevator or parking farther away from stores  Include muscle strengthening activities 2 days each week  Regular exercise provides many health benefits   It helps you manage your weight, and decreases your risk for type 2 diabetes, heart disease, stroke, and high blood pressure  Exercise can also help improve your mood  Ask your healthcare provider about the best exercise plan for you  General health and safety guidelines:   · Do not smoke  Nicotine and other chemicals in cigarettes and cigars can cause lung damage  Ask your healthcare provider for information if you currently smoke and need help to quit  E-cigarettes or smokeless tobacco still contain nicotine  Talk to your healthcare provider before you use these products  · Limit alcohol  A drink of alcohol is 12 ounces of beer, 5 ounces of wine, or 1½ ounces of liquor  · Lose weight, if needed  Being overweight increases your risk of certain health conditions  These include heart disease, high blood pressure, type 2 diabetes, and certain types of cancer  · Protect your skin  Do not sunbathe or use tanning beds  Use sunscreen with a SPF 15 or higher  Apply sunscreen at least 15 minutes before you go outside  Reapply sunscreen every 2 hours  Wear protective clothing, hats, and sunglasses when you are outside  · Drive safely  Always wear your seatbelt  Make sure everyone in your car wears a seatbelt  A seatbelt can save your life if you are in an accident  Do not use your cell phone when you are driving  This could distract you and cause an accident  Pull over if you need to make a call or send a text message  · Practice safe sex  Use latex condoms if are sexually active and have more than one partner  Your healthcare provider may recommend screening tests for sexually transmitted infections (STIs)  · Wear helmets, lifejackets, and protective gear  Always wear a helmet when you ride a bike or motorcycle, go skiing, or play sports that could cause a head injury  Wear protective equipment when you play sports  Wear a lifejacket when you are on a boat or doing water sports    © 2017 [employer] [employer] Information is for End User's use only and may not be sold, redistributed or otherwise used for commercial purposes  All illustrations and images included in CareNotes® are the copyrighted property of A D A M , Inc  or Dimas Coles  The above information is an  only  It is not intended as medical advice for individual conditions or treatments  Talk to your doctor, nurse or pharmacist before following any medical regimen to see if it is safe and effective for you  Thank you for enrolling in [facility]  Please follow the instructions below to securely access your online medical record  [employer]ative allows you to send messages to your doctor, view your test results, renew your prescriptions, schedule appointments, and more  [facility] uses Single Sign on (SSO) Technology for you to log in and access our [facility]  [place], including Onovative  No more remembering multiple user names and passwords! We are going to guide you through, step by step, to help you set up your [employer] account which will provide access to your [employer] account  How Do I Sign Up? 1  In your Internet browser, go to [email or web address]       2  Click on the [facility] patient account and then click Dont have an                 Account? Create one now      3  Enter your demographic information and chose a user name (email address) and password  Think of one that is secure and easy to remember  Enter a Referral code if you have one (this is not your Restaurant.[email or web address] code ) Accept the Terms and Conditions and the Privacy Policy  4  Select your security questions that you will use to reset your password should you forget it  Click Submit  5  Enter your Onovative Activation Code exactly as it appears below  You will not need to use this code after you have completed the sign-up process  If you do not sign up before the expiration date, you must request a new code                           Onovative Activation Code: [email or web address]  Expires: 6/10/2018  2:57 PM    6  Confirm your email address  An email confirmation was sent to you  Please open that email and click Confirm your Email   You should then be redirected to our Sh[employer] Single sign on page, where you will log on with the user name and password you created! Proceed to the [employer] Icon to view your personal health information  Additional Information  If you have questions, you can e-mail [email or web address] or call [phone] to talk to our customer support staff  Remember, [employer] is NOT to be used for urgent needs  For medical emergencies, dial 911  Yes

## 2023-07-24 NOTE — ED PROVIDER NOTE - PROGRESS NOTE DETAILS
SS: Pt reassessed at bedside, sts feeling much better after meds. Sts has amee with GI in October. Strict return precautions given.

## 2023-07-24 NOTE — ED PROVIDER NOTE - PHYSICAL EXAMINATION
CONSTITUTIONAL: Well-appearing; well-nourished; in no apparent distress.   EYES: PERRL; EOM intact.   ENT: normal nose; no rhinorrhea; normal pharynx with no tonsillar hypertrophy.   NECK: Supple; non-tender; no cervical lymphadenopathy.  CARDIOVASCULAR: Normal S1, S2; no murmurs, rubs, or gallops. Equal radial pulses  RESPIRATORY: Normal chest excursion with respiration; breath sounds clear and equal bilaterally; no wheezes, rhonchi, or rales.  GI/: Normal bowel sounds; non-distended; non-tender; no palpable organomegaly. Neg murphys sign. No cva ttp  MS: No evidence of trauma or deformity.  Normal ROM in all four extremities; non-tender to palpation; distal pulses are normal.   SKIN: Normal for age and race; warm; dry; good turgor; no apparent lesions or exudate.   NEURO/PSYCH: A & O x 4; grossly unremarkable. mood and manner are appropriate.

## 2023-07-24 NOTE — ED PROVIDER NOTE - OBJECTIVE STATEMENT
73 year old M with hx of stage 4 pancreatic ca on chemo (follows with Dr. Negrete) presenting to er for eval of abdominal pain. Pt sts has had periumbilical/upper abd pain described as discomfort x 3 months. Pain is constant and worse with eating/drinking. Pt sts had recent ct abd/pelvis x 3 days ago. HE denies any assoc fever/chills, cough, uri symptoms, chest pain, sob, flank pain, urinary symptoms, leg pain/swelling.

## 2023-07-24 NOTE — ED PROVIDER NOTE - ATTENDING APP SHARED VISIT CONTRIBUTION OF CARE
3 months of persistent abdominal discomfort worse with eating.  History of pancreatic cancer.  Had a CT scan done 3 days ago which showed improvement in tumor burden.  No signs of obstruction or infection.  Describes the pain as discomfort that is upper abdomen.  No vomiting no fevers normal bowel movements no urinary complaints. Exam shows a soft abdomen no rebound or guarding.  Lungs are clear heart is normal plan is to obtain labs and treat symptomatically

## 2023-07-26 ENCOUNTER — APPOINTMENT (OUTPATIENT)
Dept: HEMATOLOGY ONCOLOGY | Facility: CLINIC | Age: 73
End: 2023-07-26

## 2023-07-28 ENCOUNTER — LABORATORY RESULT (OUTPATIENT)
Age: 73
End: 2023-07-28

## 2023-07-28 ENCOUNTER — APPOINTMENT (OUTPATIENT)
Dept: INFUSION THERAPY | Facility: CLINIC | Age: 73
End: 2023-07-28
Payer: MEDICARE

## 2023-07-28 ENCOUNTER — OUTPATIENT (OUTPATIENT)
Dept: OUTPATIENT SERVICES | Facility: HOSPITAL | Age: 73
LOS: 1 days | End: 2023-07-28
Payer: MEDICARE

## 2023-07-28 ENCOUNTER — APPOINTMENT (OUTPATIENT)
Dept: HEMATOLOGY ONCOLOGY | Facility: CLINIC | Age: 73
End: 2023-07-28
Payer: MEDICARE

## 2023-07-28 VITALS
HEIGHT: 66 IN | RESPIRATION RATE: 16 BRPM | HEART RATE: 97 BPM | DIASTOLIC BLOOD PRESSURE: 62 MMHG | WEIGHT: 138 LBS | SYSTOLIC BLOOD PRESSURE: 118 MMHG | BODY MASS INDEX: 22.18 KG/M2 | TEMPERATURE: 98 F

## 2023-07-28 DIAGNOSIS — Z51.11 ENCOUNTER FOR ANTINEOPLASTIC CHEMOTHERAPY: ICD-10-CM

## 2023-07-28 DIAGNOSIS — Z98.890 OTHER SPECIFIED POSTPROCEDURAL STATES: Chronic | ICD-10-CM

## 2023-07-28 DIAGNOSIS — C25.9 MALIGNANT NEOPLASM OF PANCREAS, UNSPECIFIED: ICD-10-CM

## 2023-07-28 DIAGNOSIS — R97.8 OTHER ABNORMAL TUMOR MARKERS: ICD-10-CM

## 2023-07-28 DIAGNOSIS — C16.9 MALIGNANT NEOPLASM OF STOMACH, UNSPECIFIED: ICD-10-CM

## 2023-07-28 DIAGNOSIS — G62.9 POLYNEUROPATHY, UNSPECIFIED: ICD-10-CM

## 2023-07-28 DIAGNOSIS — N39.0 URINARY TRACT INFECTION, SITE NOT SPECIFIED: ICD-10-CM

## 2023-07-28 LAB
ALBUMIN SERPL ELPH-MCNC: 3.9 G/DL
ALP BLD-CCNC: 84 U/L
ALT SERPL-CCNC: 15 U/L
ANION GAP SERPL CALC-SCNC: 9 MMOL/L
APPEARANCE: CLEAR
AST SERPL-CCNC: 17 U/L
BILIRUB DIRECT SERPL-MCNC: <0.2 MG/DL
BILIRUB INDIRECT SERPL-MCNC: >0.1 MG/DL
BILIRUB SERPL-MCNC: 0.3 MG/DL
BILIRUBIN URINE: NEGATIVE
BLOOD URINE: ABNORMAL
BUN SERPL-MCNC: 14 MG/DL
CALCIUM SERPL-MCNC: 9.1 MG/DL
CHLORIDE SERPL-SCNC: 100 MMOL/L
CO2 SERPL-SCNC: 25 MMOL/L
COLOR: YELLOW
CREAT SERPL-MCNC: 1 MG/DL
EGFR: 80 ML/MIN/1.73M2
GLUCOSE QUALITATIVE U: NEGATIVE
GLUCOSE SERPL-MCNC: 119 MG/DL
HCT VFR BLD CALC: 31.4 %
HGB BLD-MCNC: 10.6 G/DL
KETONES URINE: NEGATIVE
LEUKOCYTE ESTERASE URINE: ABNORMAL
MCHC RBC-ENTMCNC: 28.9 PG
MCHC RBC-ENTMCNC: 33.8 G/DL
MCV RBC AUTO: 85.6 FL
NITRITE URINE: NEGATIVE
PH URINE: 6.5
PLATELET # BLD AUTO: 276 K/UL
PMV BLD: 9.6 FL
POTASSIUM SERPL-SCNC: 4.4 MMOL/L
PROT SERPL-MCNC: 7 G/DL
PROTEIN URINE: ABNORMAL
RBC # BLD: 3.67 M/UL
RBC # FLD: 17.7 %
SODIUM SERPL-SCNC: 134 MMOL/L
SPECIFIC GRAVITY URINE: 1.01
UROBILINOGEN URINE: NORMAL
WBC # FLD AUTO: 6.02 K/UL

## 2023-07-28 PROCEDURE — 96367 TX/PROPH/DG ADDL SEQ IV INF: CPT

## 2023-07-28 PROCEDURE — 85027 COMPLETE CBC AUTOMATED: CPT

## 2023-07-28 PROCEDURE — 99214 OFFICE O/P EST MOD 30 MIN: CPT

## 2023-07-28 PROCEDURE — 80048 BASIC METABOLIC PNL TOTAL CA: CPT

## 2023-07-28 PROCEDURE — 96413 CHEMO IV INFUSION 1 HR: CPT

## 2023-07-28 PROCEDURE — 96375 TX/PRO/DX INJ NEW DRUG ADDON: CPT

## 2023-07-28 PROCEDURE — 81001 URINALYSIS AUTO W/SCOPE: CPT

## 2023-07-28 PROCEDURE — 36415 COLL VENOUS BLD VENIPUNCTURE: CPT

## 2023-07-28 PROCEDURE — 86301 IMMUNOASSAY TUMOR CA 19-9: CPT

## 2023-07-28 PROCEDURE — 80076 HEPATIC FUNCTION PANEL: CPT

## 2023-07-28 PROCEDURE — 96417 CHEMO IV INFUS EACH ADDL SEQ: CPT

## 2023-07-28 RX ORDER — DIPHENHYDRAMINE HCL 50 MG
50 CAPSULE ORAL ONCE
Refills: 0 | Status: COMPLETED | OUTPATIENT
Start: 2023-07-28 | End: 2023-07-28

## 2023-07-28 RX ORDER — FOSAPREPITANT DIMEGLUMINE 150 MG/5ML
150 INJECTION, POWDER, LYOPHILIZED, FOR SOLUTION INTRAVENOUS ONCE
Refills: 0 | Status: COMPLETED | OUTPATIENT
Start: 2023-07-28 | End: 2023-07-28

## 2023-07-28 RX ORDER — PACLITAXEL 100 MG/20ML
165 INJECTION, POWDER, LYOPHILIZED, FOR SUSPENSION INTRAVENOUS ONCE
Refills: 0 | Status: COMPLETED | OUTPATIENT
Start: 2023-07-28 | End: 2023-07-28

## 2023-07-28 RX ORDER — DEXAMETHASONE 0.5 MG/5ML
10 ELIXIR ORAL ONCE
Refills: 0 | Status: COMPLETED | OUTPATIENT
Start: 2023-07-28 | End: 2023-07-28

## 2023-07-28 RX ORDER — FAMOTIDINE 10 MG/ML
20 INJECTION INTRAVENOUS ONCE
Refills: 0 | Status: COMPLETED | OUTPATIENT
Start: 2023-07-28 | End: 2023-07-28

## 2023-07-28 RX ORDER — GEMCITABINE 38 MG/ML
1350 INJECTION, SOLUTION INTRAVENOUS ONCE
Refills: 0 | Status: COMPLETED | OUTPATIENT
Start: 2023-07-28 | End: 2023-07-28

## 2023-07-28 RX ADMIN — Medication 10 MILLIGRAM(S): at 10:05

## 2023-07-28 RX ADMIN — PACLITAXEL 165 MILLIGRAM(S): 100 INJECTION, POWDER, LYOPHILIZED, FOR SUSPENSION INTRAVENOUS at 11:40

## 2023-07-28 RX ADMIN — Medication 118 MILLIGRAM(S): at 09:47

## 2023-07-28 RX ADMIN — FOSAPREPITANT DIMEGLUMINE 150 MILLIGRAM(S): 150 INJECTION, POWDER, LYOPHILIZED, FOR SOLUTION INTRAVENOUS at 11:10

## 2023-07-28 RX ADMIN — GEMCITABINE 1350 MILLIGRAM(S): 38 INJECTION, SOLUTION INTRAVENOUS at 12:35

## 2023-07-28 RX ADMIN — FOSAPREPITANT DIMEGLUMINE 500 MILLIGRAM(S): 150 INJECTION, POWDER, LYOPHILIZED, FOR SOLUTION INTRAVENOUS at 10:40

## 2023-07-28 RX ADMIN — GEMCITABINE 1350 MILLIGRAM(S): 38 INJECTION, SOLUTION INTRAVENOUS at 11:50

## 2023-07-28 RX ADMIN — Medication 102 MILLIGRAM(S): at 10:25

## 2023-07-28 RX ADMIN — FAMOTIDINE 104 MILLIGRAM(S): 10 INJECTION INTRAVENOUS at 10:05

## 2023-07-28 RX ADMIN — PACLITAXEL 165 MILLIGRAM(S): 100 INJECTION, POWDER, LYOPHILIZED, FOR SUSPENSION INTRAVENOUS at 11:10

## 2023-07-28 RX ADMIN — FAMOTIDINE 20 MILLIGRAM(S): 10 INJECTION INTRAVENOUS at 10:25

## 2023-07-28 RX ADMIN — Medication 50 MILLIGRAM(S): at 10:40

## 2023-07-28 NOTE — ASSESSMENT
[Palliative] : Goals of care discussed with patient: Palliative [FreeTextEntry1] : # Metastatic moderate to poorly differentiated adenocarcinoma , dx 07/2022 \par - BRCA (-) \par - s/p laparoscopy on 8.22.2022 with findings of ~1L malignant ascites and peritoneal implants \par - Ca19-9 is 6481, CEA 6.8 from 07/2022 \par - reviewed radiology, pathology and lab workup and had a discussion regarding implications of diagnosis, prognosis and options for management including but not limited to chemotherapy for palliative intent ; had a lengthy discussion regarding Stage IV disease and not curative \par - s/p L Chest port placement with SURG, Dr. Morgan \par - CT CAP from 11.8.2022 is essentially stable, tumor marker is decreasing \par - s/p cycle 12 of 5FU/Leucovorin/Irinotecan on 2/24 , initiated on 9.6.2022 ; discontinued oxaliplatin beginning C12 due to neuropathy \par - STOPPED 5FU/Leucovorin/Irinotecan due to rising Ca19-9 and PET/CT findings\par - PET/CT from 3.9.23 showed FDG uptake within upper abdominal peritoneal nodularity adjacent to the stomach and pancreas, pancreatic tail, and a subcentimeter right pelvic sidewall lymph node.  \par - Patient successfully certified for utilization of Medical Marijuana on 04/12/2023.  \par - PET/CT 6.6.2023 shows previously FDG avid peritoneal nodularity adjacent to  stomach and pancreas, pancreatic tail and right pelvic sidewall lymph node have improved to non-FDG avid status consistent with good treatment response.  \par - PROCEED W/ C5 *still 25% dose reduced\par - revewed CT A/P from 07/2023: interval decrease ; CA 19-9 keeps decreasing; continue same regimen\par rtc in 2 weeks\par - HOLD chemotherapy today due to weight loss, poor appetite and weakness\par - CT A/P with IVC ordered as further investigation due to weight loss and new abdominal discomfort ; tasked Navigators for assistance with scheduling + Rx given\par - Labwork today: CBC, BMP, LFTs, Ca19-9, UA, urine culture \par \par # Peripheral neuropathy, likely due to chemotherapy and hx of DM \par - oxaliplatin discontinued \par - c/w gabapentin 900mg daily , initiated 01/2023 \par - c/w Cymbalta 60mg daily , initiated 04/2023 \par \par # H. Pylori infection , dx 07/2022 \par - s/p upper EGD in 07/2022 ; completed antibiotic treatment \par - followup with GI, Dr. Maximilian Larson, as scheduled for management \par \par # Thrombocytopenia, likely due to chemotherapy \par - C1D15 of Gemcitabine/Abraxane held due to low PLTs \par - resolved \par \par # UTI \par - associated with burning and urinary frequency\par - START Ciprofloxacin 500mg daily x 7 days \par - advised to take probiotic while on abx\par \par

## 2023-07-28 NOTE — REVIEW OF SYSTEMS
[Abdominal Pain] : abdominal pain [Negative] : Allergic/Immunologic [Fever] : no fever [Night Sweats] : no night sweats [Dysphagia] : no dysphagia [Chest Pain] : no chest pain [Shortness Of Breath] : no shortness of breath [Vomiting] : no vomiting [Constipation] : no constipation [Easy Bleeding] : no tendency for easy bleeding [FreeTextEntry2] : poor appetite, lost 10lbs since last month [FreeTextEntry7] : reports abdominal discomfort x 3 weeks  [FreeTextEntry8] : reports urinary frequency x 1 week with occasional burning  [de-identified] : L chest port present  [de-identified] : endorses occasional neuropathy with long exposure to cold items , neuropathy in his hands is the same

## 2023-07-28 NOTE — HISTORY OF PRESENT ILLNESS
[Disease: _____________________] : Disease: [unfilled] [Therapy: ___] : Therapy: [unfilled] [Cycle: ___] : Cycle: [unfilled] [de-identified] : Mr. JINA SINGER is a 72 year old male here today for evaluation and management of Pancreatic Cancer.  \par \par He was referred by SURG, Dr. Morgan.  JINA is a 72 year old M with PMHx including HTN, HLD, T2DM, who presents to clinic to establish care.  Patient presented to ER back in 07/2022 with complaint of gradually worsening abdominal pain for the prior month, worse after eating and associated with 40lb weight loss in the last 2 months.  He underwent CT imaging which shows evidence suggestive of metastatic disease.  He also endorses constipation.  He underwent laparoscopy with findings of ~1L malignant ascites and peritoneal implants on 8.22.2022.  He is presently feeling well with no new complaints.  Patient denies fever, chills, nausea, vomiting, dysphagia, dyspnea, neuropathy or bleeding.  Patient reports family history of malignancy in his father (possibly stomach cancer) and mother (possibly colon).  Never a smoker.  NKDA.  \par \par RADIOLOGIC WORKUP\par CT Chest (7.25.2022) IMPRESSION:Perigastric infiltrative process inseparable from the tail the pancreas extending to the proximal duodenum. Associated ascites. Findings again  suspicious for pancreatic or gastrointestinal neoplasm. Differential diagnosis includes partially contained ruptured gastric or duodenal ulcer as well as infectious process/pancreatitis.Hyperaerated lungs. Scattered granulomata. Vascular calcifications. Nonspecific areas of subpleural thickening bilaterally.\par CT A/P (7.24.2022) IMPRESSION:Predominantly perigastric infiltrative soft tissue changes which are inseparable from the tail of pancreas and extend around the proximal  duodenum. Small volume abdominopelvic ascites. Differential includes underlying pancreatic or gastrointestinal neoplasm, sequelae of partially contained ruptured gastric or duodenal  ulcer, and infectious process or perhaps pancreatitis. Clinical correlation and/or further evaluation with EGD should be considered.Findings compatible with BPH. Superimposed cystitis is difficult to exclude on imaging.\par \par LAB WORKUP\par (8.17.2022) WBC 8.36, Hgb 14.2, MCV 84, , normal diff, Cr 0.9, eGFR 91, normal LFTs\par (7.25.2022) Ca19-9 is 6481, CEA 6.8, TSH 2.62\par (10.23.2019) WBC 6.83, Hgb 15, MCV 85.7, \par \par PATHOLOGY\par (see results section)\par \par HCM\par Colonoscopy done about 5 years , polyps removed \par Upper Endoscopy done 07/2022 showed 5x5 hypoechoic infiltrative mass (irregular borders) extending between the  muscularis propria (abuting it) and the territory of the pancreatic tail \par COVID Vaccinated? [FreeTextEntry1] : Started Gemcitabine + Abraxane with onbody neulasta (D1, D8, D15 every 28 days) on 3.22.2023 \par Started mFOLFIRINOX on 9.6.2022 (discontinued oxaliplatin beginning C12 on 2/22/23) - 2.24.2023 due to rising Ca19-9.   [de-identified] : 9/14/22\[facility] [place] Patient is here for a follow-up visit for pancreatic cancer.  He is s/p initial cycle of mFOLFIRINOX on 9.6.2022.  He is feeling well with no new complaints.  Reviewed most recent CBC, which is stable.  Patient denies fever, chills, nausea, vomiting, dyspnea, neuropathy, persistent diarrhea or bleeding.  He states his appetite is still poor but slightly improving.  He is due for cycle 2 of chemotherapy next week.  \par \par 10/4/22\[facility] [place] Patient is here for a follow-up visit for pancreatic cancer.  He is due for cycle 3 of mFOLFIRINOX today, initiated on 9.6.2022.  He is feeling well with no new complaints.  Reviewed most recent CBC, which shows mild anemia, hgb 11.6g/dL and persistent leukocytosis, likely due to GCSF administration with chemotherapy.  ALK Phos is slightly higher at 198 but Ca19-9 is decreasing down to 96062F/mL.  Patient denies fever, chills, vomiting, dyspnea, neuropathy, CP, palpitations, abdominal pain, persistent diarrhea or bleeding.  He is endorses transient nausea which was not bothersome and he took anti-emetics.  \par \par 11/2/22\[facility] [place] Patient is here for a follow-up visit for pancreatic cancer.  He is due for cycle 4 of mFOLFIRINOX today, initiated on 9.6.2022.  He is feeling well with no new complaints.  Reviewed most recent CBC, which shows mild anemia, hgb 11.1g/dL and persistent leukocytosis, likely due to GCSF administration with chemotherapy.  ALK Phos is slightly lower at 138 but Ca19-9 is decreasing down to 4619U/mL.  Patient denies fever, chills, vomiting, dyspnea, CP, palpitations, abdominal pain, persistent diarrhea or bleeding.  He endorses occasional neuropathy with long exposure to cold items.  \par \par 11/16/22\[facility] [place] Patient is here for a follow-up visit for pancreatic cancer, accompanied by spouse.  He is due for cycle 6 of mFOLFIRINOX today, initiated on 9.6.2022.  He is feeling well with no new complaints.  Reviewed most recent CBC, which shows mild anemia, hgb 11.1g/dL and persistent leukocytosis, likely due to GCSF administration with chemotherapy.  ALK Phos is essentially stable at 146 but Ca19-9 is decreasing down to 2708U/mL.  Patient denies fever, chills, vomiting, dyspnea, CP, palpitations, abdominal pain, persistent diarrhea, mouth sores or bleeding.  He still notes occasional neuropathy with long exposure to cold items.  Reviewed most recent CT imaging which is essentially stable.  \par CT C/A/P (11.8.2022) IMPRESSION:1. No definite evidence of intrathoracic metastasis.Since 7/24/2022, no significant interval change.Near diffuse peritoneal carcinomatosis is not significantly changed.Diffuse tumor infiltration in the pancreatic tail (4/226), unchanged.Completely attenuated splenic vein is unchanged. Prominent venous collaterals in the left upper quadrant is unchanged.\par \par 11/30/22\par Patient is here for a follow-up visit for pancreatic cancer.  He is due for cycle 7 of mFOLFIRINOX today, initiated on 9.6.2022.  Reviewed most recent CBC, which shows mild anemia, hgb 10.9g/dL and persistent leukocytosis, likely due to GCSF administration with chemotherapy.  ALK Phos is essentially stable at 138 and Ca19-9 is decreasing down to 1683U/mL.  Patient denies fever, chills, vomiting, dyspnea, CP, palpitations, abdominal pain, persistent diarrhea, mouth sores or bleeding.  He still notes occasional neuropathy with long exposure to cold items. \par \par 12/14/22\par Patient is here for a follow-up visit for pancreatic cancer, accompanied by spouse via telephone.  He is due for cycle 8 of mFOLFIRINOX today, initiated on 9.6.2022.  Reviewed most recent CBC, which shows mild anemia, hgb 10.5g/dL and persistent leukocytosis, likely due to GCSF administration with chemotherapy.  ALK Phos is essentially stable at 124 and Ca19-9 is decreasing down to 1177U/mL.  Patient denies fever, chills, dyspnea, CP, palpitations, abdominal pain, persistent diarrhea, mouth sores or bleeding.  He has an occasional cough x 2 weeks; believes he had a mild cold which has been improving over the last 2 weeks but the cough is persistent.  He still notes occasional neuropathy with long exposure to cold items. \par \par 12/28/22\par Patient is here for a follow-up visit for pancreatic cancer, accompanied by spouse via telephone.  Since last visit, patient had CXR which was suggestive for pneumonia.  He has completed antibiotics with improvement in symptoms.  Reviewed PULM consult with Dr. Escobar: plans to repeat CXR in about 6 weeks.  Patient is agreeable to resume chemotherapy.  He is due for cycle 9 of mFOLFIRINOX today, initiated on 9.6.2022.  Reviewed most recent CBC which shows mild anemia and neutrophil-predominant leukocytosis.  Tumor marker is down to 957 U/mL from 12/2022.  \par CXR (12.15.2022) Impression:Subsegmental left lower lobe consolidation.\par \par 1/11/23\par Patient is here for a follow-up visit for pancreatic cancer, accompanied by spouse via telephone.  As per PULM consult with Dr. Escobar: plan to repeat CXR about 6 weeks following prior imaging.  He is due for cycle 10 of mFOLFIRINOX today, initiated on 9.6.2022.  Reviewed most recent CBC which shows mild anemia with hgb 10.6g/dL and neutrophil-predominant leukocytosis likely from GCSF administration.  Tumor marker is down to 693 U/mL from 1/2023.  He denies fevers, chills, worsening of neuropathy, dyspnea, mouth sores or bleeding.  Patient states he dislikes the effect of Benadryl pre-medication because it makes him drowsy and feel "off."  He is requesting to decrease Benadryl from 50mg to 25mg IV as pre-medicaiton.  Risks vs benefits discussed at length.  \par \par 1/24/23\par Patient is here for a follow-up visit for pancreatic cancer.  He is due for cycle 11 of mFOLFIRINOX today, initiated on 9.6.2022.  Reviewed most recent CBC which shows mild anemia with hgb 11.3g/dL, mild thrombocytopenia with PLTs 106,000 and neutrophil-predominant leukocytosis likely from GCSF administration.  Tumor marker is down to 693 U/mL from 1/2023.  He denies fevers, chills, worsening of neuropathy, dyspnea, new cough, mouth sores or bleeding.  \par \par 2/7/23\par Patient is here for a follow-up visit for pancreatic cancer.  He is due for cycle 12 of mFOLFIRINOX today, initiated on 9.6.2022.  Reviewed most recent CBC which shows mild anemia with hgb 11.3g/dL, mild thrombocytopenia with PLTs 106,000 and neutrophil-predominant leukocytosis likely from GCSF administration.  Tumor marker is down to 693 U/mL from 1/2023.  He denies fevers, chills, dyspnea, new cough, mouth sores or bleeding.  He states his neuropathy in his feet is slightly worse in both feet.  \par \par 2/21/23\par Patient is here for a follow-up visit for pancreatic cancer.  He is due for cycle 12 of chemotherapy on 2/22, initiated on 9.6.2022.  Reviewed most recent CBC which shows mild anemia with hgb 11g/dL.  Tumor marker is down to 457 U/mL from 02/2023.  He denies fevers, chills, dyspnea, new cough, mouth sores, persistent diarrhea or bleeding.  He endorses neuropathy in both feet and fingertips; started gabapentin 100mg TiD without resolution of symptoms.  \par \par 3/7/23\par Patient is here for a follow-up visit for pancreatic cancer.  He is due for cycle 13 of chemotherapy on 3/8, initiated on 9.6.2022.  Reviewed most recent CBC which shows mild anemia with hgb 11.6g/dL.  Tumor marker is slightly higher at 570 U/mL from 02/2023.  He denies fevers, chills, dyspnea, new cough, mouth sores, persistent diarrhea, new pain or bleeding.  He endorses neuropathy in both feet and fingertips; he is on gabapentin 600mg daily without resolution of symptoms.  He recently followed with GI, Dr. Maximilian Larson, and is anticipating upper EGD.  Reviewed US Duplex which showed no DVT.  \par US Duplex BI LE (3.3.2023) IMPRESSION:No evidence of deep venous thrombosis in either lower extremity.Bilateral popliteal cysts.\par \par 3/15/23\par Patient is here for a follow-up visit for pancreatic cancer, accompanied by spouse Renate via telephone.  He is s/p cycle 12 of chemotherapy on 2/24, initiated on 9.6.2022.  Tumor marker almost doubled up to 1154 U/mL from 03/2023.  He was sent for PET/CT which showed FDG uptake within upper abdominal peritoneal nodularity adjacent to the stomach and pancreas, pancreatic tail, and a subcentimeter right pelvic sidewall lymph node.  He is presently feeling fine without new complaints.  \[facility] [place] PET/CT (3.9.2023) IMPRESSION:Abnormal FDG uptake within upper abdominal peritoneal nodularity adjacent to the stomach and pancreas (SUV up to 5.4), pancreatic tail (SUV 3.4) and a subcentimeter right pelvic sidewall lymph node (SUV 3.6).No other definite sites of abnormal FDG uptake to suggest biologic tumor activity.\par \[facility] [place] 3/21/23\[facility] Patient is here for a follow-up visit for pancreatic cancer, accompanied by spouse.  He is s/p cycle 12 of chemotherapy on 2/24, initiated on 9.6.2022.  Tumor marker almost doubled up to 1154 U/mL from 03/2023.  He is anticipating initiation of new chemotherapy regimen using Gemcitabine + Abraxane on 3.22.2023.  \par \[facility] 3/29/23[facility] Patient is here for a follow-up visit for pancreatic cancer, accompanied by spouse.  Since last visit, patient started new chemotherapy regimen using Gemcitabine + Abraxane, initiated on 3.22.2023.  Tumor marker doubled again up to 2410U/mL from 03/2023.  Reviewed most recent CBC, which shows mild pancytopenia.  Patient denies fever, chills, nausea, vomiting, dyspnea, diarrhea or bleeding.  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  \par \[facility] 4/12/23[facility] Patient is here for a follow-up visit for pancreatic cancer.  Patient is due for cycle 2 of Gemcitabine + Abraxane followed by Neulasta, initiated on 3.22.2023.  Reviewed most recent CBC, which shows mild leukopenia and anemia, hgb 11g/dL.  Patient denies fever, chills, nausea, vomiting, dyspnea, diarrhea or bleeding.  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  \par \[facility] 4/19/23[facility] Patient is here for a follow-up visit for pancreatic cancer.  Patient is due for cycle 2 of Gemcitabine + Abraxane, initiated on 3.22.2023.  Reviewed most recent CBC, which shows mild leukopenia and anemia, hgb 11g/dL.  Patient denies fever, chills, nausea, vomiting, dyspnea, diarrhea or bleeding.  He reports slight worsening of neuropathy of fingers despite Gabapentin 600mg total daily.  Ca19-9 level is down to 2253U/mL.  \par \par 5/17/23\par Patient is here for a follow-up visit for pancreatic cancer.  Patient is due for cycle 3 of Gemcitabine + Abraxane followed by Neulasta, initiated on 3.22.2023.  Reviewed most recent CBC, which shows mild anemia, hgb 11.6g/dL.  Leukocytosis is likely due to GCSF.  Ca19-9 level is up to 3087U/mL.  Patient denies fever, chills, nausea, vomiting, dyspnea, diarrhea or bleeding.  He reports slight worsening of neuropathy of fingers despite Gabapentin 600mg total daily.  \par \par 6/14/23\par Patient is here for a follow-up visit for pancreatic cancer.  Patient is due for cycle 4 of Gemcitabine + Abraxane followed by Neulasta, initiated on 3.22.2023.  Reviewed most recent CBC, which shows mild anemia, hgb 10.9g/dL.  Leukocytosis is likely due to GCSF.  Ca19-9 level is down to 2884U/mL from last month.  He still has neuropathy of fingers despite Gabapentin 900mg total daily + Cymbalta.  Patient denies fever, chills, nausea, vomiting, dyspnea, diarrhea or bleeding.  He reports urinary frequency and occasional burning over the last week (he has had UTI in the past).  Reviewed most recent PET/CT which shows previously FDG avid peritoneal nodularity adjacent to  stomach and pancreas, pancreatic tail and right pelvic sidewall lymph node have improved to non-FDG avid status consistent with good treatment response.  \par PET/CT (6.6.2023) IMPRESSION:No sites of pathologic FDG uptake; therefore compared to 3/9/2023, no new sites of pathologic FDG uptake.  Previously FDG avid peritoneal nodularity adjacent to  stomach and pancreas, pancreatic tail and right pelvic sidewall lymph node have improved to non-FDG avid status consistent with good treatment response.  \par \par 7/11/23\par Patient is here for a follow-up visit for pancreatic cancer.  Patient is due for cycle 5 of Gemcitabine + Abraxane followed by Neulasta, initiated on 3.22.2023.  He had thrombocytopenia with last cycle so dose was adjusted accordingly as per guidelines.  Patient reports abdominal discomfort for the last 3 weeks or so which has affected his appetite; he lost about 10lbs in the last month.  Reviewed most recent CBC, which shows mild anemia, hgb 10.9g/dL.  Ca19-9 level was essentially stable at 2845U/mL from last month.  He still has neuropathy of fingers despite Gabapentin 900mg total daily + Cymbalta.  Patient denies fever, chills, nausea, vomiting, dyspnea, diarrhea or bleeding.  He experiences burning with urination and increased frequency.  \par \par July 2023 CT A/P\par Since November 8, 2022;\par \par 1. Interval decrease in tumor infiltration involving pancreatic tail with new 2.0 cm cystic component.\par \par 2. Interval decrease in peritoneal/omental carcinomatosis, as above.\par \par 3. 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 Chronic circumferential bladder wall thickening with asymmetric thickening of anterior bladder wall.

## 2023-07-28 NOTE — PHYSICAL EXAM
[Restricted in physically strenuous activity but ambulatory and able to carry out work of a light or sedentary nature] : Status 1- Restricted in physically strenuous activity but ambulatory and able to carry out work of a light or sedentary nature, e.g., light house work, office work [Normal] : affect appropriate [de-identified] : wearing glasses  [de-identified] : L chest port present

## 2023-07-29 DIAGNOSIS — C16.9 MALIGNANT NEOPLASM OF STOMACH, UNSPECIFIED: ICD-10-CM

## 2023-07-31 LAB — CANCER AG19-9 SERPL-ACNC: 9700 U/ML

## 2023-08-02 ENCOUNTER — OUTPATIENT (OUTPATIENT)
Dept: OUTPATIENT SERVICES | Facility: HOSPITAL | Age: 73
LOS: 1 days | End: 2023-08-02
Payer: MEDICARE

## 2023-08-02 ENCOUNTER — LABORATORY RESULT (OUTPATIENT)
Age: 73
End: 2023-08-02

## 2023-08-02 ENCOUNTER — APPOINTMENT (OUTPATIENT)
Dept: INFUSION THERAPY | Facility: CLINIC | Age: 73
End: 2023-08-02

## 2023-08-02 DIAGNOSIS — Z98.890 OTHER SPECIFIED POSTPROCEDURAL STATES: Chronic | ICD-10-CM

## 2023-08-02 DIAGNOSIS — C18.9 MALIGNANT NEOPLASM OF COLON, UNSPECIFIED: ICD-10-CM

## 2023-08-02 LAB
HCT VFR BLD CALC: 29.5 %
HGB BLD-MCNC: 10.2 G/DL
MCHC RBC-ENTMCNC: 29.1 PG
MCHC RBC-ENTMCNC: 34.6 G/DL
MCV RBC AUTO: 84.3 FL
PLATELET # BLD AUTO: 190 K/UL
PMV BLD: 9 FL
RBC # BLD: 3.5 M/UL
RBC # FLD: 17.1 %
WBC # FLD AUTO: 5.64 K/UL

## 2023-08-02 PROCEDURE — 85027 COMPLETE CBC AUTOMATED: CPT

## 2023-08-02 PROCEDURE — 96367 TX/PROPH/DG ADDL SEQ IV INF: CPT

## 2023-08-02 PROCEDURE — 96413 CHEMO IV INFUSION 1 HR: CPT

## 2023-08-02 PROCEDURE — 96417 CHEMO IV INFUS EACH ADDL SEQ: CPT

## 2023-08-02 RX ORDER — PACLITAXEL 100 MG/20ML
165 INJECTION, POWDER, LYOPHILIZED, FOR SUSPENSION INTRAVENOUS ONCE
Refills: 0 | Status: COMPLETED | OUTPATIENT
Start: 2023-08-02 | End: 2023-08-02

## 2023-08-02 RX ORDER — DEXAMETHASONE 0.5 MG/5ML
10 ELIXIR ORAL ONCE
Refills: 0 | Status: COMPLETED | OUTPATIENT
Start: 2023-08-02 | End: 2023-08-02

## 2023-08-02 RX ORDER — FAMOTIDINE 10 MG/ML
20 INJECTION INTRAVENOUS ONCE
Refills: 0 | Status: COMPLETED | OUTPATIENT
Start: 2023-08-02 | End: 2023-08-02

## 2023-08-02 RX ORDER — DIPHENHYDRAMINE HCL 50 MG
50 CAPSULE ORAL ONCE
Refills: 0 | Status: COMPLETED | OUTPATIENT
Start: 2023-08-02 | End: 2023-08-02

## 2023-08-02 RX ORDER — GEMCITABINE 38 MG/ML
1350 INJECTION, SOLUTION INTRAVENOUS ONCE
Refills: 0 | Status: COMPLETED | OUTPATIENT
Start: 2023-08-02 | End: 2023-08-02

## 2023-08-02 RX ORDER — FOSAPREPITANT DIMEGLUMINE 150 MG/5ML
150 INJECTION, POWDER, LYOPHILIZED, FOR SOLUTION INTRAVENOUS ONCE
Refills: 0 | Status: COMPLETED | OUTPATIENT
Start: 2023-08-02 | End: 2023-08-02

## 2023-08-02 RX ADMIN — PACLITAXEL 165 MILLIGRAM(S): 100 INJECTION, POWDER, LYOPHILIZED, FOR SUSPENSION INTRAVENOUS at 15:10

## 2023-08-02 RX ADMIN — Medication 118 MILLIGRAM(S): at 14:10

## 2023-08-02 RX ADMIN — PACLITAXEL 165 MILLIGRAM(S): 100 INJECTION, POWDER, LYOPHILIZED, FOR SUSPENSION INTRAVENOUS at 14:41

## 2023-08-02 RX ADMIN — FAMOTIDINE 20 MILLIGRAM(S): 10 INJECTION INTRAVENOUS at 14:50

## 2023-08-02 RX ADMIN — GEMCITABINE 1350 MILLIGRAM(S): 38 INJECTION, SOLUTION INTRAVENOUS at 16:10

## 2023-08-02 RX ADMIN — FOSAPREPITANT DIMEGLUMINE 500 MILLIGRAM(S): 150 INJECTION, POWDER, LYOPHILIZED, FOR SOLUTION INTRAVENOUS at 13:41

## 2023-08-02 RX ADMIN — Medication 102 MILLIGRAM(S): at 14:50

## 2023-08-02 RX ADMIN — FAMOTIDINE 104 MILLIGRAM(S): 10 INJECTION INTRAVENOUS at 14:30

## 2023-08-02 RX ADMIN — Medication 50 MILLIGRAM(S): at 15:10

## 2023-08-02 RX ADMIN — Medication 10 MILLIGRAM(S): at 14:30

## 2023-08-02 RX ADMIN — FOSAPREPITANT DIMEGLUMINE 150 MILLIGRAM(S): 150 INJECTION, POWDER, LYOPHILIZED, FOR SOLUTION INTRAVENOUS at 14:10

## 2023-08-02 RX ADMIN — GEMCITABINE 1350 MILLIGRAM(S): 38 INJECTION, SOLUTION INTRAVENOUS at 15:10

## 2023-08-03 DIAGNOSIS — C18.9 MALIGNANT NEOPLASM OF COLON, UNSPECIFIED: ICD-10-CM

## 2023-08-08 ENCOUNTER — APPOINTMENT (OUTPATIENT)
Dept: HEMATOLOGY ONCOLOGY | Facility: CLINIC | Age: 73
End: 2023-08-08

## 2023-08-09 ENCOUNTER — APPOINTMENT (OUTPATIENT)
Dept: HEMATOLOGY ONCOLOGY | Facility: CLINIC | Age: 73
End: 2023-08-09

## 2023-08-09 ENCOUNTER — LABORATORY RESULT (OUTPATIENT)
Age: 73
End: 2023-08-09

## 2023-08-09 ENCOUNTER — APPOINTMENT (OUTPATIENT)
Dept: INFUSION THERAPY | Facility: CLINIC | Age: 73
End: 2023-08-09

## 2023-08-09 ENCOUNTER — OUTPATIENT (OUTPATIENT)
Dept: OUTPATIENT SERVICES | Facility: HOSPITAL | Age: 73
LOS: 1 days | End: 2023-08-09
Payer: MEDICARE

## 2023-08-09 DIAGNOSIS — C18.9 MALIGNANT NEOPLASM OF COLON, UNSPECIFIED: ICD-10-CM

## 2023-08-09 DIAGNOSIS — Z98.890 OTHER SPECIFIED POSTPROCEDURAL STATES: Chronic | ICD-10-CM

## 2023-08-09 LAB
HCT VFR BLD CALC: 29.1 %
HGB BLD-MCNC: 10.1 G/DL
MCHC RBC-ENTMCNC: 29.4 PG
MCHC RBC-ENTMCNC: 34.7 G/DL
MCV RBC AUTO: 84.8 FL
PLATELET # BLD AUTO: 56 K/UL
PMV BLD: 8.3 FL
RBC # BLD: 3.43 M/UL
RBC # FLD: 16.5 %
WBC # FLD AUTO: 3.39 K/UL

## 2023-08-09 PROCEDURE — 36415 COLL VENOUS BLD VENIPUNCTURE: CPT

## 2023-08-09 PROCEDURE — 86301 IMMUNOASSAY TUMOR CA 19-9: CPT

## 2023-08-09 PROCEDURE — 85027 COMPLETE CBC AUTOMATED: CPT

## 2023-08-10 LAB — CANCER AG19-9 SERPL-ACNC: 6729 U/ML

## 2023-08-23 ENCOUNTER — LABORATORY RESULT (OUTPATIENT)
Age: 73
End: 2023-08-23

## 2023-08-23 ENCOUNTER — APPOINTMENT (OUTPATIENT)
Dept: INFUSION THERAPY | Facility: CLINIC | Age: 73
End: 2023-08-23
Payer: MEDICARE

## 2023-08-23 ENCOUNTER — OUTPATIENT (OUTPATIENT)
Dept: OUTPATIENT SERVICES | Facility: HOSPITAL | Age: 73
LOS: 1 days | End: 2023-08-23
Payer: MEDICARE

## 2023-08-23 ENCOUNTER — APPOINTMENT (OUTPATIENT)
Dept: HEMATOLOGY ONCOLOGY | Facility: CLINIC | Age: 73
End: 2023-08-23
Payer: MEDICARE

## 2023-08-23 VITALS
WEIGHT: 137 LBS | HEART RATE: 87 BPM | HEIGHT: 66 IN | DIASTOLIC BLOOD PRESSURE: 79 MMHG | BODY MASS INDEX: 22.02 KG/M2 | TEMPERATURE: 98.1 F | SYSTOLIC BLOOD PRESSURE: 143 MMHG | RESPIRATION RATE: 16 BRPM

## 2023-08-23 DIAGNOSIS — Z98.890 OTHER SPECIFIED POSTPROCEDURAL STATES: Chronic | ICD-10-CM

## 2023-08-23 DIAGNOSIS — C18.9 MALIGNANT NEOPLASM OF COLON, UNSPECIFIED: ICD-10-CM

## 2023-08-23 LAB
ALBUMIN SERPL ELPH-MCNC: 3.8 G/DL
ALP BLD-CCNC: 72 U/L
ALT SERPL-CCNC: 10 U/L
ANION GAP SERPL CALC-SCNC: 11 MMOL/L
AST SERPL-CCNC: 14 U/L
BILIRUB DIRECT SERPL-MCNC: <0.2 MG/DL
BILIRUB INDIRECT SERPL-MCNC: >0.1 MG/DL
BILIRUB SERPL-MCNC: 0.3 MG/DL
BUN SERPL-MCNC: 9 MG/DL
CALCIUM SERPL-MCNC: 9.1 MG/DL
CHLORIDE SERPL-SCNC: 103 MMOL/L
CO2 SERPL-SCNC: 27 MMOL/L
CREAT SERPL-MCNC: 0.7 MG/DL
EGFR: 98 ML/MIN/1.73M2
GLUCOSE SERPL-MCNC: 113 MG/DL
HCT VFR BLD CALC: 33.9 %
HGB BLD-MCNC: 11.2 G/DL
MCHC RBC-ENTMCNC: 28.1 PG
MCHC RBC-ENTMCNC: 33 G/DL
MCV RBC AUTO: 85 FL
PLATELET # BLD AUTO: 292 K/UL
PMV BLD: 9.6 FL
POTASSIUM SERPL-SCNC: 4 MMOL/L
PROT SERPL-MCNC: 6.8 G/DL
RBC # BLD: 3.99 M/UL
RBC # FLD: 16.3 %
SODIUM SERPL-SCNC: 141 MMOL/L
WBC # FLD AUTO: 5.11 K/UL

## 2023-08-23 PROCEDURE — 36415 COLL VENOUS BLD VENIPUNCTURE: CPT

## 2023-08-23 PROCEDURE — 85027 COMPLETE CBC AUTOMATED: CPT

## 2023-08-23 PROCEDURE — 86301 IMMUNOASSAY TUMOR CA 19-9: CPT

## 2023-08-23 PROCEDURE — 80076 HEPATIC FUNCTION PANEL: CPT

## 2023-08-23 PROCEDURE — 99215 OFFICE O/P EST HI 40 MIN: CPT

## 2023-08-23 PROCEDURE — 96375 TX/PRO/DX INJ NEW DRUG ADDON: CPT

## 2023-08-23 PROCEDURE — 80048 BASIC METABOLIC PNL TOTAL CA: CPT

## 2023-08-23 PROCEDURE — 96417 CHEMO IV INFUS EACH ADDL SEQ: CPT

## 2023-08-23 PROCEDURE — 96367 TX/PROPH/DG ADDL SEQ IV INF: CPT

## 2023-08-23 PROCEDURE — 96413 CHEMO IV INFUSION 1 HR: CPT

## 2023-08-23 RX ORDER — DEXAMETHASONE 0.5 MG/5ML
10 ELIXIR ORAL ONCE
Refills: 0 | Status: COMPLETED | OUTPATIENT
Start: 2023-08-23 | End: 2023-08-23

## 2023-08-23 RX ORDER — PACLITAXEL 100 MG/20ML
165 INJECTION, POWDER, LYOPHILIZED, FOR SUSPENSION INTRAVENOUS ONCE
Refills: 0 | Status: COMPLETED | OUTPATIENT
Start: 2023-08-23 | End: 2023-08-23

## 2023-08-23 RX ORDER — FOSAPREPITANT DIMEGLUMINE 150 MG/5ML
150 INJECTION, POWDER, LYOPHILIZED, FOR SOLUTION INTRAVENOUS ONCE
Refills: 0 | Status: COMPLETED | OUTPATIENT
Start: 2023-08-23 | End: 2023-08-23

## 2023-08-23 RX ORDER — DIPHENHYDRAMINE HCL 50 MG
50 CAPSULE ORAL ONCE
Refills: 0 | Status: COMPLETED | OUTPATIENT
Start: 2023-08-23 | End: 2023-08-23

## 2023-08-23 RX ORDER — GEMCITABINE 38 MG/ML
1350 INJECTION, SOLUTION INTRAVENOUS ONCE
Refills: 0 | Status: COMPLETED | OUTPATIENT
Start: 2023-08-23 | End: 2023-08-23

## 2023-08-23 RX ORDER — FAMOTIDINE 10 MG/ML
20 INJECTION INTRAVENOUS ONCE
Refills: 0 | Status: COMPLETED | OUTPATIENT
Start: 2023-08-23 | End: 2023-08-23

## 2023-08-23 RX ADMIN — FAMOTIDINE 20 MILLIGRAM(S): 10 INJECTION INTRAVENOUS at 13:25

## 2023-08-23 RX ADMIN — Medication 10 MILLIGRAM(S): at 13:10

## 2023-08-23 RX ADMIN — PACLITAXEL 165 MILLIGRAM(S): 100 INJECTION, POWDER, LYOPHILIZED, FOR SUSPENSION INTRAVENOUS at 14:15

## 2023-08-23 RX ADMIN — FAMOTIDINE 104 MILLIGRAM(S): 10 INJECTION INTRAVENOUS at 13:10

## 2023-08-23 RX ADMIN — Medication 118 MILLIGRAM(S): at 12:55

## 2023-08-23 RX ADMIN — FOSAPREPITANT DIMEGLUMINE 500 MILLIGRAM(S): 150 INJECTION, POWDER, LYOPHILIZED, FOR SOLUTION INTRAVENOUS at 12:19

## 2023-08-23 RX ADMIN — Medication 102 MILLIGRAM(S): at 13:25

## 2023-08-23 RX ADMIN — FOSAPREPITANT DIMEGLUMINE 150 MILLIGRAM(S): 150 INJECTION, POWDER, LYOPHILIZED, FOR SOLUTION INTRAVENOUS at 12:50

## 2023-08-23 RX ADMIN — Medication 50 MILLIGRAM(S): at 13:40

## 2023-08-23 RX ADMIN — GEMCITABINE 1350 MILLIGRAM(S): 38 INJECTION, SOLUTION INTRAVENOUS at 15:05

## 2023-08-23 RX ADMIN — GEMCITABINE 1350 MILLIGRAM(S): 38 INJECTION, SOLUTION INTRAVENOUS at 14:20

## 2023-08-23 RX ADMIN — PACLITAXEL 165 MILLIGRAM(S): 100 INJECTION, POWDER, LYOPHILIZED, FOR SUSPENSION INTRAVENOUS at 13:45

## 2023-08-24 LAB — CANCER AG19-9 SERPL-ACNC: 8534 U/ML

## 2023-08-28 ENCOUNTER — APPOINTMENT (OUTPATIENT)
Dept: HEMATOLOGY ONCOLOGY | Facility: CLINIC | Age: 73
End: 2023-08-28

## 2023-08-29 ENCOUNTER — NON-APPOINTMENT (OUTPATIENT)
Age: 73
End: 2023-08-29

## 2023-08-29 ENCOUNTER — LABORATORY RESULT (OUTPATIENT)
Age: 73
End: 2023-08-29

## 2023-08-29 ENCOUNTER — RESULT REVIEW (OUTPATIENT)
Age: 73
End: 2023-08-29

## 2023-08-29 ENCOUNTER — APPOINTMENT (OUTPATIENT)
Dept: HEMATOLOGY ONCOLOGY | Facility: CLINIC | Age: 73
End: 2023-08-29

## 2023-08-29 ENCOUNTER — OUTPATIENT (OUTPATIENT)
Dept: OUTPATIENT SERVICES | Facility: HOSPITAL | Age: 73
LOS: 1 days | End: 2023-08-29
Payer: MEDICARE

## 2023-08-29 ENCOUNTER — OUTPATIENT (OUTPATIENT)
Dept: OUTPATIENT SERVICES | Facility: HOSPITAL | Age: 73
LOS: 1 days | End: 2023-08-29

## 2023-08-29 DIAGNOSIS — C18.9 MALIGNANT NEOPLASM OF COLON, UNSPECIFIED: ICD-10-CM

## 2023-08-29 DIAGNOSIS — C16.9 MALIGNANT NEOPLASM OF STOMACH, UNSPECIFIED: ICD-10-CM

## 2023-08-29 DIAGNOSIS — C25.9 MALIGNANT NEOPLASM OF PANCREAS, UNSPECIFIED: ICD-10-CM

## 2023-08-29 DIAGNOSIS — Z98.890 OTHER SPECIFIED POSTPROCEDURAL STATES: Chronic | ICD-10-CM

## 2023-08-29 LAB
GLUCOSE BLDC GLUCOMTR-MCNC: 95 MG/DL — SIGNIFICANT CHANGE UP (ref 70–99)
HCT VFR BLD CALC: 34.2 %
HGB BLD-MCNC: 11.2 G/DL
MCHC RBC-ENTMCNC: 28.1 PG
MCHC RBC-ENTMCNC: 32.7 G/DL
MCV RBC AUTO: 85.9 FL
PLATELET # BLD AUTO: 189 K/UL
PMV BLD: 8.7 FL
RBC # BLD: 3.98 M/UL
RBC # FLD: 15.9 %
WBC # FLD AUTO: 3.92 K/UL

## 2023-08-29 PROCEDURE — 85027 COMPLETE CBC AUTOMATED: CPT

## 2023-08-29 PROCEDURE — 80076 HEPATIC FUNCTION PANEL: CPT

## 2023-08-29 PROCEDURE — 80048 BASIC METABOLIC PNL TOTAL CA: CPT

## 2023-08-29 PROCEDURE — 82962 GLUCOSE BLOOD TEST: CPT

## 2023-08-29 PROCEDURE — A9552: CPT

## 2023-08-29 PROCEDURE — 78815 PET IMAGE W/CT SKULL-THIGH: CPT | Mod: 26,PS,MH

## 2023-08-29 PROCEDURE — 78815 PET IMAGE W/CT SKULL-THIGH: CPT | Mod: PS

## 2023-08-30 ENCOUNTER — APPOINTMENT (OUTPATIENT)
Dept: INFUSION THERAPY | Facility: CLINIC | Age: 73
End: 2023-08-30

## 2023-08-30 DIAGNOSIS — C25.9 MALIGNANT NEOPLASM OF PANCREAS, UNSPECIFIED: ICD-10-CM

## 2023-08-30 DIAGNOSIS — C16.9 MALIGNANT NEOPLASM OF STOMACH, UNSPECIFIED: ICD-10-CM

## 2023-08-30 LAB
ALBUMIN SERPL ELPH-MCNC: 4.1 G/DL
ALP BLD-CCNC: 75 U/L
ALT SERPL-CCNC: 15 U/L
ANION GAP SERPL CALC-SCNC: 11 MMOL/L
AST SERPL-CCNC: 16 U/L
BILIRUB DIRECT SERPL-MCNC: <0.2 MG/DL
BILIRUB INDIRECT SERPL-MCNC: >0.1 MG/DL
BILIRUB SERPL-MCNC: 0.3 MG/DL
BUN SERPL-MCNC: 12 MG/DL
CALCIUM SERPL-MCNC: 9.4 MG/DL
CHLORIDE SERPL-SCNC: 101 MMOL/L
CO2 SERPL-SCNC: 27 MMOL/L
CREAT SERPL-MCNC: 0.8 MG/DL
EGFR: 94 ML/MIN/1.73M2
GLUCOSE SERPL-MCNC: 100 MG/DL
POTASSIUM SERPL-SCNC: 4 MMOL/L
PROT SERPL-MCNC: 7 G/DL
SODIUM SERPL-SCNC: 139 MMOL/L

## 2023-08-30 NOTE — HISTORY OF PRESENT ILLNESS
[Disease: _____________________] : Disease: [unfilled] [Therapy: ___] : Therapy: [unfilled] [Cycle: ___] : Cycle: [unfilled] [de-identified] : Mr. JINA SINGER is a 72 year old male here today for evaluation and management of Pancreatic Cancer.  \par  \par  He was referred by SURG, Dr. Morgan.  JINA is a 72 year old M with PMHx including HTN, HLD, T2DM, who presents to clinic to establish care.  Patient presented to ER back in 07/2022 with complaint of gradually worsening abdominal pain for the prior month, worse after eating and associated with 40lb weight loss in the last 2 months.  He underwent CT imaging which shows evidence suggestive of metastatic disease.  He also endorses constipation.  He underwent laparoscopy with findings of ~1L malignant ascites and peritoneal implants on 8.22.2022.  He is presently feeling well with no new complaints.  Patient denies fever, chills, nausea, vomiting, dysphagia, dyspnea, neuropathy or bleeding.  Patient reports family history of malignancy in his father (possibly stomach cancer) and mother (possibly colon).  Never a smoker.  NKDA.  \par  \par  RADIOLOGIC WORKUP\par  CT Chest (7.25.2022) IMPRESSION:Perigastric infiltrative process inseparable from the tail the pancreas extending to the proximal duodenum. Associated ascites. Findings again  suspicious for pancreatic or gastrointestinal neoplasm. Differential diagnosis includes partially contained ruptured gastric or duodenal ulcer as well as infectious process/pancreatitis.Hyperaerated lungs. Scattered granulomata. Vascular calcifications. Nonspecific areas of subpleural thickening bilaterally.\par  CT A/P (7.24.2022) IMPRESSION:Predominantly perigastric infiltrative soft tissue changes which are inseparable from the tail of pancreas and extend around the proximal  duodenum. Small volume abdominopelvic ascites. Differential includes underlying pancreatic or gastrointestinal neoplasm, sequelae of partially contained ruptured gastric or duodenal  ulcer, and infectious process or perhaps pancreatitis. Clinical correlation and/or further evaluation with EGD should be considered.Findings compatible with BPH. Superimposed cystitis is difficult to exclude on imaging.\par  \par  LAB WORKUP\par  (8.17.2022) WBC 8.36, Hgb 14.2, MCV 84, , normal diff, Cr 0.9, eGFR 91, normal LFTs\par  (7.25.2022) Ca19-9 is 6481, CEA 6.8, TSH 2.62\par  (10.23.2019) WBC 6.83, Hgb 15, MCV 85.7, \par  \par  PATHOLOGY\par  (see results section)\par  \par  HCM\par  Colonoscopy done about 5 years , polyps removed \par  Upper Endoscopy done 07/2022 showed 5x5 hypoechoic infiltrative mass (irregular borders) extending between the  muscularis propria (abuting it) and the territory of the pancreatic tail \par  COVID Vaccinated? [FreeTextEntry1] : Started Gemcitabine + Abraxane with onbody neulasta (D1, D8, D15 every 28 days) on 3.22.2023 \par  Started mFOLFIRINOX on 9.6.2022 (discontinued oxaliplatin beginning C12 on 2/22/23) - 2.24.2023 due to rising Ca19-9.   [de-identified] : 9/14/22 Patient is here for a follow-up visit for pancreatic cancer.  He is s/p initial cycle of mFOLFIRINOX on 9.6.2022.  He is feeling well with no new complaints.  Reviewed most recent CBC, which is stable.  Patient denies fever, chills, nausea, vomiting, dyspnea, neuropathy, persistent diarrhea or bleeding.  He states his appetite is still poor but slightly improving.  He is due for cycle 2 of chemotherapy next week.    10/4/22 Patient is here for a follow-up visit for pancreatic cancer.  He is due for cycle 3 of mFOLFIRINOX today, initiated on 9.6.2022.  He is feeling well with no new complaints.  Reviewed most recent CBC, which shows mild anemia, hgb 11.6g/dL and persistent leukocytosis, likely due to GCSF administration with chemotherapy.  ALK Phos is slightly higher at 198 but Ca19-9 is decreasing down to 29961K/mL.  Patient denies fever, chills, vomiting, dyspnea, neuropathy, CP, palpitations, abdominal pain, persistent diarrhea or bleeding.  He is endorses transient nausea which was not bothersome and he took anti-emetics.    11/2/22 Patient is here for a follow-up visit for pancreatic cancer.  He is due for cycle 4 of mFOLFIRINOX today, initiated on 9.6.2022.  He is feeling well with no new complaints.  Reviewed most recent CBC, which shows mild anemia, hgb 11.1g/dL and persistent leukocytosis, likely due to GCSF administration with chemotherapy.  ALK Phos is slightly lower at 138 but Ca19-9 is decreasing down to 4619U/mL.  Patient denies fever, chills, vomiting, dyspnea, CP, palpitations, abdominal pain, persistent diarrhea or bleeding.  He endorses occasional neuropathy with long exposure to cold items.    11/16/22 Patient is here for a follow-up visit for pancreatic cancer, accompanied by spouse.  He is due for cycle 6 of mFOLFIRINOX today, initiated on 9.6.2022.  He is feeling well with no new complaints.  Reviewed most recent CBC, which shows mild anemia, hgb 11.1g/dL and persistent leukocytosis, likely due to GCSF administration with chemotherapy.  ALK Phos is essentially stable at 146 but Ca19-9 is decreasing down to 2708U/mL.  Patient denies fever, chills, vomiting, dyspnea, CP, palpitations, abdominal pain, persistent diarrhea, mouth sores or bleeding.  He still notes occasional neuropathy with long exposure to cold items.  Reviewed most recent CT imaging which is essentially stable.   CT C/A/P (11.8.2022) IMPRESSION:1. No definite evidence of intrathoracic metastasis.Since 7/24/2022, no significant interval change.Near diffuse peritoneal carcinomatosis is not significantly changed.Diffuse tumor infiltration in the pancreatic tail (4/226), unchanged.Completely attenuated splenic vein is unchanged. Prominent venous collaterals in the left upper quadrant is unchanged.  11/30/22 Patient is here for a follow-up visit for pancreatic cancer.  He is due for cycle 7 of mFOLFIRINOX today, initiated on 9.6.2022.  Reviewed most recent CBC, which shows mild anemia, hgb 10.9g/dL and persistent leukocytosis, likely due to GCSF administration with chemotherapy.  ALK Phos is essentially stable at 138 and Ca19-9 is decreasing down to 1683U/mL.  Patient denies fever, chills, vomiting, dyspnea, CP, palpitations, abdominal pain, persistent diarrhea, mouth sores or bleeding.  He still notes occasional neuropathy with long exposure to cold items.   12/14/22 Patient is here for a follow-up visit for pancreatic cancer, accompanied by spouse via telephone.  He is due for cycle 8 of mFOLFIRINOX today, initiated on 9.6.2022.  Reviewed most recent CBC, which shows mild anemia, hgb 10.5g/dL and persistent leukocytosis, likely due to GCSF administration with chemotherapy.  ALK Phos is essentially stable at 124 and Ca19-9 is decreasing down to 1177U/mL.  Patient denies fever, chills, dyspnea, CP, palpitations, abdominal pain, persistent diarrhea, mouth sores or bleeding.  He has an occasional cough x 2 weeks; believes he had a mild cold which has been improving over the last 2 weeks but the cough is persistent.  He still notes occasional neuropathy with long exposure to cold items.   12/28/22 Patient is here for a follow-up visit for pancreatic cancer, accompanied by spouse via telephone.  Since last visit, patient had CXR which was suggestive for pneumonia.  He has completed antibiotics with improvement in symptoms.  Reviewed PULM consult with Dr. Escobar: plans to repeat CXR in about 6 weeks.  Patient is agreeable to resume chemotherapy.  He is due for cycle 9 of mFOLFIRINOX today, initiated on 9.6.2022.  Reviewed most recent CBC which shows mild anemia and neutrophil-predominant leukocytosis.  Tumor marker is down to 957 U/mL from 12/2022.   CXR (12.15.2022) Impression:Subsegmental left lower lobe consolidation.  1/11/23 Patient is here for a follow-up visit for pancreatic cancer, accompanied by spouse via telephone.  As per PULM consult with Dr. Escobar: plan to repeat CXR about 6 weeks following prior imaging.  He is due for cycle 10 of mFOLFIRINOX today, initiated on 9.6.2022.  Reviewed most recent CBC which shows mild anemia with hgb 10.6g/dL and neutrophil-predominant leukocytosis likely from GCSF administration.  Tumor marker is down to 693 U/mL from 1/2023.  He denies fevers, chills, worsening of neuropathy, dyspnea, mouth sores or bleeding.  Patient states he dislikes the effect of Benadryl pre-medication because it makes him drowsy and feel "off."  He is requesting to decrease Benadryl from 50mg to 25mg IV as pre-medicaiton.  Risks vs benefits discussed at length.    1/24/23 Patient is here for a follow-up visit for pancreatic cancer.  He is due for cycle 11 of mFOLFIRINOX today, initiated on 9.6.2022.  Reviewed most recent CBC which shows mild anemia with hgb 11.3g/dL, mild thrombocytopenia with PLTs 106,000 and neutrophil-predominant leukocytosis likely from GCSF administration.  Tumor marker is down to 693 U/mL from 1/2023.  He denies fevers, chills, worsening of neuropathy, dyspnea, new cough, mouth sores or bleeding.    2/7/23 Patient is here for a follow-up visit for pancreatic cancer.  He is due for cycle 12 of mFOLFIRINOX today, initiated on 9.6.2022.  Reviewed most recent CBC which shows mild anemia with hgb 11.3g/dL, mild thrombocytopenia with PLTs 106,000 and neutrophil-predominant leukocytosis likely from GCSF administration.  Tumor marker is down to 693 U/mL from 1/2023.  He denies fevers, chills, dyspnea, new cough, mouth sores or bleeding.  He states his neuropathy in his feet is slightly worse in both feet.    2/21/23 Patient is here for a follow-up visit for pancreatic cancer.  He is due for cycle 12 of chemotherapy on 2/22, initiated on 9.6.2022.  Reviewed most recent CBC which shows mild anemia with hgb 11g/dL.  Tumor marker is down to 457 U/mL from 02/2023.  He denies fevers, chills, dyspnea, new cough, mouth sores, persistent diarrhea or bleeding.  He endorses neuropathy in both feet and fingertips; started gabapentin 100mg TiD without resolution of symptoms.    3/7/23 Patient is here for a follow-up visit for pancreatic cancer.  He is due for cycle 13 of chemotherapy on 3/8, initiated on 9.6.2022.  Reviewed most recent CBC which shows mild anemia with hgb 11.6g/dL.  Tumor marker is slightly higher at 570 U/mL from 02/2023.  He denies fevers, chills, dyspnea, new cough, mouth sores, persistent diarrhea, new pain or bleeding.  He endorses neuropathy in both feet and fingertips; he is on gabapentin 600mg daily without resolution of symptoms.  He recently followed with GI, Dr. Maximilian Larson, and is anticipating upper EGD.  Reviewed US Duplex which showed no DVT.   US Duplex BI LE (3.3.2023) IMPRESSION:No evidence of deep venous thrombosis in either lower extremity.Bilateral popliteal cysts.  3/15/23 Patient is here for a follow-up visit for pancreatic cancer, accompanied by spouse Renate via telephone.  He is s/p cycle 12 of chemotherapy on 2/24, initiated on 9.6.2022.  Tumor marker almost doubled up to 1154 U/mL from 03/2023.  He was sent for PET/CT which showed FDG uptake within upper abdominal peritoneal nodularity adjacent to the stomach and pancreas, pancreatic tail, and a subcentimeter right pelvic sidewall lymph node.  He is presently feeling fine without new complaints.   PET/CT (3.9.2023) IMPRESSION:Abnormal FDG uptake within upper abdominal peritoneal nodularity adjacent to the stomach and pancreas (SUV up to 5.4), pancreatic tail (SUV 3.4) and a subcentimeter right pelvic sidewall lymph node (SUV 3.6).No other definite sites of abnormal FDG uptake to suggest biologic tumor activity.  3/21/23 Patient is here for a follow-up visit for pancreatic cancer, accompanied by spouse.  He is s/p cycle 12 of chemotherapy on 2/24, initiated on 9.6.2022.  Tumor marker almost doubled up to 1154 U/mL from 03/2023.  He is anticipating initiation of new chemotherapy regimen using Gemcitabine + Abraxane on 3.22.2023.    3/29/23 Patient is here for a follow-up visit for pancreatic cancer, accompanied by spouse.  Since last visit, patient started new chemotherapy regimen using Gemcitabine + Abraxane, initiated on 3.22.2023.  Tumor marker doubled again up to 2410U/mL from 03/2023.  Reviewed most recent CBC, which shows mild pancytopenia.  Patient denies fever, chills, nausea, vomiting, dyspnea, diarrhea or bleeding.  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    4/12/23 Patient is here for a follow-up visit for pancreatic cancer.  Patient is due for cycle 2 of Gemcitabine + Abraxane followed by Neulasta, initiated on 3.22.2023.  Reviewed most recent CBC, which shows mild leukopenia and anemia, hgb 11g/dL.  Patient denies fever, chills, nausea, vomiting, dyspnea, diarrhea or bleeding.  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    4/19/23 Patient is here for a follow-up visit for pancreatic cancer.  Patient is due for cycle 2 of Gemcitabine + Abraxane, initiated on 3.22.2023.  Reviewed most recent CBC, which shows mild leukopenia and anemia, hgb 11g/dL.  Patient denies fever, chills, nausea, vomiting, dyspnea, diarrhea or bleeding.  He reports slight worsening of neuropathy of fingers despite Gabapentin 600mg total daily.  Ca19-9 level is down to 2253U/mL.    5/17/23 Patient is here for a follow-up visit for pancreatic cancer.  Patient is due for cycle 3 of Gemcitabine + Abraxane followed by Neulasta, initiated on 3.22.2023.  Reviewed most recent CBC, which shows mild anemia, hgb 11.6g/dL.  Leukocytosis is likely due to GCSF.  Ca19-9 level is up to 3087U/mL.  Patient denies fever, chills, nausea, vomiting, dyspnea, diarrhea or bleeding.  He reports slight worsening of neuropathy of fingers despite Gabapentin 600mg total daily.    6/14/23 Patient is here for a follow-up visit for pancreatic cancer.  Patient is due for cycle 4 of Gemcitabine + Abraxane followed by Neulasta, initiated on 3.22.2023.  Reviewed most recent CBC, which shows mild anemia, hgb 10.9g/dL.  Leukocytosis is likely due to GCSF.  Ca19-9 level is down to 2884U/mL from last month.  He still has neuropathy of fingers despite Gabapentin 900mg total daily + Cymbalta.  Patient denies fever, chills, nausea, vomiting, dyspnea, diarrhea or bleeding.  He reports urinary frequency and occasional burning over the last week (he has had UTI in the past).  Reviewed most recent PET/CT which shows previously FDG avid peritoneal nodularity adjacent to  stomach and pancreas, pancreatic tail and right pelvic sidewall lymph node have improved to non-FDG avid status consistent with good treatment response.   PET/CT (6.6.2023) IMPRESSION:No sites of pathologic FDG uptake; therefore compared to 3/9/2023, no new sites of pathologic FDG uptake.  Previously FDG avid peritoneal nodularity adjacent to  stomach and pancreas, pancreatic tail and right pelvic sidewall lymph node have improved to non-FDG avid status consistent with good treatment response.    7/11/23 Patient is here for a follow-up visit for pancreatic cancer.  Patient is due for cycle 5 of Gemcitabine + Abraxane followed by Neulasta, initiated on 3.22.2023.  He had thrombocytopenia with last cycle so dose was adjusted accordingly as per guidelines.  Patient reports abdominal discomfort for the last 3 weeks or so which has affected his appetite; he lost about 10lbs in the last month.  Reviewed most recent CBC, which shows mild anemia, hgb 10.9g/dL.  Ca19-9 level was essentially stable at 2845U/mL from last month.  He still has neuropathy of fingers despite Gabapentin 900mg total daily + Cymbalta.  Patient denies fever, chills, nausea, vomiting, dyspnea, diarrhea or bleeding.  He experiences burning with urination and increased frequency.    July 2023 CT A/PSince November 8, 2022;1. Interval decrease in tumor infiltration involving pancreatic tail with new 2.0 cm cystic component.2. Interval decrease in peritoneal/omental carcinomatosis, as above.3. Chronic splenic vein occlusion with perigastric and mesenteric varices, unchanged.4. Chronic circumferential bladder wall thickening with asymmetric thickening of anterior bladder wall.  8/23/23 Patient is here for a follow-up visit for pancreatic cancer.  Patient is due for cycle 5 of Gemcitabine + Abraxane followed by Neulasta, initiated on 3.22.2023.  He had thrombocytopenia with last cycle so dose was adjusted accordingly as per guidelines.  Patient reports abdominal discomfort for the last 3 weeks or so which has affected his appetite; he lost about 10lbs in the last month.  Reviewed most recent CBC, which shows mild anemia, hgb 10.9g/dL.  Ca19-9 level is down from 9700 to 6729U/mL from last month.  He still has neuropathy of fingers despite Gabapentin 900mg total daily + Cymbalta.  Patient denies fever, chills, nausea, vomiting, dyspnea, diarrhea or bleeding.

## 2023-08-30 NOTE — REVIEW OF SYSTEMS
[Abdominal Pain] : abdominal pain [Negative] : Allergic/Immunologic [Fever] : no fever [Night Sweats] : no night sweats [Dysphagia] : no dysphagia [Chest Pain] : no chest pain [Shortness Of Breath] : no shortness of breath [Vomiting] : no vomiting [Constipation] : no constipation [Easy Bleeding] : no tendency for easy bleeding [FreeTextEntry2] : poor appetite, lost 10lbs since last month [FreeTextEntry7] : reports abdominal discomfort x 3 weeks  [FreeTextEntry8] : reports urinary frequency x 1 week with occasional burning  [de-identified] : L chest port present  [de-identified] : endorses occasional neuropathy with long exposure to cold items , neuropathy in his hands is the same

## 2023-08-30 NOTE — PHYSICAL EXAM
[Restricted in physically strenuous activity but ambulatory and able to carry out work of a light or sedentary nature] : Status 1- Restricted in physically strenuous activity but ambulatory and able to carry out work of a light or sedentary nature, e.g., light house work, office work [Normal] : affect appropriate [de-identified] : wearing glasses  [de-identified] : L chest port present

## 2023-08-30 NOTE — ASSESSMENT
[Palliative] : Goals of care discussed with patient: Palliative [FreeTextEntry1] : # Metastatic moderate to poorly differentiated adenocarcinoma , dx 07/2022  - BRCA (-)  - s/p laparoscopy on 8.22.2022 with findings of ~1L malignant ascites and peritoneal implants  - Ca19-9 is 6481, CEA 6.8 from 07/2022  - reviewed radiology, pathology and lab workup and had a discussion regarding implications of diagnosis, prognosis and options for management including but not limited to chemotherapy for palliative intent ; had a lengthy discussion regarding Stage IV disease and not curative  - s/p L Chest port placement with SURG, Dr. Morgan  - CT CAP from 11.8.2022 is essentially stable, tumor marker is decreasing  - s/p cycle 12 of 5FU/Leucovorin/Irinotecan on 2/24 , initiated on 9.6.2022 ; discontinued oxaliplatin beginning C12 due to neuropathy  - STOPPED 5FU/Leucovorin/Irinotecan due to rising Ca19-9 and PET/CT findings - PET/CT from 3.9.23 showed FDG uptake within upper abdominal peritoneal nodularity adjacent to the stomach and pancreas, pancreatic tail, and a subcentimeter right pelvic sidewall lymph node.   - Patient successfully certified for utilization of Medical Marijuana on 04/12/2023.   - PET/CT 6.6.2023 shows previously FDG avid peritoneal nodularity adjacent to  stomach and pancreas, pancreatic tail and right pelvic sidewall lymph node have improved to non-FDG avid status consistent with good treatment response.   - CT A/P from 07/2023: interval decrease ; CA 19-9 keeps decreasing; continue same regimen - c/w C5 *still 25% dose reduced - if CEA continues to rise, will consider ordering PET/CT at next visit  - Labwork today: CBC, BMP, LFTs, Ca19-9, UA, urine culture   # Peripheral neuropathy, likely due to chemotherapy and hx of DM  - oxaliplatin discontinued  - c/w gabapentin 900mg daily , initiated 01/2023  - c/w Cymbalta 60mg daily , initiated 04/2023   # H. Pylori infection , dx 07/2022  - s/p upper EGD in 07/2022 ; completed antibiotic treatment  - followup with GI, Dr. Maximilian Larson, as scheduled for management   # Thrombocytopenia, likely due to chemotherapy  - C1D15 of Gemcitabine/Abraxane held due to low PLTs  - resolved   # UTI  - associated with burning and urinary frequency - START Ciprofloxacin 500mg daily x 7 days  - advised to take probiotic while on abx  RTC in 2 weeks with CBC, BMP, LFTs, Ca19-9, UA   restart chemo 8/23/23 reviewed PET 06/2023 and CT 07/2023 and CA 19-9 both radiologic studies show decent results;  is poorly explained by the rapid rise and then decrease he does c/o abdomen discomofrt and worseing energy level; still working will repeat CA 19-9 today and in 2 weeks and reevaluate the trend: if keeps rising and symptoms, will need to order restaging scan , preferably PET to better evaluate mesentery/ peritoneum  ADDENDUM;  8/24/23:  1. reviewed : it is elevated: there is strong concern for progression ; reviewed again the last PET and CT ; w 2. will get another PET to better assess pertioneal involvement 3. stop the current chemo and resend NGS to look for mutations; he has progressed on mFOLFIRINOX  AND GEM/ABRAXANE; options of systemic chemo are limited; might need to suggest clinical trial or hospice if no other options are available  ADDENDUM 8/30/23 13:10 PET reviewed with patient and pt was informed there are signs of progression based on PET 08/2023,  and symptoms; recommend evaluation for a clinical trial; spoke to Dr Hassan. The consult is being arranged

## 2023-08-31 ENCOUNTER — APPOINTMENT (OUTPATIENT)
Dept: GASTROENTEROLOGY | Facility: CLINIC | Age: 73
End: 2023-08-31

## 2023-08-31 VITALS
DIASTOLIC BLOOD PRESSURE: 88 MMHG | HEIGHT: 66 IN | BODY MASS INDEX: 22.02 KG/M2 | SYSTOLIC BLOOD PRESSURE: 140 MMHG | WEIGHT: 137 LBS | HEART RATE: 78 BPM

## 2023-08-31 DIAGNOSIS — K59.09 OTHER CONSTIPATION: ICD-10-CM

## 2023-08-31 DIAGNOSIS — R10.9 UNSPECIFIED ABDOMINAL PAIN: ICD-10-CM

## 2023-08-31 RX ORDER — PRUCALOPRIDE 2 MG/1
2 TABLET, FILM COATED ORAL
Qty: 30 | Refills: 3 | Status: ACTIVE | COMMUNITY
Start: 2023-08-31 | End: 1900-01-01

## 2023-08-31 RX ORDER — ATORVASTATIN CALCIUM 20 MG/1
20 TABLET, FILM COATED ORAL
Refills: 0 | Status: DISCONTINUED | COMMUNITY
End: 2023-08-31

## 2023-08-31 RX ORDER — DULOXETINE HYDROCHLORIDE 60 MG/1
60 CAPSULE, DELAYED RELEASE PELLETS ORAL
Qty: 30 | Refills: 3 | Status: DISCONTINUED | COMMUNITY
Start: 2023-04-19 | End: 2023-08-31

## 2023-08-31 RX ORDER — LISINOPRIL 30 MG/1
TABLET ORAL
Refills: 0 | Status: DISCONTINUED | COMMUNITY
End: 2023-08-31

## 2023-08-31 RX ORDER — ONDANSETRON 8 MG/1
8 TABLET ORAL EVERY 8 HOURS
Qty: 90 | Refills: 1 | Status: DISCONTINUED | COMMUNITY
Start: 2022-08-26 | End: 2023-08-31

## 2023-08-31 RX ORDER — OMEPRAZOLE 40 MG/1
40 CAPSULE, DELAYED RELEASE ORAL DAILY
Qty: 30 | Refills: 3 | Status: ACTIVE | COMMUNITY
Start: 2023-08-31 | End: 1900-01-01

## 2023-08-31 RX ORDER — SILDENAFIL 20 MG/1
20 TABLET ORAL
Qty: 30 | Refills: 11 | Status: DISCONTINUED | COMMUNITY
Start: 2019-08-20 | End: 2023-08-31

## 2023-08-31 RX ORDER — PROCHLORPERAZINE MALEATE 10 MG/1
10 TABLET ORAL EVERY 6 HOURS
Qty: 120 | Refills: 1 | Status: DISCONTINUED | COMMUNITY
Start: 2022-08-26 | End: 2023-08-31

## 2023-08-31 RX ORDER — METFORMIN HYDROCHLORIDE 625 MG/1
TABLET ORAL
Refills: 0 | Status: DISCONTINUED | COMMUNITY
End: 2023-08-31

## 2023-08-31 NOTE — HISTORY OF PRESENT ILLNESS
[FreeTextEntry1] : Patient is a pleasant 72 y/o gentleman with a PMHx of DM, HTN, Pancreatic Adenocarcinoma with Mets (Ascitic fluid, LN-Followed by Dr. Negrete) whom presents for evaluation of dyspepsia and the sensation like food has been sitting in the stomach. Patient notes he has been getting reflux more frequent and feels what he eats sits in his stomach. He notes he always passes gas and has never had an episode of emesis so does not fit picture of clinical obstruction. He notes that the lack of appetitie is from lack of stools.

## 2023-08-31 NOTE — ASSESSMENT
[FreeTextEntry1] : Patient is a pleasant 74 y/o gentleman with a PMHx of DM, HTN, Pancreatic Adenocarcinoma with Mets (Ascitic fluid, LN-Followed by Dr. Negrete) whom presents for evaluation of dyspepsia and the sensation like food has been sitting in the stomach. Patient notes he has been getting reflux more frequent and feels what he eats sits in his stomach. He notes he always passes gas and has never had an episode of emesis so does not fit picture of clinical obstruction. He notes that the lack of appetite is from lack of stools.   No concern for obstruction as passes gas and has no emesis  Constipation / Pan Dysmotility  -  Motegrity trial as this will help in Pan dysmotility  -  Omeprazole daily - Small more frequent meals  H Pylori - Treated - Stool Ag ordered

## 2023-08-31 NOTE — PHYSICAL EXAM
[Alert] : alert [Normal Voice/Communication] : normal voice/communication [Healthy Appearing] : healthy appearing [No Acute Distress] : no acute distress [Sclera] : the sclera and conjunctiva were normal [Hearing Threshold Finger Rub Not [place]] : hearing was normal [Normal Lips/Gums] : the lips and gums were normal [Oropharynx] : the oropharynx was normal [Normal Appearance] : the appearance of the neck was normal [No Neck Mass] : no neck mass was observed [No Respiratory Distress] : no respiratory distress [No Acc Muscle Use] : no accessory muscle use [Respiration, Rhythm And Depth] : normal respiratory rhythm and effort [Auscultation Breath Sounds / Voice Sounds] : lungs were clear to auscultation bilaterally [Heart Rate And Rhythm] : heart rate was normal and rhythm regular [Normal S1, S2] : normal S1 and S2 [Murmurs] : no murmurs [Bowel Sounds] : normal bowel sounds [Abdomen Tenderness] : non-tender [No Masses] : no abdominal mass palpated [Abdomen Soft] : soft [] : no hepatosplenomegaly [Oriented To Time, Place, And Person] : oriented to person, place, and time

## 2023-09-05 ENCOUNTER — OUTPATIENT (OUTPATIENT)
Dept: OUTPATIENT SERVICES | Facility: HOSPITAL | Age: 73
LOS: 1 days | End: 2023-09-05

## 2023-09-05 ENCOUNTER — APPOINTMENT (OUTPATIENT)
Dept: HEMATOLOGY ONCOLOGY | Facility: CLINIC | Age: 73
End: 2023-09-05

## 2023-09-05 DIAGNOSIS — Z98.890 OTHER SPECIFIED POSTPROCEDURAL STATES: Chronic | ICD-10-CM

## 2023-09-05 DIAGNOSIS — C25.9 MALIGNANT NEOPLASM OF PANCREAS, UNSPECIFIED: ICD-10-CM

## 2023-09-06 ENCOUNTER — APPOINTMENT (OUTPATIENT)
Dept: HEMATOLOGY ONCOLOGY | Facility: CLINIC | Age: 73
End: 2023-09-06

## 2023-09-06 ENCOUNTER — OUTPATIENT (OUTPATIENT)
Dept: OUTPATIENT SERVICES | Facility: HOSPITAL | Age: 73
LOS: 1 days | End: 2023-09-06
Payer: MEDICARE

## 2023-09-06 ENCOUNTER — APPOINTMENT (OUTPATIENT)
Dept: INFUSION THERAPY | Facility: CLINIC | Age: 73
End: 2023-09-06

## 2023-09-06 ENCOUNTER — APPOINTMENT (OUTPATIENT)
Dept: HEMATOLOGY ONCOLOGY | Facility: CLINIC | Age: 73
End: 2023-09-06
Payer: MEDICARE

## 2023-09-06 VITALS
RESPIRATION RATE: 16 BRPM | HEIGHT: 66 IN | HEART RATE: 83 BPM | TEMPERATURE: 98 F | BODY MASS INDEX: 21.86 KG/M2 | DIASTOLIC BLOOD PRESSURE: 81 MMHG | SYSTOLIC BLOOD PRESSURE: 138 MMHG | WEIGHT: 136 LBS

## 2023-09-06 DIAGNOSIS — C25.9 MALIGNANT NEOPLASM OF PANCREAS, UNSPECIFIED: ICD-10-CM

## 2023-09-06 DIAGNOSIS — Z98.890 OTHER SPECIFIED POSTPROCEDURAL STATES: Chronic | ICD-10-CM

## 2023-09-06 PROCEDURE — 99214 OFFICE O/P EST MOD 30 MIN: CPT

## 2023-09-06 NOTE — REVIEW OF SYSTEMS
[Abdominal Pain] : abdominal pain [Negative] : Allergic/Immunologic [Fever] : no fever [Night Sweats] : no night sweats [Dysphagia] : no dysphagia [Chest Pain] : no chest pain [Shortness Of Breath] : no shortness of breath [Vomiting] : no vomiting [Constipation] : no constipation [Easy Bleeding] : no tendency for easy bleeding [FreeTextEntry2] : poor appetite, lost 10lbs since last month [FreeTextEntry7] : reports abdominal discomfort x 3 weeks  [FreeTextEntry8] : reports urinary frequency x 1 week with occasional burning  [de-identified] : L chest port present  [de-identified] : endorses occasional neuropathy with long exposure to cold items , neuropathy in his hands is the same

## 2023-09-06 NOTE — HISTORY OF PRESENT ILLNESS
[Disease: _____________________] : Disease: [unfilled] [de-identified] : Mr. JINA SINGER is a 72 year old male here today for evaluation and management of Pancreatic Cancer.  \par  \par  He was referred by SURG, Dr. Morgan.  JINA is a 72 year old M with PMHx including HTN, HLD, T2DM, who presents to clinic to establish care.  Patient presented to ER back in 07/2022 with complaint of gradually worsening abdominal pain for the prior month, worse after eating and associated with 40lb weight loss in the last 2 months.  He underwent CT imaging which shows evidence suggestive of metastatic disease.  He also endorses constipation.  He underwent laparoscopy with findings of ~1L malignant ascites and peritoneal implants on 8.22.2022.  He is presently feeling well with no new complaints.  Patient denies fever, chills, nausea, vomiting, dysphagia, dyspnea, neuropathy or bleeding.  Patient reports family history of malignancy in his father (possibly stomach cancer) and mother (possibly colon).  Never a smoker.  NKDA.  \par  \par  RADIOLOGIC WORKUP\par  CT Chest (7.25.2022) IMPRESSION:Perigastric infiltrative process inseparable from the tail the pancreas extending to the proximal duodenum. Associated ascites. Findings again  suspicious for pancreatic or gastrointestinal neoplasm. Differential diagnosis includes partially contained ruptured gastric or duodenal ulcer as well as infectious process/pancreatitis.Hyperaerated lungs. Scattered granulomata. Vascular calcifications. Nonspecific areas of subpleural thickening bilaterally.\par  CT A/P (7.24.2022) IMPRESSION:Predominantly perigastric infiltrative soft tissue changes which are inseparable from the tail of pancreas and extend around the proximal  duodenum. Small volume abdominopelvic ascites. Differential includes underlying pancreatic or gastrointestinal neoplasm, sequelae of partially contained ruptured gastric or duodenal  ulcer, and infectious process or perhaps pancreatitis. Clinical correlation and/or further evaluation with EGD should be considered.Findings compatible with BPH. Superimposed cystitis is difficult to exclude on imaging.\par  \par  LAB WORKUP\par  (8.17.2022) WBC 8.36, Hgb 14.2, MCV 84, , normal diff, Cr 0.9, eGFR 91, normal LFTs\par  (7.25.2022) Ca19-9 is 6481, CEA 6.8, TSH 2.62\par  (10.23.2019) WBC 6.83, Hgb 15, MCV 85.7, \par  \par  PATHOLOGY\par  (see results section)\par  \par  HCM\par  Colonoscopy done about 5 years , polyps removed \par  Upper Endoscopy done 07/2022 showed 5x5 hypoechoic infiltrative mass (irregular borders) extending between the  muscularis propria (abuting it) and the territory of the pancreatic tail \par  COVID Vaccinated? [FreeTextEntry1] : Started Gemcitabine + Abraxane with onbody neulasta (D1, D8, D15 every 28 days) on 3.22.2023 - 8.23.2023 Started mFOLFIRINOX on 9.6.2022 (discontinued oxaliplatin beginning C12 on 2/22/23) - 2.24.2023 due to rising Ca19-9.   [de-identified] : 9/14/22 Patient is here for a follow-up visit for pancreatic cancer.  He is s/p initial cycle of mFOLFIRINOX on 9.6.2022.  He is feeling well with no new complaints.  Reviewed most recent CBC, which is stable.  Patient denies fever, chills, nausea, vomiting, dyspnea, neuropathy, persistent diarrhea or bleeding.  He states his appetite is still poor but slightly improving.  He is due for cycle 2 of chemotherapy next week.    10/4/22 Patient is here for a follow-up visit for pancreatic cancer.  He is due for cycle 3 of mFOLFIRINOX today, initiated on 9.6.2022.  He is feeling well with no new complaints.  Reviewed most recent CBC, which shows mild anemia, hgb 11.6g/dL and persistent leukocytosis, likely due to GCSF administration with chemotherapy.  ALK Phos is slightly higher at 198 but Ca19-9 is decreasing down to 28149F/mL.  Patient denies fever, chills, vomiting, dyspnea, neuropathy, CP, palpitations, abdominal pain, persistent diarrhea or bleeding.  He is endorses transient nausea which was not bothersome and he took anti-emetics.    11/2/22 Patient is here for a follow-up visit for pancreatic cancer.  He is due for cycle 4 of mFOLFIRINOX today, initiated on 9.6.2022.  He is feeling well with no new complaints.  Reviewed most recent CBC, which shows mild anemia, hgb 11.1g/dL and persistent leukocytosis, likely due to GCSF administration with chemotherapy.  ALK Phos is slightly lower at 138 but Ca19-9 is decreasing down to 4619U/mL.  Patient denies fever, chills, vomiting, dyspnea, CP, palpitations, abdominal pain, persistent diarrhea or bleeding.  He endorses occasional neuropathy with long exposure to cold items.    11/16/22 Patient is here for a follow-up visit for pancreatic cancer, accompanied by spouse.  He is due for cycle 6 of mFOLFIRINOX today, initiated on 9.6.2022.  He is feeling well with no new complaints.  Reviewed most recent CBC, which shows mild anemia, hgb 11.1g/dL and persistent leukocytosis, likely due to GCSF administration with chemotherapy.  ALK Phos is essentially stable at 146 but Ca19-9 is decreasing down to 2708U/mL.  Patient denies fever, chills, vomiting, dyspnea, CP, palpitations, abdominal pain, persistent diarrhea, mouth sores or bleeding.  He still notes occasional neuropathy with long exposure to cold items.  Reviewed most recent CT imaging which is essentially stable.   CT C/A/P (11.8.2022) IMPRESSION:1. No definite evidence of intrathoracic metastasis.Since 7/24/2022, no significant interval change.Near diffuse peritoneal carcinomatosis is not significantly changed.Diffuse tumor infiltration in the pancreatic tail (4/226), unchanged.Completely attenuated splenic vein is unchanged. Prominent venous collaterals in the left upper quadrant is unchanged.  11/30/22 Patient is here for a follow-up visit for pancreatic cancer.  He is due for cycle 7 of mFOLFIRINOX today, initiated on 9.6.2022.  Reviewed most recent CBC, which shows mild anemia, hgb 10.9g/dL and persistent leukocytosis, likely due to GCSF administration with chemotherapy.  ALK Phos is essentially stable at 138 and Ca19-9 is decreasing down to 1683U/mL.  Patient denies fever, chills, vomiting, dyspnea, CP, palpitations, abdominal pain, persistent diarrhea, mouth sores or bleeding.  He still notes occasional neuropathy with long exposure to cold items.   12/14/22 Patient is here for a follow-up visit for pancreatic cancer, accompanied by spouse via telephone.  He is due for cycle 8 of mFOLFIRINOX today, initiated on 9.6.2022.  Reviewed most recent CBC, which shows mild anemia, hgb 10.5g/dL and persistent leukocytosis, likely due to GCSF administration with chemotherapy.  ALK Phos is essentially stable at 124 and Ca19-9 is decreasing down to 1177U/mL.  Patient denies fever, chills, dyspnea, CP, palpitations, abdominal pain, persistent diarrhea, mouth sores or bleeding.  He has an occasional cough x 2 weeks; believes he had a mild cold which has been improving over the last 2 weeks but the cough is persistent.  He still notes occasional neuropathy with long exposure to cold items.   12/28/22 Patient is here for a follow-up visit for pancreatic cancer, accompanied by spouse via telephone.  Since last visit, patient had CXR which was suggestive for pneumonia.  He has completed antibiotics with improvement in symptoms.  Reviewed PULM consult with Dr. Escobar: plans to repeat CXR in about 6 weeks.  Patient is agreeable to resume chemotherapy.  He is due for cycle 9 of mFOLFIRINOX today, initiated on 9.6.2022.  Reviewed most recent CBC which shows mild anemia and neutrophil-predominant leukocytosis.  Tumor marker is down to 957 U/mL from 12/2022.   CXR (12.15.2022) Impression:Subsegmental left lower lobe consolidation.  1/11/23 Patient is here for a follow-up visit for pancreatic cancer, accompanied by spouse via telephone.  As per PULM consult with Dr. Escobar: plan to repeat CXR about 6 weeks following prior imaging.  He is due for cycle 10 of mFOLFIRINOX today, initiated on 9.6.2022.  Reviewed most recent CBC which shows mild anemia with hgb 10.6g/dL and neutrophil-predominant leukocytosis likely from GCSF administration.  Tumor marker is down to 693 U/mL from 1/2023.  He denies fevers, chills, worsening of neuropathy, dyspnea, mouth sores or bleeding.  Patient states he dislikes the effect of Benadryl pre-medication because it makes him drowsy and feel "off."  He is requesting to decrease Benadryl from 50mg to 25mg IV as pre-medicaiton.  Risks vs benefits discussed at length.    1/24/23 Patient is here for a follow-up visit for pancreatic cancer.  He is due for cycle 11 of mFOLFIRINOX today, initiated on 9.6.2022.  Reviewed most recent CBC which shows mild anemia with hgb 11.3g/dL, mild thrombocytopenia with PLTs 106,000 and neutrophil-predominant leukocytosis likely from GCSF administration.  Tumor marker is down to 693 U/mL from 1/2023.  He denies fevers, chills, worsening of neuropathy, dyspnea, new cough, mouth sores or bleeding.    2/7/23 Patient is here for a follow-up visit for pancreatic cancer.  He is due for cycle 12 of mFOLFIRINOX today, initiated on 9.6.2022.  Reviewed most recent CBC which shows mild anemia with hgb 11.3g/dL, mild thrombocytopenia with PLTs 106,000 and neutrophil-predominant leukocytosis likely from GCSF administration.  Tumor marker is down to 693 U/mL from 1/2023.  He denies fevers, chills, dyspnea, new cough, mouth sores or bleeding.  He states his neuropathy in his feet is slightly worse in both feet.    2/21/23 Patient is here for a follow-up visit for pancreatic cancer.  He is due for cycle 12 of chemotherapy on 2/22, initiated on 9.6.2022.  Reviewed most recent CBC which shows mild anemia with hgb 11g/dL.  Tumor marker is down to 457 U/mL from 02/2023.  He denies fevers, chills, dyspnea, new cough, mouth sores, persistent diarrhea or bleeding.  He endorses neuropathy in both feet and fingertips; started gabapentin 100mg TiD without resolution of symptoms.    3/7/23 Patient is here for a follow-up visit for pancreatic cancer.  He is due for cycle 13 of chemotherapy on 3/8, initiated on 9.6.2022.  Reviewed most recent CBC which shows mild anemia with hgb 11.6g/dL.  Tumor marker is slightly higher at 570 U/mL from 02/2023.  He denies fevers, chills, dyspnea, new cough, mouth sores, persistent diarrhea, new pain or bleeding.  He endorses neuropathy in both feet and fingertips; he is on gabapentin 600mg daily without resolution of symptoms.  He recently followed with GI, Dr. Maximilian Larson, and is anticipating upper EGD.  Reviewed US Duplex which showed no DVT.   US Duplex BI LE (3.3.2023) IMPRESSION:No evidence of deep venous thrombosis in either lower extremity.Bilateral popliteal cysts.  3/15/23 Patient is here for a follow-up visit for pancreatic cancer, accompanied by spouse Renate via telephone.  He is s/p cycle 12 of chemotherapy on 2/24, initiated on 9.6.2022.  Tumor marker almost doubled up to 1154 U/mL from 03/2023.  He was sent for PET/CT which showed FDG uptake within upper abdominal peritoneal nodularity adjacent to the stomach and pancreas, pancreatic tail, and a subcentimeter right pelvic sidewall lymph node.  He is presently feeling fine without new complaints.   PET/CT (3.9.2023) IMPRESSION:Abnormal FDG uptake within upper abdominal peritoneal nodularity adjacent to the stomach and pancreas (SUV up to 5.4), pancreatic tail (SUV 3.4) and a subcentimeter right pelvic sidewall lymph node (SUV 3.6).No other definite sites of abnormal FDG uptake to suggest biologic tumor activity.  3/21/23 Patient is here for a follow-up visit for pancreatic cancer, accompanied by spouse.  He is s/p cycle 12 of chemotherapy on 2/24, initiated on 9.6.2022.  Tumor marker almost doubled up to 1154 U/mL from 03/2023.  He is anticipating initiation of new chemotherapy regimen using Gemcitabine + Abraxane on 3.22.2023.    3/29/23 Patient is here for a follow-up visit for pancreatic cancer, accompanied by spouse.  Since last visit, patient started new chemotherapy regimen using Gemcitabine + Abraxane, initiated on 3.22.2023.  Tumor marker doubled again up to 2410U/mL from 03/2023.  Reviewed most recent CBC, which shows mild pancytopenia.  Patient denies fever, chills, nausea, vomiting, dyspnea, diarrhea or bleeding.  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    4/12/23 Patient is here for a follow-up visit for pancreatic cancer.  Patient is due for cycle 2 of Gemcitabine + Abraxane followed by Neulasta, initiated on 3.22.2023.  Reviewed most recent CBC, which shows mild leukopenia and anemia, hgb 11g/dL.  Patient denies fever, chills, nausea, vomiting, dyspnea, diarrhea or bleeding.  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    4/19/23 Patient is here for a follow-up visit for pancreatic cancer.  Patient is due for cycle 2 of Gemcitabine + Abraxane, initiated on 3.22.2023.  Reviewed most recent CBC, which shows mild leukopenia and anemia, hgb 11g/dL.  Patient denies fever, chills, nausea, vomiting, dyspnea, diarrhea or bleeding.  He reports slight worsening of neuropathy of fingers despite Gabapentin 600mg total daily.  Ca19-9 level is down to 2253U/mL.    5/17/23 Patient is here for a follow-up visit for pancreatic cancer.  Patient is due for cycle 3 of Gemcitabine + Abraxane followed by Neulasta, initiated on 3.22.2023.  Reviewed most recent CBC, which shows mild anemia, hgb 11.6g/dL.  Leukocytosis is likely due to GCSF.  Ca19-9 level is up to 3087U/mL.  Patient denies fever, chills, nausea, vomiting, dyspnea, diarrhea or bleeding.  He reports slight worsening of neuropathy of fingers despite Gabapentin 600mg total daily.    6/14/23 Patient is here for a follow-up visit for pancreatic cancer.  Patient is due for cycle 4 of Gemcitabine + Abraxane followed by Neulasta, initiated on 3.22.2023.  Reviewed most recent CBC, which shows mild anemia, hgb 10.9g/dL.  Leukocytosis is likely due to GCSF.  Ca19-9 level is down to 2884U/mL from last month.  He still has neuropathy of fingers despite Gabapentin 900mg total daily + Cymbalta.  Patient denies fever, chills, nausea, vomiting, dyspnea, diarrhea or bleeding.  He reports urinary frequency and occasional burning over the last week (he has had UTI in the past).  Reviewed most recent PET/CT which shows previously FDG avid peritoneal nodularity adjacent to  stomach and pancreas, pancreatic tail and right pelvic sidewall lymph node have improved to non-FDG avid status consistent with good treatment response.   PET/CT (6.6.2023) IMPRESSION:No sites of pathologic FDG uptake; therefore compared to 3/9/2023, no new sites of pathologic FDG uptake.  Previously FDG avid peritoneal nodularity adjacent to  stomach and pancreas, pancreatic tail and right pelvic sidewall lymph node have improved to non-FDG avid status consistent with good treatment response.    7/11/23 Patient is here for a follow-up visit for pancreatic cancer.  Patient is due for cycle 5 of Gemcitabine + Abraxane followed by Neulasta, initiated on 3.22.2023.  He had thrombocytopenia with last cycle so dose was adjusted accordingly as per guidelines.  Patient reports abdominal discomfort for the last 3 weeks or so which has affected his appetite; he lost about 10lbs in the last month.  Reviewed most recent CBC, which shows mild anemia, hgb 10.9g/dL.  Ca19-9 level was essentially stable at 2845U/mL from last month.  He still has neuropathy of fingers despite Gabapentin 900mg total daily + Cymbalta.  Patient denies fever, chills, nausea, vomiting, dyspnea, diarrhea or bleeding.  He experiences burning with urination and increased frequency.    July 2023 CT A/PSince November 8, 2022;1. Interval decrease in tumor infiltration involving pancreatic tail with new 2.0 cm cystic component.2. Interval decrease in peritoneal/omental carcinomatosis, as above.3. Chronic splenic vein occlusion with perigastric and mesenteric varices, unchanged.4. Chronic circumferential bladder wall thickening with asymmetric thickening of anterior bladder wall.  8/23/23 Patient is here for a follow-up visit for pancreatic cancer.  Patient is due for cycle 5 of Gemcitabine + Abraxane followed by Neulasta, initiated on 3.22.2023.  He had thrombocytopenia with last cycle so dose was adjusted accordingly as per guidelines.  Patient reports abdominal discomfort for the last 3 weeks or so which has affected his appetite; he lost about 10lbs in the last month.  Reviewed most recent CBC, which shows mild anemia, hgb 10.9g/dL.  Ca19-9 level is down from 9700 to 6729U/mL from last month.  He still has neuropathy of fingers despite Gabapentin 900mg total daily + Cymbalta.  Patient denies fever, chills, nausea, vomiting, dyspnea, diarrhea or bleeding.    9/6/23 Patient is here for a follow-up visit for pancreatic cancer, accompanied by spouse.  Patient completed about 6 cycles of Gemcitabine + Abraxane followed by Neulasta as of 8.23.2023, initiated on 3.22.2023.  Patient still endorses occasional abdominal discomfort but he does not require analgesic medication for relief.  Reviewed most recent CBC, which shows mild leukopenia and anemia, hgb 11.2g/dL.  Ca19-9 level is up to 8534U/mL from last month.  He still has neuropathy of fingers despite Gabapentin 900mg total daily + Cymbalta.  Patient denies fever, chills, nausea, vomiting, dyspnea, diarrhea or bleeding.  Reviewed most recent PET/CT which shows multiple new sites of FDG uptake including pancreatic tail mass, lymphadenopathy and peritoneal implants.   PET/CT (8.29.2023) IMPRESSION:Compared to 06/06/2023 multiple new sites of pathologic FDG uptake consistent with biologic tumor activity including tail of pancreas mass, paraortic and gastrohepatic adenopathy, and multiple peritoneal implants.Highest SUV 8.2 (implant to the right of the pancreatic head; 8 left of midline omental implants; 7.6 pancreatic tail).No other sites of pathologic FDG uptake.

## 2023-09-06 NOTE — PHYSICAL EXAM
[Restricted in physically strenuous activity but ambulatory and able to carry out work of a light or sedentary nature] : Status 1- Restricted in physically strenuous activity but ambulatory and able to carry out work of a light or sedentary nature, e.g., light house work, office work [Normal] : affect appropriate [de-identified] : wearing glasses  [de-identified] : L chest port present

## 2023-09-06 NOTE — ASSESSMENT
[Palliative] : Goals of care discussed with patient: Palliative [FreeTextEntry1] : # Metastatic moderate to poorly differentiated adenocarcinoma , dx 07/2022  - BRCA (-)  - s/p laparoscopy on 8.22.2022 with findings of ~1L malignant ascites and peritoneal implants  - Ca19-9 is 6481, CEA 6.8 from 07/2022  - reviewed radiology, pathology and lab workup and had a discussion regarding implications of diagnosis, prognosis and options for management including but not limited to chemotherapy for palliative intent ; had a lengthy discussion regarding Stage IV disease and not curative  - s/p L Chest port placement with SURG, Dr. Morgan  - CT CAP from 11.8.2022 is essentially stable, tumor marker is decreasing  - s/p cycle 12 of 5FU/Leucovorin/Irinotecan on 2/24 , initiated on 9.6.2022 ; discontinued oxaliplatin beginning C12 due to neuropathy  - STOPPED 5FU/Leucovorin/Irinotecan due to rising Ca19-9 and PET/CT findings - PET/CT from 3.9.23 showed FDG uptake within upper abdominal peritoneal nodularity adjacent to the stomach and pancreas, pancreatic tail, and a subcentimeter right pelvic sidewall lymph node.   - Patient successfully certified for utilization of Medical Marijuana on 04/12/2023.   - PET/CT 6.6.2023 shows previously FDG avid peritoneal nodularity adjacent to  stomach and pancreas, pancreatic tail and right pelvic sidewall lymph node have improved to non-FDG avid status consistent with good treatment response.   - CT A/P from 07/2023: interval decrease ; CA 19-9 keeps decreasing; continue same regimen - PET/CT 8.29.2023 shows multiple new sites of FDG uptake including pancreatic tail mass, lymphadenopathy and peritoneal implants.  - STOP chemotherapy following 6 cycles of Gemcitabine + Abraxane on 8.23.2023 due to progression on PET/CT and rising tumor marker - explained that since patient progressed on FOLFIRINOX as well as next line therapy with [place]/Abraxane, advised to proceed with clinical trial.  He was educated on active clinical trials at [facility] and referred accordingly but hesitant to proceed with clinical trial.  Options of systemic chemo are limited; might need to suggest clinical trial or hospice if no other options are available.  He will inquire if Dr. Acuna can perform TELEhealth consult since his concern is regarding traveling back and forth to the city.  Discussed looking into options through [facility] such as [employer]-[facility]  trial but patient states [place] is more feasible than [place].  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 Pylori infection , dx 07/2022  - s/p upper EGD in 07/2022 ; completed antibiotic treatment  - followup with GI, Dr. Maximilian Larson, as scheduled for management   # Thrombocytopenia, likely due to chemotherapy  - C1D15 of Gemcitabine/Abraxane held due to low PLTs  - resolved   RTC in 2 weeks with Dr. Che with CBC, BMP, LFTs, Ca19-9

## 2023-09-12 NOTE — HISTORY OF PRESENT ILLNESS
[Disease: _____________________] : Disease: [unfilled] [Therapy: ___] : Therapy: [unfilled] [Cycle: ___] : Cycle: [unfilled] [de-identified] : Mr. JINA SINGER is a 72 year old male here today for evaluation and management of Pancreatic Cancer.  \par \par He was referred by SURG, Dr. Morgan.  JINA is a 72 year old M with PMHx including HTN, HLD, T2DM, who presents to clinic to establish care.  Patient presented to ER back in 07/2022 with complaint of gradually worsening abdominal pain for the prior month, worse after eating and associated with 40lb weight loss in the last 2 months.  He underwent CT imaging which shows evidence suggestive of metastatic disease.  He also endorses constipation.  He underwent laparoscopy with findings of ~1L malignant ascites and peritoneal implants on 8.22.2022.  He is presently feeling well with no new complaints.  Patient denies fever, chills, nausea, vomiting, dysphagia, dyspnea, neuropathy or bleeding.  Patient reports family history of malignancy in his father (possibly stomach cancer) and mother (possibly colon).  Never a smoker.  NKDA.  \par \par RADIOLOGIC WORKUP\par CT Chest (7.25.2022) IMPRESSION:Perigastric infiltrative process inseparable from the tail the pancreas extending to the proximal duodenum. Associated ascites. Findings again  suspicious for pancreatic or gastrointestinal neoplasm. Differential diagnosis includes partially contained ruptured gastric or duodenal ulcer as well as infectious process/pancreatitis.Hyperaerated lungs. Scattered granulomata. Vascular calcifications. Nonspecific areas of subpleural thickening bilaterally.\par CT A/P (7.24.2022) IMPRESSION:Predominantly perigastric infiltrative soft tissue changes which are inseparable from the tail of pancreas and extend around the proximal  duodenum. Small volume abdominopelvic ascites. Differential includes underlying pancreatic or gastrointestinal neoplasm, sequelae of partially contained ruptured gastric or duodenal  ulcer, and infectious process or perhaps pancreatitis. Clinical correlation and/or further evaluation with EGD should be considered.Findings compatible with BPH. Superimposed cystitis is difficult to exclude on imaging.\par \par LAB WORKUP\par (8.17.2022) WBC 8.36, Hgb 14.2, MCV 84, , normal diff, Cr 0.9, eGFR 91, normal LFTs\par (7.25.2022) Ca19-9 is 6481, CEA 6.8, TSH 2.62\par (10.23.2019) WBC 6.83, Hgb 15, MCV 85.7, \par \par PATHOLOGY\par (see results section)\par \par HCM\par Colonoscopy done about 5 years , polyps removed \par Upper Endoscopy done 07/2022 showed 5x5 hypoechoic infiltrative mass (irregular borders) extending between the  muscularis propria (abuting it) and the territory of the pancreatic tail \par COVID Vaccinated? [FreeTextEntry1] : Started Gemcitabine + Abraxane with onbody neulasta (D1, D8, D15 every 28 days) on 3.22.2023 \par Started mFOLFIRINOX on 9.6.2022 (discontinued oxaliplatin beginning C12 on 2/22/23) - 2.24.2023 due to rising Ca19-9.   [de-identified] : 9/14/22\[facility] Patient is here for a follow-up visit for pancreatic cancer.  He is s/p initial cycle of mFOLFIRINOX on 9.6.2022.  He is feeling well with no new complaints.  Reviewed most recent CBC, which is stable.  Patient denies fever, chills, nausea, vomiting, dyspnea, neuropathy, persistent diarrhea or bleeding.  He states his appetite is still poor but slightly improving.  He is due for cycle 2 of chemotherapy next week.  \par \par 10/4/22\[facility] Patient is here for a follow-up visit for pancreatic cancer.  He is due for cycle 3 of mFOLFIRINOX today, initiated on 9.6.2022.  He is feeling well with no new complaints.  Reviewed most recent CBC, which shows mild anemia, hgb 11.6g/dL and persistent leukocytosis, likely due to GCSF administration with chemotherapy.  ALK Phos is slightly higher at 198 but Ca19-9 is decreasing down to 38401B/mL.  Patient denies fever, chills, vomiting, dyspnea, neuropathy, CP, palpitations, abdominal pain, persistent diarrhea or bleeding.  He is endorses transient nausea which was not bothersome and he took anti-emetics.  \par \par 11/2/22\[facility] Patient is here for a follow-up visit for pancreatic cancer.  He is due for cycle 4 of mFOLFIRINOX today, initiated on 9.6.2022.  He is feeling well with no new complaints.  Reviewed most recent CBC, which shows mild anemia, hgb 11.1g/dL and persistent leukocytosis, likely due to GCSF administration with chemotherapy.  ALK Phos is slightly lower at 138 but Ca19-9 is decreasing down to 4619U/mL.  Patient denies fever, chills, vomiting, dyspnea, CP, palpitations, abdominal pain, persistent diarrhea or bleeding.  He endorses occasional neuropathy with long exposure to cold items.  \par \par 11/16/22\[facility] Patient is here for a follow-up visit for pancreatic cancer, accompanied by spouse.  He is due for cycle 6 of mFOLFIRINOX today, initiated on 9.6.2022.  He is feeling well with no new complaints.  Reviewed most recent CBC, which shows mild anemia, hgb 11.1g/dL and persistent leukocytosis, likely due to GCSF administration with chemotherapy.  ALK Phos is essentially stable at 146 but Ca19-9 is decreasing down to 2708U/mL.  Patient denies fever, chills, vomiting, dyspnea, CP, palpitations, abdominal pain, persistent diarrhea, mouth sores or bleeding.  He still notes occasional neuropathy with long exposure to cold items.  Reviewed most recent CT imaging which is essentially stable.  \par CT C/A/P (11.8.2022) IMPRESSION:1. No definite evidence of intrathoracic metastasis.Since 7/24/2022, no significant interval change.Near diffuse peritoneal carcinomatosis is not significantly changed.Diffuse tumor infiltration in the pancreatic tail (4/226), unchanged.Completely attenuated splenic vein is unchanged. Prominent venous collaterals in the left upper quadrant is unchanged.\par \par 11/30/22\par Patient is here for a follow-up visit for pancreatic cancer.  He is due for cycle 7 of mFOLFIRINOX today, initiated on 9.6.2022.  Reviewed most recent CBC, which shows mild anemia, hgb 10.9g/dL and persistent leukocytosis, likely due to GCSF administration with chemotherapy.  ALK Phos is essentially stable at 138 and Ca19-9 is decreasing down to 1683U/mL.  Patient denies fever, chills, vomiting, dyspnea, CP, palpitations, abdominal pain, persistent diarrhea, mouth sores or bleeding.  He still notes occasional neuropathy with long exposure to cold items. \par \par 12/14/22\par Patient is here for a follow-up visit for pancreatic cancer, accompanied by spouse via telephone.  He is due for cycle 8 of mFOLFIRINOX today, initiated on 9.6.2022.  Reviewed most recent CBC, which shows mild anemia, hgb 10.5g/dL and persistent leukocytosis, likely due to GCSF administration with chemotherapy.  ALK Phos is essentially stable at 124 and Ca19-9 is decreasing down to 1177U/mL.  Patient denies fever, chills, dyspnea, CP, palpitations, abdominal pain, persistent diarrhea, mouth sores or bleeding.  He has an occasional cough x 2 weeks; believes he had a mild cold which has been improving over the last 2 weeks but the cough is persistent.  He still notes occasional neuropathy with long exposure to cold items. \par \par 12/28/22\par Patient is here for a follow-up visit for pancreatic cancer, accompanied by spouse via telephone.  Since last visit, patient had CXR which was suggestive for pneumonia.  He has completed antibiotics with improvement in symptoms.  Reviewed PULM consult with Dr. Escobar: plans to repeat CXR in about 6 weeks.  Patient is agreeable to resume chemotherapy.  He is due for cycle 9 of mFOLFIRINOX today, initiated on 9.6.2022.  Reviewed most recent CBC which shows mild anemia and neutrophil-predominant leukocytosis.  Tumor marker is down to 957 U/mL from 12/2022.  \par CXR (12.15.2022) Impression:Subsegmental left lower lobe consolidation.\par \par 1/11/23\par Patient is here for a follow-up visit for pancreatic cancer, accompanied by spouse via telephone.  As per PULM consult with Dr. Escobar: plan to repeat CXR about 6 weeks following prior imaging.  He is due for cycle 10 of mFOLFIRINOX today, initiated on 9.6.2022.  Reviewed most recent CBC which shows mild anemia with hgb 10.6g/dL and neutrophil-predominant leukocytosis likely from GCSF administration.  Tumor marker is down to 693 U/mL from 1/2023.  He denies fevers, chills, worsening of neuropathy, dyspnea, mouth sores or bleeding.  Patient states he dislikes the effect of Benadryl pre-medication because it makes him drowsy and feel "off."  He is requesting to decrease Benadryl from 50mg to 25mg IV as pre-medicaiton.  Risks vs benefits discussed at length.  \par \par 1/24/23\par Patient is here for a follow-up visit for pancreatic cancer.  He is due for cycle 11 of mFOLFIRINOX today, initiated on 9.6.2022.  Reviewed most recent CBC which shows mild anemia with hgb 11.3g/dL, mild thrombocytopenia with PLTs 106,000 and neutrophil-predominant leukocytosis likely from GCSF administration.  Tumor marker is down to 693 U/mL from 1/2023.  He denies fevers, chills, worsening of neuropathy, dyspnea, new cough, mouth sores or bleeding.  \par \par 2/7/23\par Patient is here for a follow-up visit for pancreatic cancer.  He is due for cycle 12 of mFOLFIRINOX today, initiated on 9.6.2022.  Reviewed most recent CBC which shows mild anemia with hgb 11.3g/dL, mild thrombocytopenia with PLTs 106,000 and neutrophil-predominant leukocytosis likely from GCSF administration.  Tumor marker is down to 693 U/mL from 1/2023.  He denies fevers, chills, dyspnea, new cough, mouth sores or bleeding.  He states his neuropathy in his feet is slightly worse in both feet.  \par \par 2/21/23\par Patient is here for a follow-up visit for pancreatic cancer.  He is due for cycle 12 of chemotherapy on 2/22, initiated on 9.6.2022.  Reviewed most recent CBC which shows mild anemia with hgb 11g/dL.  Tumor marker is down to 457 U/mL from 02/2023.  He denies fevers, chills, dyspnea, new cough, mouth sores, persistent diarrhea or bleeding.  He endorses neuropathy in both feet and fingertips; started gabapentin 100mg TiD without resolution of symptoms.  \par \par 3/7/23\par Patient is here for a follow-up visit for pancreatic cancer.  He is due for cycle 13 of chemotherapy on 3/8, initiated on 9.6.2022.  Reviewed most recent CBC which shows mild anemia with hgb 11.6g/dL.  Tumor marker is slightly higher at 570 U/mL from 02/2023.  He denies fevers, chills, dyspnea, new cough, mouth sores, persistent diarrhea, new pain or bleeding.  He endorses neuropathy in both feet and fingertips; he is on gabapentin 600mg daily without resolution of symptoms.  He recently followed with GI, Dr. Maximilian Larson, and is anticipating upper EGD.  Reviewed US Duplex which showed no DVT.  \par US Duplex BI LE (3.3.2023) IMPRESSION:No evidence of deep venous thrombosis in either lower extremity.Bilateral popliteal cysts.\par \par 3/15/23\par Patient is here for a follow-up visit for pancreatic cancer, accompanied by spouse Renate via telephone.  He is s/p cycle 12 of chemotherapy on 2/24, initiated on 9.6.2022.  Tumor marker almost doubled up to 1154 U/mL from 03/2023.  He was sent for PET/CT which showed FDG uptake within upper abdominal peritoneal nodularity adjacent to the stomach and pancreas, pancreatic tail, and a subcentimeter right pelvic sidewall lymph node.  He is presently feeling fine without new complaints.  \[facility] PET/CT (3.9.2023) IMPRESSION:Abnormal FDG uptake within upper abdominal peritoneal nodularity adjacent to the stomach and pancreas (SUV up to 5.4), pancreatic tail (SUV 3.4) and a subcentimeter right pelvic sidewall lymph node (SUV 3.6).No other definite sites of abnormal FDG uptake to suggest biologic tumor activity.\par \[facility] 3/21/23\[facility] Patient is here for a follow-up visit for pancreatic cancer, accompanied by spouse.  He is s/p cycle 12 of chemotherapy on 2/24, initiated on 9.6.2022.  Tumor marker almost doubled up to 1154 U/mL from 03/2023.  He is anticipating initiation of new chemotherapy regimen using Gemcitabine + Abraxane on 3.22.2023.  \par \[facility] 3/29/23[facility] Patient is here for a follow-up visit for pancreatic cancer, accompanied by spouse.  Since last visit, patient started new chemotherapy regimen using Gemcitabine + Abraxane, initiated on 3.22.2023.  Tumor marker doubled again up to 2410U/mL from 03/2023.  Reviewed most recent CBC, which shows mild pancytopenia.  Patient denies fever, chills, nausea, vomiting, dyspnea, diarrhea or bleeding.  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  \par \[facility] 4/12/23[facility] Patient is here for a follow-up visit for pancreatic cancer.  Patient is due for cycle 2 of Gemcitabine + Abraxane followed by Neulasta, initiated on 3.22.2023.  Reviewed most recent CBC, which shows mild leukopenia and anemia, hgb 11g/dL.  Patient denies fever, chills, nausea, vomiting, dyspnea, diarrhea or bleeding.  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  \par \[facility] 4/19/23[facility] Patient is here for a follow-up visit for pancreatic cancer.  Patient is due for cycle 2 of Gemcitabine + Abraxane, initiated on 3.22.2023.  Reviewed most recent CBC, which shows mild leukopenia and anemia, hgb 11g/dL.  Patient denies fever, chills, nausea, vomiting, dyspnea, diarrhea or bleeding.  He reports slight worsening of neuropathy of fingers despite Gabapentin 600mg total daily.  Ca19-9 level is down to 2253U/mL.  \par \par 5/17/23\par Patient is here for a follow-up visit for pancreatic cancer.  Patient is due for cycle 3 of Gemcitabine + Abraxane followed by Neulasta, initiated on 3.22.2023.  Reviewed most recent CBC, which shows mild anemia, hgb 11.6g/dL.  Leukocytosis is likely due to GCSF.  Ca19-9 level is up to 3087U/mL.  Patient denies fever, chills, nausea, vomiting, dyspnea, diarrhea or bleeding.  He reports slight worsening of neuropathy of fingers despite Gabapentin 600mg total daily.   No abnormalities

## 2023-09-20 ENCOUNTER — LABORATORY RESULT (OUTPATIENT)
Age: 73
End: 2023-09-20

## 2023-09-20 ENCOUNTER — OUTPATIENT (OUTPATIENT)
Dept: OUTPATIENT SERVICES | Facility: HOSPITAL | Age: 73
LOS: 1 days | End: 2023-09-20
Payer: MEDICARE

## 2023-09-20 ENCOUNTER — APPOINTMENT (OUTPATIENT)
Dept: HEMATOLOGY ONCOLOGY | Facility: CLINIC | Age: 73
End: 2023-09-20
Payer: MEDICARE

## 2023-09-20 ENCOUNTER — APPOINTMENT (OUTPATIENT)
Dept: INFUSION THERAPY | Facility: CLINIC | Age: 73
End: 2023-09-20

## 2023-09-20 ENCOUNTER — APPOINTMENT (OUTPATIENT)
Dept: HEMATOLOGY ONCOLOGY | Facility: CLINIC | Age: 73
End: 2023-09-20

## 2023-09-20 VITALS
HEIGHT: 66 IN | HEART RATE: 70 BPM | BODY MASS INDEX: 20.41 KG/M2 | WEIGHT: 127 LBS | SYSTOLIC BLOOD PRESSURE: 133 MMHG | TEMPERATURE: 97.9 F | RESPIRATION RATE: 16 BRPM | DIASTOLIC BLOOD PRESSURE: 65 MMHG

## 2023-09-20 DIAGNOSIS — C25.9 MALIGNANT NEOPLASM OF PANCREAS, UNSPECIFIED: ICD-10-CM

## 2023-09-20 DIAGNOSIS — C80.1 MALIGNANT (PRIMARY) NEOPLASM, UNSPECIFIED: ICD-10-CM

## 2023-09-20 DIAGNOSIS — G62.9 POLYNEUROPATHY, UNSPECIFIED: ICD-10-CM

## 2023-09-20 DIAGNOSIS — T50.905A OTHER SECONDARY THROMBOCYTOPENIA: ICD-10-CM

## 2023-09-20 DIAGNOSIS — D69.59 OTHER SECONDARY THROMBOCYTOPENIA: ICD-10-CM

## 2023-09-20 DIAGNOSIS — Z51.11 ENCOUNTER FOR ANTINEOPLASTIC CHEMOTHERAPY: ICD-10-CM

## 2023-09-20 DIAGNOSIS — D63.0 MALIGNANT (PRIMARY) NEOPLASM, UNSPECIFIED: ICD-10-CM

## 2023-09-20 DIAGNOSIS — R97.8 OTHER ABNORMAL TUMOR MARKERS: ICD-10-CM

## 2023-09-20 DIAGNOSIS — Z98.890 OTHER SPECIFIED POSTPROCEDURAL STATES: Chronic | ICD-10-CM

## 2023-09-20 LAB
HCT VFR BLD CALC: 36.3 %
HGB BLD-MCNC: 11.9 G/DL
MCHC RBC-ENTMCNC: 27.6 PG
MCHC RBC-ENTMCNC: 32.8 G/DL
MCV RBC AUTO: 84.2 FL
PLATELET # BLD AUTO: 194 K/UL
PMV BLD: 8.3 FL
RBC # BLD: 4.31 M/UL
RBC # FLD: 15.8 %
WBC # FLD AUTO: 7.04 K/UL

## 2023-09-20 PROCEDURE — 80048 BASIC METABOLIC PNL TOTAL CA: CPT

## 2023-09-20 PROCEDURE — 85027 COMPLETE CBC AUTOMATED: CPT

## 2023-09-20 PROCEDURE — 99215 OFFICE O/P EST HI 40 MIN: CPT

## 2023-09-20 PROCEDURE — 86301 IMMUNOASSAY TUMOR CA 19-9: CPT

## 2023-09-20 PROCEDURE — 80076 HEPATIC FUNCTION PANEL: CPT

## 2023-09-21 DIAGNOSIS — C80.1 MALIGNANT (PRIMARY) NEOPLASM, UNSPECIFIED: ICD-10-CM

## 2023-09-21 LAB
ALBUMIN SERPL ELPH-MCNC: 4.2 G/DL
ALP BLD-CCNC: 87 U/L
ALT SERPL-CCNC: 12 U/L
ANION GAP SERPL CALC-SCNC: 10 MMOL/L
AST SERPL-CCNC: 15 U/L
BILIRUB DIRECT SERPL-MCNC: <0.2 MG/DL
BILIRUB INDIRECT SERPL-MCNC: NORMAL MG/DL
BILIRUB SERPL-MCNC: 0.3 MG/DL
BUN SERPL-MCNC: 8 MG/DL
CALCIUM SERPL-MCNC: 9.7 MG/DL
CANCER AG19-9 SERPL-ACNC: ABNORMAL U/ML
CHLORIDE SERPL-SCNC: 101 MMOL/L
CO2 SERPL-SCNC: 28 MMOL/L
CREAT SERPL-MCNC: 0.8 MG/DL
EGFR: 94 ML/MIN/1.73M2
GLUCOSE SERPL-MCNC: 101 MG/DL
POTASSIUM SERPL-SCNC: 4.3 MMOL/L
PROT SERPL-MCNC: 7.7 G/DL
SODIUM SERPL-SCNC: 139 MMOL/L

## 2023-09-27 ENCOUNTER — APPOINTMENT (OUTPATIENT)
Dept: INFUSION THERAPY | Facility: CLINIC | Age: 73
End: 2023-09-27

## 2023-10-04 ENCOUNTER — APPOINTMENT (OUTPATIENT)
Dept: INFUSION THERAPY | Facility: CLINIC | Age: 73
End: 2023-10-04

## 2023-10-18 ENCOUNTER — APPOINTMENT (OUTPATIENT)
Dept: INFUSION THERAPY | Facility: CLINIC | Age: 73
End: 2023-10-18

## 2023-10-18 ENCOUNTER — APPOINTMENT (OUTPATIENT)
Dept: HEMATOLOGY ONCOLOGY | Facility: CLINIC | Age: 73
End: 2023-10-18

## 2023-11-03 LAB — H PYLORI AG STL QL: NEGATIVE

## 2023-12-03 ENCOUNTER — INPATIENT (INPATIENT)
Facility: HOSPITAL | Age: 73
LOS: 5 days | Discharge: HOME | DRG: 381 | End: 2023-12-09
Attending: HOSPITALIST | Admitting: INTERNAL MEDICINE
Payer: MEDICARE

## 2023-12-03 VITALS
RESPIRATION RATE: 19 BRPM | DIASTOLIC BLOOD PRESSURE: 70 MMHG | SYSTOLIC BLOOD PRESSURE: 112 MMHG | OXYGEN SATURATION: 99 % | TEMPERATURE: 98 F | HEART RATE: 100 BPM

## 2023-12-03 DIAGNOSIS — Z98.890 OTHER SPECIFIED POSTPROCEDURAL STATES: Chronic | ICD-10-CM

## 2023-12-03 DIAGNOSIS — K56.609 UNSPECIFIED INTESTINAL OBSTRUCTION, UNSPECIFIED AS TO PARTIAL VERSUS COMPLETE OBSTRUCTION: ICD-10-CM

## 2023-12-03 LAB
ALBUMIN SERPL ELPH-MCNC: 4.1 G/DL — SIGNIFICANT CHANGE UP (ref 3.5–5.2)
ALBUMIN SERPL ELPH-MCNC: 4.1 G/DL — SIGNIFICANT CHANGE UP (ref 3.5–5.2)
ALP SERPL-CCNC: 115 U/L — SIGNIFICANT CHANGE UP (ref 30–115)
ALP SERPL-CCNC: 115 U/L — SIGNIFICANT CHANGE UP (ref 30–115)
ALT FLD-CCNC: 10 U/L — SIGNIFICANT CHANGE UP (ref 0–41)
ALT FLD-CCNC: 10 U/L — SIGNIFICANT CHANGE UP (ref 0–41)
ANION GAP SERPL CALC-SCNC: 11 MMOL/L — SIGNIFICANT CHANGE UP (ref 7–14)
ANION GAP SERPL CALC-SCNC: 11 MMOL/L — SIGNIFICANT CHANGE UP (ref 7–14)
AST SERPL-CCNC: 19 U/L — SIGNIFICANT CHANGE UP (ref 0–41)
AST SERPL-CCNC: 19 U/L — SIGNIFICANT CHANGE UP (ref 0–41)
BASOPHILS # BLD AUTO: 0.04 K/UL — SIGNIFICANT CHANGE UP (ref 0–0.2)
BASOPHILS # BLD AUTO: 0.04 K/UL — SIGNIFICANT CHANGE UP (ref 0–0.2)
BASOPHILS NFR BLD AUTO: 0.6 % — SIGNIFICANT CHANGE UP (ref 0–1)
BASOPHILS NFR BLD AUTO: 0.6 % — SIGNIFICANT CHANGE UP (ref 0–1)
BILIRUB SERPL-MCNC: 0.7 MG/DL — SIGNIFICANT CHANGE UP (ref 0.2–1.2)
BILIRUB SERPL-MCNC: 0.7 MG/DL — SIGNIFICANT CHANGE UP (ref 0.2–1.2)
BUN SERPL-MCNC: 16 MG/DL — SIGNIFICANT CHANGE UP (ref 10–20)
BUN SERPL-MCNC: 16 MG/DL — SIGNIFICANT CHANGE UP (ref 10–20)
CALCIUM SERPL-MCNC: 9.7 MG/DL — SIGNIFICANT CHANGE UP (ref 8.4–10.5)
CALCIUM SERPL-MCNC: 9.7 MG/DL — SIGNIFICANT CHANGE UP (ref 8.4–10.5)
CHLORIDE SERPL-SCNC: 96 MMOL/L — LOW (ref 98–110)
CHLORIDE SERPL-SCNC: 96 MMOL/L — LOW (ref 98–110)
CO2 SERPL-SCNC: 28 MMOL/L — SIGNIFICANT CHANGE UP (ref 17–32)
CO2 SERPL-SCNC: 28 MMOL/L — SIGNIFICANT CHANGE UP (ref 17–32)
CREAT SERPL-MCNC: 0.9 MG/DL — SIGNIFICANT CHANGE UP (ref 0.7–1.5)
CREAT SERPL-MCNC: 0.9 MG/DL — SIGNIFICANT CHANGE UP (ref 0.7–1.5)
EGFR: 91 ML/MIN/1.73M2 — SIGNIFICANT CHANGE UP
EGFR: 91 ML/MIN/1.73M2 — SIGNIFICANT CHANGE UP
EOSINOPHIL # BLD AUTO: 0.09 K/UL — SIGNIFICANT CHANGE UP (ref 0–0.7)
EOSINOPHIL # BLD AUTO: 0.09 K/UL — SIGNIFICANT CHANGE UP (ref 0–0.7)
EOSINOPHIL NFR BLD AUTO: 1.3 % — SIGNIFICANT CHANGE UP (ref 0–8)
EOSINOPHIL NFR BLD AUTO: 1.3 % — SIGNIFICANT CHANGE UP (ref 0–8)
GLUCOSE SERPL-MCNC: 116 MG/DL — HIGH (ref 70–99)
GLUCOSE SERPL-MCNC: 116 MG/DL — HIGH (ref 70–99)
HCT VFR BLD CALC: 36.3 % — LOW (ref 42–52)
HCT VFR BLD CALC: 36.3 % — LOW (ref 42–52)
HGB BLD-MCNC: 12.3 G/DL — LOW (ref 14–18)
HGB BLD-MCNC: 12.3 G/DL — LOW (ref 14–18)
IMM GRANULOCYTES NFR BLD AUTO: 0.1 % — SIGNIFICANT CHANGE UP (ref 0.1–0.3)
IMM GRANULOCYTES NFR BLD AUTO: 0.1 % — SIGNIFICANT CHANGE UP (ref 0.1–0.3)
LACTATE SERPL-SCNC: 1.7 MMOL/L — SIGNIFICANT CHANGE UP (ref 0.7–2)
LACTATE SERPL-SCNC: 1.7 MMOL/L — SIGNIFICANT CHANGE UP (ref 0.7–2)
LIDOCAIN IGE QN: 12 U/L — SIGNIFICANT CHANGE UP (ref 7–60)
LIDOCAIN IGE QN: 12 U/L — SIGNIFICANT CHANGE UP (ref 7–60)
LYMPHOCYTES # BLD AUTO: 1.25 K/UL — SIGNIFICANT CHANGE UP (ref 1.2–3.4)
LYMPHOCYTES # BLD AUTO: 1.25 K/UL — SIGNIFICANT CHANGE UP (ref 1.2–3.4)
LYMPHOCYTES # BLD AUTO: 18.5 % — LOW (ref 20.5–51.1)
LYMPHOCYTES # BLD AUTO: 18.5 % — LOW (ref 20.5–51.1)
MCHC RBC-ENTMCNC: 28.3 PG — SIGNIFICANT CHANGE UP (ref 27–31)
MCHC RBC-ENTMCNC: 28.3 PG — SIGNIFICANT CHANGE UP (ref 27–31)
MCHC RBC-ENTMCNC: 33.9 G/DL — SIGNIFICANT CHANGE UP (ref 32–37)
MCHC RBC-ENTMCNC: 33.9 G/DL — SIGNIFICANT CHANGE UP (ref 32–37)
MCV RBC AUTO: 83.6 FL — SIGNIFICANT CHANGE UP (ref 80–94)
MCV RBC AUTO: 83.6 FL — SIGNIFICANT CHANGE UP (ref 80–94)
MONOCYTES # BLD AUTO: 0.33 K/UL — SIGNIFICANT CHANGE UP (ref 0.1–0.6)
MONOCYTES # BLD AUTO: 0.33 K/UL — SIGNIFICANT CHANGE UP (ref 0.1–0.6)
MONOCYTES NFR BLD AUTO: 4.9 % — SIGNIFICANT CHANGE UP (ref 1.7–9.3)
MONOCYTES NFR BLD AUTO: 4.9 % — SIGNIFICANT CHANGE UP (ref 1.7–9.3)
NEUTROPHILS # BLD AUTO: 5.02 K/UL — SIGNIFICANT CHANGE UP (ref 1.4–6.5)
NEUTROPHILS # BLD AUTO: 5.02 K/UL — SIGNIFICANT CHANGE UP (ref 1.4–6.5)
NEUTROPHILS NFR BLD AUTO: 74.6 % — SIGNIFICANT CHANGE UP (ref 42.2–75.2)
NEUTROPHILS NFR BLD AUTO: 74.6 % — SIGNIFICANT CHANGE UP (ref 42.2–75.2)
NRBC # BLD: 0 /100 WBCS — SIGNIFICANT CHANGE UP (ref 0–0)
NRBC # BLD: 0 /100 WBCS — SIGNIFICANT CHANGE UP (ref 0–0)
PLATELET # BLD AUTO: 256 K/UL — SIGNIFICANT CHANGE UP (ref 130–400)
PLATELET # BLD AUTO: 256 K/UL — SIGNIFICANT CHANGE UP (ref 130–400)
PMV BLD: 8.9 FL — SIGNIFICANT CHANGE UP (ref 7.4–10.4)
PMV BLD: 8.9 FL — SIGNIFICANT CHANGE UP (ref 7.4–10.4)
POTASSIUM SERPL-MCNC: 4.1 MMOL/L — SIGNIFICANT CHANGE UP (ref 3.5–5)
POTASSIUM SERPL-MCNC: 4.1 MMOL/L — SIGNIFICANT CHANGE UP (ref 3.5–5)
POTASSIUM SERPL-SCNC: 4.1 MMOL/L — SIGNIFICANT CHANGE UP (ref 3.5–5)
POTASSIUM SERPL-SCNC: 4.1 MMOL/L — SIGNIFICANT CHANGE UP (ref 3.5–5)
PROT SERPL-MCNC: 8.4 G/DL — HIGH (ref 6–8)
PROT SERPL-MCNC: 8.4 G/DL — HIGH (ref 6–8)
RBC # BLD: 4.34 M/UL — LOW (ref 4.7–6.1)
RBC # BLD: 4.34 M/UL — LOW (ref 4.7–6.1)
RBC # FLD: 18.6 % — HIGH (ref 11.5–14.5)
RBC # FLD: 18.6 % — HIGH (ref 11.5–14.5)
SODIUM SERPL-SCNC: 135 MMOL/L — SIGNIFICANT CHANGE UP (ref 135–146)
SODIUM SERPL-SCNC: 135 MMOL/L — SIGNIFICANT CHANGE UP (ref 135–146)
WBC # BLD: 6.74 K/UL — SIGNIFICANT CHANGE UP (ref 4.8–10.8)
WBC # BLD: 6.74 K/UL — SIGNIFICANT CHANGE UP (ref 4.8–10.8)
WBC # FLD AUTO: 6.74 K/UL — SIGNIFICANT CHANGE UP (ref 4.8–10.8)
WBC # FLD AUTO: 6.74 K/UL — SIGNIFICANT CHANGE UP (ref 4.8–10.8)

## 2023-12-03 PROCEDURE — 83036 HEMOGLOBIN GLYCOSYLATED A1C: CPT

## 2023-12-03 PROCEDURE — 93005 ELECTROCARDIOGRAM TRACING: CPT

## 2023-12-03 PROCEDURE — 99222 1ST HOSP IP/OBS MODERATE 55: CPT

## 2023-12-03 PROCEDURE — C1769: CPT

## 2023-12-03 PROCEDURE — 36415 COLL VENOUS BLD VENIPUNCTURE: CPT

## 2023-12-03 PROCEDURE — 82962 GLUCOSE BLOOD TEST: CPT

## 2023-12-03 PROCEDURE — 74177 CT ABD & PELVIS W/CONTRAST: CPT | Mod: 26,MA

## 2023-12-03 PROCEDURE — C1889: CPT

## 2023-12-03 PROCEDURE — 99285 EMERGENCY DEPT VISIT HI MDM: CPT

## 2023-12-03 PROCEDURE — L8699: CPT

## 2023-12-03 RX ORDER — SODIUM CHLORIDE 9 MG/ML
1000 INJECTION INTRAMUSCULAR; INTRAVENOUS; SUBCUTANEOUS ONCE
Refills: 0 | Status: COMPLETED | OUTPATIENT
Start: 2023-12-03 | End: 2023-12-03

## 2023-12-03 RX ORDER — KETOROLAC TROMETHAMINE 30 MG/ML
15 SYRINGE (ML) INJECTION ONCE
Refills: 0 | Status: DISCONTINUED | OUTPATIENT
Start: 2023-12-03 | End: 2023-12-03

## 2023-12-03 RX ORDER — IOHEXOL 300 MG/ML
30 INJECTION, SOLUTION INTRAVENOUS ONCE
Refills: 0 | Status: COMPLETED | OUTPATIENT
Start: 2023-12-03 | End: 2023-12-03

## 2023-12-03 RX ORDER — ONDANSETRON 8 MG/1
4 TABLET, FILM COATED ORAL ONCE
Refills: 0 | Status: COMPLETED | OUTPATIENT
Start: 2023-12-03 | End: 2023-12-03

## 2023-12-03 RX ORDER — SODIUM CHLORIDE 9 MG/ML
1000 INJECTION INTRAMUSCULAR; INTRAVENOUS; SUBCUTANEOUS
Refills: 0 | Status: DISCONTINUED | OUTPATIENT
Start: 2023-12-03 | End: 2023-12-09

## 2023-12-03 RX ORDER — ONDANSETRON 8 MG/1
4 TABLET, FILM COATED ORAL EVERY 6 HOURS
Refills: 0 | Status: COMPLETED | OUTPATIENT
Start: 2023-12-03 | End: 2023-12-04

## 2023-12-03 RX ADMIN — Medication 15 MILLIGRAM(S): at 10:10

## 2023-12-03 RX ADMIN — ONDANSETRON 4 MILLIGRAM(S): 8 TABLET, FILM COATED ORAL at 10:11

## 2023-12-03 RX ADMIN — ONDANSETRON 4 MILLIGRAM(S): 8 TABLET, FILM COATED ORAL at 23:39

## 2023-12-03 RX ADMIN — IOHEXOL 30 MILLILITER(S): 300 INJECTION, SOLUTION INTRAVENOUS at 10:09

## 2023-12-03 RX ADMIN — SODIUM CHLORIDE 75 MILLILITER(S): 9 INJECTION INTRAMUSCULAR; INTRAVENOUS; SUBCUTANEOUS at 21:47

## 2023-12-03 RX ADMIN — SODIUM CHLORIDE 1000 MILLILITER(S): 9 INJECTION INTRAMUSCULAR; INTRAVENOUS; SUBCUTANEOUS at 10:11

## 2023-12-03 RX ADMIN — ONDANSETRON 4 MILLIGRAM(S): 8 TABLET, FILM COATED ORAL at 21:46

## 2023-12-03 NOTE — ED PROVIDER NOTE - CARE PLAN
1 Principal Discharge DX:	SBO (small bowel obstruction)  Secondary Diagnosis:	Primary pancreatic cancer with metastasis to other site  Secondary Diagnosis:	Cachexia

## 2023-12-03 NOTE — PATIENT PROFILE ADULT - FALL HARM RISK - RISK INTERVENTIONS
Assistance OOB with selected safe patient handling equipment/Assistance with ambulation/Communicate Fall Risk and Risk Factors to all staff, patient, and family/Reinforce activity limits and safety measures with patient and family/Use of alarms - bed, chair and/or voice tab/Visual Cue: Yellow wristband/Bed in lowest position, wheels locked, appropriate side rails in place/Call bell, personal items and telephone in reach/Instruct patient to call for assistance before getting out of bed or chair/Non-slip footwear when patient is out of bed/[place] to call system/Physically safe environment - no spills, clutter or unnecessary equipment/Purposeful Proactive Rounding/Room/bathroom lighting operational, light cord in reach Assistance OOB with selected safe patient handling equipment/Assistance with ambulation/Communicate Fall Risk and Risk Factors to all staff, patient, and family/Reinforce activity limits and safety measures with patient and family/Use of alarms - bed, chair and/or voice tab/Visual Cue: Yellow wristband/Bed in lowest position, wheels locked, appropriate side rails in place/Call bell, personal items and telephone in reach/Instruct patient to call for assistance before getting out of bed or chair/Non-slip footwear when patient is out of bed/[place] to call system/Physically safe environment - no spills, clutter or unnecessary equipment/Purposeful Proactive Rounding/Room/bathroom lighting operational, light cord in reach

## 2023-12-03 NOTE — ED PROVIDER NOTE - NS ED MD DISPO DIVISION
[facility] [place]

## 2023-12-03 NOTE — ED PROVIDER NOTE - OBJECTIVE STATEMENT
73yM pancreatic cancer (sees Dr. Che, not currently on treatment after chemo failed as per pt) p/w constipation >1wk - does not remember the last time he passed stool - says he often has epigastric abd pain - now he is vomiting as well.  No fever.  Pt has been losing a lot of weight recently.

## 2023-12-03 NOTE — ED PROVIDER NOTE - PHYSICAL EXAMINATION
CONSTITUTIONAL: well developed; thin, chronically ill  HEAD: normocephalic; atraumatic, temporal wasting and prominent maxilla c/w recent significant weight loss  EYES: no conjunctival injection, no scleral icterus  ENT: no nasal discharge; airway clear.  NECK: supple; non tender. + full passive ROM in all directions  CARD: S1, S2 normal; no murmurs, gallops, or rubs. Regular rate and rhythm  RESP: no wheezes, rales or rhonchi. Good air movement bilaterally without significant accessory muscle use  ABD: soft; non-distended; non-tender. No rebound, no guarding, no pulsatile abdominal mass  EXT: moving all extremities spontaneously, normal ROM. No clubbing, cyanosis or edema  SKIN: warm and dry, no lesions noted  NEURO: alert, oriented, CN II-XII grossly intact, motor and sensory grossly intact, speech nonslurred, no focal deficits. GCS 15  PSYCH: calm, cooperative, appropriate, good eye contact, logical thought process, no apparent danger to self or others

## 2023-12-03 NOTE — CONSULT NOTE ADULT - SUBJECTIVE AND OBJECTIVE BOX
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 Che), HTN, HLD, DM presents with N/V and constipation. Patient reports that he has not had a bowel movement for over a week and has now developed nausea and vomiting. Patient reports flatus this morning, last vomiting episode was this morning as well. Denies fevers/chills.    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  H/O colonoscopy  biopsy abdomen 7/2022    Home Medications:  allopurinol 100 mg oral tablet:  (22 Aug 2022 06:47)  atorvastatin 20 mg oral tablet: 1 tab(s) orally once a day (at bedtime) (22 Aug 2022 06:47)  lisinopril 5 mg oral tablet: 1 tab(s) orally once a day (22 Aug 2022 06:47)    VITALS:  T(F): 98.1 (12-03-23 @ 15:58), Max: 98.1 (12-03-23 @ 15:58)  HR: 99 (12-03-23 @ 15:58) (99 - 100)  BP: 120/72 (12-03-23 @ 15:58) (112/70 - 120/72)  RR: 19 (12-03-23 @ 08:24) (19 - 19)  SpO2: 99% (12-03-23 @ 15:58) (99% - 99%)    PHYSICAL EXAM:  General: NAD, AAOx3, calm and cooperative  HEENT: NCAT, HEIDY, EOMI, Trachea ML, Neck supple  Cardiac: RRR  Respiratory: CTAB, normal respiratory effort  Abdomen: Soft with areas of induration (likely metastatic implants), non-distended, non-tender, no rebound, no guarding  Musculoskeletal: Strength 5/5 BL UE/LE, ROM intact, compartments soft  Neuro: Sensation grossly intact and equal throughout, no focal deficits  Vascular: Pulses 2+ throughout, extremities well perfused  Skin: Warm/dry, normal color, no jaundice    LAB/STUDIES:                        12.3   6.74  )-----------( 256      ( 03 Dec 2023 09:52 )             36.3     12-03    135  |  96<L>  |  16  ----------------------------<  116<H>  4.1   |  28  |  0.9    Ca    9.7      03 Dec 2023 09:52    TPro  8.4<H>  /  Alb  4.1  /  TBili  0.7  /  DBili  x   /  AST  19  /  ALT  10  /  AlkPhos  115  12-03      LIVER FUNCTIONS - ( 03 Dec 2023 09:52 )  Alb: 4.1 g/dL / Pro: 8.4 g/dL / ALK PHOS: 115 U/L / ALT: 10 U/L / AST: 19 U/L / GGT: x           Urinalysis Basic - ( 03 Dec 2023 09:52 )    Color: x / Appearance: x / SG: x / pH: x  Gluc: 116 mg/dL / Ketone: x  / Bili: x / Urobili: x   Blood: x / Protein: x / Nitrite: x   Leuk Esterase: x / RBC: x / WBC x   Sq Epi: x / Non Sq Epi: x / Bacteria: x    IMAGING:  < from: CT Abdomen and Pelvis w/ Oral Cont and w/ IV Cont (12.03.23 @ 12:48) >  IMPRESSION:  Worsening diffusely infiltrative pancreatic mass, difficult to delineate,   with increased soft tissue infiltration in the peripancreatic region, and   increased cystic or necrotic components. Marked gastric distention with   diluted contrast. Small bowel collapsed. No definite enteric contrast  within bowel appreciated. Consider possibility of upper GI obstruction   secondary to worsening pancreatic neoplasm.  Moderate colonic stool burden, with persistent caliber change at   rectosigmoid region. Increased size of pelvic, perirectal soft tissue   mass, measuring up to 2.5 cm, previously 1.5 cm on PET, however it is   unclear whether this is causing a lower GI tract obstruction given   similar anatomy to prior.  Innumerable peritoneal metastases, increased in size and number since   8/29/2023 PET/CT.  New intrahepatic and extrahepatic biliary dilation.    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 Che), HTN, HLD, DM presents with N/V and constipation. Patient reports that he has not had a bowel movement for over a week and has now developed nausea and vomiting. Patient reports flatus this morning, last vomiting episode was this morning as well. Denies fevers/chills.    PLAN:  #Metastatic Signet Cell Adenocarcinoma   - No acute surgical intervention   - NPO w IVF, observe for ROBF   - NGT placement if N/V   - Patient has worsening carcinomatosis which is contributing to obstruction   - Palliative consult with GOC conversation   - If obstruction does not resolve, patient may benefit from venting G-tube   - Surgery following    Discussed with surgical attending on call, Dr. 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 Che), HTN, HLD, DM presents with N/V and constipation. Patient reports that he has not had a bowel movement for over a week and has now developed nausea and vomiting. Patient reports flatus this morning, last vomiting episode was this morning as well. Denies fevers/chills.    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  H/O colonoscopy  biopsy abdomen 7/2022    Home Medications:  allopurinol 100 mg oral tablet:  (22 Aug 2022 06:47)  atorvastatin 20 mg oral tablet: 1 tab(s) orally once a day (at bedtime) (22 Aug 2022 06:47)  lisinopril 5 mg oral tablet: 1 tab(s) orally once a day (22 Aug 2022 06:47)    VITALS:  T(F): 98.1 (12-03-23 @ 15:58), Max: 98.1 (12-03-23 @ 15:58)  HR: 99 (12-03-23 @ 15:58) (99 - 100)  BP: 120/72 (12-03-23 @ 15:58) (112/70 - 120/72)  RR: 19 (12-03-23 @ 08:24) (19 - 19)  SpO2: 99% (12-03-23 @ 15:58) (99% - 99%)    PHYSICAL EXAM:  General: NAD, AAOx3, calm and cooperative  HEENT: NCAT, HEIDY, EOMI, Trachea ML, Neck supple  Cardiac: RRR  Respiratory: CTAB, normal respiratory effort  Abdomen: Soft with areas of induration (likely metastatic implants), non-distended, non-tender, no rebound, no guarding  Musculoskeletal: Strength 5/5 BL UE/LE, ROM intact, compartments soft  Neuro: Sensation grossly intact and equal throughout, no focal deficits  Vascular: Pulses 2+ throughout, extremities well perfused  Skin: Warm/dry, normal color, no jaundice    LAB/STUDIES:                        12.3   6.74  )-----------( 256      ( 03 Dec 2023 09:52 )             36.3     12-03    135  |  96<L>  |  16  ----------------------------<  116<H>  4.1   |  28  |  0.9    Ca    9.7      03 Dec 2023 09:52    TPro  8.4<H>  /  Alb  4.1  /  TBili  0.7  /  DBili  x   /  AST  19  /  ALT  10  /  AlkPhos  115  12-03      LIVER FUNCTIONS - ( 03 Dec 2023 09:52 )  Alb: 4.1 g/dL / Pro: 8.4 g/dL / ALK PHOS: 115 U/L / ALT: 10 U/L / AST: 19 U/L / GGT: x           Urinalysis Basic - ( 03 Dec 2023 09:52 )    Color: x / Appearance: x / SG: x / pH: x  Gluc: 116 mg/dL / Ketone: x  / Bili: x / Urobili: x   Blood: x / Protein: x / Nitrite: x   Leuk Esterase: x / RBC: x / WBC x   Sq Epi: x / Non Sq Epi: x / Bacteria: x    IMAGING:  < from: CT Abdomen and Pelvis w/ Oral Cont and w/ IV Cont (12.03.23 @ 12:48) >  IMPRESSION:  Worsening diffusely infiltrative pancreatic mass, difficult to delineate,   with increased soft tissue infiltration in the peripancreatic region, and   increased cystic or necrotic components. Marked gastric distention with   diluted contrast. Small bowel collapsed. No definite enteric contrast  within bowel appreciated. Consider possibility of upper GI obstruction   secondary to worsening pancreatic neoplasm.  Moderate colonic stool burden, with persistent caliber change at   rectosigmoid region. Increased size of pelvic, perirectal soft tissue   mass, measuring up to 2.5 cm, previously 1.5 cm on PET, however it is   unclear whether this is causing a lower GI tract obstruction given   similar anatomy to prior.  Innumerable peritoneal metastases, increased in size and number since   8/29/2023 PET/CT.  New intrahepatic and extrahepatic biliary dilation.    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 Che), HTN, HLD, DM presents with N/V and constipation. Patient reports that he has not had a bowel movement for over a week and has now developed nausea and vomiting. Patient reports flatus this morning, last vomiting episode was this morning as well. Denies fevers/chills.    PLAN:  #Metastatic Signet Cell Adenocarcinoma   - No acute surgical intervention   - NPO w IVF, observe for ROBF   - NGT placement if N/V   - Patient has worsening carcinomatosis which is contributing to obstruction   - Palliative consult with GOC conversation   - If obstruction does not resolve, patient may benefit from venting G-tube   - Surgery following    Discussed with surgical attending on call, Dr. Abel LOYD [id number]

## 2023-12-03 NOTE — ED PROVIDER NOTE - CLINICAL SUMMARY MEDICAL DECISION MAKING FREE TEXT BOX
73yM stage IV pancreatic cancer p/w constipation and now n/v and PO intolerance.  Pt thin but hemodynamically stable and abd soft/benign despite tenderness.  Labs reassuring w/o sig electrolyte abnormality or TANK.  CT w/ metastatic disease, progressed from further, w/ mass burden on small intestine causing enlarged stomach.  Gen surg consulted and recommend NGT for decompression but admit to medicine for management of cancer.

## 2023-12-04 DIAGNOSIS — C80.1 MALIGNANT (PRIMARY) NEOPLASM, UNSPECIFIED: ICD-10-CM

## 2023-12-04 DIAGNOSIS — R11.2 NAUSEA WITH VOMITING, UNSPECIFIED: ICD-10-CM

## 2023-12-04 DIAGNOSIS — Z51.5 ENCOUNTER FOR PALLIATIVE CARE: ICD-10-CM

## 2023-12-04 LAB
A1C WITH ESTIMATED AVERAGE GLUCOSE RESULT: 6.2 % — HIGH (ref 4–5.6)
A1C WITH ESTIMATED AVERAGE GLUCOSE RESULT: 6.2 % — HIGH (ref 4–5.6)
ESTIMATED AVERAGE GLUCOSE: 131 MG/DL — HIGH (ref 68–114)
ESTIMATED AVERAGE GLUCOSE: 131 MG/DL — HIGH (ref 68–114)
GLUCOSE BLDC GLUCOMTR-MCNC: 103 MG/DL — HIGH (ref 70–99)
GLUCOSE BLDC GLUCOMTR-MCNC: 103 MG/DL — HIGH (ref 70–99)
GLUCOSE BLDC GLUCOMTR-MCNC: 84 MG/DL — SIGNIFICANT CHANGE UP (ref 70–99)
GLUCOSE BLDC GLUCOMTR-MCNC: 84 MG/DL — SIGNIFICANT CHANGE UP (ref 70–99)
GLUCOSE BLDC GLUCOMTR-MCNC: 88 MG/DL — SIGNIFICANT CHANGE UP (ref 70–99)
GLUCOSE BLDC GLUCOMTR-MCNC: 88 MG/DL — SIGNIFICANT CHANGE UP (ref 70–99)
GLUCOSE BLDC GLUCOMTR-MCNC: 90 MG/DL — SIGNIFICANT CHANGE UP (ref 70–99)
GLUCOSE BLDC GLUCOMTR-MCNC: 90 MG/DL — SIGNIFICANT CHANGE UP (ref 70–99)

## 2023-12-04 PROCEDURE — 99223 1ST HOSP IP/OBS HIGH 75: CPT

## 2023-12-04 PROCEDURE — 99233 SBSQ HOSP IP/OBS HIGH 50: CPT

## 2023-12-04 PROCEDURE — 99497 ADVNCD CARE PLAN 30 MIN: CPT | Mod: 25

## 2023-12-04 PROCEDURE — 93010 ELECTROCARDIOGRAM REPORT: CPT

## 2023-12-04 RX ORDER — SODIUM CHLORIDE 9 MG/ML
1000 INJECTION, SOLUTION INTRAVENOUS
Refills: 0 | Status: DISCONTINUED | OUTPATIENT
Start: 2023-12-04 | End: 2023-12-09

## 2023-12-04 RX ORDER — DEXTROSE 50 % IN WATER 50 %
25 SYRINGE (ML) INTRAVENOUS ONCE
Refills: 0 | Status: DISCONTINUED | OUTPATIENT
Start: 2023-12-04 | End: 2023-12-09

## 2023-12-04 RX ORDER — ENOXAPARIN SODIUM 100 MG/ML
40 INJECTION SUBCUTANEOUS EVERY 24 HOURS
Refills: 0 | Status: DISCONTINUED | OUTPATIENT
Start: 2023-12-04 | End: 2023-12-09

## 2023-12-04 RX ORDER — GLUCAGON INJECTION, SOLUTION 0.5 MG/.1ML
1 INJECTION, SOLUTION SUBCUTANEOUS ONCE
Refills: 0 | Status: DISCONTINUED | OUTPATIENT
Start: 2023-12-04 | End: 2023-12-09

## 2023-12-04 RX ORDER — INSULIN LISPRO 100/ML
VIAL (ML) SUBCUTANEOUS
Refills: 0 | Status: DISCONTINUED | OUTPATIENT
Start: 2023-12-04 | End: 2023-12-09

## 2023-12-04 RX ORDER — DEXTROSE 50 % IN WATER 50 %
15 SYRINGE (ML) INTRAVENOUS ONCE
Refills: 0 | Status: DISCONTINUED | OUTPATIENT
Start: 2023-12-04 | End: 2023-12-09

## 2023-12-04 RX ORDER — DEXTROSE 50 % IN WATER 50 %
12.5 SYRINGE (ML) INTRAVENOUS ONCE
Refills: 0 | Status: DISCONTINUED | OUTPATIENT
Start: 2023-12-04 | End: 2023-12-09

## 2023-12-04 RX ORDER — INSULIN LISPRO 100/ML
VIAL (ML) SUBCUTANEOUS AT BEDTIME
Refills: 0 | Status: DISCONTINUED | OUTPATIENT
Start: 2023-12-04 | End: 2023-12-09

## 2023-12-04 RX ADMIN — ONDANSETRON 4 MILLIGRAM(S): 8 TABLET, FILM COATED ORAL at 05:05

## 2023-12-04 RX ADMIN — ONDANSETRON 4 MILLIGRAM(S): 8 TABLET, FILM COATED ORAL at 12:51

## 2023-12-04 RX ADMIN — ENOXAPARIN SODIUM 40 MILLIGRAM(S): 100 INJECTION SUBCUTANEOUS at 05:05

## 2023-12-04 RX ADMIN — ONDANSETRON 4 MILLIGRAM(S): 8 TABLET, FILM COATED ORAL at 17:02

## 2023-12-04 NOTE — PROGRESS NOTE ADULT - ASSESSMENT
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 Che), HTN, HLD, DM presents with worsening N/V and constipation in the setting of worsening diffusely infiltrative pancreatic mass, pelvic/perirectal mass, and intraperitoneal metastases.       #Malignant GOO   #Metastatic pancreatic cancer with progression of disease   - CT scan showed pancreatic mass, difficult to delineate, with increased soft tissue infiltration in the peripancreatic region, and increased cystic or necrotic components. Marked gastric distention with diluted contrast. No definite enteric contrast within bowel appreciated (hyperdense stool).  Moderate colonic stool burden, with persistent caliber change at rectosigmoid region. Increased size of pelvic, perirectal soft tissue mass, measuring up to 2.5 cm, previously 1.5 cm on PET, however it is unclear whether this is causing a lower GI tract obstruction   Innumerable peritoneal metastases, increased in size and number since   8/29/2023 PET/CT. Mild ascites. No pneumoperitoneum. No abscess.- no BM in 1 week  - No acute surgical intervention  - NPO, IVF   - zofran 4 mg IVP q6h    - NGT placement if N/V  - Palliative consult - hospice candidate   - Oncology consult     #HTN  - was on lisinopril 5mg      #HLD  - was on atorvastatin 20mg     #DM  - ISS    dvt ppx

## 2023-12-04 NOTE — PROGRESS NOTE ADULT - PROBLEM SELECTOR PLAN 1
With carcinomatosis.  -Patient states went to [facility] for clinical trial, but did "not go well"  -treatment of N/V  -hospice discussed - consult placed  -f/u heme onc With carcinomatosis.  -Patient states went to [facility] for clinical trial, but did "not go well"  -treatment of N/V  -hospice discussed - consult placed  -f/u heme onc

## 2023-12-04 NOTE — H&P ADULT - HISTORY OF PRESENT ILLNESS
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 Che), HTN, HLD, DM presents with worsening N/V and constipation. He states that he has had constipation and vomiting for the past month. He states BMs are usually small. Pt reports that he has not had a bowel movement for over a week and has now developed nausea and vomiting a/w abdominal pain. Patient reports flatus this morning, last vomiting episode was this morning as well. However, while in the ED he was tolerating PO diet while unknowingly being NPO. He has had a 20lb unintentional weight loss the past 3 months. He denies sick contacts, fever, chills, headache, dizziness, fever, hematemesis, or bloody stools,     In the ED:  T(C): 36.7 (12-03-23 @ 19:54), Max: 36.7 (12-03-23 @ 15:58)  HR: 78 (12-03-23 @ 19:54) (78 - 100)  BP: 112/71 (12-03-23 @ 19:54) (112/70 - 120/72)  RR: 18 (12-03-23 @ 19:54) (18 - 19)  SpO2: 98% (12-03-23 @ 19:54) (98% - 99%)    WBC: 6.7  lactate: 1.7  lipase: 12    < from: CT Abdomen and Pelvis w/ Oral Cont and w/ IV Cont (12.03.23 @ 12:48) >    IMPRESSION:    Worsening diffusely infiltrative pancreatic mass, difficult to delineate,   with increased soft tissue infiltration in the peripancreatic region, and   increased cystic or necrotic components. Marked gastric distention with   diluted contrast. Small bowel collapsed. No definite enteric contrast  within bowel appreciated. Consider possibility of upper GI obstruction   secondary to worsening pancreatic neoplasm.    Moderate colonic stool burden, with persistent caliber change at   rectosigmoid region. Increased size of pelvic, perirectal soft tissue   mass, measuring up to 2.5 cm, previously 1.5 cm on PET, however it is   unclear whether this is causing a lower GI tract obstruction given   similar anatomy to prior.    Innumerable peritoneal metastases, increased in size and number since   8/29/2023 PET/CT.    New intrahepatic and extrahepatic biliary dilation.    < end of copied text >

## 2023-12-04 NOTE — PROGRESS NOTE ADULT - SUBJECTIVE AND OBJECTIVE BOX
Pt seen and examined at bedside.  Denies abd pain, nausea. Reports constipation x 2-3 weeks.     VITAL SIGNS (Last 24 hrs):  T(C): 37.2 (12-04-23 @ 14:37), Max: 37.6 (12-04-23 @ 04:27)  HR: 75 (12-04-23 @ 14:37) (75 - 99)  BP: 126/67 (12-04-23 @ 14:37) (112/71 - 126/67)  RR: 18 (12-04-23 @ 14:37) (17 - 18)  SpO2: 98% (12-03-23 @ 19:54) (98% - 99%)  Wt(kg): --  Daily     Daily     I&O's Summary      PHYSICAL EXAM:  GENERAL: NAD   HEAD:  Atraumatic, Normocephalic  EYES:  conjunctiva and sclera clear  NECK: Supple, No JVD  CHEST/LUNG: Clear to auscultation bilaterally; No wheeze  HEART: Regular rate and rhythm; No murmurs, rubs, or gallops  ABDOMEN: Soft, Nontender, Nondistended   EXTREMITIES:  2+ Peripheral Pulses, No clubbing, cyanosis, or edema  PSYCH: AAOx3  NEUROLOGY: non-focal  SKIN: No rashes or lesions    Labs Reviewed       CBC Full  -  ( 03 Dec 2023 09:52 )  WBC Count : 6.74 K/uL  Hemoglobin : 12.3 g/dL  Hematocrit : 36.3 %  Platelet Count - Automated : 256 K/uL  Mean Cell Volume : 83.6 fL  Mean Cell Hemoglobin : 28.3 pg  Mean Cell Hemoglobin Concentration : 33.9 g/dL  Auto Neutrophil # : 5.02 K/uL  Auto Lymphocyte # : 1.25 K/uL  Auto Monocyte # : 0.33 K/uL  Auto Eosinophil # : 0.09 K/uL  Auto Basophil # : 0.04 K/uL  Auto Neutrophil % : 74.6 %  Auto Lymphocyte % : 18.5 %  Auto Monocyte % : 4.9 %  Auto Eosinophil % : 1.3 %  Auto Basophil % : 0.6 %    BMP:    12-03 @ 09:52    Blood Urea Nitrogen - 16  Calcium - 9.7  Carbond Dioxide - 28  Chloride - 96  Creatinine - 0.9  Glucose - 116  Potassium - 4.1  Sodium - 135      Hemoglobin A1c -     Urine Culture:            MEDICATIONS  (STANDING):  dextrose 5%. 1000 milliLiter(s) (100 mL/Hr) IV Continuous <Continuous>  dextrose 5%. 1000 milliLiter(s) (50 mL/Hr) IV Continuous <Continuous>  dextrose 50% Injectable 12.5 Gram(s) IV Push once  dextrose 50% Injectable 25 Gram(s) IV Push once  dextrose 50% Injectable 25 Gram(s) IV Push once  enoxaparin Injectable 40 milliGRAM(s) SubCutaneous every 24 hours  glucagon  Injectable 1 milliGRAM(s) IntraMuscular once  insulin lispro (ADMELOG) corrective regimen sliding scale   SubCutaneous three times a day before meals  insulin lispro (ADMELOG) corrective regimen sliding scale   SubCutaneous at bedtime  ondansetron Injectable 4 milliGRAM(s) IV Push every 6 hours  sodium chloride 0.9%. 1000 milliLiter(s) (75 mL/Hr) IV Continuous <Continuous>    MEDICATIONS  (PRN):  dextrose Oral Gel 15 Gram(s) Oral once PRN Blood Glucose LESS THAN 70 milliGRAM(s)/deciliter

## 2023-12-04 NOTE — PROGRESS NOTE ADULT - ASSESSMENT
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 Palliative care consulted for GOC.    Spoke with patient at bedside. Palliative care introduced.  He was able to provide a medical history and hospital course. We discussed his underlying malignancy.  He noted that he was getting treatment for his cancer at [facility] and was sent to [facility] for a trial, but that it didn't go well. He noted he was supposed to have a meeting at [facility] tomorrow to discuss hospice, but came to the hospital. Discussed hospice at length. He wished to speak with the oncology team further, but wished for a hospice consult for informational purposes.      We also discussed code status, including DNR.DNI. He wishes to make himself DNR/DNI.    MEDD (morphine equivalent daily dose):    Education about palliative care provided to patient/family.  See Recs below.    Please call [phone] with questions or concerns 24/7.   We will continue to follow.    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 Palliative care consulted for GOC.    Spoke with patient at bedside. Palliative care introduced.  He was able to provide a medical history and hospital course. We discussed his underlying malignancy.  He noted that he was getting treatment for his cancer at [facility] and was sent to [facility] for a trial, but that it didn't go well. He noted he was supposed to have a meeting at [facility] tomorrow to discuss hospice, but came to the hospital. Discussed hospice at length. He wished to speak with the oncology team further, but wished for a hospice consult for informational purposes.      We also discussed code status, including DNR.DNI. He wishes to make himself DNR/DNI.    MEDD (morphine equivalent daily dose):    Education about palliative care provided to patient/family.  See Recs below.    Please call [phone] with questions or concerns 24/7.   We will continue to follow.

## 2023-12-04 NOTE — PROGRESS NOTE ADULT - NS ATTEST RISK PROBLEM GEN_ALL_CORE FT
2.2  [x ] Personally review and interpretation of  image or testing   2.3  [ x] Discussion of management or test interpretation with external physician/other qualified health care professional\appropriate source (not separately reported)  [ x] Decision not to resuscitate, not to intubate, or to de-escalate care because of poor prognosis

## 2023-12-04 NOTE — H&P ADULT - ATTENDING COMMENTS
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 Che), HTN, HLD, DM presents with worsening N/V and constipation in the setting of worsening diffusely infiltrative pancreatic mass, pelvic/perirectal mass, and intraperitoneal metastases.     #Nausea and constipation 2/2 SBO in setting of metastatic Signet Cell Adenocarcinoma  - Patient has worsening carcinomatosis which is contributing to obstruction  - hx of N/V and constipation for 1 month  - no BM in 1 week  - lactate- 1.7  - afebrile, no leukocytosis  - NPO w/ NS @75cc/hr, observe for return of bowel fxn  - zofran 4 mg IVP q6h for 24hrs- f/u ecg  - NGT placement if N/V  - Surgery: No acute surgical intervention. If obstruction does not resolve, patient may benefit from venting G-tube  - f/u Palliative consult with GOC conversation- pt would like to be DNR/DNI- wife is unaware and pt states he will disclose with her    #HTN  - was on lisinopril 5mg (rx filled last 8/30/22)    #HLD  - was on atorvastatin 20mg (rx filled 8/30/22)    #DM  - ISS    #Pending: GOC, return of bowel fxn,     Seen and examined patient on 12/3/2023

## 2023-12-04 NOTE — CONSULT NOTE ADULT - ATTENDING COMMENTS
ACS Attending Note Attestation    Patient was seen and examined bedside.  I reviewed the resident/PA note and agreed with above assessment and plan with following additions and corrections.    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 Che), HTN, HLD, DM presents with nausea, vomiting, and constipation of over a week. Last vomited after CT scan of abdomen performed today. Not nauseous at present.    T(C): 36.7 (12-03-23 @ 15:58), Max: 36.7 (12-03-23 @ 15:58)  HR: 99 (12-03-23 @ 15:58) (99 - 100)  BP: 120/72 (12-03-23 @ 15:58) (112/70 - 120/72)  RR: 19 (12-03-23 @ 08:24) (19 - 19)  SpO2: 99% (12-03-23 @ 15:58) (99% - 99%)    PHYSICAL EXAM:  General: no apparent distress, calm and cooperative  HEENT: NCAT, EOMI, trachea midline  Cardiac: regular rate and rhythm  Respiratory: Normal respiratory effort, breathing even and unlabored  Abdomen: Soft, non-distended, not tender, underling induration felt in epigastrum likely related to metastatic implants  Musculoskeletal: ROM grossly intact, no obvious swelling  Neuro: Sensation grossly intact throughout, no focal deficits  Psych: mood and affect appropriate and congruent  Vascular: Pulses 2+ throughout, extremities well perfused  Skin: Warm/dry                         12.3   6.74  )-----------( 256      ( 03 Dec 2023 09:52 )             36.3     12-03    135  |  96<L>  |  16  ----------------------------<  116<H>  4.1   |  28  |  0.9    Ca    9.7      03 Dec 2023 09:52    TPro  8.4<H>  /  Alb  4.1  /  TBili  0.7  /  DBili  x   /  AST  19  /  ALT  10  /  AlkPhos  115  12-03    CT A/P reviewed and interpreted by me: diffuse metastatic disease with peritoneal carcinomatosis    Assessment/Plan:  73y Male PMH of 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 Che), HTN, HLD, DM who presents with likely malignant SBO. Unfortunately his disease appears to be very advanced. We had offered nasogastric tube placement for symptomatic relief, but the patient is not nauseous at present and declined placement. I told him to let the clinical team know if he develops nausea as he is at risk for aspiration. Recommend admission to medicine and palliative care consult. He may be candidate for palliation with venting G tube though his peritoneal implants are quite extensive.    Juan Luis Roman MD  Trauma/ACS/Surgical Critical Care Attending
The patient was seen. Agree with above.
pt known to me   diffuse carcinomatosis   psbo from it     will not benefit from surgical procedure    tolerating liquid diet for now  dc planning   may benefit from venting G tube - for palliative reasons    discussed with him options for over 1 hr

## 2023-12-04 NOTE — H&P ADULT - ASSESSMENT
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 Che), HTN, HLD, DM presents with worsening N/V and constipation in the setting of worsening diffusely infiltrative pancreatic mass, pelvic/perirectal mass, and intraperitoneal metastases.     #Nausea and constipation 2/2 metastatic Signet Cell Adenocarcinoma  - hx of N/V and constipation for 1 month  - no BM in 1 week  - lactate- 1.7  - afebrile, no leukocytosis  - No acute surgical intervention  - NPO w IVF, observe for ROBF  - NGT placement if N/V  - Patient has worsening carcinomatosis which is contributing to obstruction  - Palliative consult with GOC conversation- pt would like to be DNR/DNI- wife is unaware and pt states he will disclose with her  - If obstruction does not resolve, patient may benefit from venting G-tube  - Surgery following    #HTN    #HLD    #DM      # DVT prophylaxis: Lovenox  # GI prophylaxis: PPI  # Diet:   # Activity:   # Code status: Full Code  # Disposition:    Pending:    **********   THIS IS AN INCOMPLETE NOTE, FINAL NOTE PENDING   **********     Pt is a 73y M w/ a 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 Che), HTN, HLD, DM presents with worsening N/V and constipation in the setting of worsening diffusely infiltrative pancreatic mass, pelvic/perirectal mass, and intraperitoneal metastases.     #Nausea and constipation 2/2 metastatic Signet Cell Adenocarcinoma  - hx of N/V and constipation for 1 month  - no BM in 1 week  - lactate- 1.7  - afebrile, no leukocytosis  - No acute surgical intervention  - NPO w IVF, observe for return of bowel fxn  - NGT placement if N/V  - Patient has worsening carcinomatosis which is contributing to obstruction  - Palliative consult with GOC conversation- pt would like to be DNR/DNI- wife is unaware and pt states he will disclose with her  - If obstruction does not resolve, patient may benefit from venting G-tube  - Surgery following    #HTN  - c/w lisinopril 5mg    #HLD  - c/w atorvastatin 20mg    #DM  - ISS    #Pending: GOC, return of bowel fxn,     # DVT prophylaxis: Lovenox  # GI prophylaxis: n/a  # Diet: NPO  # Activity: IAT  # Code status: Pt states he would like to be DNR but needs to speak with his first  # Disposition:    Pending:    **********   THIS IS AN INCOMPLETE NOTE, FINAL NOTE PENDING   **********     Pt is a 73y M w/ a 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 Che), HTN, HLD, DM presents with worsening N/V and constipation in the setting of worsening diffusely infiltrative pancreatic mass, pelvic/perirectal mass, and intraperitoneal metastases.     #Nausea and constipation 2/2 metastatic Signet Cell Adenocarcinoma  - hx of N/V and constipation for 1 month  - no BM in 1 week  - lactate- 1.7  - afebrile, no leukocytosis  - No acute surgical intervention  - NPO w/ NS @75cc/hr, observe for return of bowel fxn  - zofran 4 mg IVP q6h for 24hrs- f/u ecg  - NGT placement if N/V  - Patient has worsening carcinomatosis which is contributing to obstruction  - Palliative consult with GOC conversation- pt would like to be DNR/DNI- wife is unaware and pt states he will disclose with her  - If obstruction does not resolve, patient may benefit from venting G-tube  - Surgery following    #HTN  - c/w lisinopril 5mg    #HLD  - c/w atorvastatin 20mg    #DM  - ISS    #Pending: GOC, return of bowel fxn,     # DVT prophylaxis: Lovenox  # GI prophylaxis: n/a  # Diet: NPO  # Activity: IAT  # Code status: Pt states he would like to be DNR but needs to speak with his first  # Disposition:    Pending:    **********   THIS IS AN INCOMPLETE NOTE, FINAL NOTE PENDING   **********     Pt is a 73y M w/ a 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 Che), HTN, HLD, DM presents with worsening N/V and constipation in the setting of worsening diffusely infiltrative pancreatic mass, pelvic/perirectal mass, and intraperitoneal metastases.     #Nausea and constipation 2/2 metastatic Signet Cell Adenocarcinoma  - hx of N/V and constipation for 1 month  - no BM in 1 week  - lactate- 1.7  - afebrile, no leukocytosis  - No acute surgical intervention  - NPO w/ NS @75cc/hr, observe for return of bowel fxn  - zofran 4 mg IVP q6h for 24hrs- f/u ecg  - NGT placement if N/V  - Patient has worsening carcinomatosis which is contributing to obstruction  - If obstruction does not resolve, patient may benefit from venting G-tube  - Surgery following  - f/u Palliative consult with GOC conversation- pt would like to be DNR/DNI- wife is unaware and pt states he will disclose with her    #HTN  - was on lisinopril 5mg (rx filled last 8/30/22)    #HLD  - was on atorvastatin 20mg (rx filled 8/30/22)    #DM  - ISS    #med rec: pt does not know what meds he takes, spoke w/ [address] [facility], pt has not filled lisinopril and atorvastatin rx in over a year, recent rx filled duloxetine 60mg qd (6/23), tamsulosin 0,4 mg (11/23), carafate (6/23) and famotidine (6/23). Pharmacist says may be insurance issue. Verify in am. Pt is currently NPO.     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 Che), HTN, HLD, DM presents with worsening N/V and constipation in the setting of worsening diffusely infiltrative pancreatic mass, pelvic/perirectal mass, and intraperitoneal metastases.     #Nausea and constipation 2/2 metastatic Signet Cell Adenocarcinoma  - hx of N/V and constipation for 1 month  - no BM in 1 week  - lactate- 1.7  - afebrile, no leukocytosis  - No acute surgical intervention  - NPO w/ NS @75cc/hr, observe for return of bowel fxn  - zofran 4 mg IVP q6h for 24hrs- f/u ecg  - NGT placement if N/V  - Patient has worsening carcinomatosis which is contributing to obstruction  - If obstruction does not resolve, patient may benefit from venting G-tube  - Surgery following  - f/u Palliative consult with GOC conversation- pt would like to be DNR/DNI- wife is unaware and pt states he will disclose with her    #HTN  - was on lisinopril 5mg (rx filled last 8/30/22)    #HLD  - was on atorvastatin 20mg (rx filled 8/30/22)    #DM  - ISS    #med rec: pt does not know what meds he takes, spoke w/ [address] [facility], pt has not filled lisinopril and atorvastatin rx in over a year, recent rx filled duloxetine 60mg qd (6/23), tamsulosin 0,4 mg (11/23), carafate (6/23) and famotidine (6/23). Pharmacist says may be insurance issue. Verify in am. Pt is currently NPO.     #Pending: GOC, return of bowel fxn,       # DVT prophylaxis: Lovenox  # GI prophylaxis: n/a  # Diet: NPO  # Activity: IAT  # Code status: Pt states he would like to be DNR but needs to speak with his wife first  # Disposition: med/surg

## 2023-12-04 NOTE — CONSULT NOTE ADULT - ASSESSMENT
Mr. 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 Che), HTN, HLD, DM presents with worsening N/V and constipation in the setting of worsening diffusely infiltrative pancreatic mass, pelvic/perirectal mass, and intraperitoneal metastases.     # Metastatic moderate to poorly differentiated adenocarcinoma , Stage IV,  dx 07/2022  # Nausea and constipation concern for GI obstructions   - hx of N/V and constipation for 1 month  - no BM in 1 week  - CT Abdomen and Pelvis w/ Oral Cont and w/ IV Cont (12.03.23) Worsening diffusely infiltrative pancreatic mass, difficult to delineate, with increased soft tissue infiltration in the peripancreatic region, and increased cystic or necrotic components. Marked gastric distention with diluted contrast. Small bowel collapsed. No definite enteric contrast  within bowel appreciated. Consider possibility of upper GI obstruction secondary to worsening pancreatic neoplasm.Moderate colonic stool burden, with persistent caliber change at rectosigmoid region. Increased size of pelvic, perirectal soft tissue mass, measuring up to 2.5 cm, previously 1.5 cm on PET, however it is unclear whether this is causing a lower GI tract obstruction given similar anatomy to prior. Innumerable peritoneal metastases, increased in size and number since 8/29/2023 PET/CT.New intrahepatic and extrahepatic biliary dilation.  -  Surgery consult: no acute surgical intervention  - zofran 4 mg IVP q6h for 24hrs- f/u ecg  - Oncology Hx: BRCA (-) s/p laparoscopy on 8.22.2022 with findings of ~1L malignant ascites and peritoneal implants  - s/p cycle 12 of 5FU/Leucovorin/Irinotecan on 2/24 , initiated on 9.6.2022 ; discontinued oxaliplatin beginning C12 due to neuropathy  - STOPPED 5FU/Leucovorin/Irinotecan due to rising Ca19-9 and PET/CT findings  - PET/CT 8.29.2023 shows multiple new sites of FDG uptake including pancreatic tail mass, lymphadenopathy and peritoneal implants.  - STOP chemotherapy following 6 cycles of Gemcitabine + Abraxane on 8.23.2023 due to progression on PET/CT and rising tumor marker  - [facility] liquid biopsy from 09/2023 does not show any actionable mutations  - Patient last seen by Dr. Che in Sept 2023, recommended follow up at [facility] for clinical trial       Recommendations:   *this is an incomplete note*   ?     Mr. 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 Che), HTN, HLD, DM presents with worsening N/V and constipation in the setting of worsening diffusely infiltrative pancreatic mass, pelvic/perirectal mass, and intraperitoneal metastases.     # Metastatic moderate to poorly differentiated adenocarcinoma , Stage IV,  dx 07/2022  # Nausea and constipation concern for GI obstructions   - hx of N/V and constipation for 1 month  - no BM in 1 week  - CT Abdomen and Pelvis w/ Oral Cont and w/ IV Cont (12.03.23) Worsening diffusely infiltrative pancreatic mass, difficult to delineate, with increased soft tissue infiltration in the peripancreatic region, and increased cystic or necrotic components. Marked gastric distention with diluted contrast. Small bowel collapsed. No definite enteric contrast  within bowel appreciated. Consider possibility of upper GI obstruction secondary to worsening pancreatic neoplasm.Moderate colonic stool burden, with persistent caliber change at rectosigmoid region. Increased size of pelvic, perirectal soft tissue mass, measuring up to 2.5 cm, previously 1.5 cm on PET, however it is unclear whether this is causing a lower GI tract obstruction given similar anatomy to prior. Innumerable peritoneal metastases, increased in size and number since 8/29/2023 PET/CT.New intrahepatic and extrahepatic biliary dilation.  -  Surgery consult: no acute surgical intervention  - zofran 4 mg IVP q6h for 24hrs- f/u ecg  - Oncology Hx: BRCA (-) s/p laparoscopy on 8.22.2022 with findings of ~1L malignant ascites and peritoneal implants  - s/p cycle 12 of 5FU/Leucovorin/Irinotecan on 2/24 , initiated on 9.6.2022 ; discontinued oxaliplatin beginning C12 due to neuropathy  - STOPPED 5FU/Leucovorin/Irinotecan due to rising Ca19-9 and PET/CT findings  - PET/CT 8.29.2023 shows multiple new sites of FDG uptake including pancreatic tail mass, lymphadenopathy and peritoneal implants.  - STOP chemotherapy following 6 cycles of Gemcitabine + Abraxane on 8.23.2023 due to progression on PET/CT and rising tumor marker  - [facility] liquid biopsy from 09/2023 does not show any actionable mutations  - Patient last seen by Dr. Che in Sept 2023, recommended follow up at [facility] for clinical trial       Recommendations:   *this is an incomplete note*   ?     Mr. 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 Che), HTN, HLD, DM presents with worsening N/V and constipation in the setting of worsening diffusely infiltrative pancreatic mass, pelvic/perirectal mass, and intraperitoneal metastases.     # Metastatic moderate to poorly differentiated adenocarcinoma , Stage IV,  dx 07/2022  # Nausea and constipation concern for GI obstructions   - hx of N/V and constipation for 1 month  - no BM in 1 week  - CT Abdomen and Pelvis w/ Oral Cont and w/ IV Cont (12.03.23) Worsening diffusely infiltrative pancreatic mass, difficult to delineate, with increased soft tissue infiltration in the peripancreatic region, and increased cystic or necrotic components. Marked gastric distention with diluted contrast. Small bowel collapsed. No definite enteric contrast  within bowel appreciated. Consider possibility of upper GI obstruction secondary to worsening pancreatic neoplasm.Moderate colonic stool burden, with persistent caliber change at rectosigmoid region. Increased size of pelvic, perirectal soft tissue mass, measuring up to 2.5 cm, previously 1.5 cm on PET, however it is unclear whether this is causing a lower GI tract obstruction given similar anatomy to prior. Innumerable peritoneal metastases, increased in size and number since 8/29/2023 PET/CT.New intrahepatic and extrahepatic biliary dilation.  -  Surgery consult: no acute surgical intervention  - zofran 4 mg IVP q6h for 24hrs- f/u ecg  - Oncology Hx: BRCA (-) s/p laparoscopy on 8.22.2022 with findings of ~1L malignant ascites and peritoneal implants  - s/p cycle 12 of 5FU/Leucovorin/Irinotecan on 2/24 , initiated on 9.6.2022 ; discontinued oxaliplatin beginning C12 due to neuropathy  - STOPPED 5FU/Leucovorin/Irinotecan due to rising Ca19-9 and PET/CT findings  - PET/CT 8.29.2023 shows multiple new sites of FDG uptake including pancreatic tail mass, lymphadenopathy and peritoneal implants.  - STOP chemotherapy following 6 cycles of Gemcitabine + Abraxane on 8.23.2023 due to progression on PET/CT and rising tumor marker  - [facility] liquid biopsy from 09/2023 does not show any actionable mutations  - Patient last seen by Dr. Che in Sept 2023, recommended follow up at [facility] for clinical trial   - in October 2023 patient reports being seen at [facility] and started on clinical trial with immunotherapy. He received 1 dose in Oct 2023 and had intolerable side effects (weakness, nausea, vomiting) and has declined any further treatment       Recommendations:   - Unfortunately, no further treatment options for pancreatic cancer can be offered to Mr. Holloway at this time at [facility]. Patient was being seen at [facility] for immunotherapy via clinical trial but discontinued this treatment due to side effects. He declines any further treatment.  - Hospice discussed with patient, and he is agreeable to discuss Hospice & Palliative care further        Mr. 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 Che), HTN, HLD, DM presents with worsening N/V and constipation in the setting of worsening diffusely infiltrative pancreatic mass, pelvic/perirectal mass, and intraperitoneal metastases.     # Metastatic moderate to poorly differentiated adenocarcinoma , Stage IV,  dx 07/2022  # Nausea and constipation concern for GI obstructions   - hx of N/V and constipation for 1 month  - no BM in 1 week  - CT Abdomen and Pelvis w/ Oral Cont and w/ IV Cont (12.03.23) Worsening diffusely infiltrative pancreatic mass, difficult to delineate, with increased soft tissue infiltration in the peripancreatic region, and increased cystic or necrotic components. Marked gastric distention with diluted contrast. Small bowel collapsed. No definite enteric contrast  within bowel appreciated. Consider possibility of upper GI obstruction secondary to worsening pancreatic neoplasm.Moderate colonic stool burden, with persistent caliber change at rectosigmoid region. Increased size of pelvic, perirectal soft tissue mass, measuring up to 2.5 cm, previously 1.5 cm on PET, however it is unclear whether this is causing a lower GI tract obstruction given similar anatomy to prior. Innumerable peritoneal metastases, increased in size and number since 8/29/2023 PET/CT.New intrahepatic and extrahepatic biliary dilation.  -  Surgery consult: no acute surgical intervention  - zofran 4 mg IVP q6h for 24hrs- f/u ecg  - Oncology Hx: BRCA (-) s/p laparoscopy on 8.22.2022 with findings of ~1L malignant ascites and peritoneal implants  - s/p cycle 12 of 5FU/Leucovorin/Irinotecan on 2/24 , initiated on 9.6.2022 ; discontinued oxaliplatin beginning C12 due to neuropathy  - STOPPED 5FU/Leucovorin/Irinotecan due to rising Ca19-9 and PET/CT findings  - PET/CT 8.29.2023 shows multiple new sites of FDG uptake including pancreatic tail mass, lymphadenopathy and peritoneal implants.  - STOP chemotherapy following 6 cycles of Gemcitabine + Abraxane on 8.23.2023 due to progression on PET/CT and rising tumor marker  - [facility] liquid biopsy from 09/2023 does not show any actionable mutations  - Patient last seen by Dr. Che in Sept 2023, recommended follow up at [facility] for clinical trial   - in October 2023 patient reports being seen at [facility] and started on clinical trial with immunotherapy. He received 1 dose in Oct 2023 and had intolerable side effects (weakness, nausea, vomiting) and has declined any further treatment       Recommendations:   - Unfortunately, no further treatment options for pancreatic cancer can be offered to Mr. Holloway at this time at [facility]. Patient was being seen at [facility] for immunotherapy via clinical trial but discontinued this treatment due to side effects. He declines any further treatment.  - Hospice discussed with patient, and he is agreeable to discuss Hospice & Palliative care further

## 2023-12-04 NOTE — PROGRESS NOTE ADULT - CONVERSATION DETAILS
Spoke with patient at bedside. Palliative care introduced.  He was able to provide a medical history and hospital course. We discussed his underlying malignancy.  He noted that he was getting treatment for his cancer at [facility] and was sent to [facility] for a trial, but that it didn't go well. He noted he was supposed to have a meeting at [facility] tomorrow to discuss hospice, but came to the hospital. Discussed hospice at length. He wished to speak with the oncology team further, but wished for a hospice consult for informational purposes.      We also discussed code status, including DNR.DNI. He wishes to make himself DNR/DNI. Spoke with patient at bedside. Palliative care introduced.  He was able to provide a medical history and hospital course. We discussed his underlying malignancy.  He noted that he was getting treatment for his cancer at [facility] and was sent to [facility] for a trial, but that it didn't go well. He noted he was supposed to have a meeting at [facility] tomorrow to discuss hospice, but came to the hospital. Discussed hospice at length. He wished to speak with the oncology team further, but wished for a hospice consult for informational purposes.      We also discussed code status, including DNR.DNI. He wishes to make himself DNR/DNI.

## 2023-12-04 NOTE — CONSULT NOTE ADULT - ASSESSMENT
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 Che), HTN, HLD, DM presents with N/V and constipation, likely malignant SBO.    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 Che), HTN, HLD, DM presents with N/V and constipation, likely malignant gastric outlet obstruction.    PLAN:   - No acute surgical intervention   - NPO w IVF, observe for ROBF   - NGT placement if N/V   - Patient has worsening carcinomatosis which is contributing to obstruction   - can try clears again   - Palliative consult with GOC conversation   - If obstruction does not resolve, patient may benefit from venting G-tube

## 2023-12-04 NOTE — PROGRESS NOTE ADULT - SUBJECTIVE AND OBJECTIVE BOX
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 Che), HTN, HLD, DM presents with worsening N/V and constipation. He states that he has had constipation and vomiting for the past month. He states BMs are usually small. Pt reports that he has not had a bowel movement for over a week and has now developed nausea and vomiting a/w abdominal pain. Patient reports flatus this morning, last vomiting episode was this morning as well. However, while in the ED he was tolerating PO diet while unknowingly being NPO. He has had a 20lb unintentional weight loss the past 3 months. He denies sick contacts, fever, chills, headache, dizziness, fever, hematemesis, or bloody stools,     In the ED:  T(C): 36.7 (12-03-23 @ 19:54), Max: 36.7 (12-03-23 @ 15:58)  HR: 78 (12-03-23 @ 19:54) (78 - 100)  BP: 112/71 (12-03-23 @ 19:54) (112/70 - 120/72)  RR: 18 (12-03-23 @ 19:54) (18 - 19)  SpO2: 98% (12-03-23 @ 19:54) (98% - 99%)    WBC: 6.7  lactate: 1.7  lipase: 12    < from: CT Abdomen and Pelvis w/ Oral Cont and w/ IV Cont (12.03.23 @ 12:48) >    IMPRESSION:    Worsening diffusely infiltrative pancreatic mass, difficult to delineate,   with increased soft tissue infiltration in the peripancreatic region, and   increased cystic or necrotic components. Marked gastric distention with   diluted contrast. Small bowel collapsed. No definite enteric contrast  within bowel appreciated. Consider possibility of upper GI obstruction   secondary to worsening pancreatic neoplasm.    Moderate colonic stool burden, with persistent caliber change at   rectosigmoid region. Increased size of pelvic, perirectal soft tissue   mass, measuring up to 2.5 cm, previously 1.5 cm on PET, however it is   unclear whether this is causing a lower GI tract obstruction given   similar anatomy to prior.    Innumerable peritoneal metastases, increased in size and number since   8/29/2023 PET/CT.    New intrahepatic and extrahepatic biliary dilation.    < end of copied text >   (04 Dec 2023 00:11)    PERTINENT PM/SXH:   HTN (hypertension)    Diabetes    Hypercholesteremia    Gout    H/O constipation    Cancer of abdominal organ      H/O colonoscopy      FAMILY HISTORY:  FH: GI cancer      ITEMS NOT CHECKED ARE NOT PRESENT    SOCIAL HISTORY:   Significant other/partner[ ]  Children[ ]  Alevism/Spirituality:  Substance hx:  [ ]   Tobacco hx:  [ ]   Alcohol hx: [ ]   Living Situation: [ x]Home  [ ]Long term care  [ ]Rehab [ ]Other  Home Services: [ ] HHA [ ] Visting RN [ ] Hospice  Occupation:  Home Opioid hx:  [ ] Y [ ] N [x ] I-Stop Reference No:    Reference #: [id number] - no meds     ADVANCE DIRECTIVES:     [ ] Full Code [x ] DNR  MOLST  [ ]  Living Will  [ ]   DECISION MAKER(s):  [ ] Health Care Proxy(s)  [x ] Surrogate(s)  [ ] Guardian           Name(s): Phone Number(s):  Wife June      BASELINE (I)ADL(s) (prior to admission):    [place]: [ ]Total  [ ] Moderate [ ]Dependent  Palliative Performance Status Version 2:         %    http://npcrc.org/files/news/palliative_performance_scale_ppsv2.pdf    Allergies    No Known Drug Allergies  shellfish (Unknown)    Intolerances    MEDICATIONS  (STANDING):  dextrose 5%. 1000 milliLiter(s) (50 mL/Hr) IV Continuous <Continuous>  dextrose 5%. 1000 milliLiter(s) (100 mL/Hr) IV Continuous <Continuous>  dextrose 50% Injectable 12.5 Gram(s) IV Push once  dextrose 50% Injectable 25 Gram(s) IV Push once  dextrose 50% Injectable 25 Gram(s) IV Push once  enoxaparin Injectable 40 milliGRAM(s) SubCutaneous every 24 hours  glucagon  Injectable 1 milliGRAM(s) IntraMuscular once  insulin lispro (ADMELOG) corrective regimen sliding scale   SubCutaneous three times a day before meals  insulin lispro (ADMELOG) corrective regimen sliding scale   SubCutaneous at bedtime  ondansetron Injectable 4 milliGRAM(s) IV Push every 6 hours  sodium chloride 0.9%. 1000 milliLiter(s) (75 mL/Hr) IV Continuous <Continuous>    MEDICATIONS  (PRN):  dextrose Oral Gel 15 Gram(s) Oral once PRN Blood Glucose LESS THAN 70 milliGRAM(s)/deciliter    PRESENT SYMPTOMS: [ ]Unable to obtain due to poor mentation   Source if other than patient:  [ ]Family   [ ]Team     Pain: [ ]yes [ x]no  QOL impact -   Location -                    Aggravating factors -  Quality -  Radiation -  Timing-  Severity (0-10 scale):  Minimal acceptable level (0-10 scale):     CPOT:    https://www.[facility].org/getattachment/[email or web address]/Critical-Care-Pain-Observation-Tool-(CPOT)    PAIN AD Score:   http://geriatrictoolkit.[facility]/cog/painad.pdf (press ctrl +  left click to view)    Dyspnea:                           [x ]None[ ]Mild [ ]Moderate [ ]Severe     Respiratory Distress Observation Scale (RDOS):   A score of 0 to 2 signifies little or no respiratory distress, 3 signifies mild distress, scores 4 to 6 indicate moderate distress, and scores greater than 7 signify severe distress  [email or web address]://[email or web address].ca/sites/default/files/[email or web address]-qmdscezokxd-wznczvhy-xrzvmgldqni-rsjoc.pdf    Anxiety:                             [x ]None[ ]Mild [ ]Moderate [ ]Severe   Fatigue:                             [x ]None[ ]Mild [ ]Moderate [ ]Severe   Nausea:                             [ ]None[ ]Mild [x ]Moderate [ ]Severe   Loss of appetite:              [ ]None[ ]Mild [ x]Moderate [ ]Severe   Constipation:                    [ ]None[ ]Mild [x ]Moderate [ ]Severe    Other Symptoms:  [x ]All other review of systems negative     Palliative Performance Status Version 2:         50%    http://npcrc.org/files/news/palliative_performance_scale_ppsv2.pdf    PHYSICAL EXAM:  Vital Signs Last 24 Hrs  T(C): 37.6 (04 Dec 2023 04:27), Max: 37.6 (04 Dec 2023 04:27)  T(F): 99.7 (04 Dec 2023 04:27), Max: 99.7 (04 Dec 2023 04:27)  HR: 92 (04 Dec 2023 04:27) (78 - 99)  BP: 122/74 (04 Dec 2023 04:27) (112/71 - 122/74)  BP(mean): --  RR: 17 (04 Dec 2023 04:27) (17 - 18)  SpO2: 98% (03 Dec 2023 19:54) (98% - 99%)    Parameters below as of 03 Dec 2023 19:54  Patient On (Oxygen Delivery Method): room air     I&O's Summary      GENERAL:  [ ] No acute distress [ ]Lethargic  [ ]Unarousable  [x ]Verbal  [ ]Non-Verbal [ ]Cachexia    BEHAVIORAL/PSYCH:  [ x]Alert and Oriented x4  [ ] Anxiety [ ] Delirium [ ] Agitation [ ] Calm   EYES: [x ] No scleral icterus [ ] Scleral icterus [ ] Closed  ENMT:  [ ]Dry mouth  [x ]No external oral lesions [ ] No external ear or nose lesions  CARDIOVASCULAR:  [ ]Regular [ ]Irregular [ ]Tachy [ x]Not Tachy  [ ]Saleem [ ] Edema [ ] No edema  PULMONARY:  [ ]Tachypnea  [ ]Audible excessive secretions [x ] No labored breathing [ ] labored breathing  GASTROINTESTINAL: [x ]Soft  [ ]Distended  [ ]Not distended [ ]Non tender [ ]Tender  MUSCULOSKELETAL: [ ]No clubbing [ ] clubbing  [x] No cyanosis [ ] cyanosis  NEUROLOGIC: [ ]No focal deficits  [x ]Follows commands  [ ]Does not follow commands  [ ]Cognitive impairment  [ ]Dysphagia  [ ]Dysarthria  [ ]Paresis   SKIN: [ ] Jaundiced [x ] Non-jaundiced [ ]Rash [ ]No Rash [ ] Warm [ ] Dry  MISC/LINES: [ ] ET tube [ ] Trach [ ]NGT/OGT [ ]PEG [ ]Mcguire    LABS: reviewed by me                        12.3   6.74  )-----------( 256      ( 03 Dec 2023 09:52 )             36.3   12-03    135  |  96<L>  |  16  ----------------------------<  116<H>  4.1   |  28  |  0.9    Ca    9.7      03 Dec 2023 09:52    TPro  8.4<H>  /  Alb  4.1  /  TBili  0.7  /  DBili  x   /  AST  19  /  ALT  10  /  AlkPhos  115  12-03      Urinalysis Basic - ( 03 Dec 2023 09:52 )    Color: x / Appearance: x / SG: x / pH: x  Gluc: 116 mg/dL / Ketone: x  / Bili: x / Urobili: x   Blood: x / Protein: x / Nitrite: x   Leuk Esterase: x / RBC: x / WBC x   Sq Epi: x / Non Sq Epi: x / Bacteria: x      RADIOLOGY & ADDITIONAL STUDIES: reviewed by me    < from: CT Abdomen and Pelvis w/ Oral Cont and w/ IV Cont (12.03.23 @ 12:48) >  Worsening diffusely infiltrative pancreatic mass, difficult to delineate,   with increased soft tissue infiltration in the peripancreatic region, and   increased cystic or necrotic components. Marked gastric distention with   diluted contrast. Small bowel collapsed. No definite enteric contrast  within bowel appreciated. Consider possibility of upper GI obstruction   secondary to worsening pancreatic neoplasm.    Moderate colonic stool burden, with persistent caliber change at   rectosigmoid region. Increased size of pelvic, perirectal soft tissue   mass, measuring up to 2.5 cm, previously 1.5 cm on PET, however it is   unclear whether this is causing a lower GI tract obstruction given   similar anatomy to prior.    Innumerable peritoneal metastases, increased in size and number since   8/29/2023 PET/CT.    New intrahepatic and extrahepatic biliary dilation.    < end of copied text >      EKG: reviewed by me    < from: 12 Lead ECG (07.24.23 @ 22:36) >  Ventricular Rate 80 BPM    Atrial Rate 80 BPM    P-R Interval 158 ms    QRS Duration 92 ms    Q-T Interval 386 ms    QTC Calculation(Bazett) 445 ms    P Axis 57 degrees    R Axis -54 degrees    T Axis 34 degrees    Diagnosis Line Sinus rhythm with Premature atrial complexes  Left anterior fascicular block  Abnormal ECG    < end of copied text >      PROTEIN CALORIE MALNUTRITION PRESENT: [ ]mild [ ]moderate [ ]severe [ ]underweight [ ]morbid obesity  https://www.andeal.org/vault/2440/web/files/ONC/Table_Clinical%20Characteristics%20to%20Document%20Malnutrition-White%20JV%20et%20al%202012.pdf      Weight (kg): 65.8 (07-24-23 @ 19:42)  [ ]PPSV2 < or = to 30% [ ]significant weight loss  [ ]poor nutritional intake  [ ]anasarca      [ ]Artificial Nutrition      Palliative Care Spiritual/Emotional Screening Tool Question  Severity (0-4):                    OR                    [ x] Unable to determine. Will assess at later time if appropriate.  Score of 2 or greater indicates recommendation of Chaplaincy and/or SW referral  Chaplaincy Referral: [ ] Yes [ ] Refused [ ] Following     Caregiver [place]:  [ ] Yes [ ] No    OR    [x ] Unable to determine. Will assess at later time if appropriate.  Social Work Referral [ ]  Patient and Family Centered Care Referral [ ]    Anticipatory Grief Present: [ ] Yes [ ] No    OR     [ x] Unable to determine. Will assess at later time if appropriate.  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 Che), HTN, HLD, DM presents with worsening N/V and constipation. He states that he has had constipation and vomiting for the past month. He states BMs are usually small. Pt reports that he has not had a bowel movement for over a week and has now developed nausea and vomiting a/w abdominal pain. Patient reports flatus this morning, last vomiting episode was this morning as well. However, while in the ED he was tolerating PO diet while unknowingly being NPO. He has had a 20lb unintentional weight loss the past 3 months. He denies sick contacts, fever, chills, headache, dizziness, fever, hematemesis, or bloody stools,     In the ED:  T(C): 36.7 (12-03-23 @ 19:54), Max: 36.7 (12-03-23 @ 15:58)  HR: 78 (12-03-23 @ 19:54) (78 - 100)  BP: 112/71 (12-03-23 @ 19:54) (112/70 - 120/72)  RR: 18 (12-03-23 @ 19:54) (18 - 19)  SpO2: 98% (12-03-23 @ 19:54) (98% - 99%)    WBC: 6.7  lactate: 1.7  lipase: 12    < from: CT Abdomen and Pelvis w/ Oral Cont and w/ IV Cont (12.03.23 @ 12:48) >    IMPRESSION:    Worsening diffusely infiltrative pancreatic mass, difficult to delineate,   with increased soft tissue infiltration in the peripancreatic region, and   increased cystic or necrotic components. Marked gastric distention with   diluted contrast. Small bowel collapsed. No definite enteric contrast  within bowel appreciated. Consider possibility of upper GI obstruction   secondary to worsening pancreatic neoplasm.    Moderate colonic stool burden, with persistent caliber change at   rectosigmoid region. Increased size of pelvic, perirectal soft tissue   mass, measuring up to 2.5 cm, previously 1.5 cm on PET, however it is   unclear whether this is causing a lower GI tract obstruction given   similar anatomy to prior.    Innumerable peritoneal metastases, increased in size and number since   8/29/2023 PET/CT.    New intrahepatic and extrahepatic biliary dilation.    < end of copied text >   (04 Dec 2023 00:11)    PERTINENT PM/SXH:   HTN (hypertension)    Diabetes    Hypercholesteremia    Gout    H/O constipation    Cancer of abdominal organ      H/O colonoscopy      FAMILY HISTORY:  FH: GI cancer      ITEMS NOT CHECKED ARE NOT PRESENT    SOCIAL HISTORY:   Significant other/partner[ ]  Children[ ]  Latter-day/Spirituality:  Substance hx:  [ ]   Tobacco hx:  [ ]   Alcohol hx: [ ]   Living Situation: [ x]Home  [ ]Long term care  [ ]Rehab [ ]Other  Home Services: [ ] HHA [ ] Visting RN [ ] Hospice  Occupation:  Home Opioid hx:  [ ] Y [ ] N [x ] I-Stop Reference No:    Reference #: [id number] - no meds     ADVANCE DIRECTIVES:     [ ] Full Code [x ] DNR  MOLST  [ ]  Living Will  [ ]   DECISION MAKER(s):  [ ] Health Care Proxy(s)  [x ] Surrogate(s)  [ ] Guardian           Name(s): Phone Number(s):  Wife June      BASELINE (I)ADL(s) (prior to admission):    [place]: [ ]Total  [ ] Moderate [ ]Dependent  Palliative Performance Status Version 2:         %    http://npcrc.org/files/news/palliative_performance_scale_ppsv2.pdf    Allergies    No Known Drug Allergies  shellfish (Unknown)    Intolerances    MEDICATIONS  (STANDING):  dextrose 5%. 1000 milliLiter(s) (50 mL/Hr) IV Continuous <Continuous>  dextrose 5%. 1000 milliLiter(s) (100 mL/Hr) IV Continuous <Continuous>  dextrose 50% Injectable 12.5 Gram(s) IV Push once  dextrose 50% Injectable 25 Gram(s) IV Push once  dextrose 50% Injectable 25 Gram(s) IV Push once  enoxaparin Injectable 40 milliGRAM(s) SubCutaneous every 24 hours  glucagon  Injectable 1 milliGRAM(s) IntraMuscular once  insulin lispro (ADMELOG) corrective regimen sliding scale   SubCutaneous three times a day before meals  insulin lispro (ADMELOG) corrective regimen sliding scale   SubCutaneous at bedtime  ondansetron Injectable 4 milliGRAM(s) IV Push every 6 hours  sodium chloride 0.9%. 1000 milliLiter(s) (75 mL/Hr) IV Continuous <Continuous>    MEDICATIONS  (PRN):  dextrose Oral Gel 15 Gram(s) Oral once PRN Blood Glucose LESS THAN 70 milliGRAM(s)/deciliter    PRESENT SYMPTOMS: [ ]Unable to obtain due to poor mentation   Source if other than patient:  [ ]Family   [ ]Team     Pain: [ ]yes [ x]no  QOL impact -   Location -                    Aggravating factors -  Quality -  Radiation -  Timing-  Severity (0-10 scale):  Minimal acceptable level (0-10 scale):     CPOT:    https://www.[facility].org/getattachment/[email or web address]/Critical-Care-Pain-Observation-Tool-(CPOT)    PAIN AD Score:   http://geriatrictoolkit.[facility]/cog/painad.pdf (press ctrl +  left click to view)    Dyspnea:                           [x ]None[ ]Mild [ ]Moderate [ ]Severe     Respiratory Distress Observation Scale (RDOS):   A score of 0 to 2 signifies little or no respiratory distress, 3 signifies mild distress, scores 4 to 6 indicate moderate distress, and scores greater than 7 signify severe distress  [email or web address]/sites/default/files/[email or web address]-[email or web address].pdf    Anxiety:                             [x ]None[ ]Mild [ ]Moderate [ ]Severe   Fatigue:                             [x ]None[ ]Mild [ ]Moderate [ ]Severe   Nausea:                             [ ]None[ ]Mild [x ]Moderate [ ]Severe   Loss of appetite:              [ ]None[ ]Mild [ x]Moderate [ ]Severe   Constipation:                    [ ]None[ ]Mild [x ]Moderate [ ]Severe    Other Symptoms:  [x ]All other review of systems negative     Palliative Performance Status Version 2:         50%    http://npcrc.org/files/news/palliative_performance_scale_ppsv2.pdf    PHYSICAL EXAM:  Vital Signs Last 24 Hrs  T(C): 37.6 (04 Dec 2023 04:27), Max: 37.6 (04 Dec 2023 04:27)  T(F): 99.7 (04 Dec 2023 04:27), Max: 99.7 (04 Dec 2023 04:27)  HR: 92 (04 Dec 2023 04:27) (78 - 99)  BP: 122/74 (04 Dec 2023 04:27) (112/71 - 122/74)  BP(mean): --  RR: 17 (04 Dec 2023 04:27) (17 - 18)  SpO2: 98% (03 Dec 2023 19:54) (98% - 99%)    Parameters below as of 03 Dec 2023 19:54  Patient On (Oxygen Delivery Method): room air     I&O's Summary      GENERAL:  [ ] No acute distress [ ]Lethargic  [ ]Unarousable  [x ]Verbal  [ ]Non-Verbal [ ]Cachexia    BEHAVIORAL/PSYCH:  [ x]Alert and Oriented x4  [ ] Anxiety [ ] Delirium [ ] Agitation [ ] Calm   EYES: [x ] No scleral icterus [ ] Scleral icterus [ ] Closed  ENMT:  [ ]Dry mouth  [x ]No external oral lesions [ ] No external ear or nose lesions  CARDIOVASCULAR:  [ ]Regular [ ]Irregular [ ]Tachy [ x]Not Tachy  [ ]Saleem [ ] Edema [ ] No edema  PULMONARY:  [ ]Tachypnea  [ ]Audible excessive secretions [x ] No labored breathing [ ] labored breathing  GASTROINTESTINAL: [x ]Soft  [ ]Distended  [ ]Not distended [ ]Non tender [ ]Tender  MUSCULOSKELETAL: [ ]No clubbing [ ] clubbing  [x] No cyanosis [ ] cyanosis  NEUROLOGIC: [ ]No focal deficits  [x ]Follows commands  [ ]Does not follow commands  [ ]Cognitive impairment  [ ]Dysphagia  [ ]Dysarthria  [ ]Paresis   SKIN: [ ] Jaundiced [x ] Non-jaundiced [ ]Rash [ ]No Rash [ ] Warm [ ] Dry  MISC/LINES: [ ] ET tube [ ] Trach [ ]NGT/OGT [ ]PEG [ ]Mcguire    LABS: reviewed by me                        12.3   6.74  )-----------( 256      ( 03 Dec 2023 09:52 )             36.3   12-03    135  |  96<L>  |  16  ----------------------------<  116<H>  4.1   |  28  |  0.9    Ca    9.7      03 Dec 2023 09:52    TPro  8.4<H>  /  Alb  4.1  /  TBili  0.7  /  DBili  x   /  AST  19  /  ALT  10  /  AlkPhos  115  12-03      Urinalysis Basic - ( 03 Dec 2023 09:52 )    Color: x / Appearance: x / SG: x / pH: x  Gluc: 116 mg/dL / Ketone: x  / Bili: x / Urobili: x   Blood: x / Protein: x / Nitrite: x   Leuk Esterase: x / RBC: x / WBC x   Sq Epi: x / Non Sq Epi: x / Bacteria: x      RADIOLOGY & ADDITIONAL STUDIES: reviewed by me    < from: CT Abdomen and Pelvis w/ Oral Cont and w/ IV Cont (12.03.23 @ 12:48) >  Worsening diffusely infiltrative pancreatic mass, difficult to delineate,   with increased soft tissue infiltration in the peripancreatic region, and   increased cystic or necrotic components. Marked gastric distention with   diluted contrast. Small bowel collapsed. No definite enteric contrast  within bowel appreciated. Consider possibility of upper GI obstruction   secondary to worsening pancreatic neoplasm.    Moderate colonic stool burden, with persistent caliber change at   rectosigmoid region. Increased size of pelvic, perirectal soft tissue   mass, measuring up to 2.5 cm, previously 1.5 cm on PET, however it is   unclear whether this is causing a lower GI tract obstruction given   similar anatomy to prior.    Innumerable peritoneal metastases, increased in size and number since   8/29/2023 PET/CT.    New intrahepatic and extrahepatic biliary dilation.    < end of copied text >      EKG: reviewed by me    < from: 12 Lead ECG (07.24.23 @ 22:36) >  Ventricular Rate 80 BPM    Atrial Rate 80 BPM    P-R Interval 158 ms    QRS Duration 92 ms    Q-T Interval 386 ms    QTC Calculation(Bazett) 445 ms    P Axis 57 degrees    R Axis -54 degrees    T Axis 34 degrees    Diagnosis Line Sinus rhythm with Premature atrial complexes  Left anterior fascicular block  Abnormal ECG    < end of copied text >      PROTEIN CALORIE MALNUTRITION PRESENT: [ ]mild [ ]moderate [ ]severe [ ]underweight [ ]morbid obesity  https://www.andeal.org/vault/2440/web/files/ONC/Table_Clinical%20Characteristics%20to%20Document%20Malnutrition-White%20JV%20et%20al%202012.pdf      Weight (kg): 65.8 (07-24-23 @ 19:42)  [ ]PPSV2 < or = to 30% [ ]significant weight loss  [ ]poor nutritional intake  [ ]anasarca      [ ]Artificial Nutrition      Palliative Care Spiritual/Emotional Screening Tool Question  Severity (0-4):                    OR                    [ x] Unable to determine. Will assess at later time if appropriate.  Score of 2 or greater indicates recommendation of Chaplaincy and/or SW referral  Chaplaincy Referral: [ ] Yes [ ] Refused [ ] Following     Caregiver [place]:  [ ] Yes [ ] No    OR    [x ] Unable to determine. Will assess at later time if appropriate.  Social Work Referral [ ]  Patient and Family Centered Care Referral [ ]    Anticipatory Grief Present: [ ] Yes [ ] No    OR     [ x] Unable to determine. Will assess at later time if appropriate.  Social Work Referral [ ]  Patient and Family Centered Care Referral [ ]    Patient discussed with primary medical team MD  Palliative care education provided to patient and/or family

## 2023-12-04 NOTE — H&P ADULT - NSHPPHYSICALEXAM_GEN_ALL_CORE
GENERAL: thin, friendly, non-septic, NAD, lying in bed comfortably, AAOx3  HEAD:  Atraumatic, Normocephalic  EYES: conjunctiva and sclera clear  ENT: Moist mucous membranes  NECK: Supple, No JVD  CHEST/LUNG: Clear to auscultation bilaterally  HEART: Regular rate and rhythm; No murmurs, rubs, or gallops  ABDOMEN: Soft, nontender, nondistended, + BS, no guarding or rigidity  EXTREMITIES:  No clubbing, cyanosis, or edema  NERVOUS SYSTEM:  A&Ox3    T(C): 36.7 (12-03-23 @ 19:54), Max: 36.7 (12-03-23 @ 15:58)  HR: 78 (12-03-23 @ 19:54) (78 - 100)  BP: 112/71 (12-03-23 @ 19:54) (112/70 - 120/72)  RR: 18 (12-03-23 @ 19:54) (18 - 19)  SpO2: 98% (12-03-23 @ 19:54) (98% - 99%)

## 2023-12-04 NOTE — CONSULT NOTE ADULT - SUBJECTIVE AND OBJECTIVE BOX
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 Che), HTN, HLD, DM presents with worsening N/V and constipation. He states that he has had constipation and vomiting for the past month. He states BMs are usually small. Pt reports that he has not had a bowel movement for over a week and has now developed nausea and vomiting a/w abdominal pain. Patient reports flatus this morning, last vomiting episode was this morning as well. However, while in the ED he was tolerating PO diet while unknowingly being NPO. He has had a 20lb unintentional weight loss the past 3 months. He denies sick contacts, fever, chills, headache, dizziness, fever, hematemesis, or bloody stools,     In the ED:  T(C): 36.7 (12-03-23 @ 19:54), Max: 36.7 (12-03-23 @ 15:58)  HR: 78 (12-03-23 @ 19:54) (78 - 100)  BP: 112/71 (12-03-23 @ 19:54) (112/70 - 120/72)  RR: 18 (12-03-23 @ 19:54) (18 - 19)  SpO2: 98% (12-03-23 @ 19:54) (98% - 99%)    WBC: 6.7  lactate: 1.7  lipase: 12    < from: CT Abdomen and Pelvis w/ Oral Cont and w/ IV Cont (12.03.23 @ 12:48) >    IMPRESSION:    Worsening diffusely infiltrative pancreatic mass, difficult to delineate,   with increased soft tissue infiltration in the peripancreatic region, and   increased cystic or necrotic components. Marked gastric distention with   diluted contrast. Small bowel collapsed. No definite enteric contrast  within bowel appreciated. Consider possibility of upper GI obstruction   secondary to worsening pancreatic neoplasm.    Moderate colonic stool burden, with persistent caliber change at   rectosigmoid region. Increased size of pelvic, perirectal soft tissue   mass, measuring up to 2.5 cm, previously 1.5 cm on PET, however it is   unclear whether this is causing a lower GI tract obstruction given   similar anatomy to prior.    Innumerable peritoneal metastases, increased in size and number since   8/29/2023 PET/CT.    New intrahepatic and extrahepatic biliary dilation.    < end of copied text >   (04 Dec 2023 00:11)      Allergies    No Known Drug Allergies  shellfish (Unknown)    Intolerances        MEDICATIONS  (STANDING):  dextrose 5%. 1000 milliLiter(s) (100 mL/Hr) IV Continuous <Continuous>  dextrose 5%. 1000 milliLiter(s) (50 mL/Hr) IV Continuous <Continuous>  dextrose 50% Injectable 12.5 Gram(s) IV Push once  dextrose 50% Injectable 25 Gram(s) IV Push once  dextrose 50% Injectable 25 Gram(s) IV Push once  enoxaparin Injectable 40 milliGRAM(s) SubCutaneous every 24 hours  glucagon  Injectable 1 milliGRAM(s) IntraMuscular once  insulin lispro (ADMELOG) corrective regimen sliding scale   SubCutaneous three times a day before meals  insulin lispro (ADMELOG) corrective regimen sliding scale   SubCutaneous at bedtime  ondansetron Injectable 4 milliGRAM(s) IV Push every 6 hours  sodium chloride 0.9%. 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 No nausea, vomiting, or hematemesis; No diarrhea or constipation. No melena or hematochezia.  GENITOURINARY: No dysuria, frequency or hematuria  NEUROLOGICAL: No numbness or weakness  SKIN: No itching, burning, rashes, or lesions   All other review of systems is negative unless indicated above.        T(F): 99.7 (12-04-23 @ 04:27), Max: 99.7 (12-04-23 @ 04:27)  HR: 92 (12-04-23 @ 04:27)  BP: 122/74 (12-04-23 @ 04:27)  RR: 17 (12-04-23 @ 04:27)  SpO2: 98% (12-03-23 @ 19:54)  Wt(kg): --    GENERAL: NAD, well-developed  HEAD:  Atraumatic, Normocephalic  EYES: EOMI, PERRLA, conjunctiva and sclera clear  NECK: Supple, No JVD  CHEST/LUNG: Clear to auscultation bilaterally; No wheeze  HEART: Regular rate and rhythm; No murmurs, rubs, or gallops  ABDOMEN: Soft, Nontender, Nondistended; Bowel sounds present  EXTREMITIES:  2+ Peripheral Pulses, No clubbing, cyanosis, or edema  NEUROLOGY: non-focal  SKIN: No rashes or lesions                          12.3   6.74  )-----------( 256      ( 03 Dec 2023 09:52 )             36.3       12-03    135  |  96<L>  |  16  ----------------------------<  116<H>  4.1   |  28  |  0.9    Ca    9.7      03 Dec 2023 09:52    TPro  8.4<H>  /  Alb  4.1  /  TBili  0.7  /  DBili  x   /  AST  19  /  ALT  10  /  AlkPhos  115  12-03

## 2023-12-04 NOTE — CONSULT NOTE ADULT - SUBJECTIVE AND OBJECTIVE BOX
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 Che), HTN, HLD, DM presents with N/V and constipation. Patient reports that he has not had a bowel movement for over a week and has now developed nausea and vomiting.    Pt reports feeling much improved since admission. No abdominal pain, nausea, or vomiting. Passing more flatus, still no BM.     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  H/O colonoscopy  biopsy abdomen 7/2022    Home Medications:  allopurinol 100 mg oral tablet:  (22 Aug 2022 06:47)  atorvastatin 20 mg oral tablet: 1 tab(s) orally once a day (at bedtime) (22 Aug 2022 06:47)  lisinopril 5 mg oral tablet: 1 tab(s) orally once a day (22 Aug 2022 06:47)    VITALS:  T(F): 98.1 (12-03-23 @ 15:58), Max: 98.1 (12-03-23 @ 15:58)  HR: 99 (12-03-23 @ 15:58) (99 - 100)  BP: 120/72 (12-03-23 @ 15:58) (112/70 - 120/72)  RR: 19 (12-03-23 @ 08:24) (19 - 19)  SpO2: 99% (12-03-23 @ 15:58) (99% - 99%)    PHYSICAL EXAM:  General: NAD, AAOx3, calm and cooperative  Respiratory: normal respiratory effort  Abdomen: Soft with areas of induration (likely metastatic implants), non-distended, non-tender, no rebound, no guarding    LAB/STUDIES:  Labs:  CAPILLARY BLOOD GLUCOSE    POCT Blood Glucose.: 88 mg/dL (04 Dec 2023 08:05)                          12.3   6.74  )-----------( 256      ( 03 Dec 2023 09:52 )             36.3         12-03    135  |  96<L>  |  16  ----------------------------<  116<H>  4.1   |  28  |  0.9    LFTs:             8.4  | 0.7  | 19       ------------------[115     ( 03 Dec 2023 09:52 )  4.1  | x    | 10          Lipase:12       Lactate, Blood: 1.7 mmol/L (12-03-23 @ 09:52)  Urinalysis Basic - ( 03 Dec 2023 09:52 )    Color: x / Appearance: x / SG: x / pH: x  Gluc: 116 mg/dL / Ketone: x  / Bili: x / Urobili: x   Blood: x / Protein: x / Nitrite: x   Leuk Esterase: x / RBC: x / WBC x   Sq Epi: x / Non Sq Epi: x / Bacteria: x      IMAGING:  < from: CT Abdomen and Pelvis w/ Oral Cont and w/ IV Cont (12.03.23 @ 12:48) >  IMPRESSION:  Worsening diffusely infiltrative pancreatic mass, difficult to delineate,   with increased soft tissue infiltration in the peripancreatic region, and   increased cystic or necrotic components. Marked gastric distention with   diluted contrast. Small bowel collapsed. No definite enteric contrast  within bowel appreciated. Consider possibility of upper GI obstruction   secondary to worsening pancreatic neoplasm.  Moderate colonic stool burden, with persistent caliber change at   rectosigmoid region. Increased size of pelvic, perirectal soft tissue   mass, measuring up to 2.5 cm, previously 1.5 cm on PET, however it is   unclear whether this is causing a lower GI tract obstruction given   similar anatomy to prior.  Innumerable peritoneal metastases, increased in size and number since   8/29/2023 PET/CT.  New intrahepatic and extrahepatic biliary dilation.

## 2023-12-05 LAB
GLUCOSE BLDC GLUCOMTR-MCNC: 108 MG/DL — HIGH (ref 70–99)
GLUCOSE BLDC GLUCOMTR-MCNC: 108 MG/DL — HIGH (ref 70–99)
GLUCOSE BLDC GLUCOMTR-MCNC: 71 MG/DL — SIGNIFICANT CHANGE UP (ref 70–99)
GLUCOSE BLDC GLUCOMTR-MCNC: 71 MG/DL — SIGNIFICANT CHANGE UP (ref 70–99)
GLUCOSE BLDC GLUCOMTR-MCNC: 81 MG/DL — SIGNIFICANT CHANGE UP (ref 70–99)
GLUCOSE BLDC GLUCOMTR-MCNC: 81 MG/DL — SIGNIFICANT CHANGE UP (ref 70–99)
GLUCOSE BLDC GLUCOMTR-MCNC: 82 MG/DL — SIGNIFICANT CHANGE UP (ref 70–99)
GLUCOSE BLDC GLUCOMTR-MCNC: 82 MG/DL — SIGNIFICANT CHANGE UP (ref 70–99)
GLUCOSE BLDC GLUCOMTR-MCNC: 97 MG/DL — SIGNIFICANT CHANGE UP (ref 70–99)
GLUCOSE BLDC GLUCOMTR-MCNC: 97 MG/DL — SIGNIFICANT CHANGE UP (ref 70–99)

## 2023-12-05 PROCEDURE — 99232 SBSQ HOSP IP/OBS MODERATE 35: CPT

## 2023-12-05 RX ORDER — ONDANSETRON 8 MG/1
4 TABLET, FILM COATED ORAL EVERY 8 HOURS
Refills: 0 | Status: DISCONTINUED | OUTPATIENT
Start: 2023-12-05 | End: 2023-12-05

## 2023-12-05 RX ORDER — ONDANSETRON 8 MG/1
4 TABLET, FILM COATED ORAL EVERY 6 HOURS
Refills: 0 | Status: DISCONTINUED | OUTPATIENT
Start: 2023-12-05 | End: 2023-12-09

## 2023-12-05 RX ADMIN — ONDANSETRON 4 MILLIGRAM(S): 8 TABLET, FILM COATED ORAL at 13:09

## 2023-12-05 RX ADMIN — ENOXAPARIN SODIUM 40 MILLIGRAM(S): 100 INJECTION SUBCUTANEOUS at 06:52

## 2023-12-05 NOTE — PROGRESS NOTE ADULT - SUBJECTIVE AND OBJECTIVE BOX
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 Che), HTN, HLD, DM presents with worsening N/V and constipation. He states that he has had constipation and vomiting for the past month. He states BMs are usually small. Pt reports that he has not had a bowel movement for over a week and has now developed nausea and vomiting a/w abdominal pain. Patient reports flatus this morning, last vomiting episode was this morning as well. However, while in the ED he was tolerating PO diet while unknowingly being NPO. He has had a 20lb unintentional weight loss the past 3 months. He denies sick contacts, fever, chills, headache, dizziness, fever, hematemesis, or bloody stools,     Interval history  -Patient seen at bedside  -Denied pain or SOB at time of visit  -He spoke with oncology  -He has not yet spoke with hospice     ADVANCE DIRECTIVES:     [ ] Full Code [x ] DNR  MOLST  [ ]  Living Will  [ ]   DECISION MAKER(s):  [ ] Health Care Proxy(s)  [x ] Surrogate(s)  [ ] Guardian           Name(s): Phone Number(s):  Wife June      BASELINE (I)ADL(s) (prior to admission):    [place]: [ ]Total  [ ] Moderate [ ]Dependent  Palliative Performance Status Version 2:         %    http://npcrc.org/files/news/palliative_performance_scale_ppsv2.pdf    Allergies    No Known Drug Allergies  shellfish (Unknown)    Intolerances    MEDICATIONS  (STANDING):  dextrose 5%. 1000 milliLiter(s) (100 mL/Hr) IV Continuous <Continuous>  dextrose 5%. 1000 milliLiter(s) (50 mL/Hr) IV Continuous <Continuous>  dextrose 50% Injectable 12.5 Gram(s) IV Push once  dextrose 50% Injectable 25 Gram(s) IV Push once  dextrose 50% Injectable 25 Gram(s) IV Push once  enoxaparin Injectable 40 milliGRAM(s) SubCutaneous every 24 hours  glucagon  Injectable 1 milliGRAM(s) IntraMuscular once  insulin lispro (ADMELOG) corrective regimen sliding scale   SubCutaneous three times a day before meals  insulin lispro (ADMELOG) corrective regimen sliding scale   SubCutaneous at bedtime  sodium chloride 0.9%. 1000 milliLiter(s) (75 mL/Hr) IV Continuous <Continuous>    MEDICATIONS  (PRN):  dextrose Oral Gel 15 Gram(s) Oral once PRN Blood Glucose LESS THAN 70 milliGRAM(s)/deciliter  ondansetron Injectable 4 milliGRAM(s) IV Push every 6 hours PRN Nausea and/or Vomiting      PRESENT SYMPTOMS: [ ]Unable to obtain due to poor mentation   Source if other than patient:  [ ]Family   [ ]Team     Pain: [ ]yes [ x]no  QOL impact -   Location -                    Aggravating factors -  Quality -  Radiation -  Timing-  Severity (0-10 scale):  Minimal acceptable level (0-10 scale):     CPOT:    https://www.[facility].org/getattachment/[email or web address]/Critical-Care-Pain-Observation-Tool-(CPOT)    PAIN AD Score:   http://geriatrictoolkit.[facility]/cog/painad.pdf (press ctrl +  left click to view)    Dyspnea:                           [x ]None[ ]Mild [ ]Moderate [ ]Severe     Respiratory Distress Observation Scale (RDOS):   A score of 0 to 2 signifies little or no respiratory distress, 3 signifies mild distress, scores 4 to 6 indicate moderate distress, and scores greater than 7 signify severe distress  [email or web address]/sites/default/[email or web address]-klgpzvmcejh-[email or web address]-[email or web address]    Anxiety:                             [x ]None[ ]Mild [ ]Moderate [ ]Severe   Fatigue:                             [x ]None[ ]Mild [ ]Moderate [ ]Severe   Nausea:                             [ ]None[ ]Mild [x ]Moderate [ ]Severe   Loss of appetite:              [ ]None[ ]Mild [ x]Moderate [ ]Severe   Constipation:                    [ ]None[ ]Mild [x ]Moderate [ ]Severe    Other Symptoms:  [x ]All other review of systems negative     Palliative Performance Status Version 2:         50%    http://npcrc.org/files/news/palliative_performance_scale_ppsv2.pdf    PHYSICAL EXAM:  Vital Signs Last 24 Hrs  T(C): 36.6 (05 Dec 2023 14:24), Max: 37.6 (04 Dec 2023 20:48)  T(F): 97.8 (05 Dec 2023 14:24), Max: 99.6 (04 Dec 2023 20:48)  HR: 84 (05 Dec 2023 14:24) (84 - 95)  BP: 135/65 (05 Dec 2023 14:24) (129/74 - 135/65)  BP(mean): --  RR: 18 (05 Dec 2023 14:24) (18 - 18)  SpO2: --        GENERAL:  [ ] No acute distress [ ]Lethargic  [ ]Unarousable  [x ]Verbal  [ ]Non-Verbal [ ]Cachexia    BEHAVIORAL/PSYCH:  [ x]Alert and Oriented x4  [ ] Anxiety [ ] Delirium [ ] Agitation [ ] Calm   EYES: [x ] No scleral icterus [ ] Scleral icterus [ ] Closed  ENMT:  [ ]Dry mouth  [x ]No external oral lesions [ ] No external ear or nose lesions  CARDIOVASCULAR:  [ ]Regular [ ]Irregular [ ]Tachy [ x]Not Tachy  [ ]Saleem [ ] Edema [ ] No edema  PULMONARY:  [ ]Tachypnea  [ ]Audible excessive secretions [x ] No labored breathing [ ] labored breathing  GASTROINTESTINAL: [x ]Soft  [ ]Distended  [ ]Not distended [ ]Non tender [ ]Tender  MUSCULOSKELETAL: [ ]No clubbing [ ] clubbing  [x] No cyanosis [ ] cyanosis  NEUROLOGIC: [ ]No focal deficits  [x ]Follows commands  [ ]Does not follow commands  [ ]Cognitive impairment  [ ]Dysphagia  [ ]Dysarthria  [ ]Paresis   SKIN: [ ] Jaundiced [x ] Non-jaundiced [ ]Rash [ ]No Rash [ ] Warm [ ] Dry  MISC/LINES: [ ] ET tube [ ] Trach [ ]NGT/OGT [ ]PEG [ ]Mcguire    LABS: reviewed by me                                      CAPILLARY BLOOD GLUCOSE      POCT Blood Glucose.: 97 mg/dL (05 Dec 2023 11:49)                RADIOLOGY & ADDITIONAL STUDIES: reviewed by me    < from: CT Abdomen and Pelvis w/ Oral Cont and w/ IV Cont (12.03.23 @ 12:48) >  Worsening diffusely infiltrative pancreatic mass, difficult to delineate,   with increased soft tissue infiltration in the peripancreatic region, and   increased cystic or necrotic components. Marked gastric distention with   diluted contrast. Small bowel collapsed. No definite enteric contrast  within bowel appreciated. Consider possibility of upper GI obstruction   secondary to worsening pancreatic neoplasm.    Moderate colonic stool burden, with persistent caliber change at   rectosigmoid region. Increased size of pelvic, perirectal soft tissue   mass, measuring up to 2.5 cm, previously 1.5 cm on PET, however it is   unclear whether this is causing a lower GI tract obstruction given   similar anatomy to prior.    Innumerable peritoneal metastases, increased in size and number since   8/29/2023 PET/CT.    New intrahepatic and extrahepatic biliary dilation.    < end of copied text >      EKG: reviewed by me    < from: 12 Lead ECG (07.24.23 @ 22:36) >  Ventricular Rate 80 BPM    Atrial Rate 80 BPM    P-R Interval 158 ms    QRS Duration 92 ms    Q-T Interval 386 ms    QTC Calculation(Bazett) 445 ms    P Axis 57 degrees    R Axis -54 degrees    T Axis 34 degrees    Diagnosis Line Sinus rhythm with Premature atrial complexes  Left anterior fascicular block  Abnormal ECG    < end of copied text >      PROTEIN CALORIE MALNUTRITION PRESENT: [ ]mild [ ]moderate [ ]severe [ ]underweight [ ]morbid obesity  https://www.andeal.org/vault/2440/web/files/ONC/Table_Clinical%20Characteristics%20to%20Document%20Malnutrition-White%20JV%20et%20al%485643.pdf      Weight (kg): 65.8 (07-24-23 @ 19:42)  [ ]PPSV2 < or = to 30% [ ]significant weight loss  [ ]poor nutritional intake  [ ]anasarca      [ ]Artificial Nutrition      Palliative Care Spiritual/Emotional Screening Tool Question  Severity (0-4):                    OR                    [ x] Unable to determine. Will assess at later time if appropriate.  Score of 2 or greater indicates recommendation of Chaplaincy and/or SW referral  Chaplaincy Referral: [ ] Yes [ ] Refused [ ] Following     Caregiver [place]:  [ ] Yes [ ] No    OR    [x ] Unable to determine. Will assess at later time if appropriate.  Social Work Referral [ ]  Patient and Family Centered Care Referral [ ]    Anticipatory Grief Present: [ ] Yes [ ] No    OR     [ x] Unable to determine. Will assess at later time if appropriate.  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 Che), HTN, HLD, DM presents with worsening N/V and constipation. He states that he has had constipation and vomiting for the past month. He states BMs are usually small. Pt reports that he has not had a bowel movement for over a week and has now developed nausea and vomiting a/w abdominal pain. Patient reports flatus this morning, last vomiting episode was this morning as well. However, while in the ED he was tolerating PO diet while unknowingly being NPO. He has had a 20lb unintentional weight loss the past 3 months. He denies sick contacts, fever, chills, headache, dizziness, fever, hematemesis, or bloody stools,     Interval history  -Patient seen at bedside  -Denied pain or SOB at time of visit  -He spoke with oncology  -He has not yet spoke with hospice     ADVANCE DIRECTIVES:     [ ] Full Code [x ] DNR  MOLST  [ ]  Living Will  [ ]   DECISION MAKER(s):  [ ] Health Care Proxy(s)  [x ] Surrogate(s)  [ ] Guardian           Name(s): Phone Number(s):  Wife June      BASELINE (I)ADL(s) (prior to admission):    [place]: [ ]Total  [ ] Moderate [ ]Dependent  Palliative Performance Status Version 2:         %    http://npcrc.org/files/news/palliative_performance_scale_ppsv2.pdf    Allergies    No Known Drug Allergies  shellfish (Unknown)    Intolerances    MEDICATIONS  (STANDING):  dextrose 5%. 1000 milliLiter(s) (100 mL/Hr) IV Continuous <Continuous>  dextrose 5%. 1000 milliLiter(s) (50 mL/Hr) IV Continuous <Continuous>  dextrose 50% Injectable 12.5 Gram(s) IV Push once  dextrose 50% Injectable 25 Gram(s) IV Push once  dextrose 50% Injectable 25 Gram(s) IV Push once  enoxaparin Injectable 40 milliGRAM(s) SubCutaneous every 24 hours  glucagon  Injectable 1 milliGRAM(s) IntraMuscular once  insulin lispro (ADMELOG) corrective regimen sliding scale   SubCutaneous three times a day before meals  insulin lispro (ADMELOG) corrective regimen sliding scale   SubCutaneous at bedtime  sodium chloride 0.9%. 1000 milliLiter(s) (75 mL/Hr) IV Continuous <Continuous>    MEDICATIONS  (PRN):  dextrose Oral Gel 15 Gram(s) Oral once PRN Blood Glucose LESS THAN 70 milliGRAM(s)/deciliter  ondansetron Injectable 4 milliGRAM(s) IV Push every 6 hours PRN Nausea and/or Vomiting      PRESENT SYMPTOMS: [ ]Unable to obtain due to poor mentation   Source if other than patient:  [ ]Family   [ ]Team     Pain: [ ]yes [ x]no  QOL impact -   Location -                    Aggravating factors -  Quality -  Radiation -  Timing-  Severity (0-10 scale):  Minimal acceptable level (0-10 scale):     CPOT:    https://www.[facility].org/getattachment/[email or web address]/Critical-Care-Pain-Observation-Tool-(CPOT)    PAIN AD Score:   http://geriatrictoolkit.[facility]/cog/painad.pdf (press ctrl +  left click to view)    Dyspnea:                           [x ]None[ ]Mild [ ]Moderate [ ]Severe     Respiratory Distress Observation Scale (RDOS):   A score of 0 to 2 signifies little or no respiratory distress, 3 signifies mild distress, scores 4 to 6 indicate moderate distress, and scores greater than 7 signify severe distress  [email or web address]://[email or web address]/sites/default/files/PDFS/[email or web address]-pzghonujovr-zghlsqzj-tcyfnkuheuz-sifao.pdf    Anxiety:                             [x ]None[ ]Mild [ ]Moderate [ ]Severe   Fatigue:                             [x ]None[ ]Mild [ ]Moderate [ ]Severe   Nausea:                             [ ]None[ ]Mild [x ]Moderate [ ]Severe   Loss of appetite:              [ ]None[ ]Mild [ x]Moderate [ ]Severe   Constipation:                    [ ]None[ ]Mild [x ]Moderate [ ]Severe    Other Symptoms:  [x ]All other review of systems negative     Palliative Performance Status Version 2:         50%    http://npcrc.org/files/news/palliative_performance_scale_ppsv2.pdf    PHYSICAL EXAM:  Vital Signs Last 24 Hrs  T(C): 36.6 (05 Dec 2023 14:24), Max: 37.6 (04 Dec 2023 20:48)  T(F): 97.8 (05 Dec 2023 14:24), Max: 99.6 (04 Dec 2023 20:48)  HR: 84 (05 Dec 2023 14:24) (84 - 95)  BP: 135/65 (05 Dec 2023 14:24) (129/74 - 135/65)  BP(mean): --  RR: 18 (05 Dec 2023 14:24) (18 - 18)  SpO2: --        GENERAL:  [ ] No acute distress [ ]Lethargic  [ ]Unarousable  [x ]Verbal  [ ]Non-Verbal [ ]Cachexia    BEHAVIORAL/PSYCH:  [ x]Alert and Oriented x4  [ ] Anxiety [ ] Delirium [ ] Agitation [ ] Calm   EYES: [x ] No scleral icterus [ ] Scleral icterus [ ] Closed  ENMT:  [ ]Dry mouth  [x ]No external oral lesions [ ] No external ear or nose lesions  CARDIOVASCULAR:  [ ]Regular [ ]Irregular [ ]Tachy [ x]Not Tachy  [ ]Saleem [ ] Edema [ ] No edema  PULMONARY:  [ ]Tachypnea  [ ]Audible excessive secretions [x ] No labored breathing [ ] labored breathing  GASTROINTESTINAL: [x ]Soft  [ ]Distended  [ ]Not distended [ ]Non tender [ ]Tender  MUSCULOSKELETAL: [ ]No clubbing [ ] clubbing  [x] No cyanosis [ ] cyanosis  NEUROLOGIC: [ ]No focal deficits  [x ]Follows commands  [ ]Does not follow commands  [ ]Cognitive impairment  [ ]Dysphagia  [ ]Dysarthria  [ ]Paresis   SKIN: [ ] Jaundiced [x ] Non-jaundiced [ ]Rash [ ]No Rash [ ] Warm [ ] Dry  MISC/LINES: [ ] ET tube [ ] Trach [ ]NGT/OGT [ ]PEG [ ]Mcguire    LABS: reviewed by me                                      CAPILLARY BLOOD GLUCOSE      POCT Blood Glucose.: 97 mg/dL (05 Dec 2023 11:49)                RADIOLOGY & ADDITIONAL STUDIES: reviewed by me    < from: CT Abdomen and Pelvis w/ Oral Cont and w/ IV Cont (12.03.23 @ 12:48) >  Worsening diffusely infiltrative pancreatic mass, difficult to delineate,   with increased soft tissue infiltration in the peripancreatic region, and   increased cystic or necrotic components. Marked gastric distention with   diluted contrast. Small bowel collapsed. No definite enteric contrast  within bowel appreciated. Consider possibility of upper GI obstruction   secondary to worsening pancreatic neoplasm.    Moderate colonic stool burden, with persistent caliber change at   rectosigmoid region. Increased size of pelvic, perirectal soft tissue   mass, measuring up to 2.5 cm, previously 1.5 cm on PET, however it is   unclear whether this is causing a lower GI tract obstruction given   similar anatomy to prior.    Innumerable peritoneal metastases, increased in size and number since   8/29/2023 PET/CT.    New intrahepatic and extrahepatic biliary dilation.    < end of copied text >      EKG: reviewed by me    < from: 12 Lead ECG (07.24.23 @ 22:36) >  Ventricular Rate 80 BPM    Atrial Rate 80 BPM    P-R Interval 158 ms    QRS Duration 92 ms    Q-T Interval 386 ms    QTC Calculation(Bazett) 445 ms    P Axis 57 degrees    R Axis -54 degrees    T Axis 34 degrees    Diagnosis Line Sinus rhythm with Premature atrial complexes  Left anterior fascicular block  Abnormal ECG    < end of copied text >      PROTEIN CALORIE MALNUTRITION PRESENT: [ ]mild [ ]moderate [ ]severe [ ]underweight [ ]morbid obesity  https://www.andeal.org/vault/2440/web/files/ONC/Table_Clinical%20Characteristics%20to%20Document%20Malnutrition-White%20JV%20et%20al%813772.pdf      Weight (kg): 65.8 (07-24-23 @ 19:42)  [ ]PPSV2 < or = to 30% [ ]significant weight loss  [ ]poor nutritional intake  [ ]anasarca      [ ]Artificial Nutrition      Palliative Care Spiritual/Emotional Screening Tool Question  Severity (0-4):                    OR                    [ x] Unable to determine. Will assess at later time if appropriate.  Score of 2 or greater indicates recommendation of Chaplaincy and/or SW referral  Chaplaincy Referral: [ ] Yes [ ] Refused [ ] Following     Caregiver [place]:  [ ] Yes [ ] No    OR    [x ] Unable to determine. Will assess at later time if appropriate.  Social Work Referral [ ]  Patient and Family Centered Care Referral [ ]    Anticipatory Grief Present: [ ] Yes [ ] No    OR     [ x] Unable to determine. Will assess at later time if appropriate.  Social Work Referral [ ]  Patient and Family Centered Care Referral [ ]    Patient discussed with primary medical team MD  Palliative care education provided to patient and/or family

## 2023-12-05 NOTE — HOSPICE CARE NOTE - CONVESATION DETAILS
Call placed to patient to discuss hospice services. Yemi requesting call back from hospice team Wednesday.

## 2023-12-05 NOTE — DIETITIAN INITIAL EVALUATION ADULT - ORAL INTAKE PTA/DIET HISTORY
Pt reports poor appetite prior to admit for 3 weeks. Pt was unable to keep meals/drinks down; experienced vomiting and nausea after meals. Pt usually drank Ensure shakes supplements, however unable to tolerate during acute illness. Pt is allergic to shellfish; last consumed in 1977 - experienced a rash. Denies any restrictive cultural or Rastafarian food preferences. No chewing or swallowing difficulties with regular textured food.  Pt reports poor appetite prior to admit for 3 weeks. Pt was unable to keep meals/drinks down; experienced vomiting and nausea after meals. Pt usually drank Ensure shakes supplements, however unable to tolerate during acute illness. Pt is allergic to shellfish; last consumed in 1977 - experienced a rash. Denies any restrictive cultural or [place] food preferences. No chewing or swallowing difficulties with regular textured food.

## 2023-12-05 NOTE — DIETITIAN INITIAL EVALUATION ADULT - PERTINENT LABORATORY DATA
POCT Blood Glucose.: 97 mg/dL (12-05-23 @ 11:49)  A1C with Estimated Average Glucose Result: 6.2 % (12-04-23 @ 03:53)

## 2023-12-05 NOTE — DIETITIAN INITIAL EVALUATION ADULT - OTHER CALCULATIONS
Using ABW: ENERGY: 3117-6716 kcal/day (25-30 kcal/kg); PROTEIN:  g/day (1.2-1.5 g/kg); FLUID: 1870 mL/day (25 mL/kg) -- with consideration for age, BMI, malnutrition, cancer w/ mets Using ABW: ENERGY: 3371-3664 kcal/day (25-30 kcal/kg); PROTEIN:  g/day (1.2-1.5 g/kg); FLUID: 1870 mL/day (25 mL/kg) -- with consideration for age, BMI, malnutrition, cancer w/ mets

## 2023-12-05 NOTE — DIETITIAN INITIAL EVALUATION ADULT - PERTINENT MEDS FT
MEDICATIONS  (STANDING):  dextrose 5%. 1000 milliLiter(s) (50 mL/Hr) IV Continuous <Continuous>  dextrose 5%. 1000 milliLiter(s) (100 mL/Hr) IV Continuous <Continuous>  dextrose 50% Injectable 12.5 Gram(s) IV Push once  dextrose 50% Injectable 25 Gram(s) IV Push once  dextrose 50% Injectable 25 Gram(s) IV Push once  enoxaparin Injectable 40 milliGRAM(s) SubCutaneous every 24 hours  glucagon  Injectable 1 milliGRAM(s) IntraMuscular once  insulin lispro (ADMELOG) corrective regimen sliding scale   SubCutaneous three times a day before meals  insulin lispro (ADMELOG) corrective regimen sliding scale   SubCutaneous at bedtime  sodium chloride 0.9%. 1000 milliLiter(s) (75 mL/Hr) IV Continuous <Continuous>    MEDICATIONS  (PRN):  dextrose Oral Gel 15 Gram(s) Oral once PRN Blood Glucose LESS THAN 70 milliGRAM(s)/deciliter

## 2023-12-05 NOTE — DIETITIAN NUTRITION RISK NOTIFICATION - TREATMENT: THE FOLLOWING DIET HAS BEEN RECOMMENDED
Diet, Clear Liquid:   Free Water Flush Instructions:  APPLE ENSURE CLEARS  Supplement Feeding Modality:  Oral  Ensure Clear Cans or Servings Per Day:  3       Frequency:  Daily (12-05-23 @ 12:20) [Pending Verification By Attending]  Diet, Clear Liquid (12-04-23 @ 18:19) [Active]

## 2023-12-05 NOTE — PROGRESS NOTE ADULT - ASSESSMENT
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 Palliative care consulted for GOC.    Patient awaiting hospice call.    MEDD (morphine equivalent daily dose):    Education about palliative care provided to patient/family.  See Recs below.    Please call [phone] with questions or concerns 24/7.   We will continue to follow.    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 Palliative care consulted for GOC.    Patient awaiting hospice call.    MEDD (morphine equivalent daily dose):    Education about palliative care provided to patient/family.  See Recs below.    Please call [phone] with questions or concerns 24/7.   We will continue to follow.

## 2023-12-05 NOTE — DIETITIAN INITIAL EVALUATION ADULT - OTHER INFO
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 Che), HTN, HLD, DM presents with worsening N/V and constipation in the setting of worsening diffusely infiltrative pancreatic mass, pelvic/perirectal mass, and intraperitoneal metastases.     Pertinent Subjective Information: Pt currently on a clear liquid diet. Pt consumed 100% of breakfast this morning. No vomiting, however experiencing some nausea. Discussed medical nutrition supplements to optimize kcal/pro intake; pt agreed to receive supplements in-house.    Weight hx: #/79 KG -- unsure when weight was checked per pt. Current dosing weight not documented per flow sheet. Previous admission weight was 74.8 KG, height 172.7 cm in EMR. Unable to determine if pt meets weight criteria for malnutrition at this time.

## 2023-12-05 NOTE — DIETITIAN INITIAL EVALUATION ADULT - EDUCATION DIETARY MODIFICATIONS
Discussed importance of PO intake, current diet order and supplements./(1) partially meets; needs review/practice/verbalization

## 2023-12-05 NOTE — PROGRESS NOTE ADULT - PROBLEM SELECTOR PLAN 1
With carcinomatosis.  -Patient states went to [facility] for clinical trial, but did "not go well"  -treatment of N/V  -hospice discussed - consult placed  -f/u heme onc With carcinomatosis.  -Patient states went to [facility] for clinical trial, but did "not go well"  -treatment of N/V  -hospice discussed - consult placed  -f/u heme onc

## 2023-12-05 NOTE — DIETITIAN INITIAL EVALUATION ADULT - ADD RECOMMEND
1. ADD Ensure Clears 3X/DAILY to optimize kcal/pro intake -- provides 720 kcal, 24g pro total  2. Continue with current diet order until medically feasible to advance -- once able to advance recommend to add consistent carbohydrate (evening snack) and DASH/TLC modifiers to diet order.  3. Encourage PO intake and assist during meals prn    Pt is at high nutrition risk; will f/u in 3-5 days or prn.

## 2023-12-05 NOTE — PROGRESS NOTE ADULT - SUBJECTIVE AND OBJECTIVE BOX
24H events:    Patient is a 73y old Male who presents with a chief complaint of Intestinal obstruction     (05 Dec 2023 11:51)    Primary diagnosis of SBO (small bowel obstruction)      Today is hospital day 2d  This morning patient was seen and examined at bedside, resting comfortably in bed  Was endorsed that the following events occurred overnight, if any:  Otherwise no acute or major events reported overnight    Review of Systems:  CONSTITUTIONAL  reported (if any):   otherwise did not endorse difficulty sleeping, poor appetite    HEENT  reported (if any):   otherwise did not endorse rhinorrhea, lacrimation, ear pain, sore throat    CARDIAC  reported (if any):   otherwise did not endorse chest pain, palpitations,    PULMONARY  reported (if any):   otherwise did not endorse shortness of breath, cough, wheezing    GASTROINTESTINAL  reported (if any): still constipated  otherwise did not endorse nausea, vomiting, abdominal pain, diarrhea, constipation    GENITO-URINARY  reported (if any):   otherwise did not endorse dysuria, hematuria, urgency, frequency,    NEURO-MUSCULOSKELETAL  reported (if any):   otherwise did not endorse numbness, paresthesia, weakness,    DERMATOLOGIC  reported (if any):   otherwise did not endorse rashes, lesions    Code Status:  see end of document    Family communication:  Contact date:  Name of person contacted:  Relationship to patient:  Communication details:  What matters most:    PAST MEDICAL & SURGICAL HISTORY  HTN (hypertension)    Diabetes    Hypercholesteremia    Gout    H/O constipation    Cancer of abdominal organ  between stomach and opancreas    H/O colonoscopy  biopsy abdomen 7/2022      SOCIAL HISTORY:  Social History:      ALLERGIES:  No Known Drug Allergies  shellfish (Unknown)    MEDICATIONS:  STANDING MEDICATIONS  dextrose 5%. 1000 milliLiter(s) IV Continuous <Continuous>  dextrose 5%. 1000 milliLiter(s) IV Continuous <Continuous>  dextrose 50% Injectable 12.5 Gram(s) IV Push once  dextrose 50% Injectable 25 Gram(s) IV Push once  dextrose 50% Injectable 25 Gram(s) IV Push once  enoxaparin Injectable 40 milliGRAM(s) SubCutaneous every 24 hours  glucagon  Injectable 1 milliGRAM(s) IntraMuscular once  insulin lispro (ADMELOG) corrective regimen sliding scale   SubCutaneous three times a day before meals  insulin lispro (ADMELOG) corrective regimen sliding scale   SubCutaneous at bedtime  sodium chloride 0.9%. 1000 milliLiter(s) IV Continuous <Continuous>    PRN MEDICATIONS  dextrose Oral Gel 15 Gram(s) Oral once PRN  ondansetron Injectable 4 milliGRAM(s) IV Push every 8 hours PRN    VITALS:   T(F): 97.6  HR: 95  BP: 129/75  RR: 18  SpO2: --    PHYSICAL EXAM:  GENERAL: cachectic, visible ribs  ( x ) NAD, lying in bed comfortably     (  ) obtunded     (  ) lethargic     (  ) somnolent    HEAD:   ( x ) Atraumatic     (  ) hematoma     (  ) laceration (specify location:       )     NECK:  ( x ) Supple     (  ) neck stiffness     (  ) nuchal rigidity     (  )  no JVD     (  ) JVD present ( -- cm)    CHEST:  (  ) Chest wall tenderness (specify location:       )    HEART:  Rate -->     ( x ) normal rate     (  ) bradycardic     (  ) tachycardic  Rhythm -->     ( x ) regular     (  ) regularly irregular     (  ) irregularly irregular  Murmurs -->     ( x ) normal s1s2     (  ) systolic murmur     (  ) diastolic murmur     (  ) continuous murmur      (  ) S3 present     (  ) S4 present    LUNG:   ( x ) Unlabored respirations     (  ) tachypnea  ( x ) B/L air entry     (  ) decreased breath sounds in:  (location     )    ( x ) no adventitious sound     (  ) crackles     (  ) wheezing      (  ) rhonchi      (specify location:       )    ABDOMEN:   ( x ) Soft     (  ) tense   |   ( x ) nondistended     (  ) distended   |   ( x ) +BS     (  ) hypoactive bowel sounds     (  ) hyperactive bowel sounds  ( x ) nontender     (  ) RUQ tenderness     (  ) RLQ tenderness     (  ) LLQ tenderness     (  ) epigastric tenderness     (  ) diffuse tenderness  (  ) Splenomegaly      (  ) Hepatomegaly      (  ) Jaundice     (  ) ecchymosis     EXTREMITIES:   (  ) Cyanosis    (  ) varicose veins    (  ) clubbing    (  ) gangrene:     (location:     )  (  ) pitting edema     (  ) non-pitting edema         SKIN:   (  ) rash:     (  ) pustules     (  ) vesicles     (  ) ulcer     (  ) ecchymosis     (specify location:     )    NERVOUS SYSTEM:    ( x ) A&Ox3     (  ) confused     (  ) lethargic  CN II-XII:     (  ) grossly intact     (  ) deficits found     (Specify:     )   Upper extremities:     (  ) no sensorimotor deficits     (  ) weakness     (  ) loss of proprioception/vibration     (  ) loss of touch/temperature (specify:    )  Lower extremities:     (  ) no sensorimotor deficits     (  ) weakness     (  ) loss of proprioception/vibration     (  ) loss of touch/temperature (specify:    )    (  ) Indwelling Mcguire Catheter:  Date inserted:    Reason (  ) Critical illness     (  ) Urinary retention    (  ) Accurate Ins/Outs Monitoring     (  ) CMO patient    (  ) Central Line:   Date inserted:  Location: (  ) Right IJ     (  ) Left IJ     (  ) Right Fem     (  ) Left Fem    (  ) SPC        (  ) pigtail       (  ) PEG tube       (  ) colostomy       (  ) jejunostomy  (  ) U-Dall    LABS:                          ECHO, ENDO, EEG, RAD:  < from: CT Abdomen and Pelvis w/ Oral Cont and w/ IV Cont (12.03.23 @ 12:48) >    FINDINGS:    HEPATOBILIARY: New intrahepatic and extrahepatic biliary dilation.   Unchanged hepatic cysts. Gallbladder distended.    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    PANCREAS/PERITONEUM/MESENTERY/BOWEL: Worsening diffusely infiltrative   pancreatic mass, difficult to delineate, with increased soft tissue   infiltration in the peripancreatic region, and increased cystic or   necrotic components. Marked gastric distention with diluted contrast. No   definite enteric contrast within bowel appreciated (hyperdense stool).    Moderate colonic stool burden, with persistent caliber change at   rectosigmoid region. Increased size of pelvic, perirectal soft tissue   mass, measuring up to 2.5 cm, previously 1.5 cm on PET, however it is   unclear whether this is causing a lower GI tract obstruction (series 3,   image 27).    Innumerable peritoneal metastases, increased in size and number since   8/29/2023 PET/CT. Mild ascites. No pneumoperitoneum. No abscess.    ABDOMINOPELVIC NODES: Limited evaluationof peripancreatic   lymphadenopathy.    PELVIC ORGANS: BPH. Urinary bladder partially distended.    BONES/SOFT TISSUES: Degenerative change of spine..    OTHER: Vascular calcifications.    IMPRESSION:    Worsening diffusely infiltrative pancreatic mass, difficult to delineate,   with increased soft tissue infiltration in the peripancreatic region, and   increased cystic or necrotic components. Marked gastric distention with   diluted contrast. Small bowel collapsed. No definite enteric contrast  within bowel appreciated. Consider possibility of upper GI obstruction   secondary to worsening pancreatic neoplasm.    Moderate colonic stool burden, with persistent caliber change at   rectosigmoid region. Increased size of pelvic, perirectal soft tissue   mass, measuring up to 2.5 cm, previously 1.5 cm on PET, however it is   unclear whether this is causing a lower GI tract obstruction given   similar anatomy to prior.    Innumerable peritoneal metastases, increased in size and number since   8/29/2023 PET/CT.    New intrahepatic and extrahepatic biliary dilation.    < end of copied text >

## 2023-12-05 NOTE — DIETITIAN INITIAL EVALUATION ADULT - NAME AND PHONE
Lydia [id number] or [id number]    Nutrition Interventions: meals, snacks, medical nutrition supplements; Nutrition Monitoring: Diet order, PO intake, weights, labs, NFPF, body composition, BM and tolerance to medical food supplements

## 2023-12-05 NOTE — PROGRESS NOTE ADULT - ASSESSMENT
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 Che), HTN, HLD, DM presents with worsening N/V and constipation in the setting of worsening diffusely infiltrative pancreatic mass, pelvic/perirectal mass, and intraperitoneal metastases.     #Malignant GOO   #Metastatic pancreatic cancer with progression of disease  - surgery recommendations appreciated   - Palliative recommendations appreciated   - hospice recommendations pending   - Oncology recommendations pending   - CT scan showed pancreatic mass, difficult to delineate, with increased soft tissue infiltration in the peripancreatic region, and increased cystic or necrotic components. Marked gastric distention with diluted contrast. No definite enteric contrast within bowel appreciated (hyperdense stool).  Moderate colonic stool burden, with persistent caliber change at rectosigmoid region. Increased size of pelvic, perirectal soft tissue mass, measuring up to 2.5 cm, previously 1.5 cm on PET, however it is unclear whether this is causing a lower GI tract obstruction   Innumerable peritoneal metastases, increased in size and number since   8/29/2023 PET/CT. Mild ascites. No pneumoperitoneum. No abscess.- no BM in 1 week  - Unfortunately, no further treatment options for pancreatic cancer can be offered to Mr. Holloway at this time at [facility]  - Patient was being seen at [facility] for immunotherapy via clinical trial but discontinued this treatment due to side effects. He declines any further treatment.  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 Che), HTN, HLD, DM presents with worsening N/V and constipation in the setting of worsening diffusely infiltrative pancreatic mass, pelvic/perirectal mass, and intraperitoneal metastases.     #Malignant GOO   #Metastatic pancreatic cancer with progression of disease  - surgery recommendations appreciated   - Palliative recommendations appreciated   - hospice recommendations pending   - Oncology recommendations pending   - CT scan showed pancreatic mass, difficult to delineate, with increased soft tissue infiltration in the peripancreatic region, and increased cystic or necrotic components. Marked gastric distention with diluted contrast. No definite enteric contrast within bowel appreciated (hyperdense stool).  Moderate colonic stool burden, with persistent caliber change at rectosigmoid region. Increased size of pelvic, perirectal soft tissue mass, measuring up to 2.5 cm, previously 1.5 cm on PET, however it is unclear whether this is causing a lower GI tract obstruction   Innumerable peritoneal metastases, increased in size and number since   8/29/2023 PET/CT. Mild ascites. No pneumoperitoneum. No abscess.- no BM in 1 week  - Unfortunately, no further treatment options for pancreatic cancer can be offered to Mr. Holloway at this time at [facility]  - Patient was being seen at [facility] for immunotherapy via clinical trial but discontinued this treatment due to side effects. He declines any further treatment.  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 Che), HTN, HLD, DM presents with worsening N/V and constipation in the setting of worsening diffusely infiltrative pancreatic mass, pelvic/perirectal mass, and intraperitoneal metastases.     #Malignant GOO   #Metastatic pancreatic cancer with progression of disease  - surgery recommendations appreciated   - Palliative recommendations appreciated   - hospice recommendations pending   - Oncology recommendations pending   - CT scan showed pancreatic mass, difficult to delineate, with increased soft tissue infiltration in the peripancreatic region, and increased cystic or necrotic components. Marked gastric distention with diluted contrast. No definite enteric contrast within bowel appreciated (hyperdense stool).  Moderate colonic stool burden, with persistent caliber change at rectosigmoid region. Increased size of pelvic, perirectal soft tissue mass, measuring up to 2.5 cm, previously 1.5 cm on PET, however it is unclear whether this is causing a lower GI tract obstruction   Innumerable peritoneal metastases, increased in size and number since   8/29/2023 PET/CT. Mild ascites. No pneumoperitoneum. No abscess.- no BM in 1 week  - Unfortunately, no further treatment options for pancreatic cancer can be offered to Mr. Holloway at this time at [facility]  - Patient was being seen at [facility] for immunotherapy via clinical trial but discontinued this treatment due to side effects. He declines any further treatment.  - No acute surgical intervention  - Hospice discussed with patient, and he is agreeable to discuss Hospice & Palliative care further  - NS @ 75cc/hr  - tolerating clears  - zofran 4 mg IVP q6h    - NGT placement if N/V    #HTN  - was on lisinopril 5mg      #HLD  - was on atorvastatin 20mg     #DM  - ISS    dvt ppx     #nutrition  - nutrition recommendations appreciated   1. ADD Ensure Clears 3X/DAILY to optimize kcal/pro intake -- provides 720 kcal, 24g pro total  2. Continue with current diet order until medically feasible to advance -- once able to advance recommend to add consistent carbohydrate (evening snack) and DASH/TLC modifiers to diet order.  3. 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 Che), HTN, HLD, DM presents with worsening N/V and constipation in the setting of worsening diffusely infiltrative pancreatic mass, pelvic/perirectal mass, and intraperitoneal metastases.     #Malignant GOO   #Metastatic pancreatic cancer with progression of disease  - surgery recommendations appreciated   - Palliative recommendations appreciated   - hospice recommendations pending   - Oncology recommendations pending   - CT scan showed pancreatic mass, difficult to delineate, with increased soft tissue infiltration in the peripancreatic region, and increased cystic or necrotic components. Marked gastric distention with diluted contrast. No definite enteric contrast within bowel appreciated (hyperdense stool).  Moderate colonic stool burden, with persistent caliber change at rectosigmoid region. Increased size of pelvic, perirectal soft tissue mass, measuring up to 2.5 cm, previously 1.5 cm on PET, however it is unclear whether this is causing a lower GI tract obstruction   Innumerable peritoneal metastases, increased in size and number since   8/29/2023 PET/CT. Mild ascites. No pneumoperitoneum. No abscess.- no BM in 1 week  - Unfortunately, no further treatment options for pancreatic cancer can be offered to Mr. Holloway at this time at [facility]  - Patient was being seen at [facility] for immunotherapy via clinical trial but discontinued this treatment due to side effects. He declines any further treatment.  - No acute surgical intervention  - Hospice discussed with patient, and he is agreeable to discuss Hospice & Palliative care further  - NS @ 75cc/hr  - tolerating clears  - zofran 4 mg IVP q6h    - NGT placement if N/V    #HTN  - was on lisinopril 5mg      #HLD  - was on atorvastatin 20mg     #DM  - ISS    dvt ppx     #nutrition  - nutrition recommendations appreciated   1. ADD Ensure Clears 3X/DAILY to optimize kcal/pro intake -- provides 720 kcal, 24g pro total  2. Continue with current diet order until medically feasible to advance -- once able to advance recommend to add consistent carbohydrate (evening snack) and DASH/TLC modifiers to diet order.  3. 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 Che), HTN, HLD, DM presents with worsening N/V and constipation in the setting of worsening diffusely infiltrative pancreatic mass, pelvic/perirectal mass, and intraperitoneal metastases.     #Malignant GOO   #Metastatic pancreatic cancer with progression of disease  - surgery recommendations appreciated   - Palliative recommendations appreciated   - hospice recommendations pending   - Oncology recommendations pending   - CT scan showed pancreatic mass, difficult to delineate, with increased soft tissue infiltration in the peripancreatic region, and increased cystic or necrotic components. Marked gastric distention with diluted contrast. No definite enteric contrast within bowel appreciated (hyperdense stool).  Moderate colonic stool burden, with persistent caliber change at rectosigmoid region. Increased size of pelvic, perirectal soft tissue mass, measuring up to 2.5 cm, previously 1.5 cm on PET, however it is unclear whether this is causing a lower GI tract obstruction   Innumerable peritoneal metastases, increased in size and number since   8/29/2023 PET/CT. Mild ascites. No pneumoperitoneum. No abscess.- no BM in 1 week  - Unfortunately, no further treatment options for pancreatic cancer can be offered to Mr. Holloway at this time at [facility]  - Patient was being seen at [facility] for immunotherapy via clinical trial but discontinued this treatment due to side effects. He declines any further treatment.  - No acute surgical intervention  - Hospice discussed with patient, and he is agreeable to discuss Hospice & Palliative care further  - NS @ 75cc/hr  - tolerating clears  - zofran 4 mg IVP q6h         #HTN  - was on lisinopril 5mg      #HLD  - was on atorvastatin 20mg     #DM  - ISS    dvt ppx     #nutrition  - nutrition recommendations appreciated   1. ADD Ensure Clears 3X/DAILY to optimize kcal/pro intake -- provides 720 kcal, 24g pro total  2. Continue with current diet order until medically feasible to advance -- once able to advance recommend to add consistent carbohydrate (evening snack) and DASH/TLC modifiers to diet order.  3. 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 Che), HTN, HLD, DM presents with worsening N/V and constipation in the setting of worsening diffusely infiltrative pancreatic mass, pelvic/perirectal mass, and intraperitoneal metastases.     #Malignant GOO   #Metastatic pancreatic cancer with progression of disease  - surgery recommendations appreciated   - Palliative recommendations appreciated   - hospice recommendations pending   - Oncology recommendations pending   - CT scan showed pancreatic mass, difficult to delineate, with increased soft tissue infiltration in the peripancreatic region, and increased cystic or necrotic components. Marked gastric distention with diluted contrast. No definite enteric contrast within bowel appreciated (hyperdense stool).  Moderate colonic stool burden, with persistent caliber change at rectosigmoid region. Increased size of pelvic, perirectal soft tissue mass, measuring up to 2.5 cm, previously 1.5 cm on PET, however it is unclear whether this is causing a lower GI tract obstruction   Innumerable peritoneal metastases, increased in size and number since   8/29/2023 PET/CT. Mild ascites. No pneumoperitoneum. No abscess.- no BM in 1 week  - Unfortunately, no further treatment options for pancreatic cancer can be offered to Mr. Holloway at this time at [facility]  - Patient was being seen at [facility] for immunotherapy via clinical trial but discontinued this treatment due to side effects. He declines any further treatment.  - No acute surgical intervention  - Hospice discussed with patient, and he is agreeable to discuss Hospice & Palliative care further  - NS @ 75cc/hr  - tolerating clears  - zofran 4 mg IVP q6h         #HTN  - was on lisinopril 5mg      #HLD  - was on atorvastatin 20mg     #DM  - ISS    dvt ppx     #nutrition  - nutrition recommendations appreciated   1. ADD Ensure Clears 3X/DAILY to optimize kcal/pro intake -- provides 720 kcal, 24g pro total  2. Continue with current diet order until medically feasible to advance -- once able to advance recommend to add consistent carbohydrate (evening snack) and DASH/TLC modifiers to diet order.  3. 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 Che), HTN, HLD, DM presents with worsening N/V and constipation in the setting of worsening diffusely infiltrative pancreatic mass, pelvic/perirectal mass, and intraperitoneal metastases.     #Malignant GOO   #Metastatic pancreatic cancer with progression of disease  - surgery recommendations appreciated   - Palliative recommendations appreciated   - hospice recommendations pending   - Oncology recommendations pending   - CT scan showed pancreatic mass, difficult to delineate, with increased soft tissue infiltration in the peripancreatic region, and increased cystic or necrotic components. Marked gastric distention with diluted contrast. No definite enteric contrast within bowel appreciated (hyperdense stool).  Moderate colonic stool burden, with persistent caliber change at rectosigmoid region. Increased size of pelvic, perirectal soft tissue mass, measuring up to 2.5 cm, previously 1.5 cm on PET, however it is unclear whether this is causing a lower GI tract obstruction   Innumerable peritoneal metastases, increased in size and number since   8/29/2023 PET/CT. Mild ascites. No pneumoperitoneum. No abscess.- no BM in 1 week  - Unfortunately, no further treatment options for pancreatic cancer can be offered to Mr. Holloway at this time at [facility]  - Patient was being seen at [facility] for immunotherapy via clinical trial but discontinued this treatment due to side effects. He declines any further treatment.  - No acute surgical intervention  - Hospice discussed with patient, and he is agreeable to discuss Hospice & Palliative care further  - NS @ 75cc/hr  - tolerating clears  - zofran 4 mg IVP q6h         #HTN  - was on lisinopril 5mg      #HLD  - was on atorvastatin 20mg     #DM  - ISS    dvt ppx     #nutrition  - nutrition recommendations appreciated   1. ADD Ensure Clears 3X/DAILY to optimize kcal/pro intake -- provides 720 kcal, 24g pro total  2. Continue with current diet order until medically feasible to advance -- once able to advance recommend to add consistent carbohydrate (evening snack) and DASH/TLC modifiers to diet order.  3. Encourage PO intake and assist during meals prn    #Code status: DNR/DNI  #Handoff: see points bolded above and any points listed below  Call placed to patient to discuss hospice services. 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 Che), HTN, HLD, DM presents with worsening N/V and constipation in the setting of worsening diffusely infiltrative pancreatic mass, pelvic/perirectal mass, and intraperitoneal metastases.     #Malignant GOO   #Metastatic pancreatic cancer with progression of disease  - surgery recommendations appreciated   - Palliative recommendations appreciated   - hospice recommendations pending   - Oncology recommendations pending   - CT scan showed pancreatic mass, difficult to delineate, with increased soft tissue infiltration in the peripancreatic region, and increased cystic or necrotic components. Marked gastric distention with diluted contrast. No definite enteric contrast within bowel appreciated (hyperdense stool).  Moderate colonic stool burden, with persistent caliber change at rectosigmoid region. Increased size of pelvic, perirectal soft tissue mass, measuring up to 2.5 cm, previously 1.5 cm on PET, however it is unclear whether this is causing a lower GI tract obstruction   Innumerable peritoneal metastases, increased in size and number since   8/29/2023 PET/CT. Mild ascites. No pneumoperitoneum. No abscess.- no BM in 1 week  - Unfortunately, no further treatment options for pancreatic cancer can be offered to Mr. Holloway at this time at [facility]  - Patient was being seen at [facility] for immunotherapy via clinical trial but discontinued this treatment due to side effects. He declines any further treatment.  - No acute surgical intervention  - Hospice discussed with patient, and he is agreeable to discuss Hospice & Palliative care further  - NS @ 75cc/hr  - tolerating clears  - zofran 4 mg IVP q6h         #HTN  - was on lisinopril 5mg      #HLD  - was on atorvastatin 20mg     #DM  - ISS    dvt ppx     #nutrition  - nutrition recommendations appreciated   1. ADD Ensure Clears 3X/DAILY to optimize kcal/pro intake -- provides 720 kcal, 24g pro total  2. Continue with current diet order until medically feasible to advance -- once able to advance recommend to add consistent carbohydrate (evening snack) and DASH/TLC modifiers to diet order.  3. Encourage PO intake and assist during meals prn    #Code status: DNR/DNI  #Handoff: see points bolded above and any points listed below  Call placed to patient to discuss hospice services. Yemi requesting call back from hospice team Wednesday

## 2023-12-06 LAB
GLUCOSE BLDC GLUCOMTR-MCNC: 105 MG/DL — HIGH (ref 70–99)
GLUCOSE BLDC GLUCOMTR-MCNC: 105 MG/DL — HIGH (ref 70–99)
GLUCOSE BLDC GLUCOMTR-MCNC: 118 MG/DL — HIGH (ref 70–99)
GLUCOSE BLDC GLUCOMTR-MCNC: 118 MG/DL — HIGH (ref 70–99)
GLUCOSE BLDC GLUCOMTR-MCNC: 122 MG/DL — HIGH (ref 70–99)
GLUCOSE BLDC GLUCOMTR-MCNC: 122 MG/DL — HIGH (ref 70–99)
GLUCOSE BLDC GLUCOMTR-MCNC: 125 MG/DL — HIGH (ref 70–99)
GLUCOSE BLDC GLUCOMTR-MCNC: 125 MG/DL — HIGH (ref 70–99)

## 2023-12-06 PROCEDURE — 99233 SBSQ HOSP IP/OBS HIGH 50: CPT

## 2023-12-06 PROCEDURE — 99232 SBSQ HOSP IP/OBS MODERATE 35: CPT

## 2023-12-06 RX ADMIN — ONDANSETRON 4 MILLIGRAM(S): 8 TABLET, FILM COATED ORAL at 12:10

## 2023-12-06 RX ADMIN — ENOXAPARIN SODIUM 40 MILLIGRAM(S): 100 INJECTION SUBCUTANEOUS at 06:09

## 2023-12-06 RX ADMIN — ONDANSETRON 4 MILLIGRAM(S): 8 TABLET, FILM COATED ORAL at 17:58

## 2023-12-06 NOTE — PROGRESS NOTE ADULT - SUBJECTIVE AND OBJECTIVE BOX
24H events:    Patient is a 73y old Male who presents with a chief complaint of Vomiting and Constipation (05 Dec 2023 12:44)    Primary diagnosis of SBO (small bowel obstruction)      Today is hospital day 3d  This morning patient was seen and examined at bedside, resting comfortably in bed  Was endorsed that the following events occurred overnight, if any:  Otherwise no acute or major events reported overnight    Review of Systems:  CONSTITUTIONAL  reported (if any):   otherwise did not endorse difficulty sleeping, poor appetite    HEENT  reported (if any):   otherwise did not endorse rhinorrhea, lacrimation, ear pain, sore throat    CARDIAC  reported (if any):   otherwise did not endorse chest pain, palpitations,    PULMONARY  reported (if any):   otherwise did not endorse shortness of breath, cough, wheezing    GASTROINTESTINAL  reported (if any): still constipated, vomiting yesterday and overnight, denied abdominal pain  otherwise did not endorse nausea, vomiting, abdominal pain, diarrhea, constipation    GENITO-URINARY  reported (if any):   otherwise did not endorse dysuria, hematuria, urgency, frequency,    NEURO-MUSCULOSKELETAL  reported (if any):   otherwise did not endorse numbness, paresthesia, weakness,    DERMATOLOGIC  reported (if any):   otherwise did not endorse rashes, lesions    Code Status:  see end of document    Family communication:  Contact date:  Name of person contacted:  Relationship to patient:  Communication details:  What matters most:    PAST MEDICAL & SURGICAL HISTORY  HTN (hypertension)    Diabetes    Hypercholesteremia    Gout    H/O constipation    Cancer of abdominal organ  between stomach and opancreas    H/O colonoscopy  biopsy abdomen 7/2022      SOCIAL HISTORY:  Social History:      ALLERGIES:  No Known Drug Allergies  shellfish (Unknown)    MEDICATIONS:  STANDING MEDICATIONS  dextrose 5%. 1000 milliLiter(s) IV Continuous <Continuous>  dextrose 5%. 1000 milliLiter(s) IV Continuous <Continuous>  dextrose 50% Injectable 12.5 Gram(s) IV Push once  dextrose 50% Injectable 25 Gram(s) IV Push once  dextrose 50% Injectable 25 Gram(s) IV Push once  enoxaparin Injectable 40 milliGRAM(s) SubCutaneous every 24 hours  glucagon  Injectable 1 milliGRAM(s) IntraMuscular once  insulin lispro (ADMELOG) corrective regimen sliding scale   SubCutaneous at bedtime  insulin lispro (ADMELOG) corrective regimen sliding scale   SubCutaneous three times a day before meals  sodium chloride 0.9%. 1000 milliLiter(s) IV Continuous <Continuous>    PRN MEDICATIONS  dextrose Oral Gel 15 Gram(s) Oral once PRN  ondansetron Injectable 4 milliGRAM(s) IV Push every 6 hours PRN    VITALS:   T(F): 98.7  HR: 82  BP: 132/71  RR: 18  SpO2: --    PHYSICAL EXAM:  GENERAL: cachectic, visible ribs  ( x ) NAD, lying in bed comfortably     (  ) obtunded     (  ) lethargic     (  ) somnolent    HEAD:   ( x ) Atraumatic     (  ) hematoma     (  ) laceration (specify location:       )     NECK:  ( x ) Supple     (  ) neck stiffness     (  ) nuchal rigidity     (  )  no JVD     (  ) JVD present ( -- cm)    CHEST:  (  ) Chest wall tenderness (specify location:       )    HEART:  Rate -->     ( x ) normal rate     (  ) bradycardic     (  ) tachycardic  Rhythm -->     ( x ) regular     (  ) regularly irregular     (  ) irregularly irregular  Murmurs -->     ( x ) normal s1s2     (  ) systolic murmur     (  ) diastolic murmur     (  ) continuous murmur      (  ) S3 present     (  ) S4 present    LUNG:   ( x ) Unlabored respirations     (  ) tachypnea  ( x ) B/L air entry     (  ) decreased breath sounds in:  (location     )    ( x ) no adventitious sound     (  ) crackles     (  ) wheezing      (  ) rhonchi      (specify location:       )    ABDOMEN:   ( x ) Soft     (  ) tense   |   ( x ) nondistended     (  ) distended   |   ( x ) +BS     (  ) hypoactive bowel sounds     (  ) hyperactive bowel sounds  ( x ) nontender     (  ) RUQ tenderness     (  ) RLQ tenderness     (  ) LLQ tenderness     (  ) epigastric tenderness     (  ) diffuse tenderness  (  ) Splenomegaly      (  ) Hepatomegaly      (  ) Jaundice     (  ) ecchymosis     EXTREMITIES:   (  ) Cyanosis    (  ) varicose veins    (  ) clubbing    (  ) gangrene:     (location:     )  (  ) pitting edema     (  ) non-pitting edema         SKIN:   (  ) rash:     (  ) pustules     (  ) vesicles     (  ) ulcer     (  ) ecchymosis     (specify location:     )    NERVOUS SYSTEM:    ( x ) A&Ox3     (  ) confused     (  ) lethargic  CN II-XII:     (  ) grossly intact     (  ) deficits found     (Specify:     )   Upper extremities:     (  ) no sensorimotor deficits     (  ) weakness     (  ) loss of proprioception/vibration     (  ) loss of touch/temperature (specify:    )  Lower extremities:     (  ) no sensorimotor deficits     (  ) weakness     (  ) loss of proprioception/vibration     (  ) loss of touch/temperature (specify:    )    (  ) Indwelling Mcguire Catheter:  Date inserted:    Reason (  ) Critical illness     (  ) Urinary retention    (  ) Accurate Ins/Outs Monitoring     (  ) CMO patient    (  ) Central Line:   Date inserted:  Location: (  ) Right IJ     (  ) Left IJ     (  ) Right Fem     (  ) Left Fem    (  ) SPC        (  ) pigtail       (  ) PEG tube       (  ) colostomy       (  ) jejunostomy  (  ) U-Dall    LABS:                          ECHO, ENDO, EEG, RAD:

## 2023-12-06 NOTE — PROGRESS NOTE ADULT - SUBJECTIVE AND OBJECTIVE BOX
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 Che), HTN, HLD, DM presents with worsening N/V and constipation. He states that he has had constipation and vomiting for the past month. He states BMs are usually small. Pt reports that he has not had a bowel movement for over a week and has now developed nausea and vomiting a/w abdominal pain. Patient reports flatus this morning, last vomiting episode was this morning as well. However, while in the ED he was tolerating PO diet while unknowingly being NPO. He has had a 20lb unintentional weight loss the past 3 months. He denies sick contacts, fever, chills, headache, dizziness, fever, hematemesis, or bloody stools,     Interval history  -Patient seen at bedside  -Denied pain or SOB at time of visit  -He spoke with hospice  -He is interested in hospice on discharge     ADVANCE DIRECTIVES:     [ ] Full Code [x ] DNR  MOLST  [ ]  Living Will  [ ]   DECISION MAKER(s):  [ ] Health Care Proxy(s)  [x ] Surrogate(s)  [ ] Guardian           Name(s): Phone Number(s):  Wife June      BASELINE (I)ADL(s) (prior to admission):    [place]: [ ]Total  [ ] Moderate [ ]Dependent  Palliative Performance Status Version 2:         %    http://npcrc.org/files/news/palliative_performance_scale_ppsv2.pdf    Allergies    No Known Drug Allergies  shellfish (Unknown)    Intolerances    MEDICATIONS  (STANDING):  dextrose 5%. 1000 milliLiter(s) (100 mL/Hr) IV Continuous <Continuous>  dextrose 5%. 1000 milliLiter(s) (50 mL/Hr) IV Continuous <Continuous>  dextrose 50% Injectable 12.5 Gram(s) IV Push once  dextrose 50% Injectable 25 Gram(s) IV Push once  dextrose 50% Injectable 25 Gram(s) IV Push once  enoxaparin Injectable 40 milliGRAM(s) SubCutaneous every 24 hours  glucagon  Injectable 1 milliGRAM(s) IntraMuscular once  insulin lispro (ADMELOG) corrective regimen sliding scale   SubCutaneous at bedtime  insulin lispro (ADMELOG) corrective regimen sliding scale   SubCutaneous three times a day before meals  sodium chloride 0.9%. 1000 milliLiter(s) (75 mL/Hr) IV Continuous <Continuous>    MEDICATIONS  (PRN):  dextrose Oral Gel 15 Gram(s) Oral once PRN Blood Glucose LESS THAN 70 milliGRAM(s)/deciliter  ondansetron Injectable 4 milliGRAM(s) IV Push every 6 hours PRN Nausea and/or Vomiting        PRESENT SYMPTOMS: [ ]Unable to obtain due to poor mentation   Source if other than patient:  [ ]Family   [ ]Team     Pain: [ ]yes [ x]no  QOL impact -   Location -                    Aggravating factors -  Quality -  Radiation -  Timing-  Severity (0-10 scale):  Minimal acceptable level (0-10 scale):     CPOT:    https://www.[email or web address]/getattachment/[email or web address]/Critical-Care-Pain-Observation-Tool-(CPOT)    PAIN AD Score:   http://geriatrictoolkit.[email or web address]/cog/painad.pdf (press ctrl +  left click to view)    Dyspnea:                           [x ]None[ ]Mild [ ]Moderate [ ]Severe     Respiratory Distress Observation Scale (RDOS):   A score of 0 to 2 signifies little or no respiratory distress, 3 signifies mild distress, scores 4 to 6 indicate moderate distress, and scores greater than 7 signify severe distress  [email or web address]/sites/default/[email or web address]    Anxiety:                             [x ]None[ ]Mild [ ]Moderate [ ]Severe   Fatigue:                             [x ]None[ ]Mild [ ]Moderate [ ]Severe   Nausea:                             [ ]None[ ]Mild [x ]Moderate [ ]Severe   Loss of appetite:              [ ]None[ ]Mild [ x]Moderate [ ]Severe   Constipation:                    [ ]None[ ]Mild [x ]Moderate [ ]Severe    Other Symptoms:  [x ]All other review of systems negative     Palliative Performance Status Version 2:         50%    http://npcrc.org/files/news/palliative_performance_scale_ppsv2.pdf    PHYSICAL EXAM:  Vital Signs Last 24 Hrs  T(C): 37.1 (06 Dec 2023 15:17), Max: 37.1 (06 Dec 2023 04:20)  T(F): 98.7 (06 Dec 2023 15:17), Max: 98.7 (06 Dec 2023 04:20)  HR: 92 (06 Dec 2023 15:17) (82 - 92)  BP: 136/75 (06 Dec 2023 15:17) (123/68 - 136/75)  BP(mean): --  RR: 18 (06 Dec 2023 15:17) (18 - 18)  SpO2: --      GENERAL:  [ ] No acute distress [ ]Lethargic  [ ]Unarousable  [x ]Verbal  [ ]Non-Verbal [ ]Cachexia    BEHAVIORAL/PSYCH:  [ x]Alert and Oriented x4  [ ] Anxiety [ ] Delirium [ ] Agitation [ ] Calm   EYES: [x ] No scleral icterus [ ] Scleral icterus [ ] Closed  ENMT:  [ ]Dry mouth  [x ]No external oral lesions [ ] No external ear or nose lesions  CARDIOVASCULAR:  [ ]Regular [ ]Irregular [ ]Tachy [ x]Not Tachy  [ ]Saleem [ ] Edema [ ] No edema  PULMONARY:  [ ]Tachypnea  [ ]Audible excessive secretions [x ] No labored breathing [ ] labored breathing  GASTROINTESTINAL: [x ]Soft  [ ]Distended  [ ]Not distended [ ]Non tender [ ]Tender  MUSCULOSKELETAL: [ ]No clubbing [ ] clubbing  [x] No cyanosis [ ] cyanosis  NEUROLOGIC: [ ]No focal deficits  [x ]Follows commands  [ ]Does not follow commands  [ ]Cognitive impairment  [ ]Dysphagia  [ ]Dysarthria  [ ]Paresis   SKIN: [ ] Jaundiced [x ] Non-jaundiced [ ]Rash [ ]No Rash [ ] Warm [ ] Dry  MISC/LINES: [ ] ET tube [ ] Trach [ ]NGT/OGT [ ]PEG [ ]Mcguire    LABS: reviewed by me                                      CAPILLARY BLOOD GLUCOSE      POCT Blood Glucose.: 122 mg/dL (06 Dec 2023 11:51)              RADIOLOGY & ADDITIONAL STUDIES: reviewed by me    < from: CT Abdomen and Pelvis w/ Oral Cont and w/ IV Cont (12.03.23 @ 12:48) >    IMPRESSION:    Worsening diffusely infiltrative pancreatic mass, difficult to delineate,   with increased soft tissue infiltration in the peripancreatic region, and   increased cystic or necrotic components. Marked gastric distention with   diluted contrast. Small bowel collapsed. No definite enteric contrast  within bowel appreciated. Consider possibility of upper GI obstruction   secondary to worsening pancreatic neoplasm.    Moderate colonic stool burden, with persistent caliber change at   rectosigmoid region. Increased size of pelvic, perirectal soft tissue   mass, measuring up to 2.5 cm, previously 1.5 cm on PET, however it is   unclear whether this is causing a lower GI tract obstruction given   similar anatomy to prior.    Innumerable peritoneal metastases, increased in size and number since   8/29/2023 PET/CT.    New intrahepatic and extrahepatic biliary dilation.    < end of copied text >      EKG: reviewed by me    < from: 12 Lead ECG (12.04.23 @ 02:52) >  Ventricular Rate 95 BPM    Atrial Rate 95 BPM    P-R Interval 138 ms    QRS Duration 78 ms    Q-T Interval 350 ms    QTC Calculation(Bazett) 439 ms    P Axis 59 degrees    R Axis -52 degrees    T Axis 55 degrees    Diagnosis Line Normal sinus rhythm  Left anterior fascicular block  Cannot rule out Anteroseptal infarct , age undetermined  Abnormal ECG    < end of copied text >        PROTEIN CALORIE MALNUTRITION PRESENT: [ ]mild [ ]moderate [ ]severe [ ]underweight [ ]morbid obesity  https://www.andeal.org/vault/2440/web/files/ONC/Table_Clinical%20Characteristics%20to%20Document%20Malnutrition-White%20JV%20et%20al%202012.pdf      Weight (kg): 65.8 (07-24-23 @ 19:42)  [ ]PPSV2 < or = to 30% [ ]significant weight loss  [ ]poor nutritional intake  [ ]anasarca      [ ]Artificial Nutrition      Palliative Care Spiritual/Emotional Screening Tool Question  Severity (0-4):                    OR                    [ x] Unable to determine. Will assess at later time if appropriate.  Score of 2 or greater indicates recommendation of Chaplaincy and/or SW referral  Chaplaincy Referral: [ ] Yes [ ] Refused [ ] Following     Caregiver [facility]:  [ ] Yes [ ] No    OR    [x ] Unable to determine. Will assess at later time if appropriate.  Social Work Referral [ ]  Patient and Family Centered Care Referral [ ]    Anticipatory Grief Present: [ ] Yes [ ] No    OR     [ x] Unable to determine. Will assess at later time if appropriate.  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 Che), HTN, HLD, DM presents with worsening N/V and constipation. He states that he has had constipation and vomiting for the past month. He states BMs are usually small. Pt reports that he has not had a bowel movement for over a week and has now developed nausea and vomiting a/w abdominal pain. Patient reports flatus this morning, last vomiting episode was this morning as well. However, while in the ED he was tolerating PO diet while unknowingly being NPO. He has had a 20lb unintentional weight loss the past 3 months. He denies sick contacts, fever, chills, headache, dizziness, fever, hematemesis, or bloody stools,     Interval history  -Patient seen at bedside  -Denied pain or SOB at time of visit  -He spoke with hospice  -He is interested in hospice on discharge     ADVANCE DIRECTIVES:     [ ] Full Code [x ] DNR  MOLST  [ ]  Living Will  [ ]   DECISION MAKER(s):  [ ] Health Care Proxy(s)  [x ] Surrogate(s)  [ ] Guardian           Name(s): Phone Number(s):  Wife June      BASELINE (I)ADL(s) (prior to admission):    [employer]: [ ]Total  [ ] Moderate [ ]Dependent  Palliative Performance Status Version 2:         %    http://npcrc.org/files/news/palliative_performance_scale_ppsv2.pdf    Allergies    No Known Drug Allergies  shellfish (Unknown)    Intolerances    MEDICATIONS  (STANDING):  dextrose 5%. 1000 milliLiter(s) (100 mL/Hr) IV Continuous <Continuous>  dextrose 5%. 1000 milliLiter(s) (50 mL/Hr) IV Continuous <Continuous>  dextrose 50% Injectable 12.5 Gram(s) IV Push once  dextrose 50% Injectable 25 Gram(s) IV Push once  dextrose 50% Injectable 25 Gram(s) IV Push once  enoxaparin Injectable 40 milliGRAM(s) SubCutaneous every 24 hours  glucagon  Injectable 1 milliGRAM(s) IntraMuscular once  insulin lispro (ADMELOG) corrective regimen sliding scale   SubCutaneous at bedtime  insulin lispro (ADMELOG) corrective regimen sliding scale   SubCutaneous three times a day before meals  sodium chloride 0.9%. 1000 milliLiter(s) (75 mL/Hr) IV Continuous <Continuous>    MEDICATIONS  (PRN):  dextrose Oral Gel 15 Gram(s) Oral once PRN Blood Glucose LESS THAN 70 milliGRAM(s)/deciliter  ondansetron Injectable 4 milliGRAM(s) IV Push every 6 hours PRN Nausea and/or Vomiting        PRESENT SYMPTOMS: [ ]Unable to obtain due to poor mentation   Source if other than patient:  [ ]Family   [ ]Team     Pain: [ ]yes [ x]no  QOL impact -   Location -                    Aggravating factors -  Quality -  Radiation -  Timing-  Severity (0-10 scale):  Minimal acceptable level (0-10 scale):     CPOT:    https://www.[facility].org/getattachment/[email or web address]/Critical-Care-Pain-Observation-Tool-(CPOT)    PAIN AD Score:   http://geriatrictoolkit.[facility]/cog/painad.pdf (press ctrl +  left click to view)    Dyspnea:                           [x ]None[ ]Mild [ ]Moderate [ ]Severe     Respiratory Distress Observation Scale (RDOS):   A score of 0 to 2 signifies little or no respiratory distress, 3 signifies mild distress, scores 4 to 6 indicate moderate distress, and scores greater than 7 signify severe distress  https://[email or web address]/sites/default/files/PDFS/[email or web address]-kkooflzqfry-apkwfrzi-ojdlwmmcmow-[email or web address].pdf    Anxiety:                             [x ]None[ ]Mild [ ]Moderate [ ]Severe   Fatigue:                             [x ]None[ ]Mild [ ]Moderate [ ]Severe   Nausea:                             [ ]None[ ]Mild [x ]Moderate [ ]Severe   Loss of appetite:              [ ]None[ ]Mild [ x]Moderate [ ]Severe   Constipation:                    [ ]None[ ]Mild [x ]Moderate [ ]Severe    Other Symptoms:  [x ]All other review of systems negative     Palliative Performance Status Version 2:         50%    http://npcrc.org/files/news/palliative_performance_scale_ppsv2.pdf    PHYSICAL EXAM:  Vital Signs Last 24 Hrs  T(C): 37.1 (06 Dec 2023 15:17), Max: 37.1 (06 Dec 2023 04:20)  T(F): 98.7 (06 Dec 2023 15:17), Max: 98.7 (06 Dec 2023 04:20)  HR: 92 (06 Dec 2023 15:17) (82 - 92)  BP: 136/75 (06 Dec 2023 15:17) (123/68 - 136/75)  BP(mean): --  RR: 18 (06 Dec 2023 15:17) (18 - 18)  SpO2: --      GENERAL:  [ ] No acute distress [ ]Lethargic  [ ]Unarousable  [x ]Verbal  [ ]Non-Verbal [ ]Cachexia    BEHAVIORAL/PSYCH:  [ x]Alert and Oriented x4  [ ] Anxiety [ ] Delirium [ ] Agitation [ ] Calm   EYES: [x ] No scleral icterus [ ] Scleral icterus [ ] Closed  ENMT:  [ ]Dry mouth  [x ]No external oral lesions [ ] No external ear or nose lesions  CARDIOVASCULAR:  [ ]Regular [ ]Irregular [ ]Tachy [ x]Not Tachy  [ ]Saleem [ ] Edema [ ] No edema  PULMONARY:  [ ]Tachypnea  [ ]Audible excessive secretions [x ] No labored breathing [ ] labored breathing  GASTROINTESTINAL: [x ]Soft  [ ]Distended  [ ]Not distended [ ]Non tender [ ]Tender  MUSCULOSKELETAL: [ ]No clubbing [ ] clubbing  [x] No cyanosis [ ] cyanosis  NEUROLOGIC: [ ]No focal deficits  [x ]Follows commands  [ ]Does not follow commands  [ ]Cognitive impairment  [ ]Dysphagia  [ ]Dysarthria  [ ]Paresis   SKIN: [ ] Jaundiced [x ] Non-jaundiced [ ]Rash [ ]No Rash [ ] Warm [ ] Dry  MISC/LINES: [ ] ET tube [ ] Trach [ ]NGT/OGT [ ]PEG [ ]Mcguire    LABS: reviewed by me                                      CAPILLARY BLOOD GLUCOSE      POCT Blood Glucose.: 122 mg/dL (06 Dec 2023 11:51)              RADIOLOGY & ADDITIONAL STUDIES: reviewed by me    < from: CT Abdomen and Pelvis w/ Oral Cont and w/ IV Cont (12.03.23 @ 12:48) >    IMPRESSION:    Worsening diffusely infiltrative pancreatic mass, difficult to delineate,   with increased soft tissue infiltration in the peripancreatic region, and   increased cystic or necrotic components. Marked gastric distention with   diluted contrast. Small bowel collapsed. No definite enteric contrast  within bowel appreciated. Consider possibility of upper GI obstruction   secondary to worsening pancreatic neoplasm.    Moderate colonic stool burden, with persistent caliber change at   rectosigmoid region. Increased size of pelvic, perirectal soft tissue   mass, measuring up to 2.5 cm, previously 1.5 cm on PET, however it is   unclear whether this is causing a lower GI tract obstruction given   similar anatomy to prior.    Innumerable peritoneal metastases, increased in size and number since   8/29/2023 PET/CT.    New intrahepatic and extrahepatic biliary dilation.    < end of copied text >      EKG: reviewed by me    < from: 12 Lead ECG (12.04.23 @ 02:52) >  Ventricular Rate 95 BPM    Atrial Rate 95 BPM    P-R Interval 138 ms    QRS Duration 78 ms    Q-T Interval 350 ms    QTC Calculation(Bazett) 439 ms    P Axis 59 degrees    R Axis -52 degrees    T Axis 55 degrees    Diagnosis Line Normal sinus rhythm  Left anterior fascicular block  Cannot rule out Anteroseptal infarct , age undetermined  Abnormal ECG    < end of copied text >        PROTEIN CALORIE MALNUTRITION PRESENT: [ ]mild [ ]moderate [ ]severe [ ]underweight [ ]morbid obesity  https://www.andeal.org/vault/2440/web/files/ONC/Table_Clinical%20Characteristics%20to%20Document%20Malnutrition-White%20JV%20et%20al%202012.pdf      Weight (kg): 65.8 (07-24-23 @ 19:42)  [ ]PPSV2 < or = to 30% [ ]significant weight loss  [ ]poor nutritional intake  [ ]anasarca      [ ]Artificial Nutrition      Palliative Care Spiritual/Emotional Screening Tool Question  Severity (0-4):                    OR                    [ x] Unable to determine. Will assess at later time if appropriate.  Score of 2 or greater indicates recommendation of Chaplaincy and/or SW referral  Chaplaincy Referral: [ ] Yes [ ] Refused [ ] Following     Caregiver [place]:  [ ] Yes [ ] No    OR    [x ] Unable to determine. Will assess at later time if appropriate.  Social Work Referral [ ]  Patient and Family Centered Care Referral [ ]    Anticipatory Grief Present: [ ] Yes [ ] No    OR     [ x] Unable to determine. Will assess at later time if appropriate.  Social Work Referral [ ]  Patient and Family Centered Care Referral [ ]    Patient discussed with primary medical team MD  Palliative care education provided to patient and/or family

## 2023-12-06 NOTE — PROGRESS NOTE ADULT - PROBLEM SELECTOR PLAN 1
With carcinomatosis.  -Patient states went to [facility] for clinical trial, but did "not go well"  -treatment of N/V  -hospice discussed - consult placed  -f/u heme onc  -ongoing GOC With carcinomatosis.  -Patient states went to [facility] for clinical trial, but did "not go well"  -treatment of N/V  -hospice discussed - consult placed  -f/u heme onc  -ongoing GOC

## 2023-12-06 NOTE — HOSPICE CARE NOTE - CONVESATION DETAILS
At patient's request, conference call completed with patient Yemi and his spouse. Introduced hospice philosophy and services. All questions answered. Hospice contact information provided. Yemi declines hospice at this time. Will call hospice should he require our services.

## 2023-12-06 NOTE — PROGRESS NOTE ADULT - ASSESSMENT
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 Palliative care consulted for GOC.    Spoke with patient at bedside. He spoke with hospice earlier today.  Hospice note indicates the patient declined hospice.  Patient noted he was interested in hospice on discharge once stable and wished to talk it over further with his wife. All questions answered.      Education about palliative care provided to patient/family.  See Recs below.    Please call [phone] with questions or concerns 24/7.   We will continue to follow.    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 Palliative care consulted for GOC.    Spoke with patient at bedside. He spoke with hospice earlier today.  Hospice note indicates the patient declined hospice.  Patient noted he was interested in hospice on discharge once stable and wished to talk it over further with his wife. All questions answered.      Education about palliative care provided to patient/family.  See Recs below.    Please call [phone] with questions or concerns 24/7.   We will continue to follow.

## 2023-12-06 NOTE — PROGRESS NOTE ADULT - ASSESSMENT
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 Che), HTN, HLD, DM presents with worsening N/V and constipation in the setting of worsening diffusely infiltrative pancreatic mass, pelvic/perirectal mass, and intraperitoneal metastases.     #Malignant GOO   #Metastatic pancreatic cancer with progression of disease  - surgery recommendations appreciated   - Palliative recommendations appreciated   - hospice recommendations pending   - Oncology recommendations pending   - CT scan showed pancreatic mass, difficult to delineate, with increased soft tissue infiltration in the peripancreatic region, and increased cystic or necrotic components. Marked gastric distention with diluted contrast. No definite enteric contrast within bowel appreciated (hyperdense stool).  Moderate colonic stool burden, with persistent caliber change at rectosigmoid region. Increased size of pelvic, perirectal soft tissue mass, measuring up to 2.5 cm, previously 1.5 cm on PET, however it is unclear whether this is causing a lower GI tract obstruction   Innumerable peritoneal metastases, increased in size and number since   8/29/2023 PET/CT. Mild ascites. No pneumoperitoneum. No abscess.- no BM in 1 week  - Unfortunately, no further treatment options for pancreatic cancer can be offered to Mr. Holloway at this time at [facility]  - Patient was being seen at [facility] for immunotherapy via clinical trial but discontinued this treatment due to side effects. He declines any further treatment.  - No acute surgical intervention  - Hospice discussed with patient, and he is agreeable to discuss Hospice & Palliative care further  - NS @ 75cc/hr  - NPO if vomiting, otherwise clears  - zofran 4 mg IVP q6h      #HTN  - was on lisinopril 5mg      #HLD  - was on atorvastatin 20mg     #DM  - ISS    dvt ppx     #nutrition  - nutrition recommendations appreciated   1. ADD Ensure Clears 3X/DAILY to optimize kcal/pro intake -- provides 720 kcal, 24g pro total  2. Continue with current diet order until medically feasible to advance -- once able to advance recommend to add consistent carbohydrate (evening snack) and DASH/TLC modifiers to diet order.  3. Encourage PO intake and assist during meals prn    #Code status: DNR/DNI  #Handoff: see points bolded above and any points listed below  Call placed to patient to discuss hospice services. 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 Che), HTN, HLD, DM presents with worsening N/V and constipation in the setting of worsening diffusely infiltrative pancreatic mass, pelvic/perirectal mass, and intraperitoneal metastases.     #Malignant GOO   #Metastatic pancreatic cancer with progression of disease  - surgery recommendations appreciated   - Palliative recommendations appreciated   - hospice recommendations pending   - Oncology recommendations pending   - CT scan showed pancreatic mass, difficult to delineate, with increased soft tissue infiltration in the peripancreatic region, and increased cystic or necrotic components. Marked gastric distention with diluted contrast. No definite enteric contrast within bowel appreciated (hyperdense stool).  Moderate colonic stool burden, with persistent caliber change at rectosigmoid region. Increased size of pelvic, perirectal soft tissue mass, measuring up to 2.5 cm, previously 1.5 cm on PET, however it is unclear whether this is causing a lower GI tract obstruction   Innumerable peritoneal metastases, increased in size and number since   8/29/2023 PET/CT. Mild ascites. No pneumoperitoneum. No abscess.- no BM in 1 week  - Unfortunately, no further treatment options for pancreatic cancer can be offered to Mr. Holloway at this time at [facility]  - Patient was being seen at [facility] for immunotherapy via clinical trial but discontinued this treatment due to side effects. He declines any further treatment.  - No acute surgical intervention  - Hospice discussed with patient, and he is agreeable to discuss Hospice & Palliative care further  - NS @ 75cc/hr  - NPO if vomiting, otherwise clears  - zofran 4 mg IVP q6h      #HTN  - was on lisinopril 5mg      #HLD  - was on atorvastatin 20mg     #DM  - ISS    dvt ppx     #nutrition  - nutrition recommendations appreciated   1. ADD Ensure Clears 3X/DAILY to optimize kcal/pro intake -- provides 720 kcal, 24g pro total  2. Continue with current diet order until medically feasible to advance -- once able to advance recommend to add consistent carbohydrate (evening snack) and DASH/TLC modifiers to diet order.  3. Encourage PO intake and assist during meals prn    #Code status: DNR/DNI  #Handoff: see points bolded above and any points listed below  Call placed to patient to discuss hospice services. Yemi requesting call back from hospice team Wednesday

## 2023-12-07 LAB
GLUCOSE BLDC GLUCOMTR-MCNC: 100 MG/DL — HIGH (ref 70–99)
GLUCOSE BLDC GLUCOMTR-MCNC: 100 MG/DL — HIGH (ref 70–99)
GLUCOSE BLDC GLUCOMTR-MCNC: 102 MG/DL — HIGH (ref 70–99)
GLUCOSE BLDC GLUCOMTR-MCNC: 102 MG/DL — HIGH (ref 70–99)
GLUCOSE BLDC GLUCOMTR-MCNC: 110 MG/DL — HIGH (ref 70–99)
GLUCOSE BLDC GLUCOMTR-MCNC: 110 MG/DL — HIGH (ref 70–99)
GLUCOSE BLDC GLUCOMTR-MCNC: 99 MG/DL — SIGNIFICANT CHANGE UP (ref 70–99)
GLUCOSE BLDC GLUCOMTR-MCNC: 99 MG/DL — SIGNIFICANT CHANGE UP (ref 70–99)

## 2023-12-07 PROCEDURE — 99233 SBSQ HOSP IP/OBS HIGH 50: CPT

## 2023-12-07 PROCEDURE — 99497 ADVNCD CARE PLAN 30 MIN: CPT

## 2023-12-07 PROCEDURE — 99223 1ST HOSP IP/OBS HIGH 75: CPT

## 2023-12-07 PROCEDURE — 99232 SBSQ HOSP IP/OBS MODERATE 35: CPT

## 2023-12-07 RX ADMIN — ENOXAPARIN SODIUM 40 MILLIGRAM(S): 100 INJECTION SUBCUTANEOUS at 06:22

## 2023-12-07 RX ADMIN — ONDANSETRON 4 MILLIGRAM(S): 8 TABLET, FILM COATED ORAL at 21:26

## 2023-12-07 RX ADMIN — SODIUM CHLORIDE 75 MILLILITER(S): 9 INJECTION INTRAMUSCULAR; INTRAVENOUS; SUBCUTANEOUS at 06:22

## 2023-12-07 NOTE — PROGRESS NOTE ADULT - CONVERSATION DETAILS
Spoke with patient and son at bedside. Palliative care introduced to son.  They were able to provide a medical history and hospital course.  The patient noted he was waiting for GI for a possible venting PEG. He also discussed that he wished to go home soon. We again discussed hospice, and he noted that hospice would be available for him to start when he needed. We discussed that given the patient will need hospice as soon as he goes home, as there are no additional treatment options for him. He is interested in going home on hospice, but would like to speak with GI about a venting PEG first. All questions answered. Spoke with patient and son at bedside. Palliative care introduced to son.  They were able to provide a medical history and hospital course.  The patient noted he was waiting for GI for a possible venting PEG. He also discussed that he wished to go home soon. We again discussed hospice, and he noted that hospice would be available for him to start when he needed. We discussed that given the patient will need hospice as soon as he goes home, as there are no additional treatment options for him. He is interested in going home on hospice, but would like to speak with GI about a venting PEG first. All questions answered.    Called and spoke with wife. Had same conversation as with patient as above. All questions answered.

## 2023-12-07 NOTE — PROGRESS NOTE ADULT - SUBJECTIVE AND OBJECTIVE BOX
24H events:    Patient is a 73y old Male who presents with a chief complaint of Vomiting and Constipation (06 Dec 2023 10:28)    Primary diagnosis of SBO (small bowel obstruction)      Today is hospital day 4d  This morning patient was seen and examined at bedside, resting comfortably in bed  Was endorsed that the following events occurred overnight, if any:  Otherwise no acute or major events reported overnight    Review of Systems:  CONSTITUTIONAL  reported (if any):   otherwise did not endorse difficulty sleeping, poor appetite    HEENT  reported (if any):   otherwise did not endorse rhinorrhea, lacrimation, ear pain, sore throat    CARDIAC  reported (if any):   otherwise did not endorse chest pain, palpitations,    PULMONARY  reported (if any):   otherwise did not endorse shortness of breath, cough, wheezing    GASTROINTESTINAL  reported (if any): still constipated, vomiting yesterday and overnight, denied abdominal pain  otherwise did not endorse nausea, vomiting, abdominal pain, diarrhea, constipation    GENITO-URINARY  reported (if any):   otherwise did not endorse dysuria, hematuria, urgency, frequency,    NEURO-MUSCULOSKELETAL  reported (if any):   otherwise did not endorse numbness, paresthesia, weakness,    DERMATOLOGIC  reported (if any):   otherwise did not endorse rashes, lesions    Code Status:  see end of document    Family communication:  Contact date:  Name of person contacted:  Relationship to patient:  Communication details:  What matters most:    PAST MEDICAL & SURGICAL HISTORY  HTN (hypertension)    Diabetes    Hypercholesteremia    Gout    H/O constipation    Cancer of abdominal organ  between stomach and opancreas    H/O colonoscopy  biopsy abdomen 7/2022      SOCIAL HISTORY:  Social History:      ALLERGIES:  No Known Drug Allergies  shellfish (Unknown)    MEDICATIONS:  STANDING MEDICATIONS  dextrose 5%. 1000 milliLiter(s) IV Continuous <Continuous>  dextrose 5%. 1000 milliLiter(s) IV Continuous <Continuous>  dextrose 50% Injectable 12.5 Gram(s) IV Push once  dextrose 50% Injectable 25 Gram(s) IV Push once  dextrose 50% Injectable 25 Gram(s) IV Push once  enoxaparin Injectable 40 milliGRAM(s) SubCutaneous every 24 hours  glucagon  Injectable 1 milliGRAM(s) IntraMuscular once  insulin lispro (ADMELOG) corrective regimen sliding scale   SubCutaneous three times a day before meals  insulin lispro (ADMELOG) corrective regimen sliding scale   SubCutaneous at bedtime  sodium chloride 0.9%. 1000 milliLiter(s) IV Continuous <Continuous>    PRN MEDICATIONS  dextrose Oral Gel 15 Gram(s) Oral once PRN  ondansetron Injectable 4 milliGRAM(s) IV Push every 6 hours PRN    VITALS:   T(F): 98  HR: 96  BP: 123/76  RR: 18  SpO2: 96%    PHYSICAL EXAM:  GENERAL: cachectic, visible ribs  ( x ) NAD, lying in bed comfortably     (  ) obtunded     (  ) lethargic     (  ) somnolent    HEAD:   ( x ) Atraumatic     (  ) hematoma     (  ) laceration (specify location:       )     NECK:  ( x ) Supple     (  ) neck stiffness     (  ) nuchal rigidity     (  )  no JVD     (  ) JVD present ( -- cm)    CHEST:  (  ) Chest wall tenderness (specify location:       )    HEART:  Rate -->     ( x ) normal rate     (  ) bradycardic     (  ) tachycardic  Rhythm -->     ( x ) regular     (  ) regularly irregular     (  ) irregularly irregular  Murmurs -->     ( x ) normal s1s2     (  ) systolic murmur     (  ) diastolic murmur     (  ) continuous murmur      (  ) S3 present     (  ) S4 present    LUNG:   ( x ) Unlabored respirations     (  ) tachypnea  ( x ) B/L air entry     (  ) decreased breath sounds in:  (location     )    ( x ) no adventitious sound     (  ) crackles     (  ) wheezing      (  ) rhonchi      (specify location:       )    ABDOMEN:   ( x ) Soft     (  ) tense   |   ( x ) nondistended     (  ) distended   |   ( x ) +BS     (  ) hypoactive bowel sounds     (  ) hyperactive bowel sounds  ( x ) nontender     (  ) RUQ tenderness     (  ) RLQ tenderness     (  ) LLQ tenderness     (  ) epigastric tenderness     (  ) diffuse tenderness  (  ) Splenomegaly      (  ) Hepatomegaly      (  ) Jaundice     (  ) ecchymosis     EXTREMITIES:   (  ) Cyanosis    (  ) varicose veins    (  ) clubbing    (  ) gangrene:     (location:     )  (  ) pitting edema     (  ) non-pitting edema         SKIN:   (  ) rash:     (  ) pustules     (  ) vesicles     (  ) ulcer     (  ) ecchymosis     (specify location:     )    NERVOUS SYSTEM:    ( x ) A&Ox3     (  ) confused     (  ) lethargic  CN II-XII:     (  ) grossly intact     (  ) deficits found     (Specify:     )   Upper extremities:     (  ) no sensorimotor deficits     (  ) weakness     (  ) loss of proprioception/vibration     (  ) loss of touch/temperature (specify:    )  Lower extremities:     (  ) no sensorimotor deficits     (  ) weakness     (  ) loss of proprioception/vibration     (  ) loss of touch/temperature (specify:    )    (  ) Indwelling Mcguire Catheter:  Date inserted:    Reason (  ) Critical illness     (  ) Urinary retention    (  ) Accurate Ins/Outs Monitoring     (  ) CMO patient    (  ) Central Line:   Date inserted:  Location: (  ) Right IJ     (  ) Left IJ     (  ) Right Fem     (  ) Left Fem    (  ) SPC        (  ) pigtail       (  ) PEG tube       (  ) colostomy       (  ) jejunostomy  (  ) U-Dall    LABS:                          ECHO, ENDO, EEG, RAD:

## 2023-12-07 NOTE — PROGRESS NOTE ADULT - ASSESSMENT
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 Che), HTN, HLD, DM presents with worsening N/V and constipation in the setting of worsening diffusely infiltrative pancreatic mass, pelvic/perirectal mass, and intraperitoneal metastases.     #Malignant GOO   #Metastatic pancreatic cancer with progression of disease  - surgery recommendations appreciated   - Palliative recommendations appreciated   - hospice recommendations appreciated   - Oncology recommendations appreciated   - advanced GI recommendations pending   - CT scan showed pancreatic mass, difficult to delineate, with increased soft tissue infiltration in the peripancreatic region, and increased cystic or necrotic components. Marked gastric distention with diluted contrast. No definite enteric contrast within bowel appreciated (hyperdense stool).  Moderate colonic stool burden, with persistent caliber change at rectosigmoid region. Increased size of pelvic, perirectal soft tissue mass, measuring up to 2.5 cm, previously 1.5 cm on PET, however it is unclear whether this is causing a lower GI tract obstruction   Innumerable peritoneal metastases, increased in size and number since   8/29/2023 PET/CT. Mild ascites. No pneumoperitoneum. No abscess.- no BM in 1 week  - Unfortunately, no further treatment options for pancreatic cancer can be offered to Mr. Holloway at this time at [facility]  - Patient was being seen at [facility] for immunotherapy via clinical trial but discontinued this treatment due to side effects. He declines any further treatment.  - No acute surgical intervention  - Patient noted he was interested in hospice on discharge once stable and wished to talk it over further with his wife  - NS @ 75cc/hr  - NPO if vomiting, otherwise clears, advance as tolerated  - still vomiting on clears  - zofran 4 mg IVP q6h    - considering PEG placement for nutrition ( patient not even tolerating clears) and venting for GOO    #HTN  - was on lisinopril 5mg      #HLD  - was on atorvastatin 20mg     #DM  - ISS    dvt ppx     #nutrition  - nutrition recommendations appreciated   1. ADD Ensure Clears 3X/DAILY to optimize kcal/pro intake -- provides 720 kcal, 24g pro total  2. Continue with current diet order until medically feasible to advance -- once able to advance recommend to add consistent carbohydrate (evening snack) and DASH/TLC modifiers to diet order.  3. 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 Che), HTN, HLD, DM presents with worsening N/V and constipation in the setting of worsening diffusely infiltrative pancreatic mass, pelvic/perirectal mass, and intraperitoneal metastases.     #Malignant GOO   #Metastatic pancreatic cancer with progression of disease  - surgery recommendations appreciated   - Palliative recommendations appreciated   - hospice recommendations appreciated   - Oncology recommendations appreciated   - advanced GI recommendations pending   - CT scan showed pancreatic mass, difficult to delineate, with increased soft tissue infiltration in the peripancreatic region, and increased cystic or necrotic components. Marked gastric distention with diluted contrast. No definite enteric contrast within bowel appreciated (hyperdense stool).  Moderate colonic stool burden, with persistent caliber change at rectosigmoid region. Increased size of pelvic, perirectal soft tissue mass, measuring up to 2.5 cm, previously 1.5 cm on PET, however it is unclear whether this is causing a lower GI tract obstruction   Innumerable peritoneal metastases, increased in size and number since   8/29/2023 PET/CT. Mild ascites. No pneumoperitoneum. No abscess.- no BM in 1 week  - Unfortunately, no further treatment options for pancreatic cancer can be offered to Mr. Holloway at this time at [facility]  - Patient was being seen at [facility] for immunotherapy via clinical trial but discontinued this treatment due to side effects. He declines any further treatment.  - No acute surgical intervention  - Patient noted he was interested in hospice on discharge once stable and wished to talk it over further with his wife  - NS @ 75cc/hr  - NPO if vomiting, otherwise clears, advance as tolerated  - still vomiting on clears  - zofran 4 mg IVP q6h    - considering PEG placement for nutrition ( patient not even tolerating clears) and venting for GOO    #HTN  - was on lisinopril 5mg      #HLD  - was on atorvastatin 20mg     #DM  - ISS    dvt ppx     #nutrition  - nutrition recommendations appreciated   1. ADD Ensure Clears 3X/DAILY to optimize kcal/pro intake -- provides 720 kcal, 24g pro total  2. Continue with current diet order until medically feasible to advance -- once able to advance recommend to add consistent carbohydrate (evening snack) and DASH/TLC modifiers to diet order.  3. Encourage PO intake and assist during meals prn    #Code status: DNR/DNI  #Handoff: see points bolded above and any points listed below  - patient interested in discharge after GI eval/interventions

## 2023-12-07 NOTE — PROGRESS NOTE ADULT - PROBLEM SELECTOR PLAN 1
With carcinomatosis.  -Patient states went to [facility] for clinical trial, but did "not go well"  -treatment of N/V  -hospice consulted and discussed  -ongoing GOC With carcinomatosis.  -Patient [facility] to [facility], but did "not go well"  -treatment of N/V  -hospice consulted and discussed  -ongoing GOC

## 2023-12-07 NOTE — PROGRESS NOTE ADULT - ATTENDING COMMENTS
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 Che), HTN, HLD, DM presents with worsening N/V and constipation in the setting of worsening diffusely infiltrative pancreatic mass, pelvic/perirectal mass, and intraperitoneal metastases.       #Malignant GOO   #Metastatic pancreatic cancer with progression of disease   - CT scan showed pancreatic mass, difficult to delineate, with increased soft tissue infiltration in the peripancreatic region, and increased cystic or necrotic components. Marked gastric distention with diluted contrast. No definite enteric contrast within bowel appreciated (hyperdense stool).  Moderate colonic stool burden, with persistent caliber change at rectosigmoid region. Increased size of pelvic, perirectal soft tissue mass, measuring up to 2.5 cm, previously 1.5 cm on PET, however it is unclear whether this is causing a lower GI tract obstruction   Innumerable peritoneal metastases, increased in size and number since   8/29/2023 PET/CT. Mild ascites. No pneumoperitoneum. No abscess.- no BM in 1 week  - No acute surgical intervention  - on clears   - Zofran 4 mg IVP q6h    - Palliative consult - declined hospice   - Oncology consult appreciated   - GI eval for GJ tube     #HTN  - was on lisinopril 5mg      #HLD  - was on atorvastatin 20mg     #DM  - ISS    dvt ppx       Pending: GI eval   spoke with patient
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 Che), HTN, HLD, DM presents with worsening N/V and constipation in the setting of worsening diffusely infiltrative pancreatic mass, pelvic/perirectal mass, and intraperitoneal metastases.       #Malignant GOO   #Metastatic pancreatic cancer with progression of disease   - CT scan showed pancreatic mass, difficult to delineate, with increased soft tissue infiltration in the peripancreatic region, and increased cystic or necrotic components. Marked gastric distention with diluted contrast. No definite enteric contrast within bowel appreciated (hyperdense stool).  Moderate colonic stool burden, with persistent caliber change at rectosigmoid region. Increased size of pelvic, perirectal soft tissue mass, measuring up to 2.5 cm, previously 1.5 cm on PET, however it is unclear whether this is causing a lower GI tract obstruction   Innumerable peritoneal metastases, increased in size and number since   8/29/2023 PET/CT. Mild ascites. No pneumoperitoneum. No abscess.- no BM in 1 week  - No acute surgical intervention  - on clears   - Zofran 4 mg IVP q6h    - Palliative consult - hospice candidate   - Oncology consult appreciated     #HTN  - was on lisinopril 5mg      #HLD  - was on atorvastatin 20mg     #DM  - ISS    dvt ppx       Pending: hospice referral  spoke with patient
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 Che), HTN, HLD, DM presents with worsening N/V and constipation in the setting of worsening diffusely infiltrative pancreatic mass, pelvic/perirectal mass, and intraperitoneal metastases.       #Malignant GOO   #Metastatic pancreatic cancer with progression of disease   - CT scan showed pancreatic mass, difficult to delineate, with increased soft tissue infiltration in the peripancreatic region, and increased cystic or necrotic components. Marked gastric distention with diluted contrast. No definite enteric contrast within bowel appreciated (hyperdense stool).  Moderate colonic stool burden, with persistent caliber change at rectosigmoid region. Increased size of pelvic, perirectal soft tissue mass, measuring up to 2.5 cm, previously 1.5 cm on PET, however it is unclear whether this is causing a lower GI tract obstruction   Innumerable peritoneal metastases, increased in size and number since   8/29/2023 PET/CT. Mild ascites. No pneumoperitoneum. No abscess.- no BM in 1 week  - No acute surgical intervention  - on clears   - Zofran 4 mg IVP q6h    - Palliative consult - hospice candidate   - Oncology consult appreciated     #HTN  - was on lisinopril 5mg      #HLD  - was on atorvastatin 20mg     #DM  - ISS    dvt ppx       Pending: hospice referral

## 2023-12-07 NOTE — PROGRESS NOTE ADULT - ASSESSMENT
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 [place] [facility] [place]    Spoke with patient and son at bedside. Palliative care introduced to son.  They were able to provide a medical history and hospital course.  The patient noted he was waiting for GI for a possible venting PEG. He also discussed that he wished to go home soon. We again discussed hospice, and he noted that hospice would be available for him to start when he needed. We discussed that given the patient will need hospice as soon as he goes home, as there are no additional treatment options for him. He is interested in going home on hospice, but would like to speak with GI about a venting PEG first. All questions answered.      Education about palliative care provided to patient/family.  See Recs below.    Please call [phone] with questions or concerns 24/7.   We will continue to follow.    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 Palliative care consulted for [facility].    Spoke with patient and son at bedside. Palliative care introduced to son.  They were able to provide a medical history and hospital course.  The patient noted he was waiting for GI for a possible venting PEG. He also discussed that he wished to go home soon. We again discussed hospice, and he noted that hospice would be available for him to start when he needed. We discussed that given the patient will need hospice as soon as he goes home, as there are no additional treatment options for him. He is interested in going home on hospice, but would like to speak with GI about a venting PEG first. All questions answered.      Education about palliative care provided to patient/family.  See Recs below.    Please call [phone] with questions or concerns 24/7.   We will continue to follow.

## 2023-12-07 NOTE — PROGRESS NOTE ADULT - SUBJECTIVE AND OBJECTIVE BOX
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 Che), HTN, HLD, DM presents with worsening N/V and constipation. He states that he has had constipation and vomiting for the past month. He states BMs are usually small. Pt reports that he has not had a bowel movement for over a week and has now developed nausea and vomiting a/w abdominal pain. Patient reports flatus this morning, last vomiting episode was this morning as well. However, while in the ED he was tolerating PO diet while unknowingly being NPO. He has had a 20lb unintentional weight loss the past 3 months. He denies sick contacts, fever, chills, headache, dizziness, fever, hematemesis, or bloody stools,     Interval history  -Patient seen at bedside  -Denied pain or SOB at time of visit  -He spoke with hospice  -awaiting GI intervention for venting PEG     ADVANCE DIRECTIVES:     [ ] Full Code [x ] DNR  MOLST  [ ]  Living Will  [ ]   DECISION MAKER(s):  [ ] Health Care Proxy(s)  [x ] Surrogate(s)  [ ] Guardian           Name(s): Phone Number(s):  Wife June      BASELINE (I)ADL(s) (prior to admission):    [place]: [ ]Total  [ ] Moderate [ ]Dependent  Palliative Performance Status Version 2:         %    http://npcrc.org/files/news/palliative_performance_scale_ppsv2.pdf    Allergies    No Known Drug Allergies  shellfish (Unknown)    Intolerances    MEDICATIONS  (STANDING):  dextrose 5%. 1000 milliLiter(s) (100 mL/Hr) IV Continuous <Continuous>  dextrose 5%. 1000 milliLiter(s) (50 mL/Hr) IV Continuous <Continuous>  dextrose 50% Injectable 12.5 Gram(s) IV Push once  dextrose 50% Injectable 25 Gram(s) IV Push once  dextrose 50% Injectable 25 Gram(s) IV Push once  enoxaparin Injectable 40 milliGRAM(s) SubCutaneous every 24 hours  glucagon  Injectable 1 milliGRAM(s) IntraMuscular once  insulin lispro (ADMELOG) corrective regimen sliding scale   SubCutaneous at bedtime  insulin lispro (ADMELOG) corrective regimen sliding scale   SubCutaneous three times a day before meals  sodium chloride 0.9%. 1000 milliLiter(s) (75 mL/Hr) IV Continuous <Continuous>    MEDICATIONS  (PRN):  dextrose Oral Gel 15 Gram(s) Oral once PRN Blood Glucose LESS THAN 70 milliGRAM(s)/deciliter  ondansetron Injectable 4 milliGRAM(s) IV Push every 6 hours PRN Nausea and/or Vomiting      PRESENT SYMPTOMS: [ ]Unable to obtain due to poor mentation   Source if other than patient:  [ ]Family   [ ]Team     Pain: [ ]yes [ x]no  QOL impact -   Location -                    Aggravating factors -  Quality -  Radiation -  Timing-  Severity (0-10 scale):  Minimal acceptable level (0-10 scale):     CPOT:    https://www.[facility].org/getattachment/[email or web address]/Critical-Care-Pain-Observation-Tool-(CPOT)    PAIN AD Score:   http://geriatrictoolkit.[facility]/cog/painad.pdf (press ctrl +  left click to view)    Dyspnea:                           [x ]None[ ]Mild [ ]Moderate [ ]Severe     Respiratory Distress Observation Scale (RDOS):   A score of 0 to 2 signifies little or no respiratory distress, 3 signifies mild distress, scores 4 to 6 indicate moderate distress, and scores greater than 7 signify severe distress  [email or web address]://[email or web address].ca/sites/default/files/PDFS/[email or web address]-fonymanozeb-tahfexgu-fhzyjezdhxc-ymiab.pdf    Anxiety:                             [x ]None[ ]Mild [ ]Moderate [ ]Severe   Fatigue:                             [x ]None[ ]Mild [ ]Moderate [ ]Severe   Nausea:                             [ ]None[ ]Mild [x ]Moderate [ ]Severe   Loss of appetite:              [ ]None[ ]Mild [ x]Moderate [ ]Severe   Constipation:                    [ ]None[ ]Mild [x ]Moderate [ ]Severe    Other Symptoms:  [x ]All other review of systems negative     Palliative Performance Status Version 2:         50%    http://npcrc.org/files/news/palliative_performance_scale_ppsv2.pdf    PHYSICAL EXAM:  Vital Signs Last 24 Hrs  T(C): 36.4 (07 Dec 2023 13:48), Max: 36.7 (07 Dec 2023 05:17)  T(F): 97.6 (07 Dec 2023 13:48), Max: 98 (07 Dec 2023 05:17)  HR: 88 (07 Dec 2023 13:48) (78 - 96)  BP: 132/71 (07 Dec 2023 13:48) (121/64 - 132/71)  BP(mean): --  RR: 18 (07 Dec 2023 13:48) (18 - 18)  SpO2: 96% (07 Dec 2023 03:28) (96% - 96%)    Parameters below as of 07 Dec 2023 03:28  Patient On (Oxygen Delivery Method): room air      GENERAL:  [ ] No acute distress [ ]Lethargic  [ ]Unarousable  [x ]Verbal  [ ]Non-Verbal [ ]Cachexia    BEHAVIORAL/PSYCH:  [ x]Alert and Oriented x4  [ ] Anxiety [ ] Delirium [ ] Agitation [ ] Calm   EYES: [x ] No scleral icterus [ ] Scleral icterus [ ] Closed  ENMT:  [ ]Dry mouth  [x ]No external oral lesions [ ] No external ear or nose lesions  CARDIOVASCULAR:  [ ]Regular [ ]Irregular [ ]Tachy [ x]Not Tachy  [ ]Saleem [ ] Edema [ ] No edema  PULMONARY:  [ ]Tachypnea  [ ]Audible excessive secretions [x ] No labored breathing [ ] labored breathing  GASTROINTESTINAL: [x ]Soft  [ ]Distended  [ ]Not distended [ ]Non tender [ ]Tender  MUSCULOSKELETAL: [ ]No clubbing [ ] clubbing  [x] No cyanosis [ ] cyanosis  NEUROLOGIC: [ ]No focal deficits  [x ]Follows commands  [ ]Does not follow commands  [ ]Cognitive impairment  [ ]Dysphagia  [ ]Dysarthria  [ ]Paresis   SKIN: [ ] Jaundiced [x ] Non-jaundiced [ ]Rash [ ]No Rash [ ] Warm [ ] Dry  MISC/LINES: [ ] ET tube [ ] Trach [ ]NGT/OGT [ ]PEG [ ]Mcguire    LABS: reviewed by me                                      CAPILLARY BLOOD GLUCOSE      POCT Blood Glucose.: 99 mg/dL (07 Dec 2023 11:57)              RADIOLOGY & ADDITIONAL STUDIES: reviewed by me    < from: CT Abdomen and Pelvis w/ Oral Cont and w/ IV Cont (12.03.23 @ 12:48) >    IMPRESSION:    Worsening diffusely infiltrative pancreatic mass, difficult to delineate,   with increased soft tissue infiltration in the peripancreatic region, and   increased cystic or necrotic components. Marked gastric distention with   diluted contrast. Small bowel collapsed. No definite enteric contrast  within bowel appreciated. Consider possibility of upper GI obstruction   secondary to worsening pancreatic neoplasm.    Moderate colonic stool burden, with persistent caliber change at   rectosigmoid region. Increased size of pelvic, perirectal soft tissue   mass, measuring up to 2.5 cm, previously 1.5 cm on PET, however it is   unclear whether this is causing a lower GI tract obstruction given   similar anatomy to prior.    Innumerable peritoneal metastases, increased in size and number since   8/29/2023 PET/CT.    New intrahepatic and extrahepatic biliary dilation.    < end of copied text >      EKG: reviewed by me    < from: 12 Lead ECG (12.04.23 @ 02:52) >  Ventricular Rate 95 BPM    Atrial Rate 95 BPM    P-R Interval 138 ms    QRS Duration 78 ms    Q-T Interval 350 ms    QTC Calculation(Bazett) 439 ms    P Axis 59 degrees    R Axis -52 degrees    T Axis 55 degrees    Diagnosis Line Normal sinus rhythm  Left anterior fascicular block  Cannot rule out Anteroseptal infarct , age undetermined  Abnormal ECG    < end of copied text >        PROTEIN CALORIE MALNUTRITION PRESENT: [ ]mild [ ]moderate [ ]severe [ ]underweight [ ]morbid obesity  https://www.andeal.org/vault/2440/web/files/ONC/Table_Clinical%20Characteristics%20to%20Document%20Malnutrition-White%20JV%20et%20al%202012.pdf      Weight (kg): 65.8 (07-24-23 @ 19:42)  [ ]PPSV2 < or = to 30% [ ]significant weight loss  [ ]poor nutritional intake  [ ]anasarca      [ ]Artificial Nutrition      Palliative Care Spiritual/Emotional Screening Tool Question  Severity (0-4):                    OR                    [ x] Unable to determine. Will assess at later time if appropriate.  Score of 2 or greater indicates recommendation of Chaplaincy and/or SW referral  Chaplaincy Referral: [ ] Yes [ ] Refused [ ] Following     Caregiver [place]:  [ ] Yes [ ] No    OR    [x ] Unable to determine. Will assess at later time if appropriate.  Social Work Referral [ ]  Patient and Family Centered Care Referral [ ]    Anticipatory Grief Present: [ ] Yes [ ] No    OR     [ x] Unable to determine. Will assess at later time if appropriate.  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 Che), HTN, HLD, DM presents with worsening N/V and constipation. He states that he has had constipation and vomiting for the past month. He states BMs are usually small. Pt reports that he has not had a bowel movement for over a week and has now developed nausea and vomiting a/w abdominal pain. Patient reports flatus this morning, last vomiting episode was this morning as well. However, while in the ED he was tolerating PO diet while unknowingly being NPO. He has had a 20lb unintentional weight loss the past 3 months. He denies sick contacts, fever, chills, headache, dizziness, fever, hematemesis, or bloody stools,     Interval history  -Patient seen at bedside  -Denied pain or SOB at time of visit  -He spoke with hospice  -awaiting GI intervention for venting PEG     ADVANCE DIRECTIVES:     [ ] Full Code [x ] DNR  MOLST  [ ]  Living Will  [ ]   DECISION MAKER(s):  [ ] Health Care Proxy(s)  [x ] Surrogate(s)  [ ] Guardian           Name(s): Phone Number(s):  Wife June      BASELINE (I)ADL(s) (prior to admission):    [place]: [ ]Total  [ ] Moderate [ ]Dependent  Palliative Performance Status Version 2:         %    http://npcrc.org/files/news/palliative_performance_scale_ppsv2.pdf    Allergies    No Known Drug Allergies  shellfish (Unknown)    Intolerances    MEDICATIONS  (STANDING):  dextrose 5%. 1000 milliLiter(s) (100 mL/Hr) IV Continuous <Continuous>  dextrose 5%. 1000 milliLiter(s) (50 mL/Hr) IV Continuous <Continuous>  dextrose 50% Injectable 12.5 Gram(s) IV Push once  dextrose 50% Injectable 25 Gram(s) IV Push once  dextrose 50% Injectable 25 Gram(s) IV Push once  enoxaparin Injectable 40 milliGRAM(s) SubCutaneous every 24 hours  glucagon  Injectable 1 milliGRAM(s) IntraMuscular once  insulin lispro (ADMELOG) corrective regimen sliding scale   SubCutaneous at bedtime  insulin lispro (ADMELOG) corrective regimen sliding scale   SubCutaneous three times a day before meals  sodium chloride 0.9%. 1000 milliLiter(s) (75 mL/Hr) IV Continuous <Continuous>    MEDICATIONS  (PRN):  dextrose Oral Gel 15 Gram(s) Oral once PRN Blood Glucose LESS THAN 70 milliGRAM(s)/deciliter  ondansetron Injectable 4 milliGRAM(s) IV Push every 6 hours PRN Nausea and/or Vomiting      PRESENT SYMPTOMS: [ ]Unable to obtain due to poor mentation   Source if other than patient:  [ ]Family   [ ]Team     Pain: [ ]yes [ x]no  QOL impact -   Location -                    Aggravating factors -  Quality -  Radiation -  Timing-  Severity (0-10 scale):  Minimal acceptable level (0-10 scale):     CPOT:    https://www.[facility].org/getattachment/[email or web address]/Critical-Care-Pain-Observation-Tool-(CPOT)    PAIN AD Score:   http://geriatrictoolkit.[facility]/cog/painad.pdf (press ctrl +  left click to view)    Dyspnea:                           [x ]None[ ]Mild [ ]Moderate [ ]Severe     Respiratory Distress Observation Scale (RDOS):   A score of 0 to 2 signifies little or no respiratory distress, 3 signifies mild distress, scores 4 to 6 indicate moderate distress, and scores greater than 7 signify severe distress  https://www.[facility].ca/sites/default/files/PDFS/337223-hpqhlioetus-tfpfimuw-nfilblgavnr-qlmlp.pdf    Anxiety:                             [x ]None[ ]Mild [ ]Moderate [ ]Severe   Fatigue:                             [x ]None[ ]Mild [ ]Moderate [ ]Severe   Nausea:                             [ ]None[ ]Mild [x ]Moderate [ ]Severe   Loss of appetite:              [ ]None[ ]Mild [ x]Moderate [ ]Severe   Constipation:                    [ ]None[ ]Mild [x ]Moderate [ ]Severe    Other Symptoms:  [x ]All other review of systems negative     Palliative Performance Status Version 2:         50%    http://npcrc.org/files/news/palliative_performance_scale_ppsv2.pdf    PHYSICAL EXAM:  Vital Signs Last 24 Hrs  T(C): 36.4 (07 Dec 2023 13:48), Max: 36.7 (07 Dec 2023 05:17)  T(F): 97.6 (07 Dec 2023 13:48), Max: 98 (07 Dec 2023 05:17)  HR: 88 (07 Dec 2023 13:48) (78 - 96)  BP: 132/71 (07 Dec 2023 13:48) (121/64 - 132/71)  BP(mean): --  RR: 18 (07 Dec 2023 13:48) (18 - 18)  SpO2: 96% (07 Dec 2023 03:28) (96% - 96%)    Parameters below as of 07 Dec 2023 03:28  Patient On (Oxygen Delivery Method): room air      GENERAL:  [ ] No acute distress [ ]Lethargic  [ ]Unarousable  [x ]Verbal  [ ]Non-Verbal [ ]Cachexia    BEHAVIORAL/PSYCH:  [ x]Alert and Oriented x4  [ ] Anxiety [ ] Delirium [ ] Agitation [ ] Calm   EYES: [x ] No scleral icterus [ ] Scleral icterus [ ] Closed  ENMT:  [ ]Dry mouth  [x ]No external oral lesions [ ] No external ear or nose lesions  CARDIOVASCULAR:  [ ]Regular [ ]Irregular [ ]Tachy [ x]Not Tachy  [ ]Saleem [ ] Edema [ ] No edema  PULMONARY:  [ ]Tachypnea  [ ]Audible excessive secretions [x ] No labored breathing [ ] labored breathing  GASTROINTESTINAL: [x ]Soft  [ ]Distended  [ ]Not distended [ ]Non tender [ ]Tender  MUSCULOSKELETAL: [ ]No clubbing [ ] clubbing  [x] No cyanosis [ ] cyanosis  NEUROLOGIC: [ ]No focal deficits  [x ]Follows commands  [ ]Does not follow commands  [ ]Cognitive impairment  [ ]Dysphagia  [ ]Dysarthria  [ ]Paresis   SKIN: [ ] Jaundiced [x ] Non-jaundiced [ ]Rash [ ]No Rash [ ] Warm [ ] Dry  MISC/LINES: [ ] ET tube [ ] Trach [ ]NGT/OGT [ ]PEG [ ]Mcguire    LABS: reviewed by me                                      CAPILLARY BLOOD GLUCOSE      POCT Blood Glucose.: 99 mg/dL (07 Dec 2023 11:57)              RADIOLOGY & ADDITIONAL STUDIES: reviewed by me    < from: CT Abdomen and Pelvis w/ Oral Cont and w/ IV Cont (12.03.23 @ 12:48) >    IMPRESSION:    Worsening diffusely infiltrative pancreatic mass, difficult to delineate,   with increased soft tissue infiltration in the peripancreatic region, and   increased cystic or necrotic components. Marked gastric distention with   diluted contrast. Small bowel collapsed. No definite enteric contrast  within bowel appreciated. Consider possibility of upper GI obstruction   secondary to worsening pancreatic neoplasm.    Moderate colonic stool burden, with persistent caliber change at   rectosigmoid region. Increased size of pelvic, perirectal soft tissue   mass, measuring up to 2.5 cm, previously 1.5 cm on PET, however it is   unclear whether this is causing a lower GI tract obstruction given   similar anatomy to prior.    Innumerable peritoneal metastases, increased in size and number since   8/29/2023 PET/CT.    New intrahepatic and extrahepatic biliary dilation.    < end of copied text >      EKG: reviewed by me    < from: 12 Lead ECG (12.04.23 @ 02:52) >  Ventricular Rate 95 BPM    Atrial Rate 95 BPM    P-R Interval 138 ms    QRS Duration 78 ms    Q-T Interval 350 ms    QTC Calculation(Bazett) 439 ms    P Axis 59 degrees    R Axis -52 degrees    T Axis 55 degrees    Diagnosis Line Normal sinus rhythm  Left anterior fascicular block  Cannot rule out Anteroseptal infarct , age undetermined  Abnormal ECG    < end of copied text >        PROTEIN CALORIE MALNUTRITION PRESENT: [ ]mild [ ]moderate [ ]severe [ ]underweight [ ]morbid obesity  https://www.andeal.org/vault/2440/web/files/ONC/Table_Clinical%20Characteristics%20to%20Document%20Malnutrition-White%20JV%20et%20al%202012.pdf      Weight (kg): 65.8 (07-24-23 @ 19:42)  [ ]PPSV2 < or = to 30% [ ]significant weight loss  [ ]poor nutritional intake  [ ]anasarca      [ ]Artificial Nutrition      Palliative Care Spiritual/Emotional Screening Tool Question  Severity (0-4):                    OR                    [ x] Unable to determine. Will assess at later time if appropriate.  Score of 2 or greater indicates recommendation of Chaplaincy and/or SW referral  Chaplaincy Referral: [ ] Yes [ ] Refused [ ] Following     Caregiver [place]:  [ ] Yes [ ] No    OR    [x ] Unable to determine. Will assess at later time if appropriate.  Social Work Referral [ ]  Patient and Family Centered Care Referral [ ]    Anticipatory Grief Present: [ ] Yes [ ] No    OR     [ x] Unable to determine. Will assess at later time if appropriate.  Social Work Referral [ ]  Patient and Family Centered Care Referral [ ]    Patient discussed with primary medical team MD  Palliative care education provided to patient and/or family

## 2023-12-08 ENCOUNTER — TRANSCRIPTION ENCOUNTER (OUTPATIENT)
Age: 73
End: 2023-12-08

## 2023-12-08 LAB
GLUCOSE BLDC GLUCOMTR-MCNC: 103 MG/DL — HIGH (ref 70–99)
GLUCOSE BLDC GLUCOMTR-MCNC: 103 MG/DL — HIGH (ref 70–99)
GLUCOSE BLDC GLUCOMTR-MCNC: 89 MG/DL — SIGNIFICANT CHANGE UP (ref 70–99)
GLUCOSE BLDC GLUCOMTR-MCNC: 89 MG/DL — SIGNIFICANT CHANGE UP (ref 70–99)
GLUCOSE BLDC GLUCOMTR-MCNC: 90 MG/DL — SIGNIFICANT CHANGE UP (ref 70–99)
GLUCOSE BLDC GLUCOMTR-MCNC: 90 MG/DL — SIGNIFICANT CHANGE UP (ref 70–99)

## 2023-12-08 PROCEDURE — 99232 SBSQ HOSP IP/OBS MODERATE 35: CPT

## 2023-12-08 PROCEDURE — 44373 SMALL BOWEL ENDOSCOPY: CPT

## 2023-12-08 RX ORDER — PANTOPRAZOLE SODIUM 20 MG/1
1 TABLET, DELAYED RELEASE ORAL
Qty: 60 | Refills: 0
Start: 2023-12-08 | End: 2024-01-06

## 2023-12-08 RX ORDER — ALLOPURINOL 300 MG
0 TABLET ORAL
Qty: 0 | Refills: 0 | DISCHARGE

## 2023-12-08 RX ORDER — LISINOPRIL 2.5 MG/1
1 TABLET ORAL
Qty: 0 | Refills: 0 | DISCHARGE

## 2023-12-08 RX ORDER — ATORVASTATIN CALCIUM 80 MG/1
1 TABLET, FILM COATED ORAL
Qty: 0 | Refills: 0 | DISCHARGE

## 2023-12-08 RX ORDER — ONDANSETRON 8 MG/1
1 TABLET, FILM COATED ORAL
Qty: 90 | Refills: 0
Start: 2023-12-08 | End: 2024-01-06

## 2023-12-08 RX ADMIN — ENOXAPARIN SODIUM 40 MILLIGRAM(S): 100 INJECTION SUBCUTANEOUS at 06:23

## 2023-12-08 RX ADMIN — ONDANSETRON 4 MILLIGRAM(S): 8 TABLET, FILM COATED ORAL at 09:36

## 2023-12-08 NOTE — CONSULT NOTE ADULT - SUBJECTIVE AND OBJECTIVE BOX
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 Che), HTN, HLD, DM presents with worsening N/V and constipation. He states that he has had constipation and vomiting for the past month. He is currently admitted and is feeling better. On clears today. [facility] consulted for Venting PEG placement.       PAST MEDICAL & SURGICAL HISTORY:  HTN (hypertension)  Diabetes  Hypercholesteremia  Gout  H/O constipation  Cancer of abdominal organ between stomach and opancreas    Family Hx:  Father: Non Contributory   Mother: Non Contributory    Social History  Denies Current Tobacco use  Denies Current ETOH use  Denies Current Illicit Drug use         MEDICATIONS  (STANDING):  dextrose 5%. 1000 milliLiter(s) (50 mL/Hr) IV Continuous <Continuous>  dextrose 5%. 1000 milliLiter(s) (100 mL/Hr) IV Continuous <Continuous>  dextrose 50% Injectable 12.5 Gram(s) IV Push once  dextrose 50% Injectable 25 Gram(s) IV Push once  dextrose 50% Injectable 25 Gram(s) IV Push once  enoxaparin Injectable 40 milliGRAM(s) SubCutaneous every 24 hours  glucagon  Injectable 1 milliGRAM(s) IntraMuscular once  insulin lispro (ADMELOG) corrective regimen sliding scale   SubCutaneous at bedtime  insulin lispro (ADMELOG) corrective regimen sliding scale   SubCutaneous three times a day before meals  sodium chloride 0.9%. 1000 milliLiter(s) (75 mL/Hr) IV Continuous <Continuous>    MEDICATIONS  (PRN):  dextrose Oral Gel 15 Gram(s) Oral once PRN Blood Glucose LESS THAN 70 milliGRAM(s)/deciliter  ondansetron Injectable 4 milliGRAM(s) IV Push every 6 hours PRN Nausea and/or Vomiting      Allergies  No Known Drug Allergies  shellfish (Unknown)      Review of Systems  General:  Denies Fatigue, Denies Fever, Denies Weakness ,Denies Weight Loss   HEENT: Denies Trouble Swallowing ,Denies  Sore Throat , Denies Change in hearing/vision/speech ,Denies Dizziness    Cardio: Denies  Chest Pain , Palpitations    Respiratory: Denies worsening of SOB, Denies Cough  Abdomen: See detailed HPI  Neuro: Denies Headache Denies Dizziness, Denies Paresthesias  MSK: Denies pain in Bones/Joints/Muscles   Psych: Patient denies depression, denies suicidal or homicidal ideations  Integ: Patient Denies rash, or new skin lesions     Vital Signs Last 24 Hrs  T(C): 36.7 (08 Dec 2023 05:13), Max: 37.4 (07 Dec 2023 21:09)  T(F): 98 (08 Dec 2023 05:13), Max: 99.3 (07 Dec 2023 21:09)  HR: 89 (08 Dec 2023 05:13) (88 - 95)  BP: 106/61 (08 Dec 2023 05:13) (106/61 - 132/71)  BP(mean): --  RR: 18 (08 Dec 2023 05:13) (18 - 18)  SpO2: --      Physical Exam  Gen: NAD  Head: NC/AT, no visible deformity  ENT: PERRLA, Sclera Non Icteric   Cardio: S1/S2 No S3/S4, Regular  Resp: CTA B/L  Abdomen: Soft, ND/NT  Neuro: AAOx3, Cranial Nerve II-XII intact   Extremities: FROM x 4  Skin: No jaundice, no excoriation     Labs:  No labs    RADIOLOGY & ADDITIONAL STUDIES:  CT Abdomen and Pelvis w/ Oral Cont and w/ IV Cont (12.03.23 @ 12:48) >  IMPRESSION:    Worsening diffusely infiltrative pancreatic mass, difficult to delineate,   with increased soft tissue infiltration in the peripancreatic region, and   increased cystic or necrotic components. Marked gastric distention with   diluted contrast. Small bowel collapsed. No definite enteric contrast  within bowel appreciated. Consider possibility of upper GI obstruction   secondary to worsening pancreatic neoplasm.    Moderate colonic stool burden, with persistent caliber change at   rectosigmoid region. Increased size of pelvic, perirectal soft tissue   mass, measuring up to 2.5 cm, previously 1.5 cm on PET, however it is   unclear whether this is causing a lower GI tract obstruction given   similar anatomy to prior.    Innumerable peritoneal metastases, increased in size and number since   8/29/2023 PET/CT.    New intrahepatic and extrahepatic biliary dilation.     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 Che), HTN, HLD, DM presents with worsening N/V and constipation. He states that he has had constipation and vomiting for the past month. He is currently admitted and is feeling better. On clears today. [facility] consulted for Venting PEG placement.       PAST MEDICAL & SURGICAL HISTORY:  HTN (hypertension)  Diabetes  Hypercholesteremia  Gout  H/O constipation  Cancer of abdominal organ between stomach and opancreas    Family Hx:  Father: Non Contributory   Mother: Non Contributory    Social History  Denies Current Tobacco use  Denies Current ETOH use  Denies Current Illicit Drug use         MEDICATIONS  (STANDING):  dextrose 5%. 1000 milliLiter(s) (50 mL/Hr) IV Continuous <Continuous>  dextrose 5%. 1000 milliLiter(s) (100 mL/Hr) IV Continuous <Continuous>  dextrose 50% Injectable 12.5 Gram(s) IV Push once  dextrose 50% Injectable 25 Gram(s) IV Push once  dextrose 50% Injectable 25 Gram(s) IV Push once  enoxaparin Injectable 40 milliGRAM(s) SubCutaneous every 24 hours  glucagon  Injectable 1 milliGRAM(s) IntraMuscular once  insulin lispro (ADMELOG) corrective regimen sliding scale   SubCutaneous at bedtime  insulin lispro (ADMELOG) corrective regimen sliding scale   SubCutaneous three times a day before meals  sodium chloride 0.9%. 1000 milliLiter(s) (75 mL/Hr) IV Continuous <Continuous>    MEDICATIONS  (PRN):  dextrose Oral Gel 15 Gram(s) Oral once PRN Blood Glucose LESS THAN 70 milliGRAM(s)/deciliter  ondansetron Injectable 4 milliGRAM(s) IV Push every 6 hours PRN Nausea and/or Vomiting      Allergies  No Known Drug Allergies  shellfish (Unknown)      Review of Systems  General:  Denies Fatigue, Denies Fever, Denies Weakness ,Denies Weight Loss   HEENT: Denies Trouble Swallowing ,Denies  Sore Throat , Denies Change in hearing/vision/speech ,Denies Dizziness    Cardio: Denies  Chest Pain , Palpitations    Respiratory: Denies worsening of SOB, Denies Cough  Abdomen: See detailed HPI  Neuro: Denies Headache Denies Dizziness, Denies Paresthesias  MSK: Denies pain in Bones/Joints/Muscles   Psych: Patient denies depression, denies suicidal or homicidal ideations  Integ: Patient Denies rash, or new skin lesions     Vital Signs Last 24 Hrs  T(C): 36.7 (08 Dec 2023 05:13), Max: 37.4 (07 Dec 2023 21:09)  T(F): 98 (08 Dec 2023 05:13), Max: 99.3 (07 Dec 2023 21:09)  HR: 89 (08 Dec 2023 05:13) (88 - 95)  BP: 106/61 (08 Dec 2023 05:13) (106/61 - 132/71)  BP(mean): --  RR: 18 (08 Dec 2023 05:13) (18 - 18)  SpO2: --      Physical Exam  Gen: NAD  Head: NC/AT, no visible deformity  ENT: PERRLA, Sclera Non Icteric   Cardio: S1/S2 No S3/S4, Regular  Resp: CTA B/L  Abdomen: Soft, ND/NT  Neuro: AAOx3, Cranial Nerve II-XII intact   Extremities: FROM x 4  Skin: No jaundice, no excoriation     Labs:  No labs    RADIOLOGY & ADDITIONAL STUDIES:  CT Abdomen and Pelvis w/ Oral Cont and w/ IV Cont (12.03.23 @ 12:48) >  IMPRESSION:    Worsening diffusely infiltrative pancreatic mass, difficult to delineate,   with increased soft tissue infiltration in the peripancreatic region, and   increased cystic or necrotic components. Marked gastric distention with   diluted contrast. Small bowel collapsed. No definite enteric contrast  within bowel appreciated. Consider possibility of upper GI obstruction   secondary to worsening pancreatic neoplasm.    Moderate colonic stool burden, with persistent caliber change at   rectosigmoid region. Increased size of pelvic, perirectal soft tissue   mass, measuring up to 2.5 cm, previously 1.5 cm on PET, however it is   unclear whether this is causing a lower GI tract obstruction given   similar anatomy to prior.    Innumerable peritoneal metastases, increased in size and number since   8/29/2023 PET/CT.    New intrahepatic and extrahepatic biliary dilation.

## 2023-12-08 NOTE — DISCHARGE NOTE PROVIDER - HOSPITAL COURSE
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 Che), HTN, HLD, DM presents with worsening N/V and constipation. He states that he has had constipation and vomiting for the past month. He states BMs are usually small. Pt reports that he has not had a bowel movement for over a week and has now developed nausea and vomiting a/w abdominal pain. Patient reports flatus this morning, last vomiting episode was this morning as well. However, while in the ED he was tolerating PO diet while unknowingly being NPO. He has had a 20lb unintentional weight loss the past 3 months. He denies sick contacts, fever, chills, headache, dizziness, fever, hematemesis, or bloody stools,     In the ED:  T(C): 36.7 (12-03-23 @ 19:54), Max: 36.7 (12-03-23 @ 15:58)  HR: 78 (12-03-23 @ 19:54) (78 - 100)  BP: 112/71 (12-03-23 @ 19:54) (112/70 - 120/72)  RR: 18 (12-03-23 @ 19:54) (18 - 19)  SpO2: 98% (12-03-23 @ 19:54) (98% - 99%)    WBC: 6.7  lactate: 1.7  lipase: 12    Patient was evaluated by hematology/oncology, surgery, however there were no interventions available; his cancer was refractory to chemotherapy done prior to admission. Was evaluated by palliative; patient was made DNR/DNI, and was interested in hospice after discharge. On the floor patient was monitored for nausea, vomiting, and bowel movement from his gastric outlet obstruction; he was found having difficulty tolerating clear liquid diet and was mostly constipated however having some bowel movement. Patient was interested in getting a venting PEG tube for comfort.     #Malignant GOO   #Metastatic pancreatic cancer with progression of disease  - CT scan showed pancreatic mass, difficult to delineate, with increased soft tissue infiltration in the peripancreatic region, and increased cystic or necrotic components. Marked gastric distention with diluted contrast. No definite enteric contrast within bowel appreciated (hyperdense stool).  Moderate colonic stool burden, with persistent caliber change at rectosigmoid region. Increased size of pelvic, perirectal soft tissue mass, measuring up to 2.5 cm, previously 1.5 cm on PET, however it is unclear whether this is causing a lower GI tract obstruction   Innumerable peritoneal metastases, increased in size and number since   8/29/2023 PET/CT. Mild ascites. No pneumoperitoneum. No abscess.- no BM in 1 week  - Unfortunately, no further treatment options for pancreatic cancer can be offered to Mr. Holloway at this time at [facility]  - Patient was being seen at [facility] for immunotherapy via clinical trial but discontinued this treatment due to side effects. He declines any further treatment.  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 Che), HTN, HLD, DM presents with worsening N/V and constipation. He states that he has had constipation and vomiting for the past month. He states BMs are usually small. Pt reports that he has not had a bowel movement for over a week and has now developed nausea and vomiting a/w abdominal pain. Patient reports flatus this morning, last vomiting episode was this morning as well. However, while in the ED he was tolerating PO diet while unknowingly being NPO. He has had a 20lb unintentional weight loss the past 3 months. He denies sick contacts, fever, chills, headache, dizziness, fever, hematemesis, or bloody stools,     In the ED:  T(C): 36.7 (12-03-23 @ 19:54), Max: 36.7 (12-03-23 @ 15:58)  HR: 78 (12-03-23 @ 19:54) (78 - 100)  BP: 112/71 (12-03-23 @ 19:54) (112/70 - 120/72)  RR: 18 (12-03-23 @ 19:54) (18 - 19)  SpO2: 98% (12-03-23 @ 19:54) (98% - 99%)    WBC: 6.7  lactate: 1.7  lipase: 12    Patient was evaluated by hematology/oncology, surgery, however there were no interventions available; his cancer was refractory to chemotherapy done prior to admission. Was evaluated by palliative; patient was made DNR/DNI, and was interested in hospice after discharge. On the floor patient was monitored for nausea, vomiting, and bowel movement from his gastric outlet obstruction; he was found having difficulty tolerating clear liquid diet and was mostly constipated however having some bowel movement. Patient was interested in getting a venting PEG tube for comfort.     #Malignant GOO   #Metastatic pancreatic cancer with progression of disease  - CT scan showed pancreatic mass, difficult to delineate, with increased soft tissue infiltration in the peripancreatic region, and increased cystic or necrotic components. Marked gastric distention with diluted contrast. No definite enteric contrast within bowel appreciated (hyperdense stool).  Moderate colonic stool burden, with persistent caliber change at rectosigmoid region. Increased size of pelvic, perirectal soft tissue mass, measuring up to 2.5 cm, previously 1.5 cm on PET, however it is unclear whether this is causing a lower GI tract obstruction   Innumerable peritoneal metastases, increased in size and number since   8/29/2023 PET/CT. Mild ascites. No pneumoperitoneum. No abscess.- no BM in 1 week  - Unfortunately, no further treatment options for pancreatic cancer can be offered to Mr. Holloway at this time at [facility]  - Patient was being seen at [facility] for immunotherapy via clinical trial but discontinued this treatment due to side effects. He declines any further treatment.  - No acute surgical intervention  - Hospice discussed with patient, and he is agreeable to discuss Hospice & Palliative care further  - NS @ 75cc/hr  - NPO if vomiting, otherwise clears  - zofran 4 mg IVP q6h      #HTN  - was on lisinopril 5mg      #HLD  - was on atorvastatin 20mg     #DM  - ISS

## 2023-12-08 NOTE — PRE-ANESTHESIA EVALUATION ADULT - NSANTHPMHFT_GEN_ALL_CORE
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 Che), HTN, HLD, DM presents with worsening N/V and constipation. He states that he has had constipation and vomiting for the past month. He is currently admitted and is feeling better. On clears today. Advanced GI consulted for Venting PEG placement.

## 2023-12-08 NOTE — PROGRESS NOTE ADULT - NUTRITIONAL ASSESSMENT
This patient has been assessed with a concern for Malnutrition and has been determined to have a diagnosis/diagnoses of Moderate protein-calorie malnutrition.    This patient is being managed with:   Diet NPO-  Entered: Dec  8 2023  3:19PM  
This patient has been assessed with a concern for Malnutrition and has been determined to have a diagnosis/diagnoses of Moderate protein-calorie malnutrition.    This patient is being managed with:   Diet Clear Liquid-  Free Water Flush Instructions:  APPLE ENSURE CLEARS  Supplement Feeding Modality:  Oral  Ensure Clear Cans or Servings Per Day:  3       Frequency:  Daily  Entered: Dec  5 2023 12:19PM  
This patient has been assessed with a concern for Malnutrition and has been determined to have a diagnosis/diagnoses of Moderate protein-calorie malnutrition.    This patient is being managed with:   Diet Clear Liquid-  Free Water Flush Instructions:  APPLE ENSURE CLEARS  Supplement Feeding Modality:  Oral  Ensure Clear Cans or Servings Per Day:  3       Frequency:  Daily  Entered: Dec  5 2023 12:19PM  
This patient has been assessed with a concern for Malnutrition and has been determined to have a diagnosis/diagnoses of Moderate protein-calorie malnutrition.    This patient is being managed with:   Diet Clear Liquid-  Free Water Flush Instructions:  APPLE ENSURE CLEARS  Supplement Feeding Modality:  Oral  Ensure Clear Cans or Servings Per Day:  3       Frequency:  Daily  Entered: Dec  5 2023 12:19PM    Diet Clear Liquid-  Entered: Dec  4 2023  6:19PM    The following pending diet order is being considered for treatment of Moderate protein-calorie malnutrition:null

## 2023-12-08 NOTE — PROGRESS NOTE ADULT - REASON FOR ADMISSION
Vomiting and Constipation

## 2023-12-08 NOTE — DISCHARGE NOTE PROVIDER - DETAILS OF MALNUTRITION DIAGNOSIS/DIAGNOSES
This patient has been assessed with a concern for Malnutrition and was treated during this hospitalization for the following Nutrition diagnosis/diagnoses:     -  12/05/2023: Moderate protein-calorie malnutrition

## 2023-12-08 NOTE — PROGRESS NOTE ADULT - SUBJECTIVE AND OBJECTIVE BOX
Pt seen and examined at bedside.  Not tolerating PO intake.       VITAL SIGNS (Last 24 hrs):  T(C): 36.8 (12-08-23 @ 15:48), Max: 37.4 (12-07-23 @ 21:09)  HR: 94 (12-08-23 @ 15:48) (89 - 95)  BP: 116/67 (12-08-23 @ 15:48) (106/61 - 120/72)  RR: 18 (12-08-23 @ 15:48) (18 - 18)  SpO2: 96% (12-08-23 @ 15:48) (96% - 96%)  Wt(kg): --  Daily Height in cm: 172.7 (08 Dec 2023 15:48)    Daily     I&O's Summary      PHYSICAL EXAM:  GENERAL: NAD   HEAD:  Atraumatic, Normocephalic  EYES:   conjunctiva and sclera clear  NECK: Supple, No JVD  CHEST/LUNG: Clear to auscultation bilaterally; No wheeze  HEART: Regular rate and rhythm; No murmurs, rubs, or gallops  ABDOMEN: Soft, Nontender, Nondistended; Bowel sounds present  EXTREMITIES:  2+ Peripheral Pulses, No clubbing, cyanosis, or edema  PSYCH: AAOx3  NEUROLOGY: non-focal  SKIN: No rashes or lesions    Labs Reviewed                      MEDICATIONS  (STANDING):  dextrose 5%. 1000 milliLiter(s) (50 mL/Hr) IV Continuous <Continuous>  dextrose 5%. 1000 milliLiter(s) (100 mL/Hr) IV Continuous <Continuous>  dextrose 50% Injectable 12.5 Gram(s) IV Push once  dextrose 50% Injectable 25 Gram(s) IV Push once  dextrose 50% Injectable 25 Gram(s) IV Push once  enoxaparin Injectable 40 milliGRAM(s) SubCutaneous every 24 hours  glucagon  Injectable 1 milliGRAM(s) IntraMuscular once  insulin lispro (ADMELOG) corrective regimen sliding scale   SubCutaneous three times a day before meals  insulin lispro (ADMELOG) corrective regimen sliding scale   SubCutaneous at bedtime  sodium chloride 0.9%. 1000 milliLiter(s) (75 mL/Hr) IV Continuous <Continuous>    MEDICATIONS  (PRN):  dextrose Oral Gel 15 Gram(s) Oral once PRN Blood Glucose LESS THAN 70 milliGRAM(s)/deciliter  ondansetron Injectable 4 milliGRAM(s) IV Push every 6 hours PRN Nausea and/or Vomiting

## 2023-12-08 NOTE — PROGRESS NOTE ADULT - TIME BILLING
Time above includes time spent preparing to see the patient, obtaining and/or reviewing separately obtained history, performing a medically appropriate examination and/or evaluation, counseling and educating the patient/family/caregiver, referring and communicating with other health care professionals (when not separately reported), documenting clinical information in the electronic or other health record, independently interpreting results (not separately reported) and communicating results to the patient/family/caregiver, and/or care coordination (not separately reported).

## 2023-12-08 NOTE — PROGRESS NOTE ADULT - PROBLEM SELECTOR PLAN 1
With carcinomatosis.  -Patient states went to [facility] for clinical trial, but did "not go well"  -no additional treatment options  -treatment of N/V  -hospice discussed - consult placed  -f/u heme onc  -ongoing GOC With carcinomatosis.  -Patient states went to [facility] for clinical trial, but did "not go well"  -no additional treatment options  -treatment of N/V  -hospice discussed - consult placed  -f/u heme onc  -ongoing GOC

## 2023-12-08 NOTE — PROGRESS NOTE ADULT - SUBJECTIVE AND OBJECTIVE BOX
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 Che), HTN, HLD, DM presents with worsening N/V and constipation. He states that he has had constipation and vomiting for the past month. He states BMs are usually small. Pt reports that he has not had a bowel movement for over a week and has now developed nausea and vomiting a/w abdominal pain. Patient reports flatus this morning, last vomiting episode was this morning as well. However, while in the ED he was tolerating PO diet while unknowingly being NPO. He has had a 20lb unintentional weight loss the past 3 months. He denies sick contacts, fever, chills, headache, dizziness, fever, hematemesis, or bloody stools,     Interval history  -Patient seen at bedside  -Denied pain or SOB at time of visit  -He is awaiting discussion with GI  -States he can't keep food down     ADVANCE DIRECTIVES:     [ ] Full Code [x ] DNR  MOLST  [ ]  Living Will  [ ]   DECISION MAKER(s):  [ ] Health Care Proxy(s)  [x ] Surrogate(s)  [ ] Guardian           Name(s): Phone Number(s):  Wife June      BASELINE (I)ADL(s) (prior to admission):    [place]: [ ]Total  [ ] Moderate [ ]Dependent  Palliative Performance Status Version 2:         %    http://npcrc.org/files/news/palliative_performance_scale_ppsv2.pdf    Allergies    No Known Drug Allergies  shellfish (Unknown)    Intolerances    MEDICATIONS  (STANDING):  dextrose 5%. 1000 milliLiter(s) (100 mL/Hr) IV Continuous <Continuous>  dextrose 5%. 1000 milliLiter(s) (50 mL/Hr) IV Continuous <Continuous>  dextrose 50% Injectable 12.5 Gram(s) IV Push once  dextrose 50% Injectable 25 Gram(s) IV Push once  dextrose 50% Injectable 25 Gram(s) IV Push once  enoxaparin Injectable 40 milliGRAM(s) SubCutaneous every 24 hours  glucagon  Injectable 1 milliGRAM(s) IntraMuscular once  insulin lispro (ADMELOG) corrective regimen sliding scale   SubCutaneous at bedtime  insulin lispro (ADMELOG) corrective regimen sliding scale   SubCutaneous three times a day before meals  sodium chloride 0.9%. 1000 milliLiter(s) (75 mL/Hr) IV Continuous <Continuous>    MEDICATIONS  (PRN):  dextrose Oral Gel 15 Gram(s) Oral once PRN Blood Glucose LESS THAN 70 milliGRAM(s)/deciliter  ondansetron Injectable 4 milliGRAM(s) IV Push every 6 hours PRN Nausea and/or Vomiting        PRESENT SYMPTOMS: [ ]Unable to obtain due to poor mentation   Source if other than patient:  [ ]Family   [ ]Team     Pain: [ ]yes [ x]no  QOL impact -   Location -                    Aggravating factors -  Quality -  Radiation -  Timing-  Severity (0-10 scale):  Minimal acceptable level (0-10 scale):     CPOT:    https://www.[facility].org/getattachment/[email or web address]/Critical-Care-Pain-Observation-Tool-(CPOT)    PAIN AD Score:   http://geriatrictoolkit.[facility]/cog/painad.pdf (press ctrl +  left click to view)    Dyspnea:                           [x ]None[ ]Mild [ ]Moderate [ ]Severe     Respiratory Distress Observation Scale (RDOS):   A score of 0 to 2 signifies little or no respiratory distress, 3 signifies mild distress, scores 4 to 6 indicate moderate distress, and scores greater than 7 signify severe distress  [email or web address]://www.[facility].ca/sites/default/files/PDFS/910991-bizfmabdrmr-rhbxyvet-buczpdbtrsi-rfbrp.pdf    Anxiety:                             [x ]None[ ]Mild [ ]Moderate [ ]Severe   Fatigue:                             [x ]None[ ]Mild [ ]Moderate [ ]Severe   Nausea:                             [ ]None[ ]Mild [x ]Moderate [ ]Severe   Loss of appetite:              [ ]None[ ]Mild [ x]Moderate [ ]Severe   Constipation:                    [ ]None[ ]Mild [x ]Moderate [ ]Severe    Other Symptoms:  [x ]All other review of systems negative     Palliative Performance Status Version 2:         50%    http://npcrc.org/files/news/palliative_performance_scale_ppsv2.pdf    PHYSICAL EXAM:  Vital Signs Last 24 Hrs  T(C): 36.8 (08 Dec 2023 14:14), Max: 37.4 (07 Dec 2023 21:09)  T(F): 98.2 (08 Dec 2023 14:14), Max: 99.3 (07 Dec 2023 21:09)  HR: 94 (08 Dec 2023 14:14) (89 - 95)  BP: 116/67 (08 Dec 2023 14:14) (106/61 - 120/72)  BP(mean): --  RR: 18 (08 Dec 2023 14:14) (18 - 18)  SpO2: --    GENERAL:  [ ] No acute distress [ ]Lethargic  [ ]Unarousable  [x ]Verbal  [ ]Non-Verbal [ ]Cachexia    BEHAVIORAL/PSYCH:  [ x]Alert and Oriented x4  [ ] Anxiety [ ] Delirium [ ] Agitation [ ] Calm   EYES: [x ] No scleral icterus [ ] Scleral icterus [ ] Closed  ENMT:  [ ]Dry mouth  [x ]No external oral lesions [ ] No external ear or nose lesions  CARDIOVASCULAR:  [ ]Regular [ ]Irregular [ ]Tachy [ x]Not Tachy  [ ]Saleem [ ] Edema [ ] No edema  PULMONARY:  [ ]Tachypnea  [ ]Audible excessive secretions [x ] No labored breathing [ ] labored breathing  GASTROINTESTINAL: [x ]Soft  [ ]Distended  [ ]Not distended [ ]Non tender [ ]Tender  MUSCULOSKELETAL: [ ]No clubbing [ ] clubbing  [x] No cyanosis [ ] cyanosis  NEUROLOGIC: [ ]No focal deficits  [x ]Follows commands  [ ]Does not follow commands  [ ]Cognitive impairment  [ ]Dysphagia  [ ]Dysarthria  [ ]Paresis   SKIN: [ ] Jaundiced [x ] Non-jaundiced [ ]Rash [ ]No Rash [ ] Warm [ ] Dry  MISC/LINES: [ ] ET tube [ ] Trach [ ]NGT/OGT [ ]PEG [ ]Mcguire    LABS: reviewed by me                                      CAPILLARY BLOOD GLUCOSE      POCT Blood Glucose.: 103 mg/dL (08 Dec 2023 11:35)                RADIOLOGY & ADDITIONAL STUDIES: reviewed by me    < from: CT Abdomen and Pelvis w/ Oral Cont and w/ IV Cont (12.03.23 @ 12:48) >    IMPRESSION:    Worsening diffusely infiltrative pancreatic mass, difficult to delineate,   with increased soft tissue infiltration in the peripancreatic region, and   increased cystic or necrotic components. Marked gastric distention with   diluted contrast. Small bowel collapsed. No definite enteric contrast  within bowel appreciated. Consider possibility of upper GI obstruction   secondary to worsening pancreatic neoplasm.    Moderate colonic stool burden, with persistent caliber change at   rectosigmoid region. Increased size of pelvic, perirectal soft tissue   mass, measuring up to 2.5 cm, previously 1.5 cm on PET, however it is   unclear whether this is causing a lower GI tract obstruction given   similar anatomy to prior.    Innumerable peritoneal metastases, increased in size and number since   8/29/2023 PET/CT.    New intrahepatic and extrahepatic biliary dilation.    < end of copied text >      EKG: reviewed by me    < from: 12 Lead ECG (12.04.23 @ 02:52) >  Ventricular Rate 95 BPM    Atrial Rate 95 BPM    P-R Interval 138 ms    QRS Duration 78 ms    Q-T Interval 350 ms    QTC Calculation(Bazett) 439 ms    P Axis 59 degrees    R Axis -52 degrees    T Axis 55 degrees    Diagnosis Line Normal sinus rhythm  Left anterior fascicular block  Cannot rule out Anteroseptal infarct , age undetermined  Abnormal ECG    < end of copied text >        PROTEIN CALORIE MALNUTRITION PRESENT: [ ]mild [ ]moderate [ ]severe [ ]underweight [ ]morbid obesity  https://www.andeal.org/vault/2440/web/files/ONC/Table_Clinical%20Characteristics%20to%20Document%20Malnutrition-White%20JV%20et%20al%202012.pdf      Weight (kg): 65.8 (07-24-23 @ 19:42)  [ ]PPSV2 < or = to 30% [ ]significant weight loss  [ ]poor nutritional intake  [ ]anasarca      [ ]Artificial Nutrition      Palliative Care Spiritual/Emotional Screening Tool Question  Severity (0-4):                    OR                    [ x] Unable to determine. Will assess at later time if appropriate.  Score of 2 or greater indicates recommendation of Chaplaincy and/or SW referral  Chaplaincy Referral: [ ] Yes [ ] Refused [ ] Following     Caregiver [place]:  [ ] Yes [ ] No    OR    [x ] Unable to determine. Will assess at later time if appropriate.  Social Work Referral [ ]  Patient and Family Centered Care Referral [ ]    Anticipatory Grief Present: [ ] Yes [ ] No    OR     [ x] Unable to determine. Will assess at later time if appropriate.  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 Che), HTN, HLD, DM presents with worsening N/V and constipation. He states that he has had constipation and vomiting for the past month. He states BMs are usually small. Pt reports that he has not had a bowel movement for over a week and has now developed nausea and vomiting a/w abdominal pain. Patient reports flatus this morning, last vomiting episode was this morning as well. However, while in the ED he was tolerating PO diet while unknowingly being NPO. He has had a 20lb unintentional weight loss the past 3 months. He denies sick contacts, fever, chills, headache, dizziness, fever, hematemesis, or bloody stools,     Interval history  -Patient seen at bedside  -Denied pain or SOB at time of visit  -He is awaiting discussion with GI  -States he can't keep food down     ADVANCE DIRECTIVES:     [ ] Full Code [x ] DNR  MOLST  [ ]  Living Will  [ ]   DECISION MAKER(s):  [ ] Health Care Proxy(s)  [x ] Surrogate(s)  [ ] Guardian           Name(s): Phone Number(s):  Wife June      BASELINE (I)ADL(s) (prior to admission):    [place]: [ ]Total  [ ] Moderate [ ]Dependent  Palliative Performance Status Version 2:         %    http://npcrc.org/files/news/palliative_performance_scale_ppsv2.pdf    Allergies    No Known Drug Allergies  shellfish (Unknown)    Intolerances    MEDICATIONS  (STANDING):  dextrose 5%. 1000 milliLiter(s) (100 mL/Hr) IV Continuous <Continuous>  dextrose 5%. 1000 milliLiter(s) (50 mL/Hr) IV Continuous <Continuous>  dextrose 50% Injectable 12.5 Gram(s) IV Push once  dextrose 50% Injectable 25 Gram(s) IV Push once  dextrose 50% Injectable 25 Gram(s) IV Push once  enoxaparin Injectable 40 milliGRAM(s) SubCutaneous every 24 hours  glucagon  Injectable 1 milliGRAM(s) IntraMuscular once  insulin lispro (ADMELOG) corrective regimen sliding scale   SubCutaneous at bedtime  insulin lispro (ADMELOG) corrective regimen sliding scale   SubCutaneous three times a day before meals  sodium chloride 0.9%. 1000 milliLiter(s) (75 mL/Hr) IV Continuous <Continuous>    MEDICATIONS  (PRN):  dextrose Oral Gel 15 Gram(s) Oral once PRN Blood Glucose LESS THAN 70 milliGRAM(s)/deciliter  ondansetron Injectable 4 milliGRAM(s) IV Push every 6 hours PRN Nausea and/or Vomiting        PRESENT SYMPTOMS: [ ]Unable to obtain due to poor mentation   Source if other than patient:  [ ]Family   [ ]Team     Pain: [ ]yes [ x]no  QOL impact -   Location -                    Aggravating factors -  Quality -  Radiation -  Timing-  Severity (0-10 scale):  Minimal acceptable level (0-10 scale):     CPOT:    https://www.[facility].org/getattachment/[email or web address]/Critical-Care-Pain-Observation-Tool-(CPOT)    PAIN AD Score:   http://geriatrictoolkit.[facility]/cog/painad.pdf (press ctrl +  left click to view)    Dyspnea:                           [x ]None[ ]Mild [ ]Moderate [ ]Severe     Respiratory Distress Observation Scale (RDOS):   A score of 0 to 2 signifies little or no respiratory distress, 3 signifies mild distress, scores 4 to 6 indicate moderate distress, and scores greater than 7 signify severe distress  https://www.[facility].ca/sites/default/files/PDFS/456492-ulqiytlrsjd-espqpgve-mumzxlmqhui-dqusk.pdf    Anxiety:                             [x ]None[ ]Mild [ ]Moderate [ ]Severe   Fatigue:                             [x ]None[ ]Mild [ ]Moderate [ ]Severe   Nausea:                             [ ]None[ ]Mild [x ]Moderate [ ]Severe   Loss of appetite:              [ ]None[ ]Mild [ x]Moderate [ ]Severe   Constipation:                    [ ]None[ ]Mild [x ]Moderate [ ]Severe    Other Symptoms:  [x ]All other review of systems negative     Palliative Performance Status Version 2:         50%    http://npcrc.org/files/news/palliative_performance_scale_ppsv2.pdf    PHYSICAL EXAM:  Vital Signs Last 24 Hrs  T(C): 36.8 (08 Dec 2023 14:14), Max: 37.4 (07 Dec 2023 21:09)  T(F): 98.2 (08 Dec 2023 14:14), Max: 99.3 (07 Dec 2023 21:09)  HR: 94 (08 Dec 2023 14:14) (89 - 95)  BP: 116/67 (08 Dec 2023 14:14) (106/61 - 120/72)  BP(mean): --  RR: 18 (08 Dec 2023 14:14) (18 - 18)  SpO2: --    GENERAL:  [ ] No acute distress [ ]Lethargic  [ ]Unarousable  [x ]Verbal  [ ]Non-Verbal [ ]Cachexia    BEHAVIORAL/PSYCH:  [ x]Alert and Oriented x4  [ ] Anxiety [ ] Delirium [ ] Agitation [ ] Calm   EYES: [x ] No scleral icterus [ ] Scleral icterus [ ] Closed  ENMT:  [ ]Dry mouth  [x ]No external oral lesions [ ] No external ear or nose lesions  CARDIOVASCULAR:  [ ]Regular [ ]Irregular [ ]Tachy [ x]Not Tachy  [ ]Saleem [ ] Edema [ ] No edema  PULMONARY:  [ ]Tachypnea  [ ]Audible excessive secretions [x ] No labored breathing [ ] labored breathing  GASTROINTESTINAL: [x ]Soft  [ ]Distended  [ ]Not distended [ ]Non tender [ ]Tender  MUSCULOSKELETAL: [ ]No clubbing [ ] clubbing  [x] No cyanosis [ ] cyanosis  NEUROLOGIC: [ ]No focal deficits  [x ]Follows commands  [ ]Does not follow commands  [ ]Cognitive impairment  [ ]Dysphagia  [ ]Dysarthria  [ ]Paresis   SKIN: [ ] Jaundiced [x ] Non-jaundiced [ ]Rash [ ]No Rash [ ] Warm [ ] Dry  MISC/LINES: [ ] ET tube [ ] Trach [ ]NGT/OGT [ ]PEG [ ]Mcguire    LABS: reviewed by me                                      CAPILLARY BLOOD GLUCOSE      POCT Blood Glucose.: 103 mg/dL (08 Dec 2023 11:35)                RADIOLOGY & ADDITIONAL STUDIES: reviewed by me    < from: CT Abdomen and Pelvis w/ Oral Cont and w/ IV Cont (12.03.23 @ 12:48) >    IMPRESSION:    Worsening diffusely infiltrative pancreatic mass, difficult to delineate,   with increased soft tissue infiltration in the peripancreatic region, and   increased cystic or necrotic components. Marked gastric distention with   diluted contrast. Small bowel collapsed. No definite enteric contrast  within bowel appreciated. Consider possibility of upper GI obstruction   secondary to worsening pancreatic neoplasm.    Moderate colonic stool burden, with persistent caliber change at   rectosigmoid region. Increased size of pelvic, perirectal soft tissue   mass, measuring up to 2.5 cm, previously 1.5 cm on PET, however it is   unclear whether this is causing a lower GI tract obstruction given   similar anatomy to prior.    Innumerable peritoneal metastases, increased in size and number since   8/29/2023 PET/CT.    New intrahepatic and extrahepatic biliary dilation.    < end of copied text >      EKG: reviewed by me    < from: 12 Lead ECG (12.04.23 @ 02:52) >  Ventricular Rate 95 BPM    Atrial Rate 95 BPM    P-R Interval 138 ms    QRS Duration 78 ms    Q-T Interval 350 ms    QTC Calculation(Bazett) 439 ms    P Axis 59 degrees    R Axis -52 degrees    T Axis 55 degrees    Diagnosis Line Normal sinus rhythm  Left anterior fascicular block  Cannot rule out Anteroseptal infarct , age undetermined  Abnormal ECG    < end of copied text >        PROTEIN CALORIE MALNUTRITION PRESENT: [ ]mild [ ]moderate [ ]severe [ ]underweight [ ]morbid obesity  https://www.andeal.org/vault/2440/web/files/ONC/Table_Clinical%20Characteristics%20to%20Document%20Malnutrition-White%20JV%20et%20al%202012.pdf      Weight (kg): 65.8 (07-24-23 @ 19:42)  [ ]PPSV2 < or = to 30% [ ]significant weight loss  [ ]poor nutritional intake  [ ]anasarca      [ ]Artificial Nutrition      Palliative Care Spiritual/Emotional Screening Tool Question  Severity (0-4):                    OR                    [ x] Unable to determine. Will assess at later time if appropriate.  Score of 2 or greater indicates recommendation of Chaplaincy and/or SW referral  Chaplaincy Referral: [ ] Yes [ ] Refused [ ] Following     Caregiver [place]:  [ ] Yes [ ] No    OR    [x ] Unable to determine. Will assess at later time if appropriate.  Social Work Referral [ ]  Patient and Family Centered Care Referral [ ]    Anticipatory Grief Present: [ ] Yes [ ] No    OR     [ x] Unable to determine. Will assess at later time if appropriate.  Social Work Referral [ ]  Patient and Family Centered Care Referral [ ]    Patient discussed with primary medical team MD  Palliative care education provided to patient and/or family

## 2023-12-08 NOTE — PROGRESS NOTE ADULT - PROBLEM/PLAN-2
There are no preventive care reminders to display for this patient.    Patient is up to date, no discussion needed.    Recent PHQ 2/9 Score    PHQ 2:  Date Adult PHQ 2 Score   6/20/2019 0       PHQ 9:             
DISPLAY PLAN FREE TEXT

## 2023-12-08 NOTE — DISCHARGE NOTE PROVIDER - PROVIDER TOKENS
PROVIDER:[TOKEN:[[id number]],FOLLOWUP:[2 weeks]] PROVIDER:[TOKEN:[[id number]],FOLLOWUP:[2 weeks]]

## 2023-12-08 NOTE — CHART NOTE - NSCHARTNOTEFT_GEN_A_CORE
PACU ANESTHESIA ADMISSION NOTE      Procedure:   Post op diagnosis:      ____  Intubated  TV:______       Rate: ______      FiO2: ______    _x___  Patent Airway    _x___  Full return of protective reflexes    _x___  Full recovery from anesthesia / back to baseline status    Vitals  SPO2:-99  HR:-109  RR:-11  TEMP:-98    Mental Status:  _x___ Awake   ___x_ Alert   _____ Drowsy   _____ Sedated    Nausea/Vomiting:  _x___  NO       ______Yes,   See Post - Op Orders         Pain Scale (0-10):  __0___    Treatment: _x___ None    __x__ See Post - Op/PCA Orders    Post - Operative Fluids:   ___ Oral   ____x See Post - Op Orders    Plan: Discharge:   ____Home       ___x__Floor     _____Critical Care    _____  Other:_________________    Comments:  Report endorsed to RN in pacu  Vitals stable  No anesthesia issues or complications noted.  Discharge to patient to floor  when criteria met.

## 2023-12-08 NOTE — PROGRESS NOTE ADULT - PROBLEM SELECTOR PLAN 3
-DNR/DNI  -hospice consult placed  -will follow
-DNR/DNI  -hospice consult placed  -will follow
-now DNR/DNI  -new MOLST placed in chart  -hospice consult placed  -will follow
-DNR/DNI  -hospice consult placed  -will follow
-now DNR/DNI  -hospice consult placed  -will follow

## 2023-12-08 NOTE — PROGRESS NOTE ADULT - ASSESSMENT
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 Palliative care consulted for GOC.    Spoke with patient at bedside. He spoke with GI PA earlier in day and is awaiting discussion with full GI team about possible intervention.  He is unable to keep food down at this time. All questions answered.       Education about palliative care provided to patient/family.  See Recs below.    Please call [phone] with questions or concerns 24/7.   We will continue to follow.    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 Palliative care consulted for GOC.    Spoke with patient at bedside. He spoke with GI PA earlier in day and is awaiting discussion with full GI team about possible intervention.  He is unable to keep food down at this time. All questions answered.       Education about palliative care provided to patient/family.  See Recs below.    Please call [phone] with questions or concerns 24/7.   We will continue to follow.

## 2023-12-08 NOTE — CHART NOTE - NSCHARTNOTEFT_GEN_A_CORE
PALLIATIVE MEDICINE INTERDISCIPLINARY TEAM NOTE    Provider:                                        Met with: [  x ] Patient  [ x  ] Family  [   ] Other:    Primary Language: [ x  ] English [   ] Other*:                      *Interpretation provided by:    SUPPORT DIAGNOSES            (Check all that apply)    [   ] EOL issues  [   ] Advanced Illness  [   ] Cultural / spiritual concerns  [ x  ] Pain / suffering  [   ] Dementia / AMS  [   ] Other:  [   ] AD issues  [   ] Grief / loss / sadness  [   ] Discharge issues  [ x  ] Distress / coping    PSYCHOSOCIAL ASSESSMENT OF PATIENT         (Check all that apply)    [  x ] Initial Assessment            [   ] Reassessment          [   ] Not Applicable this visit    Pain/suffering acuity:  [ x  ] None to mild (0-3)           [   ] Moderate (4-6)        [   ] High (7-10)    Mental Status:  [ x  ] Alert/oriented (x3)          [   ] Confused/Altered(x2/x1)         [   ] Non-resp    Functional status:  [   ] Independent w ADLs      [  x ] Needs Assistance             [   ] Bedbound/Full Care    Coping:  [ x  ] Coping well                     [   ] Coping w/difficulty            [   ] Poor coping    Support system:  [  x ] Strong                              [   ] Adequate                        [   ] Inadequate      Past history and medications for:     [ ] Anxiety       [ ] Depression    [ ] Sleep disorders       SERVICE PROVIDED  [   ]Discharge support / facilitation  [   ]AD / goals of care counseling                                  [   ]EOL / death / bereavement counseling  [  x ]Counseling / support                                                [   ] Family meeting  [   ]Prayer / sacrament / ritual                                      [   ] Referral   [   ]Other                                                                       NOTE and Plan of Care (PoC):    patient is a 72 y/o M with pmhx of metastatic signet cell carcinoma, HTN, HLD, DM, presenting with corsening N/V and constipation. visited patient earlier today. patient encountered resting in bed. awake and alert. his son at bedside. Reviewed role of palliative SW with them. He noted having support from his spouse and family. He is hopeful he will get to go home soon. he was awaiting GI f/u. support rendered. contact info provided. [phone] PALLIATIVE MEDICINE INTERDISCIPLINARY TEAM NOTE    Provider:                                        Met with: [  x ] Patient  [ x  ] Family  [   ] Other:    Primary Language: [ x  ] English [   ] Other*:                      *Interpretation provided by:    SUPPORT DIAGNOSES            (Check all that apply)    [   ] EOL issues  [   ] Advanced Illness  [   ] Cultural / spiritual concerns  [ x  ] Pain / suffering  [   ] Dementia / AMS  [   ] Other:  [   ] AD issues  [   ] Grief / loss / sadness  [   ] Discharge issues  [ x  ] Distress / coping    PSYCHOSOCIAL ASSESSMENT OF PATIENT         (Check all that apply)    [  x ] Initial Assessment            [   ] Reassessment          [   ] Not Applicable this visit    Pain/suffering acuity:  [ x  ] None to mild (0-3)           [   ] Moderate (4-6)        [   ] High (7-10)    Mental Status:  [ x  ] Alert/oriented (x3)          [   ] Confused/Altered(x2/x1)         [   ] Non-resp    Functional status:  [   ] Independent w ADLs      [  x ] Needs Assistance             [   ] Bedbound/Full Care    Coping:  [ x  ] Coping well                     [   ] Coping w/difficulty            [   ] Poor coping    Support system:  [  x ] Strong                              [   ] Adequate                        [   ] Inadequate      Past history and medications for:     [ ] Anxiety       [ ] Depression    [ ] Sleep disorders       SERVICE PROVIDED  [   ]Discharge support / facilitation  [   ]AD / goals of care counseling                                  [   ]EOL / death / bereavement counseling  [  x ]Counseling / support                                                [   ] Family meeting  [   ]Prayer / sacrament / ritual                                      [   ] Referral   [   ]Other                                                                       NOTE and Plan of Care (PoC):    patient is a 74 y/o M with pmhx of metastatic signet cell carcinoma, HTN, HLD, DM, presenting with corsening N/V and constipation. visited patient earlier today. patient encountered resting in bed. awake and alert. his son at bedside. Reviewed role of palliative SW with them. He noted having support from his spouse and family. He is hopeful he will get to go home soon. he was awaiting GI f/u. support rendered. contact info provided. [phone]

## 2023-12-08 NOTE — DISCHARGE NOTE PROVIDER - CARE PROVIDER_API CALL
Scar Peace  Internal Medicine  [address] [place] [address]  Phone: [phone]  Fax: [phone]  Follow Up Time: 2 weeks   Scar Peace  Internal Medicine  [address] [place] [address]  Phone: [phone]  Fax: [phone]  Follow Up Time: 2 weeks

## 2023-12-08 NOTE — DISCHARGE NOTE PROVIDER - NSDCDCMDCOMP_GEN_ALL_CORE
Subjective:   Patient: Eugenie Bennett [id number], :1980   Visit date:2018 10:24 AM    Chief Complaint:  Sinusitis (still having sinus congestion/nasal discharge/sinus headaches)    HPI:  Eugenie is a 38 y.o. male who is here for evaluation of sinus pressure and nasal congestion:    MAJOR SYMPTOMS:  Yes  Purulent anterior nasal discharge  Yes  Purulent or discolored posterior nasal discharge  Yes  Nasal congestion or obstruction  Yes  Facial Congestion or fullness  Yes  Hyposmia or anosmia  No  Fever    MINOR SYMPTOMS:  Yes  Headache  Yes  Ear pain, pressure, or fullness  No  Halitosis  Yes  Dental pain  Yes  Fatigue    The diagnosis of acute sinusitis is based on the presence of at least 2 major or 1 major and at least 2 minor symptoms.  Eugenie does meet this criteria.  Clinical Practice Guideline for Acute Bacterial Rhinosinusitis in Children and Adults. Clin Infect Dis 2012; 54:e72    Onset:  2 weeks ago  Exacerbating factors: No  Relieving factors: No  Timing:  worsening          Review of Systems:  -     Allergic/Immunologic: is allergic to lortab [hydrocodone-acetaminophen]..  -     Constitutional: Current temp: 98.1 °F (36.7 °C) (Tympanic)    His meds, allergies, medical, surgical, social & family histories were reviewed & updated:  -     He has a current medication list which includes the following prescription(s): dextroamphetamine-amphetamine, meloxicam, amoxicillin-clavulanate 875-125mg, levocetirizine, prednisone, and sodium,potassium,mag sulfates.  -     He  has a past medical history of GERD (gastroesophageal reflux disease).   -     He does not have any pertinent problems on file.   -     He  has a past surgical history that includes Tonsillectomy; Adenoidectomy; Tympanostomy tube placement; SEPTOPLASTY, NASAL (N/A, 2018); CAUTERIZATION, MUCOSA OF NASAL TURBINATE (Bilateral, 2018); and REPAIR, STENOSIS, NOSE, VESTIBULE (Bilateral, 2018).  -     He  reports that  has never  smoked. His smokeless tobacco use includes chew. He reports that he drinks alcohol. He reports that he does not use drugs.  -     His family history includes Alcohol abuse in his maternal grandfather; Cancer in his maternal grandmother; Colon cancer in his mother.  -     He is allergic to lortab [hydrocodone-acetaminophen].    Objective:     Physical Exam:  Vitals:  Temp 98.1 °F (36.7 °C) (Tympanic)   Resp 17   Wt 96 kg (211 lb 10.3 oz)   BMI 27.17 kg/m²   Appearance:  Well-developed, well-nourished.  Communication:  Able to communicate, no hoarseness.  Head & Face:  Normocephalic, atraumatic, no sinus tenderness, normal facial strength.  Eyes:  Extraocular motions intact.  Ears:  Otoscopy of external auditory canals and tympanic membranes was normal, clinical speech reception thresholds grossly intact, no mass/lesion of auricle.  Nose:  Thick, clear/ tinged with yellow/green mucus bilaterally. Diffuse erythema. No mass. No polyps  Mouth:  No mass/lesion of lips, teeth, gums, hard/soft palate, tongue, tonsils, or oropharynx.  Neck & Lymphatics:  No cervical lymphadenopathy, no neck mass/crepitus/ asymmetry, trachea is midline, no thyroid enlargement/tenderness/mass. Moderate erythema of oropharynx.  Neuro/Psych: Alert with normal mood and affect.   Abdominal: Normal appearance.   Respiration/Chest:  Symmetric expansion during respiration, normal respiratory effort.  Skin:  Warm and intact  Cardiovascular:  No peripheral vascular edema or varicosities.    Assessment & Plan:   Eugenie was seen today for sinusitis.    Diagnoses and all orders for this visit:    Nasal congestion    Acute bacterial rhinosinusitis    Other orders  -     amoxicillin-clavulanate 875-125mg (AUGMENTIN) 875-125 mg per tablet; Take 1 tablet by mouth every 12 (twelve) hours. for 14 days  -     predniSONE (DELTASONE) 20 MG tablet; Take one tablet by mouth three times a day for 3 days.   Then take one tablet by mouth twice a day for 3 days.  "  Then take one tablet by mouth every morning for 3 days.   Then take 1/2 tablet by mouth every morning for 3 days.  -     levocetirizine (XYZAL) 5 MG tablet; Take 1 tablet (5 mg total) by mouth every evening.      Augmentin x 14 days  Prednisone taper  Xyzal QHS  Continue saline rinses  Recheck in 2 wks. If unresolved or re-occurs, will obtain CT of sinuses.     *Note: Avoid fluoroquinolone(s), pt states he has a "bad" rotator cuff and welds for a living.     Reina Hoff, PAC   " This document is complete and the patient is ready for discharge.

## 2023-12-08 NOTE — PROGRESS NOTE ADULT - PROBLEM SELECTOR PROBLEM 1
Signet ring cell carcinoma

## 2023-12-08 NOTE — DISCHARGE NOTE PROVIDER - NSDCFUSCHEDAPPT_GEN_ALL_CORE_FT
Faustino Che  [facility] PreAdmits  Scheduled Appointment: 12/19/2023    [facility]  [facility] [address]  Scheduled Appointment: 12/19/2023    Faustino Che  [facility]mits  Scheduled Appointment: 12/20/2023    Faustino Che  [facility]  [facility] [address]  Scheduled Appointment: 12/20/2023     Faustino Che  [facility] PreAdmits  Scheduled Appointment: 12/19/2023    [facility]  [facility]  Scheduled Appointment: 12/19/2023    Faustino Che  [facility] PreAdmits  Scheduled Appointment: 12/20/2023    Faustino Che  [facility]  [facility] [address]  Scheduled Appointment: 12/20/2023

## 2023-12-08 NOTE — CONSULT NOTE ADULT - ASSESSMENT
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 Che), HTN, HLD, DM presents with worsening N/V and constipation. He states that he has had constipation and vomiting for the past month. He is currently admitted and is feeling better. On clears today. [facility] consulted for Venting PEG placement.       GOO from Cancer   - Not currently obsctructed on exam  - Discussed with patient Venting G with J extension or Endoscopic Gastro-Jejunostomy   - Will discuss with his [facility] DR. Maximilian Larson

## 2023-12-08 NOTE — CHART NOTE - NSCHARTNOTEFT_GEN_A_CORE
s/p PEG with J extension placement.  finding of erosive esophagitis and gastritis.    recommendations:  Can use G port for venting immediately    Can use J port for feedings immediately   PPI BID    Carafate QID

## 2023-12-08 NOTE — DISCHARGE NOTE PROVIDER - CARE PROVIDERS DIRECT ADDRESSES
[email or web address] Flap Text: The defect edges were debeveled with a #15 scalpel blade.  Given the location of the defect, shape of the defect and the proximity to free margins an O-L flap was deemed most appropriate.  Using a sterile surgical marker, an appropriate advancement flap was drawn incorporating the defect and placing the expected incisions within the relaxed skin tension lines where possible.    The area thus outlined was incised deep to adipose tissue with a #15 scalpel blade.  The skin margins were undermined to an appropriate distance in all directions utilizing iris scissors.

## 2023-12-08 NOTE — DISCHARGE NOTE PROVIDER - ATTENDING DISCHARGE PHYSICAL EXAMINATION:
PHYSICAL EXAM:  GENERAL: cachectic   HEAD:  Atraumatic, Normocephalic  EYES:  conjunctiva and sclera clear  NECK: Supple, No JVD  CHEST/LUNG: Clear to auscultation bilaterally; No wheeze  HEART: Regular rate and rhythm; No murmurs, rubs, or gallops  ABDOMEN: Soft, Nontender, Nondistended + GJ tube   EXTREMITIES:  2+ Peripheral Pulses, No clubbing, cyanosis, or edema  PSYCH: AAOx3  NEUROLOGY: non-focal  SKIN: No rashes or lesions

## 2023-12-08 NOTE — PROGRESS NOTE ADULT - PROVIDER SPECIALTY LIST ADULT
Internal Medicine
Palliative Care
Internal Medicine
Palliative Care

## 2023-12-08 NOTE — DISCHARGE NOTE PROVIDER - NSDCMRMEDTOKEN_GEN_ALL_CORE_FT
allopurinol 100 mg oral tablet:   atorvastatin 20 mg oral tablet: 1 tab(s) orally once a day (at bedtime)  lisinopril 5 mg oral tablet: 1 tab(s) orally once a day   morphine 10 mg/0.5 mL oral solution: 0.25 milliliter(s) orally every 6 hours MDD: 20 mg  pantoprazole 40 mg oral delayed release tablet: 1 tab(s) orally 2 times a day  sucralfate 1 g/10 mL oral suspension: 10 milliliter(s) orally every 6 hours

## 2023-12-08 NOTE — PROGRESS NOTE ADULT - ASSESSMENT
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 Che), HTN, HLD, DM presents with worsening N/V and constipation in the setting of worsening diffusely infiltrative pancreatic mass, pelvic/perirectal mass, and intraperitoneal metastases.       #Malignant GOO   #Metastatic pancreatic cancer with progression of disease   - CT scan showed pancreatic mass, difficult to delineate, with increased soft tissue infiltration in the peripancreatic region, and increased cystic or necrotic components. Marked gastric distention with diluted contrast. No definite enteric contrast within bowel appreciated (hyperdense stool).  Moderate colonic stool burden, with persistent caliber change at rectosigmoid region. Increased size of pelvic, perirectal soft tissue mass, measuring up to 2.5 cm, previously 1.5 cm on PET, however it is unclear whether this is causing a lower GI tract obstruction   Innumerable peritoneal metastases, increased in size and number since   8/29/2023 PET/CT. Mild ascites. No pneumoperitoneum. 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 Ceh), HTN, HLD, DM presents with worsening N/V and constipation in the setting of worsening diffusely infiltrative pancreatic mass, pelvic/perirectal mass, and intraperitoneal metastases.       #Malignant GOO   #Metastatic pancreatic cancer with progression of disease   - CT scan showed pancreatic mass, difficult to delineate, with increased soft tissue infiltration in the peripancreatic region, and increased cystic or necrotic components. Marked gastric distention with diluted contrast. No definite enteric contrast within bowel appreciated (hyperdense stool).  Moderate colonic stool burden, with persistent caliber change at rectosigmoid region. Increased size of pelvic, perirectal soft tissue mass, measuring up to 2.5 cm, previously 1.5 cm on PET, however it is unclear whether this is causing a lower GI tract obstruction   Innumerable peritoneal metastases, increased in size and number since   8/29/2023 PET/CT. Mild ascites. No pneumoperitoneum. No abscess.- no BM in 1 week  - No acute surgical intervention  - on clears   - Zofran 4 mg IVP q6h    - Palliative consult - declined hospice   - Oncology consult appreciated   - GI eval for venting PEG     #HTN  - was on lisinopril 5mg      #HLD  - was on atorvastatin 20mg     #DM  - ISS    dvt ppx       Pending: Venting PEG today   spoke with patient

## 2023-12-08 NOTE — PROGRESS NOTE ADULT - PROBLEM SELECTOR PLAN 2
-zofran per primary team  -GI consult for possible venting PEG  -Patient notes no BMs in last 24 hours  -patient on clears  -f/u GI  -likely/possible hospice at home on discharge
-zofran per primary team  -Patient notes BMs in last 24 hours  -awaiting GI for possible venting PEG or intervention  -patient on clears
-zofran per primary team  -NGT if ongoign NV  -IVF per primary team  -f/u EKG   -patient remains NPO
-zofran per primary team  -NGT if ongoign NV  -Patient notes BMs in last 24 hours  -if no concern for complete GOO or SBO, would consider starting aggressive bowel regimen when patient can tolerate PO  -patient on clears
-zofran per primary team  -NGT if ongoign NV  -IVF per primary team  -f/u EKG   -enema per primary team  -if no concern for complete GOO or SBO, would consider starting aggressive bowel regimen when patient can tolerate PO  -patient on clears

## 2023-12-08 NOTE — DISCHARGE NOTE PROVIDER - NSDCCPCAREPLAN_GEN_ALL_CORE_FT
PRINCIPAL DISCHARGE DIAGNOSIS  Diagnosis: Gastric outlet obstruction  Assessment and Plan of Treatment: You were found to have a gastric outlet obstruction due to your pancreatic cancer. Per hematology/oncology and surgery there are not any treatment options.  You were treated with diet restriction as you had difficulty tolerating clear liquid diet. There was the possibility of venting PEG to promote comfort. You were evaluated by palliative team, and you were interested in discussing hospice after discharge. Please return to the ED if you feel unwell, or if you experience worsening symptoms, or any of the following symptoms but not limited to: severe abdominal pain, bleeding, persistent vomiting, seizure. confusion      SECONDARY DISCHARGE DIAGNOSES  Diagnosis: Primary pancreatic cancer with metastasis to other site  Assessment and Plan of Treatment:     Diagnosis: Cachexia  Assessment and Plan of Treatment:      PRINCIPAL DISCHARGE DIAGNOSIS  Diagnosis: Gastric outlet obstruction  Assessment and Plan of Treatment: You were found to have a gastric outlet obstruction due to your pancreatic cancer. Per hematology/oncology and surgery there are not any treatment options.  You were treated with diet restriction as you had difficulty tolerating clear liquid diet. Yo now have a GJ to promote comfort and nutrtion. You refused the tube feeds. You were evaluated by palliative team, and you are being discharge with hospice. Please return to the ED if you feel unwell, or if you experience worsening symptoms, or any of the following symptoms but not limited to: severe abdominal pain, bleeding, persistent vomiting, seizure. confusion      SECONDARY DISCHARGE DIAGNOSES  Diagnosis: Primary pancreatic cancer with metastasis to other site  Assessment and Plan of Treatment:     Diagnosis: Cachexia  Assessment and Plan of Treatment:

## 2023-12-09 ENCOUNTER — TRANSCRIPTION ENCOUNTER (OUTPATIENT)
Age: 73
End: 2023-12-09

## 2023-12-09 VITALS
HEART RATE: 106 BPM | DIASTOLIC BLOOD PRESSURE: 61 MMHG | RESPIRATION RATE: 18 BRPM | TEMPERATURE: 99 F | SYSTOLIC BLOOD PRESSURE: 105 MMHG

## 2023-12-09 LAB
GLUCOSE BLDC GLUCOMTR-MCNC: 76 MG/DL — SIGNIFICANT CHANGE UP (ref 70–99)
GLUCOSE BLDC GLUCOMTR-MCNC: 76 MG/DL — SIGNIFICANT CHANGE UP (ref 70–99)
GLUCOSE BLDC GLUCOMTR-MCNC: 84 MG/DL — SIGNIFICANT CHANGE UP (ref 70–99)
GLUCOSE BLDC GLUCOMTR-MCNC: 84 MG/DL — SIGNIFICANT CHANGE UP (ref 70–99)

## 2023-12-09 PROCEDURE — 99239 HOSP IP/OBS DSCHRG MGMT >30: CPT

## 2023-12-09 RX ORDER — MORPHINE SULFATE 50 MG/1
0.25 CAPSULE, EXTENDED RELEASE ORAL
Qty: 3 | Refills: 0
Start: 2023-12-09 | End: 2023-12-11

## 2023-12-09 RX ORDER — PANTOPRAZOLE SODIUM 20 MG/1
1 TABLET, DELAYED RELEASE ORAL
Qty: 60 | Refills: 0
Start: 2023-12-09 | End: 2024-01-07

## 2023-12-09 RX ORDER — PANTOPRAZOLE SODIUM 20 MG/1
40 TABLET, DELAYED RELEASE ORAL
Refills: 0 | Status: DISCONTINUED | OUTPATIENT
Start: 2023-12-09 | End: 2023-12-09

## 2023-12-09 RX ORDER — SUCRALFATE 1 G
10 TABLET ORAL
Qty: 560 | Refills: 0
Start: 2023-12-09 | End: 2023-12-22

## 2023-12-09 RX ORDER — SUCRALFATE 1 G
1 TABLET ORAL EVERY 6 HOURS
Refills: 0 | Status: DISCONTINUED | OUTPATIENT
Start: 2023-12-09 | End: 2023-12-09

## 2023-12-09 RX ADMIN — ENOXAPARIN SODIUM 40 MILLIGRAM(S): 100 INJECTION SUBCUTANEOUS at 05:41

## 2023-12-09 NOTE — CHART NOTE - NSCHARTNOTEFT_GEN_A_CORE
s/p EGD with PEG and J extension placement:  finding of erosive esophagitis and gastritis.  today: examined, abdomen soft, site is clean, bumper loosened      s/p PEG with J extension placement.      recommendations:  Can use G port for venting   Can use J port for feedings and medications as needed   PPI BID  Carafate QID    can be discharged from [facility] stand point

## 2023-12-09 NOTE — DISCHARGE NOTE NURSING/CASE MANAGEMENT/SOCIAL WORK - NSDCPEFALRISK_GEN_ALL_CORE
For information on Fall & Injury Prevention, visit: [email or web address] OR  [email or web address] OR  https://www.cdc.gov/steadi/patient.html For information on Fall & Injury Prevention, visit: [email or web address] OR  [email or web address] OR  https://www.cdc.gov/steadi/patient.html

## 2023-12-09 NOTE — DISCHARGE NOTE NURSING/CASE MANAGEMENT/SOCIAL WORK - PATIENT PORTAL LINK FT
You can access the FollowMyHealth Patient Portal offered by [facility] by registering at the following website: [email or web address]. By joining [employer]’s FollowMyHealth portal, you will also be able to view your health information using other applications (apps) compatible with our system. You can access the FollowMyHealth Patient Portal offered by [facility] by registering at the following website: [email or web address]. By joining [employer]’s FollowMyHealth portal, you will also be able to view your health information using other applications (apps) compatible with our system.

## 2023-12-18 DIAGNOSIS — E78.00 PURE HYPERCHOLESTEROLEMIA, UNSPECIFIED: ICD-10-CM

## 2023-12-18 DIAGNOSIS — Z51.5 ENCOUNTER FOR PALLIATIVE CARE: ICD-10-CM

## 2023-12-18 DIAGNOSIS — Z66 DO NOT RESUSCITATE: ICD-10-CM

## 2023-12-18 DIAGNOSIS — E44.0 MODERATE PROTEIN-CALORIE MALNUTRITION: ICD-10-CM

## 2023-12-18 DIAGNOSIS — E11.9 TYPE 2 DIABETES MELLITUS WITHOUT COMPLICATIONS: ICD-10-CM

## 2023-12-18 DIAGNOSIS — I10 ESSENTIAL (PRIMARY) HYPERTENSION: ICD-10-CM

## 2023-12-18 DIAGNOSIS — K31.89 OTHER DISEASES OF STOMACH AND DUODENUM: ICD-10-CM

## 2023-12-18 DIAGNOSIS — R64 CACHEXIA: ICD-10-CM

## 2023-12-18 DIAGNOSIS — Z92.21 PERSONAL HISTORY OF ANTINEOPLASTIC CHEMOTHERAPY: ICD-10-CM

## 2023-12-18 DIAGNOSIS — M10.9 GOUT, UNSPECIFIED: ICD-10-CM

## 2023-12-18 DIAGNOSIS — K22.10 ULCER OF ESOPHAGUS WITHOUT BLEEDING: ICD-10-CM

## 2023-12-18 DIAGNOSIS — K31.1 ADULT HYPERTROPHIC PYLORIC STENOSIS: ICD-10-CM

## 2023-12-18 DIAGNOSIS — K29.70 GASTRITIS, UNSPECIFIED, WITHOUT BLEEDING: ICD-10-CM

## 2023-12-18 DIAGNOSIS — R18.8 OTHER ASCITES: ICD-10-CM

## 2023-12-18 DIAGNOSIS — K59.00 CONSTIPATION, UNSPECIFIED: ICD-10-CM

## 2023-12-18 DIAGNOSIS — C78.6 SECONDARY MALIGNANT NEOPLASM OF RETROPERITONEUM AND PERITONEUM: ICD-10-CM

## 2023-12-18 DIAGNOSIS — C16.9 MALIGNANT NEOPLASM OF STOMACH, UNSPECIFIED: ICD-10-CM

## 2023-12-18 DIAGNOSIS — K44.9 DIAPHRAGMATIC HERNIA WITHOUT OBSTRUCTION OR GANGRENE: ICD-10-CM

## 2023-12-18 DIAGNOSIS — C25.9 MALIGNANT NEOPLASM OF PANCREAS, UNSPECIFIED: ICD-10-CM

## 2023-12-18 DIAGNOSIS — Z91.013 ALLERGY TO SEAFOOD: ICD-10-CM

## 2023-12-19 ENCOUNTER — APPOINTMENT (OUTPATIENT)
Dept: HEMATOLOGY ONCOLOGY | Facility: CLINIC | Age: 73
End: 2023-12-19

## 2023-12-20 ENCOUNTER — APPOINTMENT (OUTPATIENT)
Dept: HEMATOLOGY ONCOLOGY | Facility: CLINIC | Age: 73
End: 2023-12-20

## 2024-01-01 NOTE — ASSESSMENT
[Palliative] : Goals of care discussed with patient: Palliative [FreeTextEntry1] : # Metastatic moderate to poorly differentiated adenocarcinoma , dx 07/2022 \par - BRCA (-) \par - s/p laparoscopy on 8.22.2022 with findings of ~1L malignant ascites and peritoneal implants \par - Ca19-9 is 6481, CEA 6.8 from 07/2022 \par - reviewed radiology, pathology and lab workup and had a discussion regarding implications of diagnosis, prognosis and options for management including but not limited to chemotherapy for palliative intent ; had a lengthy discussion regarding Stage IV disease and not curative \par - s/p L Chest port placement with SURG, Dr. Morgan \par - CT CAP from 11.8.2022 is essentially stable, tumor marker is decreasing \par - s/p cycle 12 of 5FU/Leucovorin/Irinotecan on 2/24 , initiated on 9.6.2022 ; discontinued oxaliplatin beginning C12 due to neuropathy \par - STOPPED 5FU/Leucovorin/Irinotecan due to rising Ca19-9 and PET/CT findings\par - PET/CT 3.9.23 showed FDG uptake within upper abdominal peritoneal nodularity adjacent to the stomach and pancreas, pancreatic tail, and a subcentimeter right pelvic sidewall lymph node.  \par - Patient inquired about certification for Medical Marijuana.  Risks and benefits discussed with patient at length.  Patient would like to proceed with certification today.  Patient successfully certified for utilization of Medical Marijuana; online submission made and certificate generated on 04/12/2023.  Patient is a permanent [facility] resident with diagnosis of cancer with associated symptoms who expressed interest and verbal consent for medical marijuana certification and utilization.  Patient educated on various formulations including but not limited to capsule, tablet, oil, powder or tincture with THC:CBD ratio to be determined and titrated per pharmacist consultation and recommendations.  Patient provided with signed copy of certification and patient specific instructions to apply for Registry ID Card Online.  Instructed to visit health.[place].gov/mmp or contact the [facility] Medical Marijuana Program at [phone] or by email at mmp@[employer].[place].gov for more information.  Patient verbalized understanding and has no further questions at this time.\par - c/w cycle 2 of chemotherapy using Gemcitabine + Abraxane (D1,D8,D15 every 28 days) on 4/19 with onbody Neulasta D15 , initiated 3.22.2023 \par \par # Peripheral neuropathy, likely due to chemotherapy and hx of DM \par - oxaliplatin discontinued \par - c/w gabapentin 600mg daily , initiated 01/2023\par - briefly discussed role of Cymbalta in the future if he has no benefit with higher dosing of gabapentin \par \par # H. Pylori infection , dx 07/2022 \par - s/p upper EGD in 07/2022 ; completed antibiotic treatment \par - followup with GI, Dr. Maximilian Larson, as scheduled for management \par \par # Thrombocytopenia, likely due to chemotherapy\par - C1D15 of Gemcitabine/Abraxane held due to low PLTs\par - resolved \par \par RTC in 1 week with CBC, BMP, LFTs, Ca19-9 prior \par \par seen/examined w/ NP Myrtle; note reviewed; case discused\par continue the current chemo no

## 2024-01-22 NOTE — ASU PATIENT PROFILE, ADULT - PAIN CHRONIC, PROFILE
83-year-old male on antiplatelet presents with right-sided epistaxis.  Patient no longer bleeding while in the ED patient observed to continue to have no bleeding DC'd  well apperaing condition
no

## 2024-10-05 NOTE — ASU PATIENT PROFILE, ADULT - HAVE YOU HAD A FIRST COVID-19 BOOSTER?
Pt presenting with midsternal chest pressure, slight SOB after dancing at a party. pain radiates into is back. Denies any palpitations, n/v/d, has normal unlabored respirations, not diaphoretic, no body aches or chills. Pt is a&ox4, ambulatory at baseline, resting comfortably in bed, rails up and wheels locked in place.
Yes

## 2025-06-11 NOTE — DIETITIAN INITIAL EVALUATION ADULT - NUTRITION DIAGNOSITC TERMINOLOGY #1
Admission Reconciliation is Completed  Discharge Reconciliation is Not Complete Admission Reconciliation is Completed  Discharge Reconciliation is Completed Malnutrition...